# Patient Record
Sex: FEMALE | Race: WHITE | NOT HISPANIC OR LATINO | Employment: OTHER | URBAN - METROPOLITAN AREA
[De-identification: names, ages, dates, MRNs, and addresses within clinical notes are randomized per-mention and may not be internally consistent; named-entity substitution may affect disease eponyms.]

---

## 2023-05-05 ENCOUNTER — TELEPHONE (OUTPATIENT)
Dept: FAMILY MEDICINE | Facility: CLINIC | Age: 63
End: 2023-05-05
Payer: COMMERCIAL

## 2023-05-05 NOTE — TELEPHONE ENCOUNTER
Received call from patient. Will be establishing care with Dr. Bella in August and be seen for Annual Exam. Pt on controlled medication, Lyrica and will need refill prior to appointment in August.     Pt scheduled for a virtual visit to discuss medications prior to Annual.    Appointments in Next Year    May 25, 2023 11:30 AM  (Arrive by 11:10 AM)  Provider Visit with Sandie Bella MD  [facility] ([facility] ) [phone]   Aug 23, 2023  2:00 PM  (Arrive by 1:40 PM)  New - Adult Preventative Visit with Sandie Bella MD  [facility] ([facility] ) [phone]          Anastasia Arteaga RN

## 2023-05-25 ENCOUNTER — VIRTUAL VISIT (OUTPATIENT)
Dept: FAMILY MEDICINE | Facility: CLINIC | Age: 63
End: 2023-05-25
Payer: COMMERCIAL

## 2023-05-25 DIAGNOSIS — G62.9 PERIPHERAL POLYNEUROPATHY: ICD-10-CM

## 2023-05-25 DIAGNOSIS — M62.89 PELVIC FLOOR DYSFUNCTION IN FEMALE: ICD-10-CM

## 2023-05-25 DIAGNOSIS — F41.9 ANXIETY: ICD-10-CM

## 2023-05-25 DIAGNOSIS — Z12.11 SCREEN FOR COLON CANCER: ICD-10-CM

## 2023-05-25 DIAGNOSIS — Z78.0 ASYMPTOMATIC POSTMENOPAUSAL STATUS: Primary | ICD-10-CM

## 2023-05-25 DIAGNOSIS — Z13.220 SCREENING FOR HYPERLIPIDEMIA: ICD-10-CM

## 2023-05-25 DIAGNOSIS — C50.912 MALIGNANT NEOPLASM OF LEFT FEMALE BREAST, UNSPECIFIED ESTROGEN RECEPTOR STATUS, UNSPECIFIED SITE OF BREAST (H): ICD-10-CM

## 2023-05-25 PROBLEM — Z12.31 VISIT FOR SCREENING MAMMOGRAM: Status: ACTIVE | Noted: 2023-05-25

## 2023-05-25 PROCEDURE — 99204 OFFICE O/P NEW MOD 45 MIN: CPT | Mod: VID | Performed by: INTERNAL MEDICINE

## 2023-05-25 RX ORDER — PREGABALIN 75 MG/1
75 CAPSULE ORAL 3 TIMES DAILY
Qty: 180 CAPSULE | Refills: 0 | Status: SHIPPED | OUTPATIENT
Start: 2023-05-25 | End: 2023-08-23

## 2023-05-25 RX ORDER — CITALOPRAM HYDROBROMIDE 20 MG/1
20 TABLET ORAL DAILY
Qty: 90 TABLET | Refills: 3 | Status: SHIPPED | OUTPATIENT
Start: 2023-05-25 | End: 2023-11-30

## 2023-05-25 RX ORDER — CITALOPRAM HYDROBROMIDE 10 MG/1
10 TABLET ORAL DAILY
Qty: 90 TABLET | Refills: 3 | Status: SHIPPED | OUTPATIENT
Start: 2023-05-25 | End: 2023-11-30

## 2023-05-25 RX ORDER — PREGABALIN 75 MG/1
75 CAPSULE ORAL 3 TIMES DAILY
COMMUNITY
End: 2023-05-25

## 2023-05-25 RX ORDER — CALCIUM CARBONATE 500(1250)
1 TABLET ORAL 2 TIMES DAILY
COMMUNITY

## 2023-05-25 RX ORDER — CHOLECALCIFEROL (VITAMIN D3) 50 MCG
1 TABLET ORAL DAILY
COMMUNITY
End: 2024-04-30

## 2023-05-25 RX ORDER — SENNOSIDES A AND B 8.6 MG/1
1 TABLET, FILM COATED ORAL DAILY
COMMUNITY
End: 2024-04-30

## 2023-05-25 RX ORDER — CITALOPRAM HYDROBROMIDE 20 MG/1
20 TABLET ORAL DAILY
COMMUNITY
End: 2023-05-25

## 2023-05-25 RX ORDER — CITALOPRAM HYDROBROMIDE 10 MG/1
10 TABLET ORAL DAILY
COMMUNITY
End: 2023-05-25

## 2023-05-25 NOTE — PATIENT INSTRUCTIONS
You are due for dexa  Please call the following number to make appointment :  [phone]  It is located in [address]

## 2023-05-25 NOTE — PROGRESS NOTES
Lorin is a 62 year old who is being evaluated via a billable video visit.      How would you like to obtain your AVS? Mail a copy  If the video visit is dropped, the invitation should be resent by: Text to cell phone: [phone]  Will anyone else be joining your video visit? No      Assessment & Plan     Asymptomatic postmenopausal status  - DX Hip/Pelvis/Spine; Future  - PRIMARY CARE FOLLOW-UP SCHEDULING; Future    Screen for colon cancer  - Colonoscopy Screening  Referral; Future    Anxiety  Stable.  Medication refilled  - citalopram (CELEXA) 20 MG tablet; Take 1 tablet (20 mg) by mouth daily  - citalopram (CELEXA) 10 MG tablet; Take 1 tablet (10 mg) by mouth daily    Pelvic floor dysfunction in female  Refer to physical therapy for pelvic floor therapy  - Physical Therapy Referral; Future    Peripheral polyneuropathy  Stable.  Medication refilled  - pregabalin (LYRICA) 75 MG capsule; Take 1 capsule (75 mg) by mouth 3 times daily    Malignant neoplasm of left female breast, unspecified estrogen receptor status, unspecified site of breast (H)  Stable.  - Basic metabolic panel; Future  - CBC with Platelets (Today); Future    Screening for hyperlipidemia  - Lipid Profile; Future       See Patient Instructions    MARKEL CARDOZA MD  [facility]    Subjective   Lorin is a 62 year old, presenting for the following health issues:  No chief complaint on file.    HPI     Lorin is a very pleasant 62-year-old lady who had a virtual visit today to establish care with me.  She was diagnosed with breast cancer in 2006, s/p b/l mastectomy, s/l breast implants s/p chemo and radiation.     She has anxiety and is currently taking celexa 30 mg daily.   She also has neuropathy from chemotherapy.   She also had a prophylactic oophorectomy.   She reports pelvic floor pain - she gets pelvic floor therapy, possibly thinking of botox.       Review of Systems       Objective       Vitals:  No vitals were obtained  today due to virtual visit.    Physical Exam   GEN: No acute distress  RESP: No audible increased work of breathing. Patient speaking in full sentences without distress.  PSYCH: pleasant  Exam otherwise limited due to virtual platform          Video-Visit Details    Type of service:  Video Visit     Originating Location (pt. Location): Home  Distant Location (provider location):  On-site  Platform used for Video Visit: Mike

## 2023-06-12 ENCOUNTER — TELEPHONE (OUTPATIENT)
Dept: GASTROENTEROLOGY | Facility: CLINIC | Age: 63
End: 2023-06-12
Payer: COMMERCIAL

## 2023-06-12 NOTE — TELEPHONE ENCOUNTER
"Endoscopy Scheduling Screen    Have you had a positive Covid test in the last 14 days?  No    Are you active on MyChart?   Yes    What insurance is in the chart?  Other:  [facility]    Ordering/Referring Provider: Sandie Bella MD     (If ordering provider performs procedure, schedule with ordering provider unless otherwise instructed. )    BMI: There is no height or weight on file to calculate BMI.     Sedation Ordered  moderate sedation.   If patient BMI > 50 do not schedule in ASC.    Are you taking any prescription medications for pain?   Yes (RN Review required for scheduling unless scheduling in Hospital.)    Are you taking methadone or Suboxone?  No    Do you have a history of malignant hyperthermia or adverse reaction to anesthesia?  No    (Females) Are you currently pregnant?   No     Have you been diagnosed or told you have pulmonary hypertension?   No    Do you have an LVAD?  No    Have you been told you have moderate to severe sleep apnea?  Yes (RN Review required for scheduling unless scheduling in Hospital.)    Have you been told you have COPD, asthma, or any other lung disease?  No    Do you have any heart conditions?  No     Have you ever had or are you awaiting a heart or lung transplant?   No    Have you had a stroke or transient ischemic attack (TIA aka \"mini stroke\" in the last 6 months?   No    Have you been diagnosed with or been told you have cirrhosis of the liver?   No    Are you currently on dialysis?   No    Do you need assistance transferring?   No    BMI: There is no height or weight on file to calculate BMI.     Is patients BMI > 40 and scheduling location [facility]?  No    Do you take the medication Phentermine, Ozempic or Wegovy?  No    Do you take the medication Naltrexone?  No    Do you take blood thinners?  No    Preferred Pharmacy:    [facility] [address] [place] [facility] [address] [address] [address] [facility] [address]  [address] [address]  Phone: [phone] " Fax: [phone]      Prep   Are you currently on dialysis or do you have chronic kidney disease?  No (standard prep)    Do you have a diagnosis of diabetes?  No    Do you have a diagnosis of cystic fibrosis (CF)?  No    On a regular basis do you go 3 -5 days between bowel movements?  Yes (Extended Prep)    BMI > 40?  No    Final Scheduling Details   Colonoscopy prep sent?  Golytely Extended Prep    Procedure scheduled  COLONOSCOPY    Surgeon:  MAYO     Date of procedure:  8/16     Schedule PAC:   No    Location  [facility]    Sedation   Moderate Sedation    Patient Reminders:    You will receive a call from a Nurse to review instructions and health history.  This assessment must be completed prior to your procedure.  Failure to complete the Nurse assessment may result in the procedure being cancelled.       On the day of your procedure, please designate an adult(s) who can drive you home stay with you for the next 24 hours. The medicines used in the exam will make you sleepy. You will not be able to drive.       You cannot take public transportation, ride share services, or non-medical taxi service without a responsible caregiver.  Medical transport services are allowed with the requirement that a responsible caregiver will receive you at your destination.  We require that drivers and caregivers are confirmed prior to your procedure.

## 2023-06-18 ENCOUNTER — HEALTH MAINTENANCE LETTER (OUTPATIENT)
Age: 63
End: 2023-06-18

## 2023-07-31 ENCOUNTER — TELEPHONE (OUTPATIENT)
Dept: FAMILY MEDICINE | Facility: CLINIC | Age: 63
End: 2023-07-31

## 2023-07-31 NOTE — TELEPHONE ENCOUNTER
Patient Quality Outreach    Patient is due for the following:   Colon Cancer Screening    Next Steps:   No follow up needed at this time.    Type of outreach:    Sent Tappx message.      Questions for provider review:    None           Meghana Latif CMA

## 2023-08-10 RX ORDER — BISACODYL 5 MG/1
TABLET, DELAYED RELEASE ORAL
Qty: 4 TABLET | Refills: 0 | Status: SHIPPED | OUTPATIENT
Start: 2023-08-10 | End: 2023-08-23

## 2023-08-14 ENCOUNTER — HOSPITAL ENCOUNTER (OUTPATIENT)
Dept: BONE DENSITY | Facility: CLINIC | Age: 63
Discharge: HOME | End: 2023-08-14
Attending: INTERNAL MEDICINE | Admitting: INTERNAL MEDICINE
Payer: COMMERCIAL

## 2023-08-14 ENCOUNTER — LAB (OUTPATIENT)
Dept: LAB | Facility: CLINIC | Age: 63
End: 2023-08-14
Payer: COMMERCIAL

## 2023-08-14 DIAGNOSIS — C50.912 MALIGNANT NEOPLASM OF LEFT FEMALE BREAST, UNSPECIFIED ESTROGEN RECEPTOR STATUS, UNSPECIFIED SITE OF BREAST (H): ICD-10-CM

## 2023-08-14 DIAGNOSIS — Z78.0 ASYMPTOMATIC POSTMENOPAUSAL STATUS: ICD-10-CM

## 2023-08-14 DIAGNOSIS — Z13.220 SCREENING FOR HYPERLIPIDEMIA: ICD-10-CM

## 2023-08-14 LAB
ANION GAP SERPL CALCULATED.3IONS-SCNC: 10 MMOL/L (ref 7–15)
BUN SERPL-MCNC: 12.7 MG/DL (ref 8–23)
CALCIUM SERPL-MCNC: 9.3 MG/DL (ref 8.8–10.2)
CHLORIDE SERPL-SCNC: 104 MMOL/L (ref 98–107)
CHOLEST SERPL-MCNC: 188 MG/DL
CREAT SERPL-MCNC: 0.61 MG/DL (ref 0.51–0.95)
DEPRECATED HCO3 PLAS-SCNC: 26 MMOL/L (ref 22–29)
ERYTHROCYTE [DISTWIDTH] IN BLOOD BY AUTOMATED COUNT: 12.2 % (ref 10–15)
GFR SERPL CREATININE-BSD FRML MDRD: >90 ML/MIN/1.73M2
GLUCOSE SERPL-MCNC: 92 MG/DL (ref 70–99)
HCT VFR BLD AUTO: 37.9 % (ref 35–47)
HDLC SERPL-MCNC: 60 MG/DL
HGB BLD-MCNC: 12.3 G/DL (ref 11.7–15.7)
LDLC SERPL CALC-MCNC: 106 MG/DL
MCH RBC QN AUTO: 30.9 PG (ref 26.5–33)
MCHC RBC AUTO-ENTMCNC: 32.5 G/DL (ref 31.5–36.5)
MCV RBC AUTO: 95 FL (ref 78–100)
NONHDLC SERPL-MCNC: 128 MG/DL
PLATELET # BLD AUTO: 187 10E3/UL (ref 150–450)
POTASSIUM SERPL-SCNC: 4.4 MMOL/L (ref 3.4–5.3)
RBC # BLD AUTO: 3.98 10E6/UL (ref 3.8–5.2)
SODIUM SERPL-SCNC: 140 MMOL/L (ref 136–145)
TRIGL SERPL-MCNC: 111 MG/DL
WBC # BLD AUTO: 3.7 10E3/UL (ref 4–11)

## 2023-08-14 PROCEDURE — 85027 COMPLETE CBC AUTOMATED: CPT

## 2023-08-14 PROCEDURE — 36415 COLL VENOUS BLD VENIPUNCTURE: CPT

## 2023-08-14 PROCEDURE — 77080 DXA BONE DENSITY AXIAL: CPT

## 2023-08-14 PROCEDURE — 80061 LIPID PANEL: CPT

## 2023-08-14 PROCEDURE — 80048 BASIC METABOLIC PNL TOTAL CA: CPT

## 2023-08-16 ENCOUNTER — HOSPITAL ENCOUNTER (OUTPATIENT)
Facility: CLINIC | Age: 63
Discharge: HOME | End: 2023-08-16
Attending: COLON & RECTAL SURGERY | Admitting: COLON & RECTAL SURGERY
Payer: COMMERCIAL

## 2023-08-16 VITALS
RESPIRATION RATE: 15 BRPM | DIASTOLIC BLOOD PRESSURE: 40 MMHG | OXYGEN SATURATION: 96 % | HEIGHT: 65 IN | BODY MASS INDEX: 22.49 KG/M2 | WEIGHT: 135 LBS | SYSTOLIC BLOOD PRESSURE: 90 MMHG | HEART RATE: 71 BPM

## 2023-08-16 DIAGNOSIS — Z12.11 ENCOUNTER FOR SCREENING COLONOSCOPY: Primary | ICD-10-CM

## 2023-08-16 LAB — COLONOSCOPY: NORMAL

## 2023-08-16 PROCEDURE — 45378 DIAGNOSTIC COLONOSCOPY: CPT | Performed by: COLON & RECTAL SURGERY

## 2023-08-16 PROCEDURE — G0500 MOD SEDAT ENDO SERVICE >5YRS: HCPCS | Performed by: COLON & RECTAL SURGERY

## 2023-08-16 PROCEDURE — 250N000011 HC RX IP 250 OP 636: Performed by: COLON & RECTAL SURGERY

## 2023-08-16 PROCEDURE — G0121 COLON CA SCRN NOT HI RSK IND: HCPCS | Performed by: COLON & RECTAL SURGERY

## 2023-08-16 RX ORDER — FENTANYL CITRATE 50 UG/ML
INJECTION, SOLUTION INTRAMUSCULAR; INTRAVENOUS PRN
Status: DISCONTINUED | OUTPATIENT
Start: 2023-08-16 | End: 2023-08-16 | Stop reason: HOSPADM

## 2023-08-16 RX ORDER — ONDANSETRON 2 MG/ML
4 INJECTION INTRAMUSCULAR; INTRAVENOUS
Status: CANCELLED | OUTPATIENT
Start: 2023-08-16

## 2023-08-16 RX ORDER — LIDOCAINE 40 MG/G
CREAM TOPICAL
Status: CANCELLED | OUTPATIENT
Start: 2023-08-16

## 2023-08-16 ASSESSMENT — ACTIVITIES OF DAILY LIVING (ADL): ADLS_ACUITY_SCORE: 35

## 2023-08-16 NOTE — H&P
Colon & Rectal Surgery History and Physical  Pre-Endoscopy Procedure Note    History of Present Illness   I have been asked by Dr. Bella to evaluate this 62 year old female for colorectal cancer screening. She currently denies any abdominal pain, weight loss, bleeding per rectum, or recent change in bowel habits.    Past Medical History  Diagnosis Date    Cancer      Sleep apnea        Past Surgical History  Procedure Laterality Date    BREAST SURGERY      Double mastectomy        Medications  Medications Prior to Admission   Medication Sig    calcium carbonate (OS-JULIETTE) 500 MG tablet Take 1 tablet by mouth 2 times daily    citalopram (CELEXA) 10 MG tablet Take 1 tablet (10 mg) by mouth daily    citalopram (CELEXA) 20 MG tablet Take 1 tablet (20 mg) by mouth daily    pregabalin (LYRICA) 75 MG capsule Take 1 capsule (75 mg) by mouth 3 times daily    senna (SENOKOT) 8.6 MG tablet Take 1 tablet by mouth daily    vitamin B-12 (CYANOCOBALAMIN) 100 MCG tablet Take 1,000 mcg by mouth daily    vitamin D3 (CHOLECALCIFEROL) 50 mcg (2000 units) tablet Take 1 tablet by mouth daily       Allergies   No Known Allergies     Family History   Family history includes Arrhythmia in her sister; Coronary Artery Disease in her mother.     Social History   She reports that she has never smoked. She has never used smokeless tobacco. She reports current alcohol use. She reports that she does not use drugs.    Review of Systems   Constitutional:  No fever, weight change or fatigue.    Eyes:     No dry eyes or vision changes.   Ears/Nose/Throat/Neck:  No oral ulcers, sore throat or voice change.    Cardiovascular:   No palpitations, syncope, angina or edema.   Respiratory:    No chest pain, excessive sleepiness, shortness of breath or hemoptysis.    Gastrointestinal:   No abdominal pain, nausea, vomiting, diarrhea or heartburn.    Genitourinary:   No dysuria, hematuria, urinary retention or urinary frequency.   Musculoskeletal:  No joint  "swelling or arthralgias.    Dermatologic:  No skin rash or other skin changes.   Neurologic:    No focal weakness or numbness. No neuropathy.   Psychiatric:    No depression, anxiety, suicidal ideation, or paranoid ideation.   Endocrine:   No cold or heat intolerance, polydipsia, hirsutism, change in libido, or flushing.   Hematology/Lymphatic:  No bleeding or lymphadenopathy.    Allergy/Immunology:  No rhinitis or hives.     Physical Exam   Vitals:  Blood pressure 109/54, resp. rate 14, height 1.651 m (5' 5\"), weight 61.2 kg (135 lb), SpO2 97 %.    General:  Alert and oriented to person, place and time   Airway: Normal oropharyngeal airway and neck mobility   Lungs:  Clear bilaterally   Heart:  Regular rate and rhythm   Abdomen: Soft, NT, ND, no masses   Extremities: Warm, good capillary refill    ASA Grade: II (mild systemic disease)    Impression: Cleared for use of conscious sedation for colorectal cancer screening    Plan: Proceed with colonoscopy     Ana Paula Hurst MD  [facility]  [phone]  "

## 2023-08-17 ENCOUNTER — TRANSFERRED RECORDS (OUTPATIENT)
Dept: HEALTH INFORMATION MANAGEMENT | Facility: CLINIC | Age: 63
End: 2023-08-17

## 2023-08-23 ENCOUNTER — OFFICE VISIT (OUTPATIENT)
Dept: FAMILY MEDICINE | Facility: CLINIC | Age: 63
End: 2023-08-23
Payer: COMMERCIAL

## 2023-08-23 VITALS
HEART RATE: 83 BPM | TEMPERATURE: 97.9 F | WEIGHT: 134.3 LBS | OXYGEN SATURATION: 97 % | SYSTOLIC BLOOD PRESSURE: 90 MMHG | DIASTOLIC BLOOD PRESSURE: 57 MMHG | BODY MASS INDEX: 22.38 KG/M2 | HEIGHT: 65 IN | RESPIRATION RATE: 16 BRPM

## 2023-08-23 DIAGNOSIS — G62.9 PERIPHERAL POLYNEUROPATHY: ICD-10-CM

## 2023-08-23 DIAGNOSIS — S32.040S CLOSED COMPRESSION FRACTURE OF L4 VERTEBRA, SEQUELA: ICD-10-CM

## 2023-08-23 DIAGNOSIS — Z00.00 ANNUAL PHYSICAL EXAM: Primary | ICD-10-CM

## 2023-08-23 DIAGNOSIS — Z23 NEED FOR VACCINATION: ICD-10-CM

## 2023-08-23 DIAGNOSIS — C50.912 MALIGNANT NEOPLASM OF LEFT FEMALE BREAST, UNSPECIFIED ESTROGEN RECEPTOR STATUS, UNSPECIFIED SITE OF BREAST (H): ICD-10-CM

## 2023-08-23 DIAGNOSIS — K59.00 CONSTIPATION, UNSPECIFIED CONSTIPATION TYPE: ICD-10-CM

## 2023-08-23 PROCEDURE — 99214 OFFICE O/P EST MOD 30 MIN: CPT | Mod: 25 | Performed by: INTERNAL MEDICINE

## 2023-08-23 PROCEDURE — 90471 IMMUNIZATION ADMIN: CPT | Performed by: INTERNAL MEDICINE

## 2023-08-23 PROCEDURE — 99396 PREV VISIT EST AGE 40-64: CPT | Mod: 25 | Performed by: INTERNAL MEDICINE

## 2023-08-23 PROCEDURE — 90715 TDAP VACCINE 7 YRS/> IM: CPT | Performed by: INTERNAL MEDICINE

## 2023-08-23 RX ORDER — POLYETHYLENE GLYCOL 1450
17 POWDER (GRAM) MISCELLANEOUS DAILY PRN
COMMUNITY

## 2023-08-23 RX ORDER — PREGABALIN 75 MG/1
75 CAPSULE ORAL 2 TIMES DAILY
Qty: 180 CAPSULE | Refills: 0 | Status: SHIPPED | OUTPATIENT
Start: 2023-08-23 | End: 2023-11-30

## 2023-08-23 NOTE — NURSING NOTE
Prior to immunization administration, verified patients identity using patient s name and date of birth. Please see Immunization Activity for additional information.     Screening Questionnaire for Adult Immunization    Are you sick today?   No   Do you have allergies to medications, food, a vaccine component or latex?   No   Have you ever had a serious reaction after receiving a vaccination?   No   Do you have a long-term health problem with heart, lung, kidney, or metabolic disease (e.g., diabetes), asthma, a blood disorder, no spleen, complement component deficiency, a cochlear implant, or a spinal fluid leak?  Are you on long-term aspirin therapy?   No   Do you have cancer, leukemia, HIV/AIDS, or any other immune system problem?   No   Do you have a parent, brother, or sister with an immune system problem?   No   In the past 3 months, have you taken medications that affect  your immune system, such as prednisone, other steroids, or anticancer drugs; drugs for the treatment of rheumatoid arthritis, Crohn s disease, or psoriasis; or have you had radiation treatments?   No   Have you had a seizure, or a brain or other nervous system problem?   No   During the past year, have you received a transfusion of blood or blood    products, or been given immune (gamma) globulin or antiviral drug?   No   For women: Are you pregnant or is there a chance you could become       pregnant during the next month?   No   Have you received any vaccinations in the past 4 weeks?   No     Immunization questionnaire answers were all negative.      Patient instructed to remain in clinic for 15 minutes afterwards, and to report any adverse reactions.     Screening performed by Jenny Rojas MA on 8/23/2023 at 3:43 PM.

## 2023-08-23 NOTE — PROGRESS NOTES
"   SUBJECTIVE:   CC: Lorin is an 62 year old who presents for preventive health visit.     Healthy Habits:     Getting at least 3 servings of Calcium per day:  Yes    Bi-annual eye exam:  NO    Dental care twice a year:  Yes    Sleep apnea or symptoms of sleep apnea:  Sleep apnea    Diet:  Regular (no restrictions)    Frequency of exercise:  6-7 days/week    Duration of exercise:  30-45 minutes    Taking medications regularly:  Yes    Barriers to taking medications:  None    Medication side effects:  None    Additional concerns today:  Yes      Breast cancer s/p bilateral mastectomy     Have you ever done Advance Care Planning? (For example, a Health Directive, POLST, or a discussion with a medical provider or your loved ones about your wishes): Yes, patient states has an Advance Care Planning document and will bring a copy to the clinic.    Social History     Tobacco Use    Smoking status: Never    Smokeless tobacco: Never   Substance Use Topics    Alcohol use: Yes     Comment: 1-2 times a month       Reviewed orders with patient.  Reviewed health maintenance and updated orders accordingly - Yes  Lab work is in process    Breast Cancer Screening:  Any new diagnosis of family breast, ovarian, or bowel cancer? Yes     FHS-7:   Mammogram Screening: Recommended mammography every 1-2 years with patient discussion and risk factor consideration  Pertinent mammograms are reviewed under the imaging tab.    History of abnormal Pap smear: NO - age 30-65 PAP every 5 years with negative HPV co-testing recommended     Reviewed and updated as needed this visit by clinical staff   Tobacco  Allergies  Meds              Reviewed and updated as needed this visit by Provider                 Review of Systems   All other systems reviewed and are negative.    OBJECTIVE:   BP 90/57 (BP Location: Right arm, Patient Position: Sitting, Cuff Size: Adult Regular)   Pulse 83   Temp 97.9  F (36.6  C) (Oral)   Resp 16   Ht 1.651 m (5' 5\")  "  Wt 60.9 kg (134 lb 4.8 oz)   SpO2 97%   BMI 22.35 kg/m    Physical Exam  Vitals reviewed.   Constitutional:       Appearance: Normal appearance.   HENT:      Right Ear: Tympanic membrane normal. There is no impacted cerumen.      Left Ear: Tympanic membrane normal. There is no impacted cerumen.      Mouth/Throat:      Mouth: Mucous membranes are moist.      Pharynx: Oropharynx is clear. No oropharyngeal exudate or posterior oropharyngeal erythema.   Cardiovascular:      Rate and Rhythm: Normal rate and regular rhythm.      Heart sounds: Normal heart sounds. No murmur heard.     No gallop.   Pulmonary:      Effort: Pulmonary effort is normal. No respiratory distress.      Breath sounds: Normal breath sounds. No stridor. No wheezing, rhonchi or rales.   Chest:   Breasts:     Right: No swelling, bleeding, inverted nipple, mass, nipple discharge, skin change or tenderness.      Left: No swelling, bleeding, inverted nipple, mass, nipple discharge, skin change or tenderness.      Comments: Bilateral mastectomy.  Bilateral breast implants present.  Abdominal:      General: Abdomen is flat. There is no distension.      Palpations: Abdomen is soft. There is no mass.      Tenderness: There is no abdominal tenderness. There is no guarding.      Hernia: No hernia is present.   Musculoskeletal:         General: Normal range of motion.      Cervical back: Normal range of motion and neck supple. No rigidity or tenderness.      Right lower leg: No edema.      Left lower leg: No edema.   Lymphadenopathy:      Cervical: No cervical adenopathy.   Skin:     General: Skin is warm and dry.   Neurological:      General: No focal deficit present.      Mental Status: She is alert.   Psychiatric:         Mood and Affect: Mood normal.         ASSESSMENT/PLAN:   Lorin was seen today for physical.    Diagnoses and all orders for this visit:    Annual physical exam  Lab work reviewed.  Within normal limits.    Peripheral  polyneuropathy  Stable.  Medication refilled  -     pregabalin (LYRICA) 75 MG capsule; Take 1 capsule (75 mg) by mouth 2 times daily    Constipation, unspecified constipation type  She has constipation and bloating.  Discussed to start taking a probiotic daily and use MiraLAX as needed    Closed compression fracture of L4 vertebra, sequela  She is requesting physical therapy for her previous compression fracture of the L4 vertebra  -     Physical Therapy Referral; Future    Malignant neoplasm of left female breast, unspecified estrogen receptor status, unspecified site of breast (H)  Stable.  Exam within normal limits status post bilateral mastectomy    Need for vaccination  Tdap    Other orders  -     PRIMARY CARE FOLLOW-UP SCHEDULING; Future  -     TDAP VACCINE (Adacel, Boostrix)        Patient has been advised of split billing requirements and indicates understanding: Yes      COUNSELING:  Reviewed preventive health counseling, as reflected in patient instructions       Healthy diet/nutrition        She reports that she has never smoked. She has never used smokeless tobacco.          Sandie Bella MD  [facility]

## 2023-11-30 DIAGNOSIS — F41.9 ANXIETY: ICD-10-CM

## 2023-11-30 DIAGNOSIS — G62.9 PERIPHERAL POLYNEUROPATHY: ICD-10-CM

## 2023-11-30 RX ORDER — CITALOPRAM HYDROBROMIDE 10 MG/1
10 TABLET ORAL DAILY
Qty: 90 TABLET | Refills: 0 | Status: SHIPPED | OUTPATIENT
Start: 2023-11-30 | End: 2024-02-26

## 2023-11-30 RX ORDER — PREGABALIN 75 MG/1
75 CAPSULE ORAL 2 TIMES DAILY
Qty: 180 CAPSULE | Refills: 0 | Status: SHIPPED | OUTPATIENT
Start: 2023-11-30 | End: 2024-02-27

## 2023-11-30 RX ORDER — CITALOPRAM HYDROBROMIDE 20 MG/1
20 TABLET ORAL DAILY
Qty: 90 TABLET | Refills: 0 | Status: SHIPPED | OUTPATIENT
Start: 2023-11-30 | End: 2024-02-26

## 2023-11-30 NOTE — TELEPHONE ENCOUNTER
Pt called needs 90 day on Rxs sent to her [place] pharmacy     Celexa Rx approved     Pended Rx for Cody JONAS Triage RN  [facility]

## 2024-02-16 ENCOUNTER — TRANSFERRED RECORDS (OUTPATIENT)
Dept: HEALTH INFORMATION MANAGEMENT | Facility: CLINIC | Age: 64
End: 2024-02-16

## 2024-02-22 ENCOUNTER — TRANSFERRED RECORDS (OUTPATIENT)
Dept: HEALTH INFORMATION MANAGEMENT | Facility: CLINIC | Age: 64
End: 2024-02-22
Payer: COMMERCIAL

## 2024-02-23 ENCOUNTER — TRANSCRIBE ORDERS (OUTPATIENT)
Dept: OTHER | Age: 64
End: 2024-02-23

## 2024-02-23 ENCOUNTER — TELEPHONE (OUTPATIENT)
Dept: UROLOGY | Facility: CLINIC | Age: 64
End: 2024-02-23
Payer: COMMERCIAL

## 2024-02-23 DIAGNOSIS — N28.89 PELVICALIECTASIS: Primary | ICD-10-CM

## 2024-02-23 NOTE — TELEPHONE ENCOUNTER
[facility] Call Center    Phone Message    May a detailed message be left on voicemail: yes     Reason for Call: Other: Patient being referred for Urgent Appointment (3-5 days) for Pelvicaliectasis. This Dx is not listed in our guidelines for scheduling. Sending encounter message for clinic review and follow-up with Pt for scheduling. Thank you!     Action Taken: Message routed to:  [facility] URO    Travel Screening: Not Applicable

## 2024-02-26 DIAGNOSIS — G62.9 PERIPHERAL POLYNEUROPATHY: ICD-10-CM

## 2024-02-26 DIAGNOSIS — F41.9 ANXIETY: ICD-10-CM

## 2024-02-26 RX ORDER — CITALOPRAM HYDROBROMIDE 20 MG/1
20 TABLET ORAL DAILY
Qty: 90 TABLET | Refills: 0 | Status: SHIPPED | OUTPATIENT
Start: 2024-02-26 | End: 2024-05-30

## 2024-02-26 RX ORDER — CITALOPRAM HYDROBROMIDE 10 MG/1
10 TABLET ORAL DAILY
Qty: 90 TABLET | Refills: 0 | Status: SHIPPED | OUTPATIENT
Start: 2024-02-26 | End: 2024-05-30

## 2024-02-26 NOTE — TELEPHONE ENCOUNTER
Prescription approved per [facility] Refill Protocol.  Carlota Holt RN  [facility]

## 2024-02-27 RX ORDER — PREGABALIN 75 MG/1
75 CAPSULE ORAL 2 TIMES DAILY
Qty: 180 CAPSULE | Refills: 0 | Status: SHIPPED | OUTPATIENT
Start: 2024-02-27 | End: 2024-06-03

## 2024-02-29 NOTE — TELEPHONE ENCOUNTER
Action 2024 JTV 8:39 AM    Action Taken CSS called patient. Patient did not . [facility] LVM for patient to return call.      CSS called [facility] in [place], [phone].  Dr Smith's  did not . [facility] LVM for a return call. Message was sent to provider with update.    MEDICAL RECORDS REQUEST   [facility]  Urology Clinic  [address] [place] [address]  PHONE: [phone]  Fax: [phone]        FUTURE VISIT INFORMATION                                                   Lorin MATT Raymond, : 1960 scheduled for future visit at [facility]    APPOINTMENT INFORMATION:  Date: 2024  Provider:  Diana Lovell CNP  Reason for Visit/Diagnosis: Pelvicaliectasis    REFERRAL INFORMATION:  Referring provider:   Dr. Mikie Smith @ [facility]      RECORDS REQUESTED FOR VISIT                                                     NOTES  STATUS/DETAILS   OFFICE NOTE from referring provider Media tab Yes, Dr. Mikie Smith @ [facility]   OFFICE NOTE from other specialist  YES, 2022 -- Teresa Holland MD @ [facility]   MEDICATION LIST  yes   IMAGES  YES, 2023 -- DX HIP PELVIS SPINE     PRE-VISIT CHECKLIST      Joint diagnostic appointment coordinated correctly          (ensure right order & amount of time) Yes   RECORD COLLECTION COMPLETE YES

## 2024-03-12 ENCOUNTER — PRE VISIT (OUTPATIENT)
Dept: UROLOGY | Facility: CLINIC | Age: 64
End: 2024-03-12

## 2024-03-12 ENCOUNTER — TELEPHONE (OUTPATIENT)
Dept: UROLOGY | Facility: CLINIC | Age: 64
End: 2024-03-12

## 2024-03-12 NOTE — TELEPHONE ENCOUNTER
Pt lives in [place] and [place] seasonally, pt is currently still living in [place]. Pt was unaware about needing to be in [place] for visit. Pt states that they will be back in [place] on 3/26. Pt is interested in possible having an inperson appt if possible    Patient needs to be rescheduled for their virtual visit due to Reason for Reschedule: Out-of-State    Appointment mode: Video  Provider: Diana Lovell CNP

## 2024-04-04 ENCOUNTER — TELEPHONE (OUTPATIENT)
Dept: UROLOGY | Facility: CLINIC | Age: 64
End: 2024-04-04
Payer: COMMERCIAL

## 2024-04-04 ENCOUNTER — PATIENT OUTREACH (OUTPATIENT)
Dept: UROLOGY | Facility: CLINIC | Age: 64
End: 2024-04-04
Payer: COMMERCIAL

## 2024-04-04 NOTE — TELEPHONE ENCOUNTER
Writer spoke with pt and asked that pt bring in disk before appt. Pt confirmed that she can bring in disk tomorrow morning (4/5).       Mey DREW  April 4, 2024   3:48 PM

## 2024-04-04 NOTE — TELEPHONE ENCOUNTER
[facility] Call Center    Phone Message    May a detailed message be left on voicemail: no     Reason for Call: Patient has an MRI disk, and is wondering if she can provide it to us so it is available for her appointment with Diana Lovell tomorrow. Thanks!    Action Taken: Message routed to:  [facility] Urology    Travel Screening: Not Applicable

## 2024-04-04 NOTE — PROGRESS NOTES
Call placed to patient re: her MRI disc. She will bring it in the morning in order for us to get it downloaded before her 3 pm appointment.    Thank you,  Jia Holland RN, BSN Urology Triage

## 2024-04-05 ENCOUNTER — VIRTUAL VISIT (OUTPATIENT)
Dept: UROLOGY | Facility: CLINIC | Age: 64
End: 2024-04-05
Payer: COMMERCIAL

## 2024-04-05 DIAGNOSIS — N20.0 KIDNEY STONE: Primary | ICD-10-CM

## 2024-04-05 DIAGNOSIS — M54.9 BACK PAIN, UNSPECIFIED BACK LOCATION, UNSPECIFIED BACK PAIN LATERALITY, UNSPECIFIED CHRONICITY: ICD-10-CM

## 2024-04-05 PROCEDURE — 99203 OFFICE O/P NEW LOW 30 MIN: CPT | Mod: 95 | Performed by: NURSE PRACTITIONER

## 2024-04-05 ASSESSMENT — PAIN SCALES - GENERAL: PAINLEVEL: MODERATE PAIN (4)

## 2024-04-05 NOTE — NURSING NOTE
Is the patient currently in the state of [place]? YES    Visit mode:VIDEO    If the visit is dropped, the patient can be reconnected by: VIDEO VISIT: Send to e-mail at: [email or web address]    Will anyone else be joining the visit? NO  (If patient encounters technical issues they should call [phone]    How would you like to obtain your AVS? MyChart    Are changes needed to the allergy or medication list? No    Are refills needed on medications prescribed by this physician?     Reason for visit: Consult    Lala BRAVO

## 2024-04-05 NOTE — PATIENT INSTRUCTIONS
UROLOGY CLINIC VISIT PATIENT INSTRUCTIONS    Will obtain the CT scan to check on your kidney stones. I will be in touch with you regarding results, once complete.     See the below information regarding the ureteroscopy procedure that could be used to treat kidney stones.     If you have any issues, questions or concerns in the meantime, do not hesitate to contact us at [phone] or via [employer].     Diana Lovell, CNP  Department of Urology        Ureteroscopy     Ureteroscopy is a procedure which is done for clearance of stones from the ureter, kidney or both. There are no incisions involved. The procedure involves your surgeon placing a small scope into your urethra. This is the opening where urine leaves your body.  The surgeon watches as they carefully guide the scope to the stone(s).  Modern flexible ureteroscopes can be used to reach virtually any location within the urinary tract.      The size, shape and location of the stone determines how best to treat the stone(s).  Whenever possible, stones are removed in one piece.  Larger stones need to be broken using a laser before removing in smaller pieces.  The goal is to remove all stones and stone fragments from that side of the body in a single treatment.  Complete stone clearance is an important step to prevent future kidney stone episodes.     Surgery:  Same day outpatient procedure  30-60 minutes  Procedure done in hospital surgical suite  General anesthesia (you will be asleep during the procedure)   Antibiotic prior to surgery to prevent infection  Physician will visit with you and respond to any questions or concerns and consent will be signed prior to going to the operating room     Risks:  Infection - Preoperative antibiotics should prevent new infections but it is possible that unanticipated bacteria may be introduced at time of surgery or that the stones were actually chronically infected before surgery     Injury - The ureter may be injured  during this procedure.  This is most likely to happen if the ureter was very inflamed before surgery or if a stone is very tightly impacted.  The surgeon will not aggressively treat a stone if this creates a risk of injury.       Inaccessible Stones -A single procedure is effective in 95% of cases, but if your ureter is very narrow or your kidney stone is very impacted, a stent will be placed and the procedure stopped.  In 1-2 weeks after the ureter has relaxed, the patient will be brought back to surgery and the procedure can be safely performed.     Incomplete stone clearance -Occasionally stone or stone fragments may not be completely cleared.  These may pass on their own, which may cause discomfort.  Our goal is to remove all possible stones and fragments.     Stent:     An internal soft tube will be placed between the kidney and the bladder while in surgery (after the stone is cleared). The stent will keep the kidney draining.     What should I expect?     It is common for a stent to cause some irritation and discomfort.   You may have:     The need to urinate suddenly   The need to urinate often   Pain during urination   A dull backache, which may get worse during urination   Blood stained urine (like fruit punch) and occasional small clots     It s important to remember the stent is necessary and only temporary. To feel more comfortable:     Drink more than you normally would but you do not have to constantly  flush your kidneys   Limiting your activity may decrease irritation or bleeding  Ibuprofen - 2 tablets every 6-8 hours   Use pain medications as directed.     When is the stent removed?     Most stents are removed within 5 days to 2 weeks after a procedure.      How is the stent removed?      Your stent will be removed in the [facility] with a small telescope and a grasping tool.  It usually takes less than 1 minute to remove the stent.     What should I expect after the stent is removed?       You should feel normal by the next day     Some patients find:  An increase in back pain about an hour after the stent is removed as the kidney fills up with urine before it starts to empty.  It can be as uncomfortable as your initial stone episode.  Taking pain medications before stent removal may be helpful, but you would need someone else to drive you to and from your appointment.  Bladder symptoms usually disappear by the next morning.  Small amounts of blood in the urine may be seen occasionally for up to a week.     Diet:     After surgery, there are no dietary restrictions - Drink to thirst, there is no need to increase intake of fluids, as this may increase nausea symptoms. Try to eat smaller, more frequent snacks, instead of large meals.     Activity:  Many people return to work within 1-2 days. Fatigue is normal for a couple of weeks following surgery. With increased activity you may experience more discomfort and you may notice more blood in your urine.        One of our kidney stone surgeons, Dr. Bynum, also has a helpful website that includes information about ureteroscopy. There are videos there of him explaining this procedure and also a video of the actual procedure itself.     [email or web address]

## 2024-04-05 NOTE — LETTER
4/5/2024       RE: Lorin Andrade  [address]  [facility] [address]     Dear Colleague,    Thank you for referring your patient, Lorin Andrade, to the [facility] [place] at [facility]. Please see a copy of my visit note below.    Virtual Visit Details    Type of service:  Video Visit   Originating Location (pt. Location): Home  Distant Location (provider location):  Off-site  Platform used for Video Visit: [employer]    Video start time: 2:51 PM  Video end time: 3:14 PM    CC: Kidney stones    Assessment/Plan:  63 year old female with significant pelvic floor and back pain. Recent MRI by her orthopedic provider reportedly showed kidney stones, though these results have not yet made it into her chart for personal review today. We discussed that MRI does not give the best view of urinary tract stones and therefore recommend a CT to evaluate this further, which she agrees with. We briefly discussed treatment of stones with ureteroscopy if she is found to have large, obstructing stones that could be contributing to her pain.   -CT A/P now. Will be in touch with her regarding results.     Diana Lovell, CNP  Department of Urology      Subjective:   63 year old female with PMH of breast cancer who presents for virtual consult regarding kidney stones, referred by her orthopedic provider. These were identified on MRI that was obtained last month through [facility]. While these were results are on a disk that she dropped off to the clinic this morning, these have unfortunately not made it into her chart yet for my review.     She states that five years ago, she noticed some difficulty with voiding and was having frequency. She tried to train herself not to go as often. Sometimes needs to hum to void. A lot of pelvic floor pain to the point where she could barely walk by the end of the day. She saw pelvic floor PT, who did interval  massage and she has been doing this now ever since, for the past 3-4 years. This continues to be difficult for her if she walks uphill or stands for a prolonged period of time.    She otherwise denies any blood or obvious debris in her urine.     History of kidney stones in her brother.    Objective:     Exam:   Patient is a 63 year old female   No vital signs obtained due to virtual visit.  General Appearance: Well groomed, hygenic  Respiratory: No cough, no respiratory distress or labored breathing  Musculoskeletal: Grossly normal with no gross deficits  Skin: No discoloration or apparent rashes  Neurologic: No tremors  Psychiatric: Alert and oriented  Further examination is deferred due to the nature of our visit.

## 2024-04-05 NOTE — PROGRESS NOTES
Virtual Visit Details    Type of service:  Video Visit   Originating Location (pt. Location): Home  Distant Location (provider location):  Off-site  Platform used for Video Visit: [employer]    Video start time: 2:51 PM  Video end time: 3:14 PM    CC: Kidney stones    Assessment/Plan:  63 year old female with significant pelvic floor and back pain. Recent MRI by her orthopedic provider reportedly showed kidney stones, though these results have not yet made it into her chart for personal review today. We discussed that MRI does not give the best view of urinary tract stones and therefore recommend a CT to evaluate this further, which she agrees with. We briefly discussed treatment of stones with ureteroscopy if she is found to have large, obstructing stones that could be contributing to her pain.   -CT A/P now. Will be in touch with her regarding results.     Diana Lovell, CNP  Department of Urology      Subjective:   63 year old female with PMH of breast cancer who presents for virtual consult regarding kidney stones, referred by her orthopedic provider. These were identified on MRI that was obtained last month through [facility]. While these were results are on a disk that she dropped off to the clinic this morning, these have unfortunately not made it into her chart yet for my review.     She states that five years ago, she noticed some difficulty with voiding and was having frequency. She tried to train herself not to go as often. Sometimes needs to hum to void. A lot of pelvic floor pain to the point where she could barely walk by the end of the day. She saw pelvic floor PT, who did interval massage and she has been doing this now ever since, for the past 3-4 years. This continues to be difficult for her if she walks uphill or stands for a prolonged period of time.    She otherwise denies any blood or obvious debris in her urine.     History of kidney stones in her brother.    Objective:     Exam:    Patient is a 63 year old female   No vital signs obtained due to virtual visit.  General Appearance: Well groomed, hygenic  Respiratory: No cough, no respiratory distress or labored breathing  Musculoskeletal: Grossly normal with no gross deficits  Skin: No discoloration or apparent rashes  Neurologic: No tremors  Psychiatric: Alert and oriented  Further examination is deferred due to the nature of our visit.

## 2024-04-08 NOTE — TELEPHONE ENCOUNTER
Imaging disc was uploaded 4/5. Disc send back to pt via [employer] #[id number].       Mey DREW  April 8, 2024   12:38 PM

## 2024-04-10 ENCOUNTER — HOSPITAL ENCOUNTER (OUTPATIENT)
Dept: CT IMAGING | Facility: CLINIC | Age: 64
Discharge: HOME | End: 2024-04-10
Attending: NURSE PRACTITIONER | Admitting: NURSE PRACTITIONER
Payer: COMMERCIAL

## 2024-04-10 DIAGNOSIS — N20.0 KIDNEY STONE: Primary | ICD-10-CM

## 2024-04-10 DIAGNOSIS — N20.0 KIDNEY STONE: ICD-10-CM

## 2024-04-10 PROCEDURE — 74176 CT ABD & PELVIS W/O CONTRAST: CPT

## 2024-04-17 ENCOUNTER — HOSPITAL ENCOUNTER (OUTPATIENT)
Dept: NUCLEAR MEDICINE | Facility: CLINIC | Age: 64
Setting detail: NUCLEAR MEDICINE
Discharge: HOME | End: 2024-04-17
Attending: NURSE PRACTITIONER | Admitting: NURSE PRACTITIONER
Payer: COMMERCIAL

## 2024-04-17 ENCOUNTER — HOSPITAL ENCOUNTER (OUTPATIENT)
Dept: CT IMAGING | Facility: CLINIC | Age: 64
Discharge: HOME | End: 2024-04-17
Attending: NURSE PRACTITIONER | Admitting: NURSE PRACTITIONER
Payer: COMMERCIAL

## 2024-04-17 DIAGNOSIS — N20.0 KIDNEY STONE: ICD-10-CM

## 2024-04-17 PROCEDURE — A9562 TC99M MERTIATIDE: HCPCS | Performed by: NURSE PRACTITIONER

## 2024-04-17 PROCEDURE — 250N000011 HC RX IP 250 OP 636: Performed by: NURSE PRACTITIONER

## 2024-04-17 PROCEDURE — 78708 K FLOW/FUNCT IMAGE W/DRUG: CPT

## 2024-04-17 PROCEDURE — 250N000009 HC RX 250: Performed by: NURSE PRACTITIONER

## 2024-04-17 PROCEDURE — 74178 CT ABD&PLV WO CNTR FLWD CNTR: CPT | Mod: 26 | Performed by: RADIOLOGY

## 2024-04-17 PROCEDURE — 74178 CT ABD&PLV WO CNTR FLWD CNTR: CPT

## 2024-04-17 PROCEDURE — 78708 K FLOW/FUNCT IMAGE W/DRUG: CPT | Mod: 26 | Performed by: RADIOLOGY

## 2024-04-17 PROCEDURE — 343N000001 HC RX 343: Performed by: NURSE PRACTITIONER

## 2024-04-17 RX ORDER — FUROSEMIDE 10 MG/ML
20 INJECTION INTRAMUSCULAR; INTRAVENOUS ONCE
Status: COMPLETED | OUTPATIENT
Start: 2024-04-17 | End: 2024-04-17

## 2024-04-17 RX ORDER — IOPAMIDOL 755 MG/ML
82 INJECTION, SOLUTION INTRAVASCULAR ONCE
Status: COMPLETED | OUTPATIENT
Start: 2024-04-17 | End: 2024-04-17

## 2024-04-17 RX ADMIN — TECHNESCAN TC 99M MERTIATIDE 10 MILLICURIE: 1 INJECTION, POWDER, LYOPHILIZED, FOR SOLUTION INTRAVENOUS at 13:23

## 2024-04-17 RX ADMIN — SODIUM CHLORIDE, PRESERVATIVE FREE 150 ML: 5 INJECTION INTRAVENOUS at 12:39

## 2024-04-17 RX ADMIN — FUROSEMIDE 20 MG: 10 INJECTION, SOLUTION INTRAMUSCULAR; INTRAVENOUS at 13:32

## 2024-04-17 RX ADMIN — IOPAMIDOL 82 ML: 755 INJECTION, SOLUTION INTRAVENOUS at 12:39

## 2024-04-18 ENCOUNTER — TELEPHONE (OUTPATIENT)
Dept: UROLOGY | Facility: CLINIC | Age: 64
End: 2024-04-18
Payer: COMMERCIAL

## 2024-04-18 NOTE — TELEPHONE ENCOUNTER
[facility]    Phone Message    May a detailed message be left on voicemail: yes     Reason for Call: Requesting Results     Name/type of test: Imaging  Date of test: 4/17  Was test done at a location other than [facility] (Please fill in the location if not [facility])?: No    Action Taken: Message routed to:  [facility] ([facility] uro    Travel Screening: Not Applicable

## 2024-04-19 ENCOUNTER — LAB (OUTPATIENT)
Dept: LAB | Facility: CLINIC | Age: 64
End: 2024-04-19
Payer: COMMERCIAL

## 2024-04-19 DIAGNOSIS — N28.89: Primary | ICD-10-CM

## 2024-04-19 DIAGNOSIS — N28.9 LESION OF LEFT NATIVE URETER: Primary | ICD-10-CM

## 2024-04-19 DIAGNOSIS — N28.89: ICD-10-CM

## 2024-04-19 DIAGNOSIS — N28.9 LESION OF LEFT NATIVE URETER: ICD-10-CM

## 2024-04-19 LAB
ALBUMIN UR-MCNC: NEGATIVE MG/DL
APPEARANCE UR: CLEAR
BACTERIA #/AREA URNS HPF: ABNORMAL /HPF
BILIRUB UR QL STRIP: NEGATIVE
COLOR UR AUTO: YELLOW
GLUCOSE UR STRIP-MCNC: NEGATIVE MG/DL
HGB UR QL STRIP: ABNORMAL
KETONES UR STRIP-MCNC: NEGATIVE MG/DL
LEUKOCYTE ESTERASE UR QL STRIP: ABNORMAL
NITRATE UR QL: NEGATIVE
PH UR STRIP: 7 [PH] (ref 5–7)
RBC #/AREA URNS AUTO: ABNORMAL /HPF
SP GR UR STRIP: 1.01 (ref 1–1.03)
SQUAMOUS #/AREA URNS AUTO: ABNORMAL /LPF
UROBILINOGEN UR STRIP-ACNC: 0.2 E.U./DL
WBC #/AREA URNS AUTO: ABNORMAL /HPF

## 2024-04-19 PROCEDURE — 80053 COMPREHEN METABOLIC PANEL: CPT

## 2024-04-19 PROCEDURE — 81001 URINALYSIS AUTO W/SCOPE: CPT

## 2024-04-19 PROCEDURE — 88112 CYTOPATH CELL ENHANCE TECH: CPT | Performed by: PATHOLOGY

## 2024-04-19 PROCEDURE — 36415 COLL VENOUS BLD VENIPUNCTURE: CPT

## 2024-04-19 PROCEDURE — 87086 URINE CULTURE/COLONY COUNT: CPT

## 2024-04-19 NOTE — TELEPHONE ENCOUNTER
Called patient to discuss results of her CT urogram and renogram scan in detail. There appears to be an obstruction at her distal left ureter, concerning for malignancy. Will pursue a UA/UC, cytology and metabolic panel now. Message sent to urologic oncologist to determine if patient can be referred directly for URS to visualize/biopsy the area in question or if follow up visit is needed first.     Diana Lovell, CNP  Department of Urology

## 2024-04-19 NOTE — TELEPHONE ENCOUNTER
Left Voicemail (1st Attempt) for the patient to call back and schedule the following:    Appointment type: Lab   Provider: Ordered by Diana Lovell   Return date: Next available (Today preferred)  Specialty phone number: [phone]  Additional appointment(s) needed: N/A  Additonal Notes: Per message received - call this patient to schedule a lab appt- ideally today or early next week

## 2024-04-20 LAB — BACTERIA UR CULT: NORMAL

## 2024-04-21 LAB
ALBUMIN SERPL BCG-MCNC: 4.5 G/DL (ref 3.5–5.2)
ALP SERPL-CCNC: 76 U/L (ref 40–150)
ALT SERPL W P-5'-P-CCNC: 19 U/L (ref 0–50)
ANION GAP SERPL CALCULATED.3IONS-SCNC: 8 MMOL/L (ref 7–15)
AST SERPL W P-5'-P-CCNC: 23 U/L (ref 0–45)
BILIRUB SERPL-MCNC: 0.2 MG/DL
BUN SERPL-MCNC: 13.3 MG/DL (ref 8–23)
CALCIUM SERPL-MCNC: 9.6 MG/DL (ref 8.8–10.2)
CHLORIDE SERPL-SCNC: 103 MMOL/L (ref 98–107)
CREAT SERPL-MCNC: 0.84 MG/DL (ref 0.51–0.95)
DEPRECATED HCO3 PLAS-SCNC: 31 MMOL/L (ref 22–29)
EGFRCR SERPLBLD CKD-EPI 2021: 79 ML/MIN/1.73M2
GLUCOSE SERPL-MCNC: 96 MG/DL (ref 70–99)
POTASSIUM SERPL-SCNC: 4.3 MMOL/L (ref 3.4–5.3)
PROT SERPL-MCNC: 6.8 G/DL (ref 6.4–8.3)
SODIUM SERPL-SCNC: 142 MMOL/L (ref 135–145)

## 2024-04-22 ENCOUNTER — PREP FOR PROCEDURE (OUTPATIENT)
Dept: UROLOGY | Facility: CLINIC | Age: 64
End: 2024-04-22
Payer: COMMERCIAL

## 2024-04-22 DIAGNOSIS — N13.30 HYDRONEPHROSIS OF LEFT KIDNEY: Primary | ICD-10-CM

## 2024-04-22 DIAGNOSIS — N13.5 OBSTRUCTION OF LEFT URETER: Primary | ICD-10-CM

## 2024-04-22 DIAGNOSIS — N28.9 LESION OF LEFT NATIVE URETER: ICD-10-CM

## 2024-04-22 LAB
PATH REPORT.COMMENTS IMP SPEC: ABNORMAL
PATH REPORT.COMMENTS IMP SPEC: YES
PATH REPORT.FINAL DX SPEC: ABNORMAL
PATH REPORT.GROSS SPEC: ABNORMAL
PATH REPORT.MICROSCOPIC SPEC OTHER STN: ABNORMAL
PATH REPORT.RELEVANT HX SPEC: ABNORMAL

## 2024-04-22 RX ORDER — CEFAZOLIN SODIUM 2 G/50ML
2 SOLUTION INTRAVENOUS SEE ADMIN INSTRUCTIONS
Status: CANCELLED | OUTPATIENT
Start: 2024-04-22

## 2024-04-22 RX ORDER — CEFAZOLIN SODIUM 2 G/50ML
2 SOLUTION INTRAVENOUS
Status: CANCELLED | OUTPATIENT
Start: 2024-04-22

## 2024-04-24 ENCOUNTER — HOSPITAL ENCOUNTER (OUTPATIENT)
Facility: AMBULATORY SURGERY CENTER | Age: 64
End: 2024-04-24
Attending: UROLOGY
Payer: COMMERCIAL

## 2024-04-24 PROBLEM — N13.30 HYDRONEPHROSIS OF LEFT KIDNEY: Status: ACTIVE | Noted: 2024-04-22

## 2024-04-24 PROBLEM — N28.9 LESION OF LEFT NATIVE URETER: Status: ACTIVE | Noted: 2024-04-22

## 2024-04-25 ENCOUNTER — HOSPITAL ENCOUNTER (OUTPATIENT)
Facility: CLINIC | Age: 64
End: 2024-04-25
Attending: UROLOGY | Admitting: UROLOGY
Payer: COMMERCIAL

## 2024-04-25 ENCOUNTER — TELEPHONE (OUTPATIENT)
Dept: ONCOLOGY | Facility: CLINIC | Age: 64
End: 2024-04-25
Payer: COMMERCIAL

## 2024-04-25 NOTE — TELEPHONE ENCOUNTER
FUTURE VISIT INFORMATION      SURGERY INFORMATION:  Date: 5/15/24  [facility]  Surgeon:  Jc Nixon MD `  Anesthesia Type:  GENERAL  Procedure: CYSTOSCOPY, LEFT URETEROSCOPY WITH URETERAL BIOPSY, LEFT RETROGRADE PYELOGRAM, POSSIBLE LEFT URETERAL STENT PLACEMENT     RECORDS REQUESTED FROM:       Primary Care Provider: [facility]

## 2024-04-25 NOTE — TELEPHONE ENCOUNTER
Patient is scheduled for surgery with Dr. verma    Spoke with: Lorin    Date of Surgery: 0515    Location: [facility]    Informed patient they will need an adult  y    Pre op with Provider n    H&P: Scheduled with PAC on 0502    Additional imaging/appointments: n    Surgery packet: to be given at PAC appt     Additional comments[place] [facility]        Christine Umanzor on 4/25/2024 at 8:28 AM

## 2024-04-30 ENCOUNTER — HOSPITAL ENCOUNTER (OUTPATIENT)
Facility: CLINIC | Age: 64
Setting detail: OBSERVATION
Discharge: HOME | End: 2024-05-02
Attending: EMERGENCY MEDICINE | Admitting: UROLOGY
Payer: COMMERCIAL

## 2024-04-30 ENCOUNTER — NURSE TRIAGE (OUTPATIENT)
Dept: NURSING | Facility: CLINIC | Age: 64
End: 2024-04-30
Payer: COMMERCIAL

## 2024-04-30 ENCOUNTER — APPOINTMENT (OUTPATIENT)
Dept: CT IMAGING | Facility: CLINIC | Age: 64
End: 2024-04-30
Attending: EMERGENCY MEDICINE
Payer: COMMERCIAL

## 2024-04-30 DIAGNOSIS — N13.4 HYDROURETER: ICD-10-CM

## 2024-04-30 DIAGNOSIS — R11.2 NAUSEA AND VOMITING, UNSPECIFIED VOMITING TYPE: ICD-10-CM

## 2024-04-30 DIAGNOSIS — N28.9 LESION OF LEFT NATIVE URETER: Primary | ICD-10-CM

## 2024-04-30 DIAGNOSIS — R52 PAIN: ICD-10-CM

## 2024-04-30 DIAGNOSIS — R10.9 FLANK PAIN: ICD-10-CM

## 2024-04-30 DIAGNOSIS — N13.30 HYDRONEPHROSIS OF LEFT KIDNEY: ICD-10-CM

## 2024-04-30 LAB
ALBUMIN SERPL BCG-MCNC: 4.3 G/DL (ref 3.5–5.2)
ALBUMIN UR-MCNC: NEGATIVE MG/DL
ALP SERPL-CCNC: 65 U/L (ref 40–150)
ALT SERPL W P-5'-P-CCNC: 13 U/L (ref 0–50)
ANION GAP SERPL CALCULATED.3IONS-SCNC: 10 MMOL/L (ref 7–15)
APPEARANCE UR: CLEAR
AST SERPL W P-5'-P-CCNC: 18 U/L (ref 0–45)
BASOPHILS # BLD AUTO: 0 10E3/UL (ref 0–0.2)
BASOPHILS NFR BLD AUTO: 1 %
BILIRUB SERPL-MCNC: 0.3 MG/DL
BILIRUB UR QL STRIP: NEGATIVE
BUN SERPL-MCNC: 13.8 MG/DL (ref 8–23)
CALCIUM SERPL-MCNC: 9.3 MG/DL (ref 8.8–10.2)
CHLORIDE SERPL-SCNC: 105 MMOL/L (ref 98–107)
COLOR UR AUTO: ABNORMAL
CREAT SERPL-MCNC: 0.89 MG/DL (ref 0.51–0.95)
DEPRECATED HCO3 PLAS-SCNC: 27 MMOL/L (ref 22–29)
EGFRCR SERPLBLD CKD-EPI 2021: 73 ML/MIN/1.73M2
EOSINOPHIL # BLD AUTO: 0.1 10E3/UL (ref 0–0.7)
EOSINOPHIL NFR BLD AUTO: 1 %
ERYTHROCYTE [DISTWIDTH] IN BLOOD BY AUTOMATED COUNT: 12 % (ref 10–15)
GLUCOSE SERPL-MCNC: 94 MG/DL (ref 70–99)
GLUCOSE UR STRIP-MCNC: NEGATIVE MG/DL
HCT VFR BLD AUTO: 36.9 % (ref 35–47)
HGB BLD-MCNC: 12.1 G/DL (ref 11.7–15.7)
HGB UR QL STRIP: ABNORMAL
IMM GRANULOCYTES # BLD: 0 10E3/UL
IMM GRANULOCYTES NFR BLD: 0 %
KETONES UR STRIP-MCNC: NEGATIVE MG/DL
LEUKOCYTE ESTERASE UR QL STRIP: ABNORMAL
LYMPHOCYTES # BLD AUTO: 1.4 10E3/UL (ref 0.8–5.3)
LYMPHOCYTES NFR BLD AUTO: 25 %
MCH RBC QN AUTO: 31.3 PG (ref 26.5–33)
MCHC RBC AUTO-ENTMCNC: 32.8 G/DL (ref 31.5–36.5)
MCV RBC AUTO: 95 FL (ref 78–100)
MONOCYTES # BLD AUTO: 0.4 10E3/UL (ref 0–1.3)
MONOCYTES NFR BLD AUTO: 7 %
MUCOUS THREADS #/AREA URNS LPF: PRESENT /LPF
NEUTROPHILS # BLD AUTO: 3.7 10E3/UL (ref 1.6–8.3)
NEUTROPHILS NFR BLD AUTO: 66 %
NITRATE UR QL: NEGATIVE
NRBC # BLD AUTO: 0 10E3/UL
NRBC BLD AUTO-RTO: 0 /100
PH UR STRIP: 5 [PH] (ref 5–7)
PLATELET # BLD AUTO: 207 10E3/UL (ref 150–450)
POTASSIUM SERPL-SCNC: 4 MMOL/L (ref 3.4–5.3)
PROT SERPL-MCNC: 6.6 G/DL (ref 6.4–8.3)
RBC # BLD AUTO: 3.87 10E6/UL (ref 3.8–5.2)
RBC URINE: 14 /HPF
SODIUM SERPL-SCNC: 142 MMOL/L (ref 135–145)
SP GR UR STRIP: 1.01 (ref 1–1.03)
SQUAMOUS EPITHELIAL: 3 /HPF
UROBILINOGEN UR STRIP-MCNC: NORMAL MG/DL
WBC # BLD AUTO: 5.6 10E3/UL (ref 4–11)
WBC URINE: 8 /HPF

## 2024-04-30 PROCEDURE — 85041 AUTOMATED RBC COUNT: CPT | Performed by: EMERGENCY MEDICINE

## 2024-04-30 PROCEDURE — 81003 URINALYSIS AUTO W/O SCOPE: CPT | Performed by: EMERGENCY MEDICINE

## 2024-04-30 PROCEDURE — 82040 ASSAY OF SERUM ALBUMIN: CPT | Performed by: EMERGENCY MEDICINE

## 2024-04-30 PROCEDURE — 250N000009 HC RX 250: Performed by: EMERGENCY MEDICINE

## 2024-04-30 PROCEDURE — 99285 EMERGENCY DEPT VISIT HI MDM: CPT | Mod: 25

## 2024-04-30 PROCEDURE — 74178 CT ABD&PLV WO CNTR FLWD CNTR: CPT

## 2024-04-30 PROCEDURE — 120N000001 HC R&B MED SURG/OB

## 2024-04-30 PROCEDURE — 52354 CYSTOURETERO W/BIOPSY: CPT | Mod: LT | Performed by: UROLOGY

## 2024-04-30 PROCEDURE — 250N000011 HC RX IP 250 OP 636: Performed by: EMERGENCY MEDICINE

## 2024-04-30 PROCEDURE — G0378 HOSPITAL OBSERVATION PER HR: HCPCS

## 2024-04-30 PROCEDURE — 36415 COLL VENOUS BLD VENIPUNCTURE: CPT | Performed by: EMERGENCY MEDICINE

## 2024-04-30 PROCEDURE — 87086 URINE CULTURE/COLONY COUNT: CPT | Performed by: INTERNAL MEDICINE

## 2024-04-30 PROCEDURE — 250N000013 HC RX MED GY IP 250 OP 250 PS 637: Performed by: UROLOGY

## 2024-04-30 PROCEDURE — 99222 1ST HOSP IP/OBS MODERATE 55: CPT | Performed by: INTERNAL MEDICINE

## 2024-04-30 PROCEDURE — 250N000013 HC RX MED GY IP 250 OP 250 PS 637: Performed by: INTERNAL MEDICINE

## 2024-04-30 RX ORDER — IOPAMIDOL 755 MG/ML
80 INJECTION, SOLUTION INTRAVASCULAR ONCE
Status: COMPLETED | OUTPATIENT
Start: 2024-04-30 | End: 2024-04-30

## 2024-04-30 RX ORDER — PREGABALIN 75 MG/1
75 CAPSULE ORAL 2 TIMES DAILY
Status: DISCONTINUED | OUTPATIENT
Start: 2024-04-30 | End: 2024-05-02 | Stop reason: HOSPADM

## 2024-04-30 RX ORDER — IOPAMIDOL 755 MG/ML
65 INJECTION, SOLUTION INTRAVASCULAR ONCE
Status: DISCONTINUED | OUTPATIENT
Start: 2024-04-30 | End: 2024-04-30

## 2024-04-30 RX ORDER — CITALOPRAM HYDROBROMIDE 20 MG/1
20 TABLET ORAL EVERY EVENING
Status: DISCONTINUED | OUTPATIENT
Start: 2024-04-30 | End: 2024-05-02 | Stop reason: HOSPADM

## 2024-04-30 RX ORDER — CITALOPRAM HYDROBROMIDE 10 MG/1
10 TABLET ORAL EVERY EVENING
Status: DISCONTINUED | OUTPATIENT
Start: 2024-04-30 | End: 2024-05-02 | Stop reason: HOSPADM

## 2024-04-30 RX ORDER — CITALOPRAM HYDROBROMIDE 20 MG/1
20 TABLET ORAL DAILY
Status: DISCONTINUED | OUTPATIENT
Start: 2024-05-01 | End: 2024-04-30

## 2024-04-30 RX ADMIN — CITALOPRAM HYDROBROMIDE 10 MG: 10 TABLET ORAL at 22:49

## 2024-04-30 RX ADMIN — SODIUM CHLORIDE 55 ML: 9 INJECTION, SOLUTION INTRAVENOUS at 17:25

## 2024-04-30 RX ADMIN — PREGABALIN 75 MG: 75 CAPSULE ORAL at 22:49

## 2024-04-30 RX ADMIN — CITALOPRAM HYDROBROMIDE 20 MG: 20 TABLET ORAL at 22:49

## 2024-04-30 RX ADMIN — IOPAMIDOL 80 ML: 755 INJECTION, SOLUTION INTRAVENOUS at 17:25

## 2024-04-30 ASSESSMENT — ACTIVITIES OF DAILY LIVING (ADL)
ADLS_ACUITY_SCORE: 35

## 2024-04-30 ASSESSMENT — COLUMBIA-SUICIDE SEVERITY RATING SCALE - C-SSRS
1. IN THE PAST MONTH, HAVE YOU WISHED YOU WERE DEAD OR WISHED YOU COULD GO TO SLEEP AND NOT WAKE UP?: NO
2. HAVE YOU ACTUALLY HAD ANY THOUGHTS OF KILLING YOURSELF IN THE PAST MONTH?: NO
6. HAVE YOU EVER DONE ANYTHING, STARTED TO DO ANYTHING, OR PREPARED TO DO ANYTHING TO END YOUR LIFE?: NO

## 2024-04-30 NOTE — TELEPHONE ENCOUNTER
Noted, given degree of pain, agree with ER eval.  We cannot order a study to be done in the ER.  ER will eval and consult Urology if indicated.   Fritz Ruiz MD on 4/30/2024 at 12:29 PM

## 2024-04-30 NOTE — PHARMACY-ADMISSION MEDICATION HISTORY
Pharmacist Admission Medication History    Admission medication history is complete. The information provided in this note is only as accurate as the sources available at the time of the update.    Information Source(s): Patient and CareEverywhere/SureScripts via in-person    Pertinent Information: None    Changes made to PTA medication list:  Added: None  Deleted:   Senna  Vit D  Changed: None    Allergies reviewed with patient and updates made in EHR: yes    Medication History Completed By: Tg Singh [facility] 4/30/2024 5:48 PM    PTA Med List   Medication Sig Last Dose    calcium carbonate (OS-JULIETTE) 500 MG tablet Take 1 tablet by mouth 2 times daily 4/30/2024 at x1    citalopram (CELEXA) 10 MG tablet TAKE 1 TABLET (10 MG) BY MOUTH DAILY TAKES 20MG TAB ALSO 4/29/2024 at pm    citalopram (CELEXA) 20 MG tablet TAKE 1 TABLET (20 MG) BY MOUTH DAILY TAKES 10MG TABLET ALSO 4/29/2024 at pm    Polyethylene Glycol POWD 17 g daily as needed prn at prn    pregabalin (LYRICA) 75 MG capsule Take 1 capsule (75 mg) by mouth 2 times daily 4/30/2024 at x1    vitamin B-12 (CYANOCOBALAMIN) 100 MCG tablet Take 1,000 mcg by mouth daily 4/30/2024 at am

## 2024-04-30 NOTE — ED TRIAGE NOTES
Pt presents for evaluation of bilateral flank pain, with concern for possible blockage of L ureter. Has cystoscopy scheduled for 5/10 but is having severe pain.      Triage Assessment (Adult)       Row Name 04/30/24 0765          Triage Assessment    Airway WDL WDL        Respiratory WDL    Respiratory WDL WDL        Skin Circulation/Temperature WDL    Skin Circulation/Temperature WDL WDL        Cardiac WDL    Cardiac WDL WDL        Peripheral/Neurovascular WDL    Peripheral Neurovascular WDL WDL        Cognitive/Neuro/Behavioral WDL    Cognitive/Neuro/Behavioral WDL WDL

## 2024-04-30 NOTE — TELEPHONE ENCOUNTER
Pt calling to ask PCP for;    Ureteroscopy order to be done at ED today (?)    Pt reports that bilateral Lower back pain getting worse quickly  Has been seeing urologists  Has blockage with suspicion of bladder cancer (?)  10 days will be having   Pt states, I just don't think I can wait that long - the pain gets to 10/10 if I'm out of bed for more than 20 minutes at a time - can't even sit for longer than 20 minutes  Nauseated     Denies;  Vomiting  Fever    According to the protocol, patient should go to ED now (no availability for in office visit today per PCP care team.  Care advice given. Patient verbalizes understanding and states urology also advised to go to ED.  Pt states,  'Will go to [facility] now.  I really would like to have the ureteroscopy there today.'    Arleen Greene RN, Nurse Advisor 11:58 AM 4/30/2024  Reason for Disposition   Pain radiates into groin, scrotum    Additional Information   Negative: Passed out (i.e., fainted, collapsed and was not responding)   Negative: Shock suspected (e.g., cold/pale/clammy skin, too weak to stand, low BP, rapid pulse)   Negative: Sounds like a life-threatening emergency to the triager   Negative: Major injury to the back (e.g., MVA, fall > 10 feet or 3 meters, penetrating injury, etc.)   Negative: Pain in the upper back over the ribs (rib cage) that radiates (travels) into the chest   Negative: Pain in the upper back over the ribs (rib cage) and worsened by coughing (or clearly increases with breathing)   Negative: Back pain during pregnancy   Negative: SEVERE back pain of sudden onset and age > 60 years   Negative: SEVERE abdominal pain (e.g., excruciating)   Negative: Abdominal pain and age > 60 years   Negative: Unable to urinate (or only a few drops) and bladder feels very full   Negative: Loss of bladder or bowel control (urine or bowel incontinence; wetting self, leaking stool) of new-onset   Negative: Numbness (loss of sensation) in groin or rectal  area    Protocols used: Back Pain-A-[place]

## 2024-04-30 NOTE — TELEPHONE ENCOUNTER
Called patient regarding PCP message below. She verbalized understanding and is in agreement with plan of care.    [0] : 2) Feeling down, depressed, or hopeless: Not at all (0) [[id number]] : 0

## 2024-04-30 NOTE — H&P
INTERNAL MEDICINE HISTORY AND PHYSICAL  [facility]      Lorin Andrade MR#: [id number]  Date of Admission:  4/30/2024  Primary Care Provider:  Sandie Bella      Chief Complaint:   Back/flank and abdominal pain.    History of Present Illness:   Ms. Lorin Andrade is a 62 yo female with history including recent recent left hydroureter/hydronephrosis with left ureteral lesion; ODETTE on CPAP; depression/anxiety; neuropathy; OA; osteopenia; and h/p breast cancer s/p surgery and chemoradiation (2006); who presents with back/flank and abdominal pain.    Earlier this month 4/2024, patient developed lower back pain and subsequent CT imaging revealed left hydroureter/hydronephrosis with left ureteral lesion.  She subsequently seen by urology who would plan to perform cystoscopy 5/10/2024.      In the above context, she has had significant pain that has been fairly debilitating but has not been prescribed any pain medications.  At home she has been taking an old prescription of oxycodone and cutting the tablet into fourths and taking it for severe intractable pain.  Basically patient has not been very active immobile due to her pain.  Overall given her worsening pain today, she presented to [facility] for further evaluation.    On initial evaluation, pt was afebrile VSS. Labs notable for CBC normal; BMP normal; LFT's normal; UA with 8 WBC, 14 RBC, 3 sq epi cells, negative nitrate, trace LE, small blood.    Overall, her pain is in her mid to lower back and central.  Has abdominal pain on the left side lower at times.  She has been nauseated at times. Patient denies any fevers.  Does note chills.  No dysuria.  No change in urine appearance.  No chest pain or dyspnea.     Past Medical History:   1. Recent left hydroureter/hydronephrosis with left ureteral lesion. CT abdomen/pelvis 4/10/2024 showed no urinary calculi; duplicated left renal collecting system and proximal ureters which join just beyond  the UPJ, diffuse dilation of he left ureter transitioning to normal caliber distally in the pelvis. NM renogram 4/17/2024 showed left kidney with decreased function, decreased excretion, concerning for partial obstruction. CT urogram 4/17/2024 showed circumferential irregular/nodular urothelial enhancement at the distal left ureter with moderate upstream hydroureter/hydronephrosis, raising concern for urothelial malignancy, ureteral stricture related to prior infection/inflammation could have a similar appearance.   2. ODETTE. On CPAP.  3. Depression/anxiety.  4. OA.  5. Osteopenia.  6. Peripheral neuropathy, possibly due to chemotherapy.  7. H/o recent facial rash treated with 2 months antibiotics (completed earlier 4/2024).  8. H/o fall with L4 compression fracture in 2022.  9. H/o breast cancer, left. Stage IIB. S/p bilateral mastectomy and chemoradiation in 2006.     From Care Everywhere:  Medical History Date Comments   Anxiety state, unspecified       Backache, unspecified   neck right shoulder   Malignant neoplasm of breast (female), unspecified site 3/06     Other diseases of lung, not elsewhere classified   MARY lung nodule.   Sleep apnea       Osteopenia       LBP (low back pain) 5/20/2013     Depression       From Care Everywhere:  Dyslipidemia 05/02/2022     Overview:    Formatting of this note might be different from the original.  The 10-year ASCVD risk score ([employer] SAJI Jr[employer] et al., 2013) is: 1.5%  Values used to calculate the score:  Age: 61 years  Sex: Female  Is Non- : No  Diabetic: No  Tobacco smoker: No  Systolic Blood Pressure: 102 mmHg  Is BP treated: No  HDL Cholesterol: 69 mg/dL  Total Cholesterol: 182 mg/dL    2022 coronary calcium score was 0, done in [place]   Pelvic floor tension 05/02/2022     Overview:    Formatting of this note might be different from the original.  During examination she needs to lie down throughout the visit   Dizziness 05/02/2022     Overview:     Formatting of this note might be different from the original.  NDBC has been going well and already doing physical therapy that is helping and ?damage to inner ear from her accident   ODETTE on CPAP 10/07/2021     History of left breast cancer 10/07/2021     Overview:    Formatting of this note might be different from the original.  was diagnosed in 2006. stage IIB. status bilateral mastectomy, chemoradiation therapy.   Anxiety 10/07/2021     Peripheral sensory neuropathy 03/21/2019     Overview:    Formatting of this note might be different from the original.  on Lyrica  uses iMRS magnetic device at home   Osteopenia 05/20/2013     Overview:    Formatting of this note might be different from the original.  Dexa 4/2013 Spine -0.7, R hip -1.1, L hip -1.2 Vitamin D= 47 On reclast for several years.     Past Medical History:   Diagnosis Date    Cancer (H)     Sleep apnea      Above past medical history reviewed and edited and/or added to as necessary.    Past Surgical History:   From Care Everywhere:  Surgery Date Site/Laterality Comments   TUBAL LIGATION         BIOPSY BREAST age 20       TONSIL AND ADENOIDECTOMY         (IA) SC REMOVE ARMPITS LYMPH NODES COMPLT     L axillary lymphectomy (17 nodes)    MASTECTOMY 3/06   mesha.    Knee surgery 1/08   Miniscus Tear    Oophrectomy        Past Surgical History:   Procedure Laterality Date    COLONOSCOPY N/A 08/16/2023    Procedure: Colonoscopy;  Surgeon: Ana Paula Hurst MD;  Location:  GI    MASTECTOMY, BILATERAL      Double mastectomy        Allergies:     Allergies   Allergen Reactions    Animal Dander Shortness Of Breath    Gabapentin Other (See Comments)     Tolerating pregabalin well        Medications:     Prior to Admission Medications   Prescriptions Last Dose Informant Patient Reported? Taking?   Polyethylene Glycol POWD   Yes No   Sig: as needed   calcium carbonate (OS-JULIETTE) 500 MG tablet   Yes No   Sig: Take 1 tablet by mouth 2 times daily   citalopram  (CELEXA) 10 MG tablet   No No   Sig: TAKE 1 TABLET (10 MG) BY MOUTH DAILY TAKES 20MG TAB ALSO   citalopram (CELEXA) 20 MG tablet   No No   Sig: TAKE 1 TABLET (20 MG) BY MOUTH DAILY TAKES 10MG TABLET ALSO   pregabalin (LYRICA) 75 MG capsule   No No   Sig: Take 1 capsule (75 mg) by mouth 2 times daily   senna (SENOKOT) 8.6 MG tablet   Yes No   Sig: Take 1 tablet by mouth daily   vitamin B-12 (CYANOCOBALAMIN) 100 MCG tablet   Yes No   Sig: Take 1,000 mcg by mouth daily   vitamin D3 (CHOLECALCIFEROL) 50 mcg (2000 units) tablet   Yes No   Sig: Take 1 tablet by mouth daily      Facility-Administered Medications: None       Social History:     Social History     Socioeconomic History    Marital status:      Spouse name: Not on file    Number of children: Not on file    Years of education: Not on file    Highest education level: Not on file   Occupational History    Occupation: reading and    Tobacco Use    Smoking status: Never    Smokeless tobacco: Never   Vaping Use    Vaping status: Never Used   Substance and Sexual Activity    Alcohol use: Yes     Comment: 1-2 times a month    Drug use: Never    Sexual activity: Not on file   Other Topics Concern    Not on file   Social History Narrative    Not on file     Social Determinants of Health     Financial Resource Strain: High Risk (1/1/2022)    [facility]    Financial Resource Strain     Difficulty of Paying Living Expenses: Not on file     Difficulty of Paying Living Expenses: Not on file   Food Insecurity: Not on file   Transportation Needs: Not on file   Physical Activity: Not on file   Stress: Not on file   Social Connections: Unknown (1/1/2022)    [facility]    Social Connections     Frequency of Communication with Friends and Family: Not on file   Interpersonal  "Safety: Not on file   Housing Stability: Not on file       Family History:   Reviewed.  Family History   Problem Relation Age of Onset    Coronary Artery Disease Mother         CABG    Arrhythmia Sister        Review of Systems:   As noted in the HPI; otherwise 10 point review of systems was negative.     Physical Exam:   VITALS:  Blood pressure 118/45, pulse 82, temperature 97.1  F (36.2  C), temperature source Temporal, resp. rate 22, height 1.651 m (5' 5\"), weight 59 kg (130 lb), SpO2 96%.  General:  Awake, alert, oriented, pleasant, conversant.  HEENT:  PERRL, no scleral icterus/conjunctival injection. Oropharynx no erythema or exudate.  Neck:  No bruits, JVD, adenopathy.  Heart/Chest:  RRR, no m/r/g.  Lungs:  Diminished in bases. No crackles or wheezes.  Abdomen:  Soft, tender lower abdominal, no r/g, nd, +BS.  Extremities/Musculoskeletal:  No bilateral lower extremity edema  Skin:    Neurologic:  No gross focal motor/sensory deficits.     Labs, Imaging & Other Data:     Results for orders placed or performed during the hospital encounter of 04/30/24   UA with Microscopic reflex to Culture     Status: Abnormal    Specimen: Urine, Midstream   Result Value Ref Range    Color Urine Light Yellow Colorless, Straw, Light Yellow, Yellow    Appearance Urine Clear Clear    Glucose Urine Negative Negative mg/dL    Bilirubin Urine Negative Negative    Ketones Urine Negative Negative mg/dL    Specific Gravity Urine 1.015 1.003 - 1.035    Blood Urine Small (A) Negative    pH Urine 5.0 5.0 - 7.0    Protein Albumin Urine Negative Negative mg/dL    Urobilinogen Urine Normal Normal, 2.0 mg/dL    Nitrite Urine Negative Negative    Leukocyte Esterase Urine Trace (A) Negative    Mucus Urine Present (A) None Seen /LPF    RBC Urine 14 (H) <=2 /HPF    WBC Urine 8 (H) <=5 /HPF    Squamous Epithelials Urine 3 (H) <=1 /HPF    Narrative    Urine Culture not indicated   Comprehensive metabolic panel     Status: Normal   Result Value Ref " Range    Sodium 142 135 - 145 mmol/L    Potassium 4.0 3.4 - 5.3 mmol/L    Carbon Dioxide (CO2) 27 22 - 29 mmol/L    Anion Gap 10 7 - 15 mmol/L    Urea Nitrogen 13.8 8.0 - 23.0 mg/dL    Creatinine 0.89 0.51 - 0.95 mg/dL    GFR Estimate 73 >60 mL/min/1.73m2    Calcium 9.3 8.8 - 10.2 mg/dL    Chloride 105 98 - 107 mmol/L    Glucose 94 70 - 99 mg/dL    Alkaline Phosphatase 65 40 - 150 U/L    AST 18 0 - 45 U/L    ALT 13 0 - 50 U/L    Protein Total 6.6 6.4 - 8.3 g/dL    Albumin 4.3 3.5 - 5.2 g/dL    Bilirubin Total 0.3 <=1.2 mg/dL   CBC with platelets and differential     Status: None   Result Value Ref Range    WBC Count 5.6 4.0 - 11.0 10e3/uL    RBC Count 3.87 3.80 - 5.20 10e6/uL    Hemoglobin 12.1 11.7 - 15.7 g/dL    Hematocrit 36.9 35.0 - 47.0 %    MCV 95 78 - 100 fL    MCH 31.3 26.5 - 33.0 pg    MCHC 32.8 31.5 - 36.5 g/dL    RDW 12.0 10.0 - 15.0 %    Platelet Count 207 150 - 450 10e3/uL    % Neutrophils 66 %    % Lymphocytes 25 %    % Monocytes 7 %    % Eosinophils 1 %    % Basophils 1 %    % Immature Granulocytes 0 %    NRBCs per 100 WBC 0 <1 /100    Absolute Neutrophils 3.7 1.6 - 8.3 10e3/uL    Absolute Lymphocytes 1.4 0.8 - 5.3 10e3/uL    Absolute Monocytes 0.4 0.0 - 1.3 10e3/uL    Absolute Eosinophils 0.1 0.0 - 0.7 10e3/uL    Absolute Basophils 0.0 0.0 - 0.2 10e3/uL    Absolute Immature Granulocytes 0.0 <=0.4 10e3/uL    Absolute NRBCs 0.0 10e3/uL   CBC with platelets + differential     Status: None    Narrative    The following orders were created for panel order CBC with platelets + differential.  Procedure                               Abnormality         Status                     ---------                               -----------         ------                     CBC with platelets and d...[[id number]]                      Final result                 Please view results for these tests on the individual orders.         ASSESSMENT & PLAN:   Ms. Lorin Andrade is a 64 yo female with history including recent  recent left hydroureter/hydronephrosis with left ureteral lesion scheduled for cystoscopy (5/10/2024); ODETTE on CPAP; depression/anxiety; neuropathy; OA; osteopenia; and h/p breast cancer s/p surgery and chemoradiation (2006); who presents with back/flank and abdominal pain. Admitted under observation due to severe pain requiring expedited urologic workup.    On initial evaluation, pt was afebrile VSS. Labs notable for CBC normal; BMP normal; LFT's normal; UA with 8 WBC, 14 RBC, 3 sq epi cells, negative nitrate, trace LE, small blood.      Recent left hydroureter/hydronephrosis with left ureteral lesion.  Back/flank/abdominal, suspect due to above.  * CT abdomen/pelvis 4/10/2024 showed no urinary calculi; duplicated left renal collecting system and proximal ureters which join just beyond the UPJ, diffuse dilation of he left ureter transitioning to normal caliber distally in the pelvis. NM renogram 4/17/2024 showed left kidney with decreased function, decreased excretion, concerning for partial obstruction. CT urogram 4/17/2024 showed circumferential irregular/nodular urothelial enhancement at the distal left ureter with moderate upstream hydroureter/hydronephrosis, raising concern for urothelial malignancy, ureteral stricture related to prior infection/inflammation could have a similar appearance. Urology planned for cysto 5/10/2024.  * Initial presentation as above. On admit, pt notes that pain been fairly debilitating but has not been prescribed any pain medications; basically patient has not been very active immobile due to her pain. At home she has been taking an old prescription of oxycodone and cutting the tablet into fourths and taking it for severe intractable pain.    - Urology plans cysto 5/1/2024.  - Order PRN acetaminophen, PRN oxycodone, PRN IV hydromorphone; minimize opioids as able (pt reluctant to take opioids overall).    Abnormal UA, suspect due to above.  * Initial presentation as above.  - Add on  urine culture.  - No antibiotics for now.    Recent facial rash, treated with antibiotics.  * Treated for 2 months oral antibiotics and completed earlier 4/2024.  - Continue to monitor clinically.    ODETTE.  - Continue CPAP.    Depression/anxiety.  - Continue citalopram.    Neuropathy, possibly due to prior chemotherapy.  - Continue pregabalin.    H/o breast cancer, left.   * Stage IIB. S/p bilateral mastectomy and chemoradiation in 2006.   - Noted.    Prophylaxis.  - PCD's, ambulation.    CODE STATUS: FULL      Dennis Bermeo Jr., MD  [phone] (p)  Text Page  [place]

## 2024-04-30 NOTE — CONSULTS
[facility] Consultation by [facility]    Lorin Andrade MRN# [id number]   Age: 63 year old YOB: 1960     Date of Admission:  4/30/2024    Reason for consult: Left flank pain       Requesting PA/MD: Dr. Ross       Level of consult: Consult and follow           Assessment and Plan:   Assessment:   Renal colic  Urothelial enhancement of the left dist ureter with moderate hydroureteronephrosis      Plan:   -Ate lunch today and delays OR to tomorrow. Ok for diet. NPO after midnight.   -Reviewed cysto, left diagnostic URS, possible biopsy, possible stent placement.   -Reviewed with Dr. Gallagher.    Sharlene Finnegan PA-C  [facility]  Office: [phone]  After business hours: [phone]                   Chief Complaint:        History is obtained from the patient and EMR.         History of Present Illness:   This patient is a 63 year old female admitted with worsening renal colic secondary to known urothelial enhancement of the left dist ureter with moderate hydroureteronephrosis. Has planned diagnostic ureteroscopy scheduled 5/10/24, although her pain was so significant and unrelenting, she presented to the ED for further evaluation.      UA nitrite neg, Cr 0.89, WBC 5.6. Ate lunch at 12pm.            Past Medical History:     Past Medical History:   Diagnosis Date    Cancer (H)     Sleep apnea              Past Surgical History:     Past Surgical History:   Procedure Laterality Date    COLONOSCOPY N/A 08/16/2023    Procedure: Colonoscopy;  Surgeon: Ana Paula Hurst MD;  Location: [facility]    MASTECTOMY, BILATERAL      Double mastectomy             Social History:     Social History     Tobacco Use    Smoking status: Never    Smokeless tobacco: Never   Substance Use Topics    Alcohol use: Yes     Comment: 1-2 times a month             Family History:     Family History   Problem Relation Age of Onset    Coronary Artery Disease Mother         CABG    Arrhythmia Sister      Family  "history reviewed.             Allergies:     Allergies   Allergen Reactions    Animal Dander Shortness Of Breath    Gabapentin Other (See Comments)     Tolerating pregabalin well             Medications:     No current facility-administered medications for this encounter.     Current Outpatient Medications   Medication Sig Dispense Refill    calcium carbonate (OS-JULIETTE) 500 MG tablet Take 1 tablet by mouth 2 times daily      citalopram (CELEXA) 10 MG tablet TAKE 1 TABLET (10 MG) BY MOUTH DAILY TAKES 20MG TAB ALSO 90 tablet 0    citalopram (CELEXA) 20 MG tablet TAKE 1 TABLET (20 MG) BY MOUTH DAILY TAKES 10MG TABLET ALSO 90 tablet 0    Polyethylene Glycol POWD as needed      pregabalin (LYRICA) 75 MG capsule Take 1 capsule (75 mg) by mouth 2 times daily 180 capsule 0    senna (SENOKOT) 8.6 MG tablet Take 1 tablet by mouth daily      vitamin B-12 (CYANOCOBALAMIN) 100 MCG tablet Take 1,000 mcg by mouth daily      vitamin D3 (CHOLECALCIFEROL) 50 mcg (2000 units) tablet Take 1 tablet by mouth daily               Review of Systems:   A comprehensive 10-point review of systems was performed and found to be negative except as described in the HPI.     /45   Pulse 82   Temp 97.1  F (36.2  C) (Temporal)   Resp 22   Ht 1.651 m (5' 5\")   Wt 59 kg (130 lb)   SpO2 96%   BMI 21.63 kg/m    PSYCH: NAD  EYES: EOMI  MOUTH: MMM  NEURO: AAO x3            Data:     Lab Results   Component Value Date    WBC 5.6 04/30/2024    HGB 12.1 04/30/2024    HCT 36.9 04/30/2024    MCV 95 04/30/2024     04/30/2024     Lab Results   Component Value Date    CR 0.89 04/30/2024       EXAMINATION: CT UROGRAM WO & W CONTRAST, 4/17/2024 1:11 PM     TECHNIQUE:  Helical CT images from the lung bases through the  symphysis pubis were obtained  without and with contrast using CT  urogram protocol. Contrast dose: iopamidol (ISOVUE-370) solution 82 mL     COMPARISON: 4/10/2024 CT abdomen/pelvis, 2/16/2024 lumbar spine MRI     HISTORY: duplicated " left real collecting system; Kidney stone     FINDINGS:     Urinary tract: The kidneys are normally positioned. Subcentimeter  nonenhancing cortical cysts in the upper and lower poles of the right  kidney and mid to upper pole of the left kidney. No urinary tract  stone. Single right ureter without hydronephrosis. Duplex left kidney  with a duplicated pelvicalyceal system and proximal left ureter  uniting just below the ureteropelvic junction. Moderate left  hydronephrosis and hydroureter with fairly abrupt transition to normal  caliber ureter along the left pelvic sidewall where the urothelium  demonstrates circumferential irregular/nodular enhancement (series 13  images [id number]). Mild left periureteral fat stranding at this level.  Impaired excretion of iodinated contrast into the left upper urinary  tract with only a small amount of contrast layering within the renal  calyces. Normal excretion of contrast into the right upper urinary  tract without suspicious filling defect. Normal bladder.     Remainder of the abdomen and pelvis: Well-circumscribed fluid  attenuation cyst in hepatic segment 4A. No focal suspicious hepatic  lesion. Normal gallbladder. No intra or extrahepatic biliary  dilatation. Normal pancreas, spleen, and adrenal glands. Normal uterus  and ovaries. Trace free fluid in the pelvis. No free air. No bowel  obstruction or inflammation. Moderate colonic stool burden. Normal  appendix. The major abdominal vasculature is patent. Mild aortoiliac  atherosclerotic calcification without aneurysmal dilation. No  abdominal or pelvic lymphadenopathy. Descent of the anorectal junction  below the pubococcygeal line.     Lung bases:  No pleural or pericardial effusion. Subsegmental  atelectasis in the lung bases.     Bones and soft tissues: Partially visualized bilateral breast  implants. No acute or aggressive osseous abnormality. Chronic L4  superior end plate compression deformity.                                                                       IMPRESSION:   1. Circumferential irregular/nodular urothelial enhancement at the  distal left ureter with moderate upstream hydroureter/hydronephrosis,  raising concern for urothelial malignancy. Ureteral stricture related  to prior infection/inflammation could have a similar appearance.  Recommend ureteroscopy.  2. No suspicious lymphadenopathy.  3. Other chronic and incidental findings as detailed in the body of  the report.

## 2024-04-30 NOTE — ED PROVIDER NOTES
History     Chief Complaint:  Flank Pain     HPI   Lorin Andrade is a 63 year old female with history of hydronephrosis and lesion of left ureter who presents with her spouse for evaluation of flank pain. She reports that she had recent imaging and has a left ureteroscopy with retrograde pyelogram and possible ureteral stent schedule next week but is having severe bilateral flank pain. Adds that she has hematuria, nausea, and trouble urinating but she is still able to urinate and does not use a catheter. She denies fever.     NM Renogram w Lasix Impression 4/17/24:  1. Right kidney: Normal function. Normal excretion. No obstruction.  2. Left kidney: Decreased function. Decreased excretion, concerning for partial obstruction.    CT Urogram wo & w Contrast Impression 4/17/24:   1. Circumferential irregular/nodular urothelial enhancement at the distal left ureter with moderate upstream hydroureter/hydronephrosis,raising concern for urothelial malignancy. Ureteral stricture related to prior infection/inflammation could have a similar appearance. Recommend ureteroscopy.  2. No suspicious lymphadenopathy.  3. Other chronic and incidental findings as detailed in the body of the report.    CT Abdomen Pelvis wo Contrast Impression 4/10/24:   1.  No urinary calculi.  2.  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 Diffuse dilation of he left ureter transitioning to normal caliber distally in the pelvis. Recommend further evaluation with CT urogram or pyeloscopy to assess for possible underlying ureteral lesion or stricture.    Independent Historian:   None - Patient Only    Review of External Notes:   Reviewed recent urology notes as well as recent CT scan results as seen above.  Plan for uretoscopy on May 10 per [facility].    Medications:    Senna  Citalopram   Pregabalin     Past Medical History:    Sleep apnea   Breast cancer  Anxiety  Hyperlipidemia  Hydronephrosis,  "left  Lesion of left ureter  L4 compression fracture  Pelvic floor dysfunction  Peripheral polyneuropathy   ODETTE on CPAP  Osteopenia   Lymphedema     Past Surgical History:    Bilateral mastectomy    Tubal ligation  Tonsillectomy & adenoidectomy   Oophorectomy   Meniscus repair   Axillary lymphectomy, left     Physical Exam     Patient Vitals for the past 24 hrs:   BP Temp Temp src Pulse Resp SpO2 Height Weight   04/30/24 1344 118/45 97.1  F (36.2  C) Temporal 82 22 96 % 1.651 m (5' 5\") 59 kg (130 lb)        Physical Exam  General: Well-nourished, appears to be in pain  Eyes: PERRL, conjunctivae pink no scleral icterus or conjunctival injection  ENT:  Moist mucus membranes, posterior oropharynx clear without erythema or exudates  Respiratory:  Lungs clear to auscultation bilaterally, no crackles/rubs/wheezes.  Good air movement  CV: Normal rate and rhythm, no murmurs/rubs/gallops  GI:  Abdomen soft and non-distended.  Normoactive BS.  No tenderness, guarding or rebound  Skin: Warm, dry.  No petechiae or other rash  Musculoskeletal: No peripheral edema or calf tenderness  Neuro: Alert and oriented to person/place/time. Normal saddle sensation.  Normal and symmetric reflexes at patella tendon bilaterally.  Normal and symmetric strength at BLE.  Psychiatric: Normal affect    Emergency Department Course   Imaging:  CT Urogram wo & w Contrast   Final Result   IMPRESSION:   1.  Unchanged moderate left hydroureteronephrosis to the level of the distal ureter where there is an approximately 5 cm segment of urothelial thickening and enhancement. As before, etiology may be inflammatory or neoplastic.   2.  Additional noncritical details in the findings.           Laboratory:  Labs Ordered and Resulted from Time of ED Arrival to Time of ED Departure   ROUTINE UA WITH MICROSCOPIC REFLEX TO CULTURE - Abnormal       Result Value    Color Urine Light Yellow      Appearance Urine Clear      Glucose Urine Negative      Bilirubin Urine " Negative      Ketones Urine Negative      Specific Gravity Urine 1.015      Blood Urine Small (*)     pH Urine 5.0      Protein Albumin Urine Negative      Urobilinogen Urine Normal      Nitrite Urine Negative      Leukocyte Esterase Urine Trace (*)     Mucus Urine Present (*)     RBC Urine 14 (*)     WBC Urine 8 (*)     Squamous Epithelials Urine 3 (*)    COMPREHENSIVE METABOLIC PANEL - Normal    Sodium 142      Potassium 4.0      Carbon Dioxide (CO2) 27      Anion Gap 10      Urea Nitrogen 13.8      Creatinine 0.89      GFR Estimate 73      Calcium 9.3      Chloride 105      Glucose 94      Alkaline Phosphatase 65      AST 18      ALT 13      Protein Total 6.6      Albumin 4.3      Bilirubin Total 0.3     CBC WITH PLATELETS AND DIFFERENTIAL    WBC Count 5.6      RBC Count 3.87      Hemoglobin 12.1      Hematocrit 36.9      MCV 95      MCH 31.3      MCHC 32.8      RDW 12.0      Platelet Count 207      % Neutrophils 66      % Lymphocytes 25      % Monocytes 7      % Eosinophils 1      % Basophils 1      % Immature Granulocytes 0      NRBCs per 100 WBC 0      Absolute Neutrophils 3.7      Absolute Lymphocytes 1.4      Absolute Monocytes 0.4      Absolute Eosinophils 0.1      Absolute Basophils 0.0      Absolute Immature Granulocytes 0.0      Absolute NRBCs 0.0     URINE CULTURE      Emergency Department Course & Assessments:    Interventions:  Medications   pregabalin (LYRICA) capsule 75 mg (75 mg Oral $Given 4/30/24 2249)   citalopram (celeXA) tablet 10 mg (10 mg Oral $Given 4/30/24 2249)   citalopram (celeXA) tablet 20 mg (20 mg Oral $Given 4/30/24 2249)   Saline Flush - CT (55 mLs Intravenous $Given 4/30/24 1725)   iopamidol (ISOVUE-370) solution 80 mL (80 mLs Intravenous $Given 4/30/24 1725)        Assessments:  1350 I obtained history and examined the patient as noted above.     Independent Interpretation (X-rays, CTs, rhythm strip):  None    Consultations/Discussion of Management or Tests:  1504 I spoke with  Sharlene Finnegan from urology who requested a CT urogram.  1641 I spoke with Sharlene GIORDANO from urology after she evaluated the patient. Patient declined CT and she does not feel it is necessary at this point.    1735 I spoke with Dr. Bermeo of the hospitalist service regarding admission.  1750 I spoke with Dr. Schulte from urology.      Social Determinants of Health affecting care:   None    Disposition:  The patient was admitted to the hospital under the care of Dr. Bermeo.     Impression & Plan    Medical Decision Making:  Lorin Andrade is a 63 year old female who comes with worsening flank pain in the setting of a known ureteral obstruction.  Fortunately, kidney function does not appear to be have been significantly impacted.  Does not appear to have a urinary tract infection.  I consulted with urology as she is already under the care and the pain is likely secondary to this ongoing ureteral obstruction.  They recommended admission with the plan to take her to the OR for cystourethroscopy, ureteral biopsy and possible stent insertion as soon as possible.  Unfortunately, she cannot go to the OR tonight given her last p.o. intake but the plan is to go to the OR tomorrow morning.  She will be kept n.p.o. after midnight.  She is admitted to the hospital to the care of the hospitalist service.    Diagnosis:    ICD-10-CM    1. Lesion of left native ureter  N28.9 Case Request: CYSTOURETEROSCOPY, LEFT URETERAL BIOPSY WITH POSS STENT INSERTION     Case Request: CYSTOURETEROSCOPY, LEFT URETERAL BIOPSY WITH POSS STENT INSERTION      2. Hydroureter  N13.4 Case Request: CYSTOURETEROSCOPY, LEFT URETERAL BIOPSY WITH POSS STENT INSERTION     Case Request: CYSTOURETEROSCOPY, LEFT URETERAL BIOPSY WITH POSS STENT INSERTION      3. Flank pain  R10.9             Scribe Disclosure:  Rachael ZAMORA, am serving as a scribe at 2:22 PM on 4/30/2024 to document services personally performed by Shala Ross MD based on my observations and  the provider's statements to me.     4/30/2024   Shala Ross MD Cho, Amy C, MD  04/30/24 0896

## 2024-05-01 ENCOUNTER — ANESTHESIA (OUTPATIENT)
Dept: SURGERY | Facility: CLINIC | Age: 64
End: 2024-05-01
Payer: COMMERCIAL

## 2024-05-01 ENCOUNTER — ANESTHESIA EVENT (OUTPATIENT)
Dept: SURGERY | Facility: CLINIC | Age: 64
End: 2024-05-01
Payer: COMMERCIAL

## 2024-05-01 ENCOUNTER — APPOINTMENT (OUTPATIENT)
Dept: GENERAL RADIOLOGY | Facility: CLINIC | Age: 64
End: 2024-05-01
Attending: UROLOGY
Payer: COMMERCIAL

## 2024-05-01 LAB
ANION GAP SERPL CALCULATED.3IONS-SCNC: 11 MMOL/L (ref 7–15)
BASOPHILS # BLD AUTO: 0 10E3/UL (ref 0–0.2)
BASOPHILS NFR BLD AUTO: 1 %
BUN SERPL-MCNC: 15.5 MG/DL (ref 8–23)
CALCIUM SERPL-MCNC: 9.3 MG/DL (ref 8.8–10.2)
CHLORIDE SERPL-SCNC: 106 MMOL/L (ref 98–107)
CREAT SERPL-MCNC: 0.73 MG/DL (ref 0.51–0.95)
DEPRECATED HCO3 PLAS-SCNC: 25 MMOL/L (ref 22–29)
EGFRCR SERPLBLD CKD-EPI 2021: >90 ML/MIN/1.73M2
EOSINOPHIL # BLD AUTO: 0.1 10E3/UL (ref 0–0.7)
EOSINOPHIL NFR BLD AUTO: 2 %
ERYTHROCYTE [DISTWIDTH] IN BLOOD BY AUTOMATED COUNT: 12.1 % (ref 10–15)
GLUCOSE SERPL-MCNC: 88 MG/DL (ref 70–99)
HCT VFR BLD AUTO: 36.6 % (ref 35–47)
HGB BLD-MCNC: 12.3 G/DL (ref 11.7–15.7)
IMM GRANULOCYTES # BLD: 0 10E3/UL
IMM GRANULOCYTES NFR BLD: 0 %
LYMPHOCYTES # BLD AUTO: 0.8 10E3/UL (ref 0.8–5.3)
LYMPHOCYTES NFR BLD AUTO: 15 %
MCH RBC QN AUTO: 31.4 PG (ref 26.5–33)
MCHC RBC AUTO-ENTMCNC: 33.6 G/DL (ref 31.5–36.5)
MCV RBC AUTO: 93 FL (ref 78–100)
MONOCYTES # BLD AUTO: 0.4 10E3/UL (ref 0–1.3)
MONOCYTES NFR BLD AUTO: 7 %
NEUTROPHILS # BLD AUTO: 4.1 10E3/UL (ref 1.6–8.3)
NEUTROPHILS NFR BLD AUTO: 75 %
NRBC # BLD AUTO: 0 10E3/UL
NRBC BLD AUTO-RTO: 0 /100
PLATELET # BLD AUTO: 185 10E3/UL (ref 150–450)
POTASSIUM SERPL-SCNC: 4.1 MMOL/L (ref 3.4–5.3)
RBC # BLD AUTO: 3.92 10E6/UL (ref 3.8–5.2)
SODIUM SERPL-SCNC: 142 MMOL/L (ref 135–145)
WBC # BLD AUTO: 5.4 10E3/UL (ref 4–11)

## 2024-05-01 PROCEDURE — 250N000011 HC RX IP 250 OP 636: Performed by: UROLOGY

## 2024-05-01 PROCEDURE — 96374 THER/PROPH/DIAG INJ IV PUSH: CPT

## 2024-05-01 PROCEDURE — 52332 CYSTOSCOPY AND TREATMENT: CPT | Performed by: NURSE ANESTHETIST, CERTIFIED REGISTERED

## 2024-05-01 PROCEDURE — 36415 COLL VENOUS BLD VENIPUNCTURE: CPT | Performed by: INTERNAL MEDICINE

## 2024-05-01 PROCEDURE — 250N000013 HC RX MED GY IP 250 OP 250 PS 637: Performed by: UROLOGY

## 2024-05-01 PROCEDURE — 258N000003 HC RX IP 258 OP 636: Performed by: ANESTHESIOLOGY

## 2024-05-01 PROCEDURE — 250N000011 HC RX IP 250 OP 636: Performed by: ANESTHESIOLOGY

## 2024-05-01 PROCEDURE — G0378 HOSPITAL OBSERVATION PER HR: HCPCS

## 2024-05-01 PROCEDURE — 710N000009 HC RECOVERY PHASE 1, LEVEL 1, PER MIN: Performed by: UROLOGY

## 2024-05-01 PROCEDURE — 88305 TISSUE EXAM BY PATHOLOGIST: CPT | Mod: TC | Performed by: UROLOGY

## 2024-05-01 PROCEDURE — 85025 COMPLETE CBC W/AUTO DIFF WBC: CPT | Performed by: INTERNAL MEDICINE

## 2024-05-01 PROCEDURE — 255N000002 HC RX 255 OP 636: Performed by: UROLOGY

## 2024-05-01 PROCEDURE — 258N000001 HC RX 258: Performed by: UROLOGY

## 2024-05-01 PROCEDURE — 250N000013 HC RX MED GY IP 250 OP 250 PS 637: Performed by: HOSPITALIST

## 2024-05-01 PROCEDURE — 250N000009 HC RX 250: Performed by: UROLOGY

## 2024-05-01 PROCEDURE — C2617 STENT, NON-COR, TEM W/O DEL: HCPCS | Performed by: UROLOGY

## 2024-05-01 PROCEDURE — C1758 CATHETER, URETERAL: HCPCS | Performed by: UROLOGY

## 2024-05-01 PROCEDURE — 360N000075 HC SURGERY LEVEL 2, PER MIN: Performed by: UROLOGY

## 2024-05-01 PROCEDURE — 272N000001 HC OR GENERAL SUPPLY STERILE: Performed by: UROLOGY

## 2024-05-01 PROCEDURE — 80048 BASIC METABOLIC PNL TOTAL CA: CPT | Performed by: INTERNAL MEDICINE

## 2024-05-01 PROCEDURE — 99214 OFFICE O/P EST MOD 30 MIN: CPT | Mod: 25 | Performed by: UROLOGY

## 2024-05-01 PROCEDURE — 52332 CYSTOSCOPY AND TREATMENT: CPT | Performed by: ANESTHESIOLOGY

## 2024-05-01 PROCEDURE — 99233 SBSQ HOSP IP/OBS HIGH 50: CPT | Performed by: HOSPITALIST

## 2024-05-01 PROCEDURE — C1769 GUIDE WIRE: HCPCS | Performed by: UROLOGY

## 2024-05-01 PROCEDURE — 999N000141 HC STATISTIC PRE-PROCEDURE NURSING ASSESSMENT: Performed by: UROLOGY

## 2024-05-01 PROCEDURE — 250N000025 HC SEVOFLURANE, PER MIN: Performed by: UROLOGY

## 2024-05-01 PROCEDURE — 250N000009 HC RX 250: Performed by: ANESTHESIOLOGY

## 2024-05-01 PROCEDURE — 52332 CYSTOSCOPY AND TREATMENT: CPT | Mod: LT | Performed by: UROLOGY

## 2024-05-01 PROCEDURE — 370N000017 HC ANESTHESIA TECHNICAL FEE, PER MIN: Performed by: UROLOGY

## 2024-05-01 PROCEDURE — 74420 UROGRAPHY RTRGR +-KUB: CPT | Mod: 26 | Performed by: UROLOGY

## 2024-05-01 PROCEDURE — 999N000179 XR SURGERY CARM FLUORO LESS THAN 5 MIN W STILLS: Mod: TC

## 2024-05-01 DEVICE — URETERAL STENT
Type: IMPLANTABLE DEVICE | Site: URETER | Status: NON-FUNCTIONAL
Brand: POLARIS™ ULTRA
Removed: 2024-05-15

## 2024-05-01 RX ORDER — POLYETHYLENE GLYCOL 3350 17 G/17G
17 POWDER, FOR SOLUTION ORAL DAILY
Status: DISCONTINUED | OUTPATIENT
Start: 2024-05-01 | End: 2024-05-02 | Stop reason: HOSPADM

## 2024-05-01 RX ORDER — DEXAMETHASONE SODIUM PHOSPHATE 4 MG/ML
INJECTION, SOLUTION INTRA-ARTICULAR; INTRALESIONAL; INTRAMUSCULAR; INTRAVENOUS; SOFT TISSUE PRN
Status: DISCONTINUED | OUTPATIENT
Start: 2024-05-01 | End: 2024-05-01

## 2024-05-01 RX ORDER — SODIUM CHLORIDE, SODIUM LACTATE, POTASSIUM CHLORIDE, CALCIUM CHLORIDE 600; 310; 30; 20 MG/100ML; MG/100ML; MG/100ML; MG/100ML
INJECTION, SOLUTION INTRAVENOUS CONTINUOUS
Status: DISCONTINUED | OUTPATIENT
Start: 2024-05-01 | End: 2024-05-01 | Stop reason: HOSPADM

## 2024-05-01 RX ORDER — ONDANSETRON 4 MG/1
4 TABLET, ORALLY DISINTEGRATING ORAL EVERY 6 HOURS PRN
Status: DISCONTINUED | OUTPATIENT
Start: 2024-05-01 | End: 2024-05-02 | Stop reason: HOSPADM

## 2024-05-01 RX ORDER — HYDROMORPHONE HCL IN WATER/PF 6 MG/30 ML
0.4 PATIENT CONTROLLED ANALGESIA SYRINGE INTRAVENOUS EVERY 5 MIN PRN
Status: DISCONTINUED | OUTPATIENT
Start: 2024-05-01 | End: 2024-05-01 | Stop reason: HOSPADM

## 2024-05-01 RX ORDER — CEFAZOLIN SODIUM/WATER 2 G/20 ML
2 SYRINGE (ML) INTRAVENOUS
Status: DISCONTINUED | OUTPATIENT
Start: 2024-05-01 | End: 2024-05-01 | Stop reason: HOSPADM

## 2024-05-01 RX ORDER — AMOXICILLIN 250 MG
2 CAPSULE ORAL 2 TIMES DAILY PRN
Status: DISCONTINUED | OUTPATIENT
Start: 2024-05-01 | End: 2024-05-02 | Stop reason: HOSPADM

## 2024-05-01 RX ORDER — NALOXONE HYDROCHLORIDE 0.4 MG/ML
0.1 INJECTION, SOLUTION INTRAMUSCULAR; INTRAVENOUS; SUBCUTANEOUS
Status: DISCONTINUED | OUTPATIENT
Start: 2024-05-01 | End: 2024-05-01 | Stop reason: HOSPADM

## 2024-05-01 RX ORDER — HYDROMORPHONE HYDROCHLORIDE 1 MG/ML
.3-.5 INJECTION, SOLUTION INTRAMUSCULAR; INTRAVENOUS; SUBCUTANEOUS EVERY 4 HOURS PRN
Status: DISCONTINUED | OUTPATIENT
Start: 2024-05-01 | End: 2024-05-02 | Stop reason: HOSPADM

## 2024-05-01 RX ORDER — FENTANYL CITRATE 50 UG/ML
INJECTION, SOLUTION INTRAMUSCULAR; INTRAVENOUS PRN
Status: DISCONTINUED | OUTPATIENT
Start: 2024-05-01 | End: 2024-05-01

## 2024-05-01 RX ORDER — IOPAMIDOL 612 MG/ML
INJECTION, SOLUTION INTRAVASCULAR PRN
Status: DISCONTINUED | OUTPATIENT
Start: 2024-05-01 | End: 2024-05-01 | Stop reason: HOSPADM

## 2024-05-01 RX ORDER — ACETAMINOPHEN 650 MG/1
650 SUPPOSITORY RECTAL EVERY 4 HOURS PRN
Status: DISCONTINUED | OUTPATIENT
Start: 2024-05-01 | End: 2024-05-02 | Stop reason: HOSPADM

## 2024-05-01 RX ORDER — ACETAMINOPHEN 325 MG/1
650 TABLET ORAL EVERY 4 HOURS PRN
Status: DISCONTINUED | OUTPATIENT
Start: 2024-05-01 | End: 2024-05-02 | Stop reason: HOSPADM

## 2024-05-01 RX ORDER — AMOXICILLIN 250 MG
1 CAPSULE ORAL 2 TIMES DAILY PRN
Status: DISCONTINUED | OUTPATIENT
Start: 2024-05-01 | End: 2024-05-02 | Stop reason: HOSPADM

## 2024-05-01 RX ORDER — LIDOCAINE HYDROCHLORIDE 20 MG/ML
INJECTION, SOLUTION INFILTRATION; PERINEURAL PRN
Status: DISCONTINUED | OUTPATIENT
Start: 2024-05-01 | End: 2024-05-01

## 2024-05-01 RX ORDER — FENTANYL CITRATE 50 UG/ML
25 INJECTION, SOLUTION INTRAMUSCULAR; INTRAVENOUS EVERY 5 MIN PRN
Status: DISCONTINUED | OUTPATIENT
Start: 2024-05-01 | End: 2024-05-01 | Stop reason: HOSPADM

## 2024-05-01 RX ORDER — PROPOFOL 10 MG/ML
INJECTION, EMULSION INTRAVENOUS PRN
Status: DISCONTINUED | OUTPATIENT
Start: 2024-05-01 | End: 2024-05-01

## 2024-05-01 RX ORDER — PROCHLORPERAZINE 25 MG
25 SUPPOSITORY, RECTAL RECTAL EVERY 12 HOURS PRN
Status: DISCONTINUED | OUTPATIENT
Start: 2024-05-01 | End: 2024-05-02 | Stop reason: HOSPADM

## 2024-05-01 RX ORDER — AMOXICILLIN 250 MG
1 CAPSULE ORAL 2 TIMES DAILY
Status: DISCONTINUED | OUTPATIENT
Start: 2024-05-01 | End: 2024-05-02 | Stop reason: HOSPADM

## 2024-05-01 RX ORDER — ONDANSETRON 2 MG/ML
4 INJECTION INTRAMUSCULAR; INTRAVENOUS EVERY 6 HOURS PRN
Status: DISCONTINUED | OUTPATIENT
Start: 2024-05-01 | End: 2024-05-02 | Stop reason: HOSPADM

## 2024-05-01 RX ORDER — PROCHLORPERAZINE MALEATE 10 MG
10 TABLET ORAL EVERY 6 HOURS PRN
Status: DISCONTINUED | OUTPATIENT
Start: 2024-05-01 | End: 2024-05-02 | Stop reason: HOSPADM

## 2024-05-01 RX ORDER — NALOXONE HYDROCHLORIDE 0.4 MG/ML
0.4 INJECTION, SOLUTION INTRAMUSCULAR; INTRAVENOUS; SUBCUTANEOUS
Status: DISCONTINUED | OUTPATIENT
Start: 2024-05-01 | End: 2024-05-02 | Stop reason: HOSPADM

## 2024-05-01 RX ORDER — OXYCODONE HYDROCHLORIDE 5 MG/1
5 TABLET ORAL EVERY 4 HOURS PRN
Status: DISCONTINUED | OUTPATIENT
Start: 2024-05-01 | End: 2024-05-02 | Stop reason: HOSPADM

## 2024-05-01 RX ORDER — NALOXONE HYDROCHLORIDE 0.4 MG/ML
0.2 INJECTION, SOLUTION INTRAMUSCULAR; INTRAVENOUS; SUBCUTANEOUS
Status: DISCONTINUED | OUTPATIENT
Start: 2024-05-01 | End: 2024-05-02 | Stop reason: HOSPADM

## 2024-05-01 RX ORDER — CEFAZOLIN SODIUM/WATER 2 G/20 ML
2 SYRINGE (ML) INTRAVENOUS
Status: COMPLETED | OUTPATIENT
Start: 2024-05-01 | End: 2024-05-01

## 2024-05-01 RX ORDER — ONDANSETRON 2 MG/ML
INJECTION INTRAMUSCULAR; INTRAVENOUS PRN
Status: DISCONTINUED | OUTPATIENT
Start: 2024-05-01 | End: 2024-05-01

## 2024-05-01 RX ORDER — CEFAZOLIN SODIUM/WATER 2 G/20 ML
2 SYRINGE (ML) INTRAVENOUS SEE ADMIN INSTRUCTIONS
Status: DISCONTINUED | OUTPATIENT
Start: 2024-05-01 | End: 2024-05-01 | Stop reason: HOSPADM

## 2024-05-01 RX ORDER — HYDROMORPHONE HCL IN WATER/PF 6 MG/30 ML
0.2 PATIENT CONTROLLED ANALGESIA SYRINGE INTRAVENOUS EVERY 5 MIN PRN
Status: DISCONTINUED | OUTPATIENT
Start: 2024-05-01 | End: 2024-05-01 | Stop reason: HOSPADM

## 2024-05-01 RX ORDER — MAGNESIUM HYDROXIDE 1200 MG/15ML
LIQUID ORAL PRN
Status: DISCONTINUED | OUTPATIENT
Start: 2024-05-01 | End: 2024-05-01 | Stop reason: HOSPADM

## 2024-05-01 RX ORDER — DEXAMETHASONE SODIUM PHOSPHATE 4 MG/ML
4 INJECTION, SOLUTION INTRA-ARTICULAR; INTRALESIONAL; INTRAMUSCULAR; INTRAVENOUS; SOFT TISSUE
Status: DISCONTINUED | OUTPATIENT
Start: 2024-05-01 | End: 2024-05-01 | Stop reason: HOSPADM

## 2024-05-01 RX ORDER — MEPERIDINE HYDROCHLORIDE 25 MG/ML
12.5 INJECTION INTRAMUSCULAR; INTRAVENOUS; SUBCUTANEOUS EVERY 5 MIN PRN
Status: DISCONTINUED | OUTPATIENT
Start: 2024-05-01 | End: 2024-05-01 | Stop reason: HOSPADM

## 2024-05-01 RX ORDER — ONDANSETRON 4 MG/1
4 TABLET, ORALLY DISINTEGRATING ORAL EVERY 30 MIN PRN
Status: DISCONTINUED | OUTPATIENT
Start: 2024-05-01 | End: 2024-05-01 | Stop reason: HOSPADM

## 2024-05-01 RX ORDER — FENTANYL CITRATE 50 UG/ML
50 INJECTION, SOLUTION INTRAMUSCULAR; INTRAVENOUS EVERY 5 MIN PRN
Status: DISCONTINUED | OUTPATIENT
Start: 2024-05-01 | End: 2024-05-01 | Stop reason: HOSPADM

## 2024-05-01 RX ORDER — ONDANSETRON 2 MG/ML
4 INJECTION INTRAMUSCULAR; INTRAVENOUS EVERY 30 MIN PRN
Status: DISCONTINUED | OUTPATIENT
Start: 2024-05-01 | End: 2024-05-01 | Stop reason: HOSPADM

## 2024-05-01 RX ORDER — PROPOFOL 10 MG/ML
INJECTION, EMULSION INTRAVENOUS CONTINUOUS PRN
Status: DISCONTINUED | OUTPATIENT
Start: 2024-05-01 | End: 2024-05-01

## 2024-05-01 RX ORDER — POLYETHYLENE GLYCOL 3350 17 G/17G
17 POWDER, FOR SOLUTION ORAL 2 TIMES DAILY PRN
Status: DISCONTINUED | OUTPATIENT
Start: 2024-05-01 | End: 2024-05-02 | Stop reason: HOSPADM

## 2024-05-01 RX ADMIN — ONDANSETRON 4 MG: 2 INJECTION INTRAMUSCULAR; INTRAVENOUS at 13:52

## 2024-05-01 RX ADMIN — ONDANSETRON 4 MG: 2 INJECTION INTRAMUSCULAR; INTRAVENOUS at 13:02

## 2024-05-01 RX ADMIN — HYDROMORPHONE HYDROCHLORIDE 0.4 MG: 0.2 INJECTION, SOLUTION INTRAMUSCULAR; INTRAVENOUS; SUBCUTANEOUS at 14:00

## 2024-05-01 RX ADMIN — FENTANYL CITRATE 50 MCG: 50 INJECTION, SOLUTION INTRAMUSCULAR; INTRAVENOUS at 13:32

## 2024-05-01 RX ADMIN — SODIUM CHLORIDE, POTASSIUM CHLORIDE, SODIUM LACTATE AND CALCIUM CHLORIDE: 600; 310; 30; 20 INJECTION, SOLUTION INTRAVENOUS at 11:46

## 2024-05-01 RX ADMIN — PROPOFOL 50 MCG/KG/MIN: 10 INJECTION, EMULSION INTRAVENOUS at 12:17

## 2024-05-01 RX ADMIN — Medication 2 G: at 12:13

## 2024-05-01 RX ADMIN — PREGABALIN 75 MG: 75 CAPSULE ORAL at 15:04

## 2024-05-01 RX ADMIN — POLYETHYLENE GLYCOL 3350 17 G: 17 POWDER, FOR SOLUTION ORAL at 15:05

## 2024-05-01 RX ADMIN — PHENYLEPHRINE HYDROCHLORIDE 100 MCG: 10 INJECTION INTRAVENOUS at 12:30

## 2024-05-01 RX ADMIN — DOCUSATE SODIUM 50 MG AND SENNOSIDES 8.6 MG 1 TABLET: 8.6; 5 TABLET, FILM COATED ORAL at 20:14

## 2024-05-01 RX ADMIN — PHENYLEPHRINE HYDROCHLORIDE 100 MCG: 10 INJECTION INTRAVENOUS at 12:24

## 2024-05-01 RX ADMIN — OXYCODONE HYDROCHLORIDE 2.5 MG: 5 TABLET ORAL at 20:59

## 2024-05-01 RX ADMIN — CITALOPRAM HYDROBROMIDE 20 MG: 20 TABLET ORAL at 20:17

## 2024-05-01 RX ADMIN — FENTANYL CITRATE 50 MCG: 50 INJECTION INTRAMUSCULAR; INTRAVENOUS at 12:17

## 2024-05-01 RX ADMIN — PHENYLEPHRINE HYDROCHLORIDE 100 MCG: 10 INJECTION INTRAVENOUS at 12:48

## 2024-05-01 RX ADMIN — PHENYLEPHRINE HYDROCHLORIDE 100 MCG: 10 INJECTION INTRAVENOUS at 12:36

## 2024-05-01 RX ADMIN — CITALOPRAM HYDROBROMIDE 10 MG: 10 TABLET ORAL at 20:17

## 2024-05-01 RX ADMIN — HYDROMORPHONE HYDROCHLORIDE 0.5 MG: 1 INJECTION, SOLUTION INTRAMUSCULAR; INTRAVENOUS; SUBCUTANEOUS at 16:53

## 2024-05-01 RX ADMIN — LIDOCAINE HYDROCHLORIDE 60 MG: 20 INJECTION, SOLUTION INFILTRATION; PERINEURAL at 12:17

## 2024-05-01 RX ADMIN — DEXAMETHASONE SODIUM PHOSPHATE 4 MG: 4 INJECTION, SOLUTION INTRA-ARTICULAR; INTRALESIONAL; INTRAMUSCULAR; INTRAVENOUS; SOFT TISSUE at 12:20

## 2024-05-01 RX ADMIN — PROPOFOL 200 MG: 10 INJECTION, EMULSION INTRAVENOUS at 12:17

## 2024-05-01 RX ADMIN — PHENYLEPHRINE HYDROCHLORIDE 100 MCG: 10 INJECTION INTRAVENOUS at 12:18

## 2024-05-01 ASSESSMENT — ACTIVITIES OF DAILY LIVING (ADL)
ADLS_ACUITY_SCORE: 18
ADLS_ACUITY_SCORE: 21
ADLS_ACUITY_SCORE: 18
ADLS_ACUITY_SCORE: 18
ADLS_ACUITY_SCORE: 35
ADLS_ACUITY_SCORE: 18
ADLS_ACUITY_SCORE: 35
ADLS_ACUITY_SCORE: 18
ADLS_ACUITY_SCORE: 35
ADLS_ACUITY_SCORE: 35
ADLS_ACUITY_SCORE: 21
ADLS_ACUITY_SCORE: 35

## 2024-05-01 ASSESSMENT — LIFESTYLE VARIABLES: TOBACCO_USE: 0

## 2024-05-01 ASSESSMENT — ENCOUNTER SYMPTOMS
DYSRHYTHMIAS: 0
SEIZURES: 0

## 2024-05-01 NOTE — ED NOTES
"[facility]  ED Nurse Handoff Report    ED Chief complaint: Flank Pain      ED Diagnosis:   Final diagnoses:   Hydroureter   Flank pain       Code Status: Full Code    Allergies:   Allergies   Allergen Reactions    Animal Dander Shortness Of Breath    Gabapentin Other (See Comments)     Tolerating pregabalin well       Patient Story: Lorin Andrade is a 63 year old female with history of hydronephrosis and lesion of left ureter who presents with her spouse for evaluation of flank pain. She reports that she had recent imaging and has a left ureteroscopy with retrograde pyelogram and possible ureteral stent schedule next week but is having severe bilateral flank pain. Adds that she has hematuria, nausea, and trouble urinating but she is still able to urinate and does not use a catheter. She denies fever.     Focused Assessment:  Intermittent pain    Treatments and/or interventions provided: Imaging, Labs  Patient's response to treatments and/or interventions: N/A    To be done/followed up on inpatient unit:  Inpatient orders, pain management    Does this patient have any cognitive concerns?:  None    Activity level - Baseline/Home:  Independent  Activity Level - Current:   Independent    Patient's Preferred language: English   Needed?: No    Isolation: None  Infection: Not Applicable  Patient tested for COVID 19 prior to admission: NO  Bariatric?: No    Vital Signs:   Vitals:    04/30/24 1344   BP: 118/45   Pulse: 82   Resp: 22   Temp: 97.1  F (36.2  C)   TempSrc: Temporal   SpO2: 96%   Weight: 59 kg (130 lb)   Height: 1.651 m (5' 5\")       For the majority of the shift this patient's behavior was Green.   Behavioral interventions performed were N/A.    ED NURSE PHONE NUMBER: *[id number]         "

## 2024-05-01 NOTE — PROGRESS NOTES
[facility]    Hospitalist Progress Note      Assessment & Plan   Ms. Lorin Andrade is a 64 yo female with history including recent recent left hydroureter/hydronephrosis with left ureteral lesion; ODETTE on CPAP; depression/anxiety; neuropathy; OA; osteopenia; and h/p breast cancer s/p surgery and chemoradiation (2006); who presents with back/flank and abdominal pain     Recent left hydroureter/hydronephrosis with left ureteral lesion.  Back/flank/abdominal, suspect due to above.  * CT abdomen/pelvis 4/10/2024 concerning for partial obstruction. CT urogram 4/17/2024 showed circumferential irregular/nodular urothelial enhancement at the distal left ureter with moderate upstream hydroureter/hydronephrosis, raising concern for urothelial malignancy, Urology planned for cysto 5/10/2024.  * Initial presentation as above. On admit, pt notes that pain been fairly debilitating with severe intractable pain.    - Urology cysto and ureteroscopy with ureteral biopsy, 5/1/2024.  Stent placement  - Order PRN acetaminophen, PRN oxycodone, PRN IV hydromorphone; minimize opioids as able (pt reluctant to take opioids overall).     Abnormal UA, suspect due to above.  * Initial presentation as above.  -UCx NGTD       Recent facial rash, treated with antibiotics.  * Treated for 2 months oral antibiotics and completed earlier 4/2024.  - Continue to monitor clinically.     ODETTE.  - Continue CPAP.     Depression/anxiety.  - Continue citalopram.     Neuropathy, possibly due to prior chemotherapy.  - Continue pregabalin.     H/o breast cancer, left.   * Stage IIB. S/p bilateral mastectomy and chemoradiation in 2006.   - Noted.    Constipation  No associated nausea/emesis.  Eating.  Abdominal/flank pain as above.  -Scheduled MiraLAX, senna S, mobilize as able, monitor     DVT Prophylaxis: Pneumatic Compression Devices  Code Status: Full Code     Expected Discharge Date: 05/03/2024               Lakshmi Lorenzana MD  Text  Page (7am - 6pm, M-F)    Total time 50 minutes for today 5/1/2024 :   time consisted of the following, assuming Patient care with review of records including labs, imaging results, medications, interdisciplinary notes; examination of Patient;  and completing documentation and orders.  Care Management included  Coordination of Care time with Nursing  and Specialists, Urology regarding care plan, management and surveillance; as above. .     Interval History   Assumed care.  Continue flank pain, improved with pain medications.  Chronic constipation, denies abdominal pain, nausea, emesis    -Data reviewed today: I reviewed all new labs and imaging results over the last 24 hours.    Physical Exam   Temp: 96.9  F (36.1  C) Temp src: Oral BP: 121/61 Pulse: 61   Resp: 14 SpO2: 97 % O2 Device: None (Room air) Oxygen Delivery: 2 LPM  Vitals:    04/30/24 1344   Weight: 59 kg (130 lb)     General/Constitutional:   NAD, alert, calm, cooperative    Chest/Respiratory: Respirations nonlabored room air  Cardiovascular:  regular, no murmur appreciated.  LE edema none  Gastrointestinal/Abdomen:  soft, nontender, no rebound, guarding or other peritoneal signs.  Neuro.  Gross motor tested, nonfocal,  Psych oriented, affect calm      Medications   Current Facility-Administered Medications   Medication Dose Route Frequency Provider Last Rate Last Admin     Current Facility-Administered Medications   Medication Dose Route Frequency Provider Last Rate Last Admin    citalopram (celeXA) tablet 10 mg  10 mg Oral QPM Dennis Bermeo MD   10 mg at 04/30/24 2249    citalopram (celeXA) tablet 20 mg  20 mg Oral QPM Dennis Bermeo MD   20 mg at 04/30/24 2249    polyethylene glycol (MIRALAX) Packet 17 g  17 g Oral Daily Lakshmi Lorenzana MD        pregabalin (LYRICA) capsule 75 mg  75 mg Oral BID Dennis Bermeo MD   75 mg at 04/30/24 2249    senna-docusate (SENOKOT-S/PERICOLACE) 8.6-50 MG per tablet 1 tablet  1 tablet Oral BID  Lakshmi Lorenzana MD           Data   Recent Labs   Lab 05/01/24  0946 04/30/24  1415   WBC 5.4 5.6   HGB 12.3 12.1   MCV 93 95    207    142   POTASSIUM 4.1 4.0   CHLORIDE 106 105   CO2 25 27   BUN 15.5 13.8   CR 0.73 0.89   ANIONGAP 11 10   JULIETTE 9.3 9.3   GLC 88 94   ALBUMIN  --  4.3   PROTTOTAL  --  6.6   BILITOTAL  --  0.3   ALKPHOS  --  65   ALT  --  13   AST  --  18       Recent Results (from the past 24 hour(s))   CT Urogram wo & w Contrast    Narrative    EXAM: CT UROGRAM WO and W CONTRAST  LOCATION: [facility]  DATE: 4/30/2024    INDICATION: flank pain  COMPARISON: CT abdomen pelvis 4/17/2024  TECHNIQUE: CT scan of the abdomen and pelvis using urogram technique with pre contrast, post contrast, and delayed images. Multiplanar reformats were obtained. Dose reduction techniques were used.   CONTRAST: 80 mL Isovuie 370    FINDINGS:   LOWER CHEST: No worrisome consolidation in the visualized lung bases. No pleural effusion. Partially imaged breast implants.    HEPATOBILIARY: Unchanged liver contours with benign cyst in the left hepatic lobe. No worrisome liver lesions. Normal gallbladder. No biliary ductal dilation.    PANCREAS: Normal.    SPLEEN: Normal.    ADRENAL GLANDS: Normal.    RIGHT KIDNEY/URETER: Normal morphology and enhancement of the right kidney. A few cortically based subcentimeter low-attenuation lesions are incompletely characterized though likely cysts. No urolithiasis or hydronephrosis. Normal excretion of contrast   on delayed images without urothelial thickening or filling defects.    LEFT KIDNEY/URETER: Unchanged delayed left nephrogram. 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 No urolithiasis. There is unchanged moderate to severe hydroureteronephrosis with dependent excreted contrast   layering within the calyces. As before, there is a transition in the distal ureter where there is an approximately 5  cm segment of urothelial thickening and enhancement (series 12 image #42, #48).     BLADDER: Unremarkable.    BOWEL: Normal caliber small and large bowel. Normal appendix. No free fluid or free air.    LYMPH NODES: No lymphadenopathy.    VASCULATURE: The abdominal aorta is nonaneurysmal with mild atheromatous disease.    PELVIC ORGANS: Uterus is present. No adnexal mass.    MUSCULOSKELETAL: Unchanged mild L4 superior endplate deformity. No destructive osseous lesions.      Impression    IMPRESSION:  1.  Unchanged moderate left hydroureteronephrosis to the level of the distal ureter where there is an approximately 5 cm segment of urothelial thickening and enhancement. As before, etiology may be inflammatory or neoplastic.  2.  Additional noncritical details in the findings.   XR Surgery ARNULFO Fluoro Less Than 5 Min w Stills    Narrative    This exam was marked as non-reportable because it will not be read by a   radiologist or a [place] non-radiologist provider.

## 2024-05-01 NOTE — ANESTHESIA CARE TRANSFER NOTE
Patient: Lorin Andrade    Procedure: Procedure(s):  CYSTOURETEROSCOPY, LEFT URETERAL BIOPSY WITH STENT INSERTION, LEFT RETROGRADE PYELOGRAM       Diagnosis: Hydroureter [N13.4]  Lesion of left native ureter [N28.9]  Diagnosis Additional Information: No value filed.    Anesthesia Type:   General     Note:    Oropharynx: oropharynx clear of all foreign objects and spontaneously breathing  Level of Consciousness: awake  Oxygen Supplementation: face mask  Level of Supplemental Oxygen (L/min / FiO2): 6  Independent Airway: airway patency satisfactory and stable  Dentition: dentition unchanged  Vital Signs Stable: post-procedure vital signs reviewed and stable  Report to RN Given: handoff report given  Patient transferred to: PACU    Handoff Report: Identifed the Patient, Identified the Reponsible Provider, Reviewed the pertinent medical history, Discussed the surgical course, Reviewed Intra-OP anesthesia mangement and issues during anesthesia, Set expectations for post-procedure period and Allowed opportunity for questions and acknowledgement of understanding      Vitals:  Vitals Value Taken Time   BP     Temp     Pulse 68 05/01/24 1313   Resp 12 05/01/24 1313   SpO2 100 % 05/01/24 1313   Vitals shown include unfiled device data.    Electronically Signed By: Sumaya Monahan  May 1, 2024  1:14 PM

## 2024-05-01 NOTE — ANESTHESIA PROCEDURE NOTES
Airway       Patient location during procedure: OR  Staff -        Anesthesiologist:  Donavon Alvares MD       CRNA: Sumaya Monahan       Performed By: CRNA  Consent for Airway        Urgency: elective  Indications and Patient Condition       Indications for airway management: ilir-procedural       Induction type:intravenous       Mask difficulty assessment: 0 - not attempted    Final Airway Details       Final airway type: supraglottic airway    Supraglottic Airway Details        Type: LMA       Brand: I-Gel       LMA size: 4    Post intubation assessment        Placement verified by: capnometry, equal breath sounds and chest rise        Number of attempts at approach: 1       Number of other approaches attempted: 0       Secured with: tape       Ease of procedure: easy       Dentition: Intact and Unchanged

## 2024-05-01 NOTE — PLAN OF CARE
5/1  0700 - 1930  Orientation: A&Ox4  Activity: SBA after procedure, independent in room in morning  Diet/BS Checks: Clear liquids, ADAT  Tele: n/a  IV Access/Drains: PIV SL  Pain Management: PRN 0.3mg dilaudid x1 for post-procedure pain  Abnormal VS/Results: VSS on RA  Bowel/Bladder: Ayala in place, pink-tinged output. Orders to removed POD1. Miralax given for constipation, no BM.  Skin/Wounds: WDL  Consults: urology  D/C Disposition: Discharge home 5/2 pending progress  Other Info:   - Pt had L ureteral stent this morning  - Biopsy pending

## 2024-05-01 NOTE — PROGRESS NOTES
RECEIVING UNIT ED HANDOFF REVIEW    ED Nurse Handoff Report was reviewed by: Madison Lucio RN on May 1, 2024 at 4:45 AM

## 2024-05-01 NOTE — ANESTHESIA POSTPROCEDURE EVALUATION
Patient: Lorin Andrade    Procedure: Procedure(s):  CYSTOURETEROSCOPY, LEFT URETERAL BIOPSY WITH STENT INSERTION, LEFT RETROGRADE PYELOGRAM       Anesthesia Type:  General    Note:  Disposition: Inpatient   Postop Pain Control: Uneventful            Sign Out: Well controlled pain   PONV: No   Neuro/Psych: Uneventful            Sign Out: Acceptable/Baseline neuro status   Airway/Respiratory: Uneventful            Sign Out: Acceptable/Baseline resp. status   CV/Hemodynamics: Uneventful            Sign Out: Acceptable CV status   Other NRE: NONE   DID A NON-ROUTINE EVENT OCCUR? No           Last vitals:  Vitals Value Taken Time   /66 05/01/24 1400   Temp 36.1  C (96.9  F) 05/01/24 1313   Pulse 71 05/01/24 1406   Resp 14 05/01/24 1407   SpO2 96 % 05/01/24 1407   Vitals shown include unfiled device data.    Electronically Signed By: Donavon Alvares MD  May 1, 2024  2:58 PM

## 2024-05-01 NOTE — PLAN OF CARE
[id number]  Orientation: a&ox4  Activity: ind  Diet/BS Checks: NPO  Tele:  n/a  IV Access/Drains: PIV RA SL  Pain Management: pain in flank denies pain medications  Abnormal VS/Results: VSS on RA  Bowel/Bladder: cont B/B  Skin/Wounds: WNL  Consults: urology  D/C Disposition: pending  Other Info: poss. Biopsy/stent placement

## 2024-05-01 NOTE — UTILIZATION REVIEW
"  Admission Status; Secondary Review Determination     Under the authority of the Utilization Management Committee, the utilization review process indicated a secondary review on the above patient. The review outcome is based on review of the medical records, discussions with staff, and applying clinical experience noted on the date of the review.     (x) Observation Status Appropriate - This patient does not meet hospital inpatient criteria and is placed in observation status. If this patient's primary payer is Medicare and was admitted as an inpatient, Condition Code 44 should be used and patient status changed to \"observation\".     RATIONALE FOR DETERMINATION:  Per provider note:  \"62 yo female with history including recent recent left hydroureter/hydronephrosis with left ureteral lesion; ODETTE on CPAP; depression/anxiety; neuropathy; OA; osteopenia; and h/p breast cancer s/p surgery and chemoradiation (2006); who presents with back/flank and abdominal pain\"    VS reviewed.  No fever    Labs reviewed.  Abnormal UA with 8 WBC and 14 RBC.  CBC normal, CMP normal    CT urogram shows \"Unchanged moderate left hydroureteronephrosis to the level of the distal ureter where there is an approximately 5 cm segment of urothelial thickening and enhancement. As before, etiology may be inflammatory or neoplastic.\"    The patient is not requiring any IV medications currently.  No pain medications administered today beyond scheduled Lyrica.      Urology consulted and cysto, left diagnostic URS, possible biopsy, possible stent placement being planned tomorrow.  Unable to do procedure today as patient ate lunch today    The severity of illness, intensity of service provided, expected LOS and risk for adverse outcome make the care appropriate for further observation; however, doesn't meet criteria for hospital inpatient admission. Dr Lorenzana notified of this determination.   This document was produced using voice recognition software. "   The information on this document is developed by the utilization review team in order for the business office to ensure compliance. This only denotes the appropriateness of proper admission status and does not reflect the quality of care rendered.   The definitions of Inpatient Status and Observation Status used in making the determination above are those provided in the CMS Coverage Manual, Chapter 1 and Chapter 6, section 70.4.     Sincerely,     Bladimir Carlos MD  Utilization Review   Physician Advisor   [facility]

## 2024-05-01 NOTE — ANESTHESIA PREPROCEDURE EVALUATION
Anesthesia Pre-Procedure Evaluation    Patient: Lorin Andrade   MRN: [id number] : 1960        Procedure : Procedure(s):  CYSTOURETEROSCOPY, LEFT URETERAL BIOPSY WITH POSS STENT INSERTION          Past Medical History:   Diagnosis Date    Cancer (H)     Sleep apnea       Past Surgical History:   Procedure Laterality Date    COLONOSCOPY N/A 2023    Procedure: Colonoscopy;  Surgeon: Ana Paula Hurst MD;  Location: [facility]    MASTECTOMY, BILATERAL      Double mastectomy      Allergies   Allergen Reactions    Animal Dander Shortness Of Breath    Gabapentin Other (See Comments)     Tolerating pregabalin well      Social History     Tobacco Use    Smoking status: Never    Smokeless tobacco: Never   Substance Use Topics    Alcohol use: Yes     Comment: 1-2 times a month      Wt Readings from Last 1 Encounters:   24 59 kg (130 lb)        Anesthesia Evaluation   Pt has had prior anesthetic. Type: General.    History of anesthetic complications  - PONV.      ROS/MED HX  ENT/Pulmonary:     (+) sleep apnea, uses CPAP,                                   (-) tobacco use and asthma   Neurologic:     (+)    peripheral neuropathy, - feet.                        (-) no seizures and no CVA   Cardiovascular:    (-) hypertension, CAD and arrhythmias   METS/Exercise Tolerance:     Hematologic:       Musculoskeletal:       GI/Hepatic:    (-) GERD   Renal/Genitourinary: Comment: Hydroureter [N13.4];Flank pain [R10.9];Lesion of left native ureter [N28.9]      (+) renal disease,             Endo:    (-) Type II DM and thyroid disease   Psychiatric/Substance Use:     (+) psychiatric history anxiety and depression       Infectious Disease:    (-) Recent Fever   Malignancy:   (+) Malignancy, History of Breast.Breast CA Remission status post Surgery and Chemo.      Other:            Physical Exam    Airway  airway exam normal      Mallampati: II   TM distance: > 3 FB   Neck ROM: full   Mouth opening: > 3 cm    Respiratory  "Devices and Support         Dental       (+) Minor Abnormalities - some fillings, tiny chips      Cardiovascular   cardiovascular exam normal          Pulmonary   pulmonary exam normal                OUTSIDE LABS:  CBC:   Lab Results   Component Value Date    WBC 5.4 05/01/2024    WBC 5.6 04/30/2024    HGB 12.3 05/01/2024    HGB 12.1 04/30/2024    HCT 36.6 05/01/2024    HCT 36.9 04/30/2024     05/01/2024     04/30/2024     BMP:   Lab Results   Component Value Date     05/01/2024     04/30/2024    POTASSIUM 4.1 05/01/2024    POTASSIUM 4.0 04/30/2024    CHLORIDE 106 05/01/2024    CHLORIDE 105 04/30/2024    CO2 25 05/01/2024    CO2 27 04/30/2024    BUN 15.5 05/01/2024    BUN 13.8 04/30/2024    CR 0.73 05/01/2024    CR 0.89 04/30/2024    GLC 88 05/01/2024    GLC 94 04/30/2024     COAGS: No results found for: \"PTT\", \"INR\", \"FIBR\"  POC: No results found for: \"BGM\", \"HCG\", \"HCGS\"  HEPATIC:   Lab Results   Component Value Date    ALBUMIN 4.3 04/30/2024    PROTTOTAL 6.6 04/30/2024    ALT 13 04/30/2024    AST 18 04/30/2024    ALKPHOS 65 04/30/2024    BILITOTAL 0.3 04/30/2024     OTHER:   Lab Results   Component Value Date    JULIETTE 9.3 05/01/2024       Anesthesia Plan    ASA Status:  2    NPO Status:  NPO Appropriate    Anesthesia Type: General.     - Airway: LMA   Induction: Intravenous.   Maintenance: TIVA.        Consents    Anesthesia Plan(s) and associated risks, benefits, and realistic alternatives discussed. Questions answered and patient/representative(s) expressed understanding.     - Discussed:     - Discussed with:  Patient            Postoperative Care    Pain management: IV analgesics, Oral pain medications.   PONV prophylaxis: Ondansetron (or other 5HT-3), Dexamethasone or Solumedrol, Background Propofol Infusion     Comments:               Donavon Alvares MD    I have reviewed the pertinent notes and labs in the chart from the past 30 days and (re)examined the patient.  Any updates or " changes from those notes are reflected in this note.

## 2024-05-01 NOTE — OP NOTE
OPERATIVE REPORT  DATE OF SURGERY: 05/01/24  LOCATION OF SURGERY: [facility]  PREOPERATIVE DIAGNOSIS:  (N28.9) Lesion of left native ureter  (primary encounter diagnosis)  (N13.4) Hydroureter  (R10.9) Flank pain  (N13.30) Hydronephrosis of left kidney  POSTOPERATIVE DIAGNOSIS: (N28.9) Lesion of left native ureter  (primary encounter diagnosis)  (N13.4) Hydroureter  (R10.9) Flank pain  (N13.30) Hydronephrosis of left kidney     START TIME: 12:27 PM  END TIME: 1:04 PM    PROCEDURE PERFORMED:   1. Cystoscopy  2. LEFT retrograde pyelogram  3. LEFT ureteroscopy with ureteral biopsy   4. LEFT JJ stent placement  5. <1hr physician fluoroscopy time      STAFF SURGEON: Rogelio Gallagher MD  ANESTHESIA: General.   ESTIMATED BLOOD LOSS: 2 mL.   DRAINS AND TUBES: LEFT 6fr x 24cm ureteral stent, 16fr dominguez catheter  COMPLICATIONS: None.   DISPOSITION: PACU.   SPECIMENS OBTAINED:   ID Type Source Tests Collected by Time Destination   1 : LEFT DISTAL URETERAL BIOPSY Biopsy Ureter, Left SURGICAL PATHOLOGY EXAM Rogelio Gallagher MD 5/1/2024 12:48 PM       SIGNIFICANT FINDINGS: Cystoscopy with no evidence of intravesical abnormalities, lesions, or concern for malignancy.  Left retrograde pyelogram with evidence of very narrow distal 3 to 4 cm of ureter.  Ureteroscopy with no clear urothelial malignancy within the ureter, however this appeared more consistent with a dense stricture.  Biopsy samples taken from the stricture area.  Left ureteral stent placed.     HISTORY OF PRESENT ILLNESS: Lorin Andrade is a 63 year old female admitted with worsening left-sided renal colic which has been known and being worked up for possible enhancement of the left distal ureter and moderate hydroureteronephrosis.  Urine cytology with suspicion for high-grade urothelial carcinoma.  She presented yesterday with worsening pain and plans were made for operative evaluation today.    OPERATION PERFORMED:   Informed consent was obtained and the patient was  brought to the operating room where general anesthesia was induced. The patient was given appropriate preoperative antibiotics and positioned supine. The patient was then repositioned in dorsal lithotomy with all pressure points padded. We then performed a timeout, verifying the correct patient's site and procedure to be performed.    A 22 Persian cystoscope was inserted atraumatically into the bladder.  Complete cystoscopy was performed with both the 30 degree and 70 degree lenses with no evidence of intravesical abnormalities, masses, diverticula, stones, or of any other areas of concern.  The left ureteral orifice was identified and cannulated with a 0.035 sensor wire which was advanced up to the renal pelvis under fluoroscopic guidance and the cystoscope was removed.  A semirigid ureteroscope was assembled and inserted atraumatically into the bladder.  The Amplatz Super Stiff wire was used to cannulate the ureteral orifice and the semirigid scope was advanced using a railroad technique.  The distal 3 to 4 cm of the ureter were very narrow and constrictive.  A gentle retrograde pyelogram was performed outlining this segment of ureter with hydroureteronephrosis more proximally.  Ureteroscope was able to gently advanced through the area of stricture to the normal caliber ureter.  Repeat retrograde pyelogram was performed outlining the rest of the collecting system.  Ureteroscope was then withdrawn slowly visualizing the entire ureteral mucosa.  There is no clear papillary tumor.  3 biopsy samples were taken from the sidewall of the ureter with the Victor Manuelha flexible forceps.  The ureteroscope was removed and a 6 Persian by 24 cm JJ ureteral stent was advanced over the wire with good curl noted in the renal pelvis and the bladder fluoroscopically.  A 16 Persian Ayala catheter was placed with 10 cc in the balloon.  She was emerged from anesthesia and taken to the recovery room in stable condition.    Rogelio Gallagher MD    Urology  [facility]  Clinic Phone [phone]

## 2024-05-02 ENCOUNTER — PRE VISIT (OUTPATIENT)
Dept: SURGERY | Facility: CLINIC | Age: 64
End: 2024-05-02

## 2024-05-02 VITALS
TEMPERATURE: 97.9 F | HEIGHT: 65 IN | RESPIRATION RATE: 16 BRPM | SYSTOLIC BLOOD PRESSURE: 103 MMHG | WEIGHT: 130 LBS | HEART RATE: 60 BPM | OXYGEN SATURATION: 96 % | DIASTOLIC BLOOD PRESSURE: 50 MMHG | BODY MASS INDEX: 21.66 KG/M2

## 2024-05-02 LAB
BACTERIA UR CULT: NO GROWTH
HGB BLD-MCNC: 11.4 G/DL (ref 11.7–15.7)
PATH REPORT.COMMENTS IMP SPEC: NORMAL
PATH REPORT.COMMENTS IMP SPEC: NORMAL
PATH REPORT.FINAL DX SPEC: NORMAL
PATH REPORT.GROSS SPEC: NORMAL
PATH REPORT.MICROSCOPIC SPEC OTHER STN: NORMAL
PATH REPORT.RELEVANT HX SPEC: NORMAL
PHOTO IMAGE: NORMAL

## 2024-05-02 PROCEDURE — 250N000011 HC RX IP 250 OP 636: Performed by: UROLOGY

## 2024-05-02 PROCEDURE — 96376 TX/PRO/DX INJ SAME DRUG ADON: CPT

## 2024-05-02 PROCEDURE — 99239 HOSP IP/OBS DSCHRG MGMT >30: CPT | Performed by: HOSPITALIST

## 2024-05-02 PROCEDURE — 250N000013 HC RX MED GY IP 250 OP 250 PS 637: Performed by: HOSPITALIST

## 2024-05-02 PROCEDURE — 250N000013 HC RX MED GY IP 250 OP 250 PS 637: Performed by: UROLOGY

## 2024-05-02 PROCEDURE — 88305 TISSUE EXAM BY PATHOLOGIST: CPT | Mod: 26 | Performed by: PATHOLOGY

## 2024-05-02 PROCEDURE — 85018 HEMOGLOBIN: CPT | Performed by: HOSPITALIST

## 2024-05-02 PROCEDURE — G0378 HOSPITAL OBSERVATION PER HR: HCPCS

## 2024-05-02 PROCEDURE — 36415 COLL VENOUS BLD VENIPUNCTURE: CPT | Performed by: HOSPITALIST

## 2024-05-02 RX ORDER — ONDANSETRON 4 MG/1
4 TABLET, ORALLY DISINTEGRATING ORAL EVERY 8 HOURS PRN
Qty: 10 TABLET | Refills: 0 | Status: SHIPPED | OUTPATIENT
Start: 2024-05-02 | End: 2024-06-11

## 2024-05-02 RX ORDER — ACETAMINOPHEN 325 MG/1
650 TABLET ORAL EVERY 8 HOURS PRN
COMMUNITY
Start: 2024-05-02 | End: 2024-06-11

## 2024-05-02 RX ORDER — OXYCODONE HYDROCHLORIDE 5 MG/1
2.5 TABLET ORAL EVERY 6 HOURS PRN
Qty: 8 TABLET | Refills: 0 | Status: SHIPPED | OUTPATIENT
Start: 2024-05-02 | End: 2024-06-11

## 2024-05-02 RX ADMIN — POLYETHYLENE GLYCOL 3350 17 G: 17 POWDER, FOR SOLUTION ORAL at 07:30

## 2024-05-02 RX ADMIN — OXYCODONE HYDROCHLORIDE 2.5 MG: 5 TABLET ORAL at 02:37

## 2024-05-02 RX ADMIN — ONDANSETRON 4 MG: 4 TABLET, ORALLY DISINTEGRATING ORAL at 07:35

## 2024-05-02 RX ADMIN — HYDROMORPHONE HYDROCHLORIDE 0.5 MG: 1 INJECTION, SOLUTION INTRAMUSCULAR; INTRAVENOUS; SUBCUTANEOUS at 00:07

## 2024-05-02 RX ADMIN — PREGABALIN 75 MG: 75 CAPSULE ORAL at 07:28

## 2024-05-02 RX ADMIN — OXYCODONE HYDROCHLORIDE 2.5 MG: 5 TABLET ORAL at 07:35

## 2024-05-02 RX ADMIN — DOCUSATE SODIUM 50 MG AND SENNOSIDES 8.6 MG 1 TABLET: 8.6; 5 TABLET, FILM COATED ORAL at 07:30

## 2024-05-02 RX ADMIN — PREGABALIN 75 MG: 75 CAPSULE ORAL at 00:02

## 2024-05-02 ASSESSMENT — ACTIVITIES OF DAILY LIVING (ADL)
ADLS_ACUITY_SCORE: 21

## 2024-05-02 NOTE — PROGRESS NOTES
UROLOGY BRIEF PROGRESS NOTE    Seen and examined on PM rounds    : Dominguez in place draining clear, light pink urine    Plan:  - I again reviewed her prior CT images and shared them with the patient  - We discussed the plan for dominguez removal and possible discharge to home tomorrow  - We discussed that we will arrange for out-patient follow up next week to review pathology and discuss next steps in management  - We discussed that this will likely involve a robotic distal ureterectomy and ureteral reimplant, but that this would be based in part on the pathology from her biopsy today    Rogelio Gallagher M.D.

## 2024-05-02 NOTE — PLAN OF CARE
[id number]  Orientation: a&ox4. Forgetful and lethargic after surgery  Activity: SBA  Diet/BS Checks: clear liquids. Adv as tolerated  Tele:  n/a  IV Access/Drains: PIV RA SL  Pain Management: c/o of l flank pain. IV dilaudid 0.3 mg given x1. PO oxy given x1 Ice packs  Abnormal VS/Results: VSS on RA  Bowel/Bladder: dominguez removed this AM. Pt reported not passing gas throughou  Skin/Wounds: WNL  Consults: urology  D/C Disposition: 5/2?  Other Info:

## 2024-05-02 NOTE — DISCHARGE SUMMARY
[facility]    Discharge Summary  Hospitalist    Date of Admission:  4/30/2024  Date of Discharge:  5/2/2024  Discharging Provider: Lakshmi Lorenzana MD  Date of Service (when I saw the patient): 05/02/24      History of Present Illness   Ms. Lorin Andrade is a 64 yo female with history including recent recent left hydroureter/hydronephrosis with left ureteral lesion; ODETTE on CPAP; depression/anxiety; neuropathy; OA; osteopenia; and h/p breast cancer s/p surgery and chemoradiation (2006); who presents with back/flank and abdominal pain     Hospital Course   Lorin Andrade was admitted on 4/30/2024.  The following problems were addressed during her hospitalization:    Recent left hydroureter/hydronephrosis with left ureteral lesion.  Back/flank/abdominal, suspect due to above, improved after stent placement..  S/P cystoX, L URS w ureteral biopsy, ureteral stent placement 5/1/2024, biopsy results pending.   * CT abdomen/pelvis 4/10/2024 concerning for partial obstruction. CT urogram 4/17/2024 showed circumferential irregular/nodular urothelial enhancement at the distal left ureter with moderate upstream hydroureter/hydronephrosis, raising concern for urothelial malignancy, Urology planned for cysto 5/10/2024.  * Initial presentation as above. On admit, pt notes that pain been fairly debilitating with severe intractable pain.    - Urology cysto and ureteroscopy with ureteral biopsy, 5/1/2024.  Stent placement  -5/2/2024 pain significantly improved after stent placement.  Ayala removed, voiding trial negative however decreased overall urine output secondary to n.p.o. status.  She is aware to maintain fluid intake on discharge.  Follow-up with urology, their office will call in 1 week with biopsy results and recommendations.  Discharged with PRN oxycodone and Zofran OTD.  She is aware to monitor bowels and no driving with use of as needed oxycodone.     Abnormal UA, suspect due to above.  * Initial  presentation as above.  -Urine culture negative        Recent facial rash, treated with antibiotics.  * Treated for 2 months oral antibiotics and completed earlier 4/2024.     ODETTE.  - Continue CPAP.     Depression/anxiety.  - Continue citalopram.     Neuropathy, possibly due to prior chemotherapy.  - Continue pregabalin.     H/o breast cancer, left.   * Stage IIB. S/p bilateral mastectomy and chemoradiation in 2006.   -Follow-up with oncology as previously     Constipation  No associated nausea/emesis.  Eating.  Abdominal/flank pain as above.  -Scheduled MiraLAX, senna S, mobilize as able, bowels mobilized on bowel program.  She is aware to monitor bowels on discharge.      Code Status   Full Code       Primary Care Physician   Sandie Bella    Physical Exam   Temp: 97.9  F (36.6  C) Temp src: Oral BP: 103/50 Pulse: 60   Resp: 16 SpO2: 96 % O2 Device: None (Room air)    Vitals:    04/30/24 1344   Weight: 59 kg (130 lb)     General/Constitutional:   NAD, alert, calm, cooperative    Chest/Respiratory: Respirations nonlabored room air  Cardiovascular:  regular, no murmur appreciated.  LE edema none  Gastrointestinal/Abdomen:  soft, nontender, no rebound, guarding or other peritoneal signs. Ayala out.   Neuro.  Gross motor tested, nonfocal,  Psych oriented, affect calm    Discharge Disposition   Discharged to home  Condition at discharge: Fair    Consultations This Hospital Stay   UROLOGY IP CONSULT    Time Spent on this Encounter   ILakshmi MD, personally saw the patient today and spent greater than 30 minutes discharging this patient discussing discharge plans with Patient, , and nursing, coordinating with Specialties, Urology regarding discharge orders and follow-up.  Completing discharge orders, medications and follow-up    Discharge Orders      Reason for your hospital stay    Presented with severe flank/abdominal pain with known ureteral lesion     Follow-up and recommended labs and tests      Follow-up with Urology in 1 wk; they will call you for appointment date and time.      Follow-up with Primary Provider as needed.     Activity    Your activity upon discharge: activity as tolerated; No driving with use of narcotics.     Diet    Follow this diet upon discharge:       Advance Diet as Tolerated: Regular Diet Adult     Discharge Medications   Current Discharge Medication List        START taking these medications    Details   acetaminophen (TYLENOL) 325 MG tablet Take 2 tablets (650 mg) by mouth every 8 hours as needed for mild pain or other (and adjunct with moderate or severe pain or per patient request)    Associated Diagnoses: Pain      ondansetron (ZOFRAN ODT) 4 MG ODT tab Take 1 tablet (4 mg) by mouth every 8 hours as needed for nausea or vomiting Future refills by PCP Dr. Sandie Bella with phone number [phone].  Qty: 10 tablet, Refills: 0    Associated Diagnoses: Nausea and vomiting, unspecified vomiting type      oxyCODONE (ROXICODONE) 5 MG tablet Take 0.5 tablets (2.5 mg) by mouth every 6 hours as needed for severe pain Future refills by PCP Dr. Sandie Bella with phone number [phone].  Qty: 8 tablet, Refills: 0    Associated Diagnoses: Pain           CONTINUE these medications which have NOT CHANGED    Details   calcium carbonate (OS-JULIETTE) 500 MG tablet Take 1 tablet by mouth 2 times daily      !! citalopram (CELEXA) 10 MG tablet TAKE 1 TABLET (10 MG) BY MOUTH DAILY TAKES 20MG TAB ALSO  Qty: 90 tablet, Refills: 0    Associated Diagnoses: Anxiety      !! citalopram (CELEXA) 20 MG tablet TAKE 1 TABLET (20 MG) BY MOUTH DAILY TAKES 10MG TABLET ALSO  Qty: 90 tablet, Refills: 0    Associated Diagnoses: Anxiety      Polyethylene Glycol POWD 17 g daily as needed      pregabalin (LYRICA) 75 MG capsule Take 1 capsule (75 mg) by mouth 2 times daily  Qty: 180 capsule, Refills: 0    Associated Diagnoses: Peripheral polyneuropathy      vitamin B-12 (CYANOCOBALAMIN) 100 MCG tablet Take 1,000 mcg by  mouth daily       !! - Potential duplicate medications found. Please discuss with provider.        Allergies   Allergies   Allergen Reactions    Animal Dander Shortness Of Breath    Gabapentin Other (See Comments)     Tolerating pregabalin well     Data   Most Recent 3 CBC's:  Recent Labs   Lab Test 05/02/24  0825 05/01/24  0946 04/30/24  1415 08/14/23  0851   WBC  --  5.4 5.6 3.7*   HGB 11.4* 12.3 12.1 12.3   MCV  --  93 95 95   PLT  --  185 207 187      Most Recent 3 BMP's:  Recent Labs   Lab Test 05/01/24  0946 04/30/24  1415 04/19/24  1309    142 142   POTASSIUM 4.1 4.0 4.3   CHLORIDE 106 105 103   CO2 25 27 31*   BUN 15.5 13.8 13.3   CR 0.73 0.89 0.84   ANIONGAP 11 10 8   JULIETTE 9.3 9.3 9.6   GLC 88 94 96     Most Recent 2 LFT's:  Recent Labs   Lab Test 04/30/24  1415 04/19/24  1309   AST 18 23   ALT 13 19   ALKPHOS 65 76   BILITOTAL 0.3 0.2

## 2024-05-02 NOTE — PROGRESS NOTES
Urology Progress Note    Name: Lorin Andrade    MRN: [id number]   YOB: 1960  Date of Admission: 4/30/2024              Impression and Plan:   Impression / Plan:   Lorin Andrade is a 63 year old female with admitted 4/30 with worsening left-sided renal colic. Hx known enhancement of the left distal ureter and moderate hydroureteronephrosis.  POD1  cysto, L URS w ureteral biopsy, ureteral stent placement.       -Able to discharge from urologic perspective.  -We will arrange for out-patient follow up next week with Dr. Gallagher to review pathology and discuss next steps in management. Msg sent to scheduling pool to arrange.   -Anticipate robotic distal ureterectomy and ureteral reimplant, but this would in part be based on the pathology from her biopsy today.  -Urology will sign off. Call if questions    Discussed with Dr. Schulte, Dr. Gallagher    Thank you for the opportunity to participate in the care of Lorin Andrade.     TAL Hooks, CNP  [facility] - Department of Urology  Office: [phone]  After business hours: [phone]  Securely message with [employer] History:    Lorin Andrade is a 63 year old female admitted 4/30 with worsening left-sided renal colic which has been known and being worked up for possible enhancement of the left distal ureter and moderate hydroureteronephrosis.  Urine cytology with suspicion for high-grade urothelial carcinoma. S/p cystoscopy, L retrograde pyelogram, L URS w ureteral biopsy (path results pending), placement of JJ stent.     Has been having some stent related pain controllable with PO medications. Denies bladder spasms.   Ayala removed this AM. Voiding spontaneously. Notes some hematuria as expected.   Ambulating without assistance.   Passing flatulence. Tolerating discharge diet.   Having some nausea controllable with PO medications. No emesis.   AVSS.    History is obtained from patient & EMR.          Past Medical History:     Past Medical  History:   Diagnosis Date    Cancer (H)     Sleep apnea           Past Surgical History:     Past Surgical History:   Procedure Laterality Date    COLONOSCOPY N/A 08/16/2023    Procedure: Colonoscopy;  Surgeon: Ana Paula Hurst MD;  Location:  GI    CYSTOSCOPY, URETEROSCOPY, INSERT STENT Left 5/1/2024    Procedure: CYSTOURETEROSCOPY, LEFT URETERAL BIOPSY WITH STENT INSERTION, LEFT RETROGRADE PYELOGRAM;  Surgeon: Rogelio Gallagher MD;  Location:  OR    MASTECTOMY, BILATERAL      Double mastectomy          Social History:     Social History     Tobacco Use    Smoking status: Never    Smokeless tobacco: Never   Substance Use Topics    Alcohol use: Yes     Comment: 1-2 times a month          Family History:     Family History   Problem Relation Age of Onset    Coronary Artery Disease Mother         CABG    Arrhythmia Sister           Allergies:     Allergies   Allergen Reactions    Animal Dander Shortness Of Breath    Gabapentin Other (See Comments)     Tolerating pregabalin well          Medications:     Current Facility-Administered Medications   Medication Dose Route Frequency Provider Last Rate Last Admin    acetaminophen (TYLENOL) tablet 650 mg  650 mg Oral Q4H PRN Rogelio Gallagher MD        Or    acetaminophen (TYLENOL) Suppository 650 mg  650 mg Rectal Q4H PRN Rogelio Gallagher MD        citalopram (celeXA) tablet 10 mg  10 mg Oral QPM Rogelio Gallagher MD   10 mg at 05/01/24 2017    citalopram (celeXA) tablet 20 mg  20 mg Oral QPM Rogelio Gallagher MD   20 mg at 05/01/24 2017    HYDROmorphone (PF) (DILAUDID) injection 0.3-0.5 mg  0.3-0.5 mg Intravenous Q4H PRN Rogelio Gallagher MD   0.5 mg at 05/02/24 0007    naloxone (NARCAN) injection 0.2 mg  0.2 mg Intravenous Q2 Min PRN Rogelio Gallagher MD        Or    naloxone (NARCAN) injection 0.4 mg  0.4 mg Intravenous Q2 Min PRN Rogelio Gallagher MD        Or    naloxone (NARCAN) injection 0.2 mg  0.2 mg Intramuscular Q2 Min PRN Rogelio Gallagher MD        Or    naloxone  (NARCAN) injection 0.4 mg  0.4 mg Intramuscular Q2 Min PRN Rogelio Gallagher MD        ondansetron (ZOFRAN ODT) ODT tab 4 mg  4 mg Oral Q6H PRN Rogelio Gallagher MD   4 mg at 05/02/24 0735    Or    ondansetron (ZOFRAN) injection 4 mg  4 mg Intravenous Q6H PRN Rogelio Gallagher MD        oxyCODONE (ROXICODONE) tablet 5 mg  5 mg Oral Q4H PRN Rogelio Gallagher MD        oxyCODONE IR (ROXICODONE) half-tab 2.5 mg  2.5 mg Oral Q4H PRN Rogelio Gallagher MD   2.5 mg at 05/02/24 0735    polyethylene glycol (MIRALAX) Packet 17 g  17 g Oral BID PRN Rogelio Gallagher MD        polyethylene glycol (MIRALAX) Packet 17 g  17 g Oral Daily Lakshmi Lorenzana MD   17 g at 05/02/24 0730    pregabalin (LYRICA) capsule 75 mg  75 mg Oral BID Rogelio Gallagher MD   75 mg at 05/02/24 0728    prochlorperazine (COMPAZINE) injection 10 mg  10 mg Intravenous Q6H PRN Rogelio Gallagher MD        Or    prochlorperazine (COMPAZINE) tablet 10 mg  10 mg Oral Q6H PRN Rogelio Gallagher MD        Or    prochlorperazine (COMPAZINE) suppository 25 mg  25 mg Rectal Q12H PRN Rogelio Gallagher MD senna-docusate (SENOKOT-S/PERICOLACE) 8.6-50 MG per tablet 1 tablet  1 tablet Oral BID PRN Rogelio Gallagher MD        Or    senna-docusate (SENOKOT-S/PERICOLACE) 8.6-50 MG per tablet 2 tablet  2 tablet Oral BID PRN Rogelio Gallagher MD        sennamrata-docusate (SENOKOT-S/PERICOLACE) 8.6-50 MG per tablet 1 tablet  1 tablet Oral BID Lakshmi Lorenzana MD   1 tablet at 05/02/24 0730          Review of Systems:    ROS: See HPI for pertinent details.  Remainder of 10-point ROS negative.         Physical Exam:   VS:  T: 97.9    HR: 60    BP: 103/50    RR: 16     GEN:  Alert.  NAD. Pt is not diaphoretic.   HEENT:  Sclerae anicteric.    CV:  No obvious jugular venous distension  LUNGS: No respiratory distress, breathing comfortably wo accessory muscle use.  ABD:  ND.   EXT:  Warm, well perfused.  SKIN:  Warm.  Dry.   NEURO:  CN grossly intact.          Data:   All laboratory data  "reviewed:    No results found for: \"PSA\"  Recent Labs   Lab 05/02/24  0825 05/01/24  0946 04/30/24  1415   WBC  --  5.4 5.6   HGB 11.4* 12.3 12.1   PLT  --  185 207       Recent Labs   Lab 05/01/24  0946 04/30/24  1415    142   POTASSIUM 4.1 4.0   CHLORIDE 106 105   CO2 25 27   BUN 15.5 13.8   CR 0.73 0.89   GLC 88 94   JULIETTE 9.3 9.3       Recent Labs   Lab 04/30/24  1411   COLOR Light Yellow   APPEARANCE Clear   URINEGLC Negative   URINEBILI Negative   URINEKETONE Negative   SG 1.015   URINEPH 5.0   PROTEIN Negative   NITRITE Negative   LEUKEST Trace*   RBCU 14*   WBCU 8*     Results for orders placed or performed during the hospital encounter of 04/30/24   Urine Culture    Specimen: Urine, Midstream   Result Value Ref Range    Culture No Growth    Results for orders placed or performed in visit on 04/19/24   Urine Culture Aerobic Bacterial    Specimen: Urine, Midstream   Result Value Ref Range    Culture <10,000 CFU/mL Urogenital apoorva             "

## 2024-05-02 NOTE — PLAN OF CARE
[id number]  Orientation: a&ox4. Forgetful and lethargic after surgery  Activity: SBA  Diet/BS Checks: clear liquids. Adv as tolerated  Tele:  n/a  IV Access/Drains: PIV RA SL  Pain Management: c/o of l flank pain. IV dilaudid 0.3 mg given x1. Ice packs  Abnormal VS/Results: VSS on RA  Bowel/Bladder: pt reported not having passed gas throughout night. Removed dominguez this AM  Skin/Wounds: WNL  Consults: urology  D/C Disposition: 5/2?

## 2024-05-02 NOTE — PLAN OF CARE
Patient discharged at 1:40 PM to home  IV was discontinued. Pain at time of discharge was 2/10. Belongings returned to patient.  Discharge instructions and medications reviewed with patient.  Patient verbalized understanding and all questions were answered.  Prescriptions given to patient.  At time of discharge, patient condition was stable and left the unit escorted by .

## 2024-05-08 ENCOUNTER — OFFICE VISIT (OUTPATIENT)
Dept: UROLOGY | Facility: CLINIC | Age: 64
End: 2024-05-08
Payer: COMMERCIAL

## 2024-05-08 VITALS
BODY MASS INDEX: 20.89 KG/M2 | SYSTOLIC BLOOD PRESSURE: 85 MMHG | OXYGEN SATURATION: 96 % | DIASTOLIC BLOOD PRESSURE: 52 MMHG | WEIGHT: 130 LBS | HEART RATE: 92 BPM | HEIGHT: 66 IN

## 2024-05-08 DIAGNOSIS — N13.5 OBSTRUCTION OF LEFT URETER: ICD-10-CM

## 2024-05-08 DIAGNOSIS — N13.30 HYDRONEPHROSIS OF LEFT KIDNEY: Primary | ICD-10-CM

## 2024-05-08 DIAGNOSIS — N28.9 LESION OF LEFT NATIVE URETER: ICD-10-CM

## 2024-05-08 PROCEDURE — 99215 OFFICE O/P EST HI 40 MIN: CPT | Performed by: UROLOGY

## 2024-05-08 ASSESSMENT — PAIN SCALES - GENERAL: PAINLEVEL: NO PAIN (0)

## 2024-05-08 NOTE — LETTER
5/8/2024       RE: Lorin Andrade  [address] [facility] [address]     Dear Colleague,    Thank you for referring your patient, Lorin Andrade, to the [facility] at [facility]. Please see a copy of my visit note below.    [place]  CHIEF COMPLAINT   It was my pleasure to see Lorin Andrade who is a 63 year old female for follow-up of LEFT hydroureteronephrosis.      HPI   Lorin Andrade is a very pleasant 63 year old female with a history of left renal colic with workup showing moderate hydroureteronephrosis and enhancement of the last 5 cm of the left distal ureter.  Urine cytology with suspicion for high-grade urothelial carcinoma.  She underwent ureteroscopy with ureteral biopsy and stent placement on 5/1/2024 and returns today to discuss the results.    She has had resolution of her flank pain following stent placement  Her  joins her for this visit    PHYSICAL EXAM  Patient is a 63 year old  female   Vitals: There were no vitals taken for this visit.  There is no height or weight on file to calculate BMI.  General Appearance Adult:   Alert, no acute distress, oriented  HENT: throat/mouth:normal, good dentition  Lungs: no respiratory distress, or pursed lip breathing  Heart: No obvious jugular venous distension present  Musculoskeltal: extremities normal, no peripheral edema  Skin: no suspicious lesions or rashes  Neuro: Alert, oriented, speech and mentation normal  Psych: affect and mood normal  Gait: Normal    Creatinine   Date Value Ref Range Status   05/01/2024 0.73 0.51 - 0.95 mg/dL Final   04/30/2024 0.89 0.51 - 0.95 mg/dL Final   04/19/2024 0.84 0.51 - 0.95 mg/dL Final   08/14/2023 0.61 0.51 - 0.95 mg/dL Final        UA RESULTS:  Recent Labs   Lab Test 04/30/24  1411 04/19/24  1315   COLOR Light Yellow Yellow   APPEARANCE Clear Clear   URINEGLC Negative Negative   URINEBILI Negative Negative   URINEKETONE Negative  Negative   SG 1.015 1.015   UBLD Small* Moderate*   URINEPH 5.0 7.0   PROTEIN Negative Negative   UROBILINOGEN  --  0.2   NITRITE Negative Negative   LEUKEST Trace* Trace*   RBCU 14* 10-25*   WBCU 8* 0-5      Final Diagnosis   Specimen A                 Interpretation:                  -  Suspicious for High-grade Urothelial Carcinoma      PATHOLOGY:  5/1/2024:  Final Diagnosis   Left distal ureter, biopsy:  -Normal urothelial lining without evidence of atypia or malignancy, please see description     IMAGING:  All pertinent imaging reviewed:    All imaging studies reviewed by me.  I personally reviewed these imaging films.  A formal report from radiology will follow.    CT UROGRAM 4/30/2024:  FINDINGS:   LOWER CHEST: No worrisome consolidation in the visualized lung bases. No pleural effusion. Partially imaged breast implants.     HEPATOBILIARY: Unchanged liver contours with benign cyst in the left hepatic lobe. No worrisome liver lesions. Normal gallbladder. No biliary ductal dilation.     PANCREAS: Normal.     SPLEEN: Normal.     ADRENAL GLANDS: Normal.     RIGHT KIDNEY/URETER: Normal morphology and enhancement of the right kidney. A few cortically based subcentimeter low-attenuation lesions are incompletely characterized though likely cysts. No urolithiasis or hydronephrosis. Normal excretion of contrast   on delayed images without urothelial thickening or filling defects.     LEFT KIDNEY/URETER: Unchanged delayed left nephrogram. 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 No urolithiasis. There is unchanged moderate to severe hydroureteronephrosis with dependent excreted contrast   layering within the calyces. As before, there is a transition in the distal ureter where there is an approximately 5 cm segment of urothelial thickening and enhancement (series 12 image #42, #48).      BLADDER: Unremarkable.     BOWEL: Normal caliber small and large bowel. Normal  appendix. No free fluid or free air.     LYMPH NODES: No lymphadenopathy.     VASCULATURE: The abdominal aorta is nonaneurysmal with mild atheromatous disease.     PELVIC ORGANS: Uterus is present. No adnexal mass.     MUSCULOSKELETAL: Unchanged mild L4 superior endplate deformity. No destructive osseous lesions.                                                                      IMPRESSION:  1.  Unchanged moderate left hydroureteronephrosis to the level of the distal ureter where there is an approximately 5 cm segment of urothelial thickening and enhancement. As before, etiology may be inflammatory or neoplastic.  2.  Additional noncritical details in the findings.    NM RENAL SCAN 4/17/2024:  FINDINGS:      Renal angiogram demonstrates: Flow to right kidney within normal  limits. There is asymmetrically slower and less flow to the left  kidney.   Renogram images demonstrate:     Right kidney: There is normal cortical uptake. Cortical transit is  noted at 3 minutes. Ureter is visualized at 4 minutes. There is normal  wash out. Following Lasix administration, there is prompt wash out.      Left kidney: There is delayed cortical uptake. Cortical transit is  noted at 6 minutes which is slightly delayed. Ureter is visualized at  20 minutes. There is no wash ou prior to Lasix, minimal wash out after  Lasix.   Renogram curves demonstrate:     Right kidney: The radiotracer reaches the peak activity at 5 minutes  (time to peak). There is normal wash out and complete wash out  following Lasix. T1/2 is: 14 minutes.     Left kidney: The radiotracer reaches the peak activity at 22 minutes  (time to peak). There is delayed wash out and  incomplete wash out  following Lasix. T1/2 is: Not applicable.     Split function: Left: 38.7%, Right: 61.3%.                                                                      IMPRESSION:  1. Right kidney: Normal function. Normal excretion. No obstruction.     2. Left kidney: Decreased  function. Decreased excretion, concerning  for partial obstruction.      ASSESSMENT and PLAN  63-year-old female with left-sided hydroureteronephrosis and concern for urothelial malignancy of the left distal ureter    Possible urothelial malignancy of the left distal ureter  - I reviewed her labs with a normal and stable serum creatinine  - I reviewed her urine cytology which was suspicious for high-grade urothelial carcinoma  - I reviewed her imaging and reviewed the images with the patient and her .  Specifically we reviewed her most recent CT urogram, her retrograde pyelogram from 5/1/2024 and discussed her nuclear medicine renal scan  - Reviewed her pathology results from her ureteral biopsy which were negative for malignancy.  We discussed the difficulty in challenging obtaining adequate tissue for analysis from the ureteral biopsy  - We discussed the need to proceed with a repeat ureteroscopy with complete pyeloscopy and evaluation of both the upper and lower pole moiety's.  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  Will plan for ureteral brushing at this time as well  - We then discussed that we will need to plan to proceed with a robotic distal ureterectomy with left pelvic lymph node dissection and ureteral reimplant.  We discussed that on the low chance that a cancerous process is found up in her kidney we would need to alter the surgical plan.  - Orders for both surgeries placed      Time spent: 40 minutes spent on the date of the encounter doing chart review, history and exam, documentation and further activities as noted above.    Rogelio Gallagher MD   Urology  [facility]  [facility] Phone: [phone]  [facility] Phone: [phone]

## 2024-05-08 NOTE — PROGRESS NOTES
[facility]  CHIEF COMPLAINT   It was my pleasure to see Lorin Andrade who is a 63 year old female for follow-up of LEFT hydroureteronephrosis.      HPI   Lorin Andrade is a very pleasant 63 year old female with a history of left renal colic with workup showing moderate hydroureteronephrosis and enhancement of the last 5 cm of the left distal ureter.  Urine cytology with suspicion for high-grade urothelial carcinoma.  She underwent ureteroscopy with ureteral biopsy and stent placement on 5/1/2024 and returns today to discuss the results.    She has had resolution of her flank pain following stent placement  Her  joins her for this visit    PHYSICAL EXAM  Patient is a 63 year old  female   Vitals: There were no vitals taken for this visit.  There is no height or weight on file to calculate BMI.  General Appearance Adult:   Alert, no acute distress, oriented  HENT: throat/mouth:normal, good dentition  Lungs: no respiratory distress, or pursed lip breathing  Heart: No obvious jugular venous distension present  Musculoskeltal: extremities normal, no peripheral edema  Skin: no suspicious lesions or rashes  Neuro: Alert, oriented, speech and mentation normal  Psych: affect and mood normal  Gait: Normal    Creatinine   Date Value Ref Range Status   05/01/2024 0.73 0.51 - 0.95 mg/dL Final   04/30/2024 0.89 0.51 - 0.95 mg/dL Final   04/19/2024 0.84 0.51 - 0.95 mg/dL Final   08/14/2023 0.61 0.51 - 0.95 mg/dL Final        UA RESULTS:  Recent Labs   Lab Test 04/30/24  1411 04/19/24  1315   COLOR Light Yellow Yellow   APPEARANCE Clear Clear   URINEGLC Negative Negative   URINEBILI Negative Negative   URINEKETONE Negative Negative   SG 1.015 1.015   UBLD Small* Moderate*   URINEPH 5.0 7.0   PROTEIN Negative Negative   UROBILINOGEN  --  0.2   NITRITE Negative Negative   LEUKEST Trace* Trace*   RBCU 14* 10-25*   WBCU 8* 0-5      Final Diagnosis   Specimen A                 Interpretation:                  -  Suspicious for  High-grade Urothelial Carcinoma      PATHOLOGY:  5/1/2024:  Final Diagnosis   Left distal ureter, biopsy:  -Normal urothelial lining without evidence of atypia or malignancy, please see description     IMAGING:  All pertinent imaging reviewed:    All imaging studies reviewed by me.  I personally reviewed these imaging films.  A formal report from radiology will follow.    CT UROGRAM 4/30/2024:  FINDINGS:   LOWER CHEST: No worrisome consolidation in the visualized lung bases. No pleural effusion. Partially imaged breast implants.     HEPATOBILIARY: Unchanged liver contours with benign cyst in the left hepatic lobe. No worrisome liver lesions. Normal gallbladder. No biliary ductal dilation.     PANCREAS: Normal.     SPLEEN: Normal.     ADRENAL GLANDS: Normal.     RIGHT KIDNEY/URETER: Normal morphology and enhancement of the right kidney. A few cortically based subcentimeter low-attenuation lesions are incompletely characterized though likely cysts. No urolithiasis or hydronephrosis. Normal excretion of contrast   on delayed images without urothelial thickening or filling defects.     LEFT KIDNEY/URETER: Unchanged delayed left nephrogram. 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 No urolithiasis. There is unchanged moderate to severe hydroureteronephrosis with dependent excreted contrast   layering within the calyces. As before, there is a transition in the distal ureter where there is an approximately 5 cm segment of urothelial thickening and enhancement (series 12 image #42, #48).      BLADDER: Unremarkable.     BOWEL: Normal caliber small and large bowel. Normal appendix. No free fluid or free air.     LYMPH NODES: No lymphadenopathy.     VASCULATURE: The abdominal aorta is nonaneurysmal with mild atheromatous disease.     PELVIC ORGANS: Uterus is present. No adnexal mass.     MUSCULOSKELETAL: Unchanged mild L4 superior endplate deformity. No destructive osseous  lesions.                                                                      IMPRESSION:  1.  Unchanged moderate left hydroureteronephrosis to the level of the distal ureter where there is an approximately 5 cm segment of urothelial thickening and enhancement. As before, etiology may be inflammatory or neoplastic.  2.  Additional noncritical details in the findings.    NM RENAL SCAN 4/17/2024:  FINDINGS:      Renal angiogram demonstrates: Flow to right kidney within normal  limits. There is asymmetrically slower and less flow to the left  kidney.   Renogram images demonstrate:     Right kidney: There is normal cortical uptake. Cortical transit is  noted at 3 minutes. Ureter is visualized at 4 minutes. There is normal  wash out. Following Lasix administration, there is prompt wash out.      Left kidney: There is delayed cortical uptake. Cortical transit is  noted at 6 minutes which is slightly delayed. Ureter is visualized at  20 minutes. There is no wash ou prior to Lasix, minimal wash out after  Lasix.   Renogram curves demonstrate:     Right kidney: The radiotracer reaches the peak activity at 5 minutes  (time to peak). There is normal wash out and complete wash out  following Lasix. T1/2 is: 14 minutes.     Left kidney: The radiotracer reaches the peak activity at 22 minutes  (time to peak). There is delayed wash out and  incomplete wash out  following Lasix. T1/2 is: Not applicable.     Split function: Left: 38.7%, Right: 61.3%.                                                                      IMPRESSION:  1. Right kidney: Normal function. Normal excretion. No obstruction.     2. Left kidney: Decreased function. Decreased excretion, concerning  for partial obstruction.      ASSESSMENT and PLAN  63-year-old female with left-sided hydroureteronephrosis and concern for urothelial malignancy of the left distal ureter    Possible urothelial malignancy of the left distal ureter  - I reviewed her labs with a normal  and stable serum creatinine  - I reviewed her urine cytology which was suspicious for high-grade urothelial carcinoma  - I reviewed her imaging and reviewed the images with the patient and her .  Specifically we reviewed her most recent CT urogram, her retrograde pyelogram from 5/1/2024 and discussed her nuclear medicine renal scan  - Reviewed her pathology results from her ureteral biopsy which were negative for malignancy.  We discussed the difficulty in challenging obtaining adequate tissue for analysis from the ureteral biopsy  - We discussed the need to proceed with a repeat ureteroscopy with complete pyeloscopy and evaluation of both the upper and lower pole moiety's.  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  Will plan for ureteral brushing at this time as well  - We then discussed that we will need to plan to proceed with a robotic distal ureterectomy with left pelvic lymph node dissection and ureteral reimplant.  We discussed that on the low chance that a cancerous process is found up in her kidney we would need to alter the surgical plan.  - Orders for both surgeries placed      Time spent: 40 minutes spent on the date of the encounter doing chart review, history and exam, documentation and further activities as noted above.    Rogelio Gallagher MD   Urology  [facility]  [facility] Phone: [phone]  [facility] Phone: [phone]

## 2024-05-14 ENCOUNTER — TELEPHONE (OUTPATIENT)
Dept: UROLOGY | Facility: CLINIC | Age: 64
End: 2024-05-14
Payer: COMMERCIAL

## 2024-05-14 ENCOUNTER — ANESTHESIA EVENT (OUTPATIENT)
Dept: SURGERY | Facility: CLINIC | Age: 64
End: 2024-05-14
Payer: COMMERCIAL

## 2024-05-14 ENCOUNTER — TELEPHONE (OUTPATIENT)
Dept: FAMILY MEDICINE | Facility: CLINIC | Age: 64
End: 2024-05-14
Payer: COMMERCIAL

## 2024-05-14 ASSESSMENT — ENCOUNTER SYMPTOMS
SEIZURES: 0
DYSRHYTHMIAS: 0

## 2024-05-14 ASSESSMENT — LIFESTYLE VARIABLES: TOBACCO_USE: 0

## 2024-05-14 NOTE — ANESTHESIA PREPROCEDURE EVALUATION
Anesthesia Pre-Procedure Evaluation    Patient: Lorin Andrade   MRN: [id number] : 1960        Procedure : Procedure(s):  CYSTOSCOPY, LEFT URETERAL STENT REMOVAL,  LEFT RETROGRADE PYELOGRAM, LEFT URETEROSCOPY WITH POSSIBLE BIOPSY AND URETERAL BRUSHING, LEFT URETERAL STENT PLACEMENT          Past Medical History:   Diagnosis Date     Cancer (H)      Sleep apnea       Past Surgical History:   Procedure Laterality Date     COLONOSCOPY N/A 2023    Procedure: Colonoscopy;  Surgeon: Ana Paula Hurst MD;  Location:  GI     CYSTOSCOPY, URETEROSCOPY, INSERT STENT Left 2024    Procedure: CYSTOURETEROSCOPY, LEFT URETERAL BIOPSY WITH STENT INSERTION, LEFT RETROGRADE PYELOGRAM;  Surgeon: Rogelio Gallagher MD;  Location:  OR     MASTECTOMY, BILATERAL      Double mastectomy      Allergies   Allergen Reactions     Animal Dander Shortness Of Breath     Gabapentin Other (See Comments)     Tolerating pregabalin well      Social History     Tobacco Use     Smoking status: Never     Smokeless tobacco: Never   Substance Use Topics     Alcohol use: Yes     Comment: 1-2 times a month      Wt Readings from Last 1 Encounters:   24 59 kg (130 lb)        Anesthesia Evaluation   Pt has had prior anesthetic. Type: General.    History of anesthetic complications  - PONV.      ROS/MED HX  ENT/Pulmonary:     (+) sleep apnea, uses CPAP,                                   (-) tobacco use and asthma   Neurologic:     (+)    peripheral neuropathy, - feet.                        (-) no seizures and no CVA   Cardiovascular:    (-) hypertension, CAD, arrhythmias and murmur   METS/Exercise Tolerance:     Hematologic:       Musculoskeletal:       GI/Hepatic:    (-) GERD   Renal/Genitourinary: Comment: Hydroureter [N13.4];Flank pain [R10.9];Lesion of left native ureter [N28.9]      (+) renal disease,             Endo:    (-) Type II DM and thyroid disease   Psychiatric/Substance Use:     (+) psychiatric history anxiety and  "depression       Infectious Disease:    (-) Recent Fever   Malignancy:   (+) Malignancy, History of Breast.Breast CA Remission status post Surgery and Chemo.      Other:            Physical Exam    Airway        Mallampati: II   TM distance: > 3 FB   Neck ROM: full   Mouth opening: > 3 cm    Respiratory Devices and Support         Dental       (+) Completely normal teeth      Cardiovascular          Rhythm and rate: regular and normal (-) no murmur    Pulmonary           breath sounds clear to auscultation       OUTSIDE LABS:  CBC:   Lab Results   Component Value Date    WBC 5.4 05/01/2024    WBC 5.6 04/30/2024    HGB 11.4 (L) 05/02/2024    HGB 12.3 05/01/2024    HCT 36.6 05/01/2024    HCT 36.9 04/30/2024     05/01/2024     04/30/2024     BMP:   Lab Results   Component Value Date     05/01/2024     04/30/2024    POTASSIUM 4.1 05/01/2024    POTASSIUM 4.0 04/30/2024    CHLORIDE 106 05/01/2024    CHLORIDE 105 04/30/2024    CO2 25 05/01/2024    CO2 27 04/30/2024    BUN 15.5 05/01/2024    BUN 13.8 04/30/2024    CR 0.73 05/01/2024    CR 0.89 04/30/2024    GLC 88 05/01/2024    GLC 94 04/30/2024     COAGS: No results found for: \"PTT\", \"INR\", \"FIBR\"  POC: No results found for: \"BGM\", \"HCG\", \"HCGS\"  HEPATIC:   Lab Results   Component Value Date    ALBUMIN 4.3 04/30/2024    PROTTOTAL 6.6 04/30/2024    ALT 13 04/30/2024    AST 18 04/30/2024    ALKPHOS 65 04/30/2024    BILITOTAL 0.3 04/30/2024     OTHER:   Lab Results   Component Value Date    JULIETTE 9.3 05/01/2024       Anesthesia Plan    ASA Status:  2    NPO Status:  NPO Appropriate    Anesthesia Type: General.     - Airway: LMA   Induction: Intravenous.   Maintenance: TIVA.        Consents    Anesthesia Plan(s) and associated risks, benefits, and realistic alternatives discussed. Questions answered and patient/representative(s) expressed understanding.     - Discussed:     - Discussed with:  Patient            Postoperative Care    Pain management: IV " analgesics.     - Plan for long acting post-op opioid use   PONV prophylaxis: Ondansetron (or other 5HT-3), Dexamethasone or Solumedrol, Background Propofol Infusion     Comments:               Manoj Hidalgo MD    I have reviewed the pertinent notes and labs in the chart from the past 30 days and (re)examined the patient.  Any updates or changes from those notes are reflected in this note.

## 2024-05-14 NOTE — TELEPHONE ENCOUNTER
Writer contacted patient and relayed the message below:        Patient expressed understanding and agrees to contact Urology.

## 2024-05-14 NOTE — TELEPHONE ENCOUNTER
[facility] Call Center    Phone Message    May a detailed message be left on voicemail: yes     Reason for Call: Other: Pt states that she called the provided number and was told that they could not do this and that it had to be urology that calls her insurance at [phone]. Pt would like confirmation when this is completed. Thanks      Action Taken: Other: Uro    Travel Screening: Not Applicable

## 2024-05-14 NOTE — TELEPHONE ENCOUNTER
I am covering doctor for Dr. Bella today.    This is not done by us  We need to route to urology and prior surgery auth team  As far as I know the team does submit those prior to surgery  Patient needs to contact urology office     Dr.Nasima Dmitry MD

## 2024-05-14 NOTE — TELEPHONE ENCOUNTER
"Nurse Triage SBAR    Is this a 2nd Level Triage? YES, LICENSED PRACTITIONER REVIEW IS REQUIRED    Situation: My insurance says I need prior authorization for an cystoscopy left uteroscopy biopsy and stent exchange and urethral brushing.    Background: surgery tomorrow     Assessment: Patient needing provider to authorize surgery tomorrow. She states, \"I had the same procedure two weeks ago and had the pre-op then so they said I didn't need the pre-op, they just need to go in again because there were problems the first time. I have to have the doctor call this number today and authorize this. #[phone]. They said they need the primary care to do this. The codes are 27557 and 45709. They are looking for cancer and I am for sure having a surgery on 6/24/24 and I do also have a pre-op scheduled for that as well. But they have to complete this first to make sure my kidneys are working well enough for me to have the surgery. It could mean a substantial amount of money for me if this isn't done today.     Protocol Recommended Disposition:   No disposition on file.      Routed to provider    Does the patient meet one of the following criteria for ADS visit consideration? 16+ years old, with an [facility] PCP     TIP  Providers, please consider if this condition is appropriate for management at one of our Acute and Diagnostic Services sites.     If patient is a good candidate, please use dotphrase <dot>triageresponse and select Refer to ADS to document.   "

## 2024-05-14 NOTE — TELEPHONE ENCOUNTER
[facility] Call Center    Phone Message    May a detailed message be left on voicemail: yes     Reason for Call: Other: Pt states that because her procedure is in a hospital setting her insurance does need a prior auth called in. Pt is requesting team call [phone] to get this prior auth done. Pt states the codes needed for prior auth are 12122 and 03486. Please call pt to discuss further. Thanks       Action Taken: Other: Uro    Travel Screening: Not Applicable

## 2024-05-15 ENCOUNTER — APPOINTMENT (OUTPATIENT)
Dept: GENERAL RADIOLOGY | Facility: CLINIC | Age: 64
End: 2024-05-15
Attending: UROLOGY
Payer: COMMERCIAL

## 2024-05-15 ENCOUNTER — HOSPITAL ENCOUNTER (OUTPATIENT)
Facility: CLINIC | Age: 64
Discharge: HOME | End: 2024-05-15
Attending: UROLOGY | Admitting: UROLOGY
Payer: COMMERCIAL

## 2024-05-15 ENCOUNTER — ANESTHESIA (OUTPATIENT)
Dept: SURGERY | Facility: CLINIC | Age: 64
End: 2024-05-15
Payer: COMMERCIAL

## 2024-05-15 VITALS
BODY MASS INDEX: 22.06 KG/M2 | RESPIRATION RATE: 14 BRPM | DIASTOLIC BLOOD PRESSURE: 61 MMHG | WEIGHT: 132.4 LBS | HEIGHT: 65 IN | HEART RATE: 67 BPM | OXYGEN SATURATION: 93 % | TEMPERATURE: 98.1 F | SYSTOLIC BLOOD PRESSURE: 105 MMHG

## 2024-05-15 DIAGNOSIS — N13.30 HYDRONEPHROSIS OF LEFT KIDNEY: ICD-10-CM

## 2024-05-15 DIAGNOSIS — N28.9 LESION OF LEFT NATIVE URETER: Primary | ICD-10-CM

## 2024-05-15 PROCEDURE — 88305 TISSUE EXAM BY PATHOLOGIST: CPT | Mod: 26 | Performed by: PATHOLOGY

## 2024-05-15 PROCEDURE — 250N000011 HC RX IP 250 OP 636: Performed by: NURSE ANESTHETIST, CERTIFIED REGISTERED

## 2024-05-15 PROCEDURE — 258N000003 HC RX IP 258 OP 636: Performed by: ANESTHESIOLOGY

## 2024-05-15 PROCEDURE — 250N000025 HC SEVOFLURANE, PER MIN: Performed by: UROLOGY

## 2024-05-15 PROCEDURE — 88341 IMHCHEM/IMCYTCHM EA ADD ANTB: CPT | Mod: 26 | Performed by: PATHOLOGY

## 2024-05-15 PROCEDURE — 258N000003 HC RX IP 258 OP 636: Performed by: NURSE ANESTHETIST, CERTIFIED REGISTERED

## 2024-05-15 PROCEDURE — 52332 CYSTOSCOPY AND TREATMENT: CPT | Mod: LT | Performed by: UROLOGY

## 2024-05-15 PROCEDURE — 250N000011 HC RX IP 250 OP 636: Performed by: UROLOGY

## 2024-05-15 PROCEDURE — 250N000009 HC RX 250: Performed by: NURSE ANESTHETIST, CERTIFIED REGISTERED

## 2024-05-15 PROCEDURE — 52332 CYSTOSCOPY AND TREATMENT: CPT | Performed by: NURSE ANESTHETIST, CERTIFIED REGISTERED

## 2024-05-15 PROCEDURE — 360N000075 HC SURGERY LEVEL 2, PER MIN: Performed by: UROLOGY

## 2024-05-15 PROCEDURE — 370N000017 HC ANESTHESIA TECHNICAL FEE, PER MIN: Performed by: UROLOGY

## 2024-05-15 PROCEDURE — 710N000009 HC RECOVERY PHASE 1, LEVEL 1, PER MIN: Performed by: UROLOGY

## 2024-05-15 PROCEDURE — 272N000002 HC OR SUPPLY OTHER OPNP: Performed by: UROLOGY

## 2024-05-15 PROCEDURE — 88342 IMHCHEM/IMCYTCHM 1ST ANTB: CPT | Mod: 26 | Performed by: PATHOLOGY

## 2024-05-15 PROCEDURE — 88305 TISSUE EXAM BY PATHOLOGIST: CPT | Mod: TC | Performed by: UROLOGY

## 2024-05-15 PROCEDURE — 710N000012 HC RECOVERY PHASE 2, PER MINUTE: Performed by: UROLOGY

## 2024-05-15 PROCEDURE — C2617 STENT, NON-COR, TEM W/O DEL: HCPCS | Performed by: UROLOGY

## 2024-05-15 PROCEDURE — 88342 IMHCHEM/IMCYTCHM 1ST ANTB: CPT | Mod: TC,XU | Performed by: UROLOGY

## 2024-05-15 PROCEDURE — 999N000141 HC STATISTIC PRE-PROCEDURE NURSING ASSESSMENT: Performed by: UROLOGY

## 2024-05-15 PROCEDURE — 52354 CYSTOURETERO W/BIOPSY: CPT | Mod: 22 | Performed by: UROLOGY

## 2024-05-15 PROCEDURE — 258N000001 HC RX 258: Performed by: UROLOGY

## 2024-05-15 PROCEDURE — C1769 GUIDE WIRE: HCPCS | Performed by: UROLOGY

## 2024-05-15 PROCEDURE — 255N000002 HC RX 255 OP 636: Performed by: UROLOGY

## 2024-05-15 PROCEDURE — 88360 TUMOR IMMUNOHISTOCHEM/MANUAL: CPT | Mod: 26 | Performed by: PATHOLOGY

## 2024-05-15 PROCEDURE — 74420 UROGRAPHY RTRGR +-KUB: CPT | Mod: 26 | Performed by: UROLOGY

## 2024-05-15 PROCEDURE — 88112 CYTOPATH CELL ENHANCE TECH: CPT | Mod: 26 | Performed by: PATHOLOGY

## 2024-05-15 PROCEDURE — 250N000012 HC RX MED GY IP 250 OP 636 PS 637: Performed by: ANESTHESIOLOGY

## 2024-05-15 PROCEDURE — C1758 CATHETER, URETERAL: HCPCS | Performed by: UROLOGY

## 2024-05-15 PROCEDURE — 272N000001 HC OR GENERAL SUPPLY STERILE: Performed by: UROLOGY

## 2024-05-15 PROCEDURE — 999N000179 XR SURGERY CARM FLUORO LESS THAN 5 MIN W STILLS: Mod: TC

## 2024-05-15 PROCEDURE — 52332 CYSTOSCOPY AND TREATMENT: CPT | Performed by: ANESTHESIOLOGY

## 2024-05-15 DEVICE — URETERAL STENT
Type: IMPLANTABLE DEVICE | Site: URETER | Status: NON-FUNCTIONAL
Brand: POLARIS™ ULTRA
Removed: 2024-06-24

## 2024-05-15 RX ORDER — IOPAMIDOL 612 MG/ML
INJECTION, SOLUTION INTRAVASCULAR PRN
Status: DISCONTINUED | OUTPATIENT
Start: 2024-05-15 | End: 2024-05-15 | Stop reason: HOSPADM

## 2024-05-15 RX ORDER — LIDOCAINE HYDROCHLORIDE 20 MG/ML
INJECTION, SOLUTION INFILTRATION; PERINEURAL PRN
Status: DISCONTINUED | OUTPATIENT
Start: 2024-05-15 | End: 2024-05-15

## 2024-05-15 RX ORDER — APREPITANT 40 MG/1
40 CAPSULE ORAL ONCE
Status: COMPLETED | OUTPATIENT
Start: 2024-05-15 | End: 2024-05-15

## 2024-05-15 RX ORDER — CEFAZOLIN SODIUM/WATER 2 G/20 ML
2 SYRINGE (ML) INTRAVENOUS SEE ADMIN INSTRUCTIONS
Status: DISCONTINUED | OUTPATIENT
Start: 2024-05-15 | End: 2024-05-15 | Stop reason: HOSPADM

## 2024-05-15 RX ORDER — NALOXONE HYDROCHLORIDE 0.4 MG/ML
0.1 INJECTION, SOLUTION INTRAMUSCULAR; INTRAVENOUS; SUBCUTANEOUS
Status: DISCONTINUED | OUTPATIENT
Start: 2024-05-15 | End: 2024-05-15 | Stop reason: HOSPADM

## 2024-05-15 RX ORDER — HYDROMORPHONE HCL IN WATER/PF 6 MG/30 ML
0.4 PATIENT CONTROLLED ANALGESIA SYRINGE INTRAVENOUS EVERY 5 MIN PRN
Status: DISCONTINUED | OUTPATIENT
Start: 2024-05-15 | End: 2024-05-15 | Stop reason: HOSPADM

## 2024-05-15 RX ORDER — ONDANSETRON 2 MG/ML
INJECTION INTRAMUSCULAR; INTRAVENOUS PRN
Status: DISCONTINUED | OUTPATIENT
Start: 2024-05-15 | End: 2024-05-15

## 2024-05-15 RX ORDER — FENTANYL CITRATE 50 UG/ML
INJECTION, SOLUTION INTRAMUSCULAR; INTRAVENOUS PRN
Status: DISCONTINUED | OUTPATIENT
Start: 2024-05-15 | End: 2024-05-15

## 2024-05-15 RX ORDER — SODIUM CHLORIDE, SODIUM LACTATE, POTASSIUM CHLORIDE, CALCIUM CHLORIDE 600; 310; 30; 20 MG/100ML; MG/100ML; MG/100ML; MG/100ML
INJECTION, SOLUTION INTRAVENOUS CONTINUOUS
Status: DISCONTINUED | OUTPATIENT
Start: 2024-05-15 | End: 2024-05-15 | Stop reason: HOSPADM

## 2024-05-15 RX ORDER — FENTANYL CITRATE 0.05 MG/ML
50 INJECTION, SOLUTION INTRAMUSCULAR; INTRAVENOUS
Status: DISCONTINUED | OUTPATIENT
Start: 2024-05-15 | End: 2024-05-15 | Stop reason: HOSPADM

## 2024-05-15 RX ORDER — PROPOFOL 10 MG/ML
INJECTION, EMULSION INTRAVENOUS PRN
Status: DISCONTINUED | OUTPATIENT
Start: 2024-05-15 | End: 2024-05-15

## 2024-05-15 RX ORDER — FENTANYL CITRATE 0.05 MG/ML
25 INJECTION, SOLUTION INTRAMUSCULAR; INTRAVENOUS EVERY 5 MIN PRN
Status: DISCONTINUED | OUTPATIENT
Start: 2024-05-15 | End: 2024-05-15 | Stop reason: HOSPADM

## 2024-05-15 RX ORDER — FUROSEMIDE 10 MG/ML
INJECTION INTRAMUSCULAR; INTRAVENOUS PRN
Status: DISCONTINUED | OUTPATIENT
Start: 2024-05-15 | End: 2024-05-15

## 2024-05-15 RX ORDER — FENTANYL CITRATE 0.05 MG/ML
50 INJECTION, SOLUTION INTRAMUSCULAR; INTRAVENOUS EVERY 5 MIN PRN
Status: DISCONTINUED | OUTPATIENT
Start: 2024-05-15 | End: 2024-05-15 | Stop reason: HOSPADM

## 2024-05-15 RX ORDER — DEXAMETHASONE SODIUM PHOSPHATE 4 MG/ML
4 INJECTION, SOLUTION INTRA-ARTICULAR; INTRALESIONAL; INTRAMUSCULAR; INTRAVENOUS; SOFT TISSUE
Status: DISCONTINUED | OUTPATIENT
Start: 2024-05-15 | End: 2024-05-15 | Stop reason: HOSPADM

## 2024-05-15 RX ORDER — KETOROLAC TROMETHAMINE 30 MG/ML
INJECTION, SOLUTION INTRAMUSCULAR; INTRAVENOUS PRN
Status: DISCONTINUED | OUTPATIENT
Start: 2024-05-15 | End: 2024-05-15

## 2024-05-15 RX ORDER — ONDANSETRON 2 MG/ML
4 INJECTION INTRAMUSCULAR; INTRAVENOUS EVERY 30 MIN PRN
Status: DISCONTINUED | OUTPATIENT
Start: 2024-05-15 | End: 2024-05-15 | Stop reason: HOSPADM

## 2024-05-15 RX ORDER — ONDANSETRON 4 MG/1
4 TABLET, ORALLY DISINTEGRATING ORAL EVERY 30 MIN PRN
Status: DISCONTINUED | OUTPATIENT
Start: 2024-05-15 | End: 2024-05-15 | Stop reason: HOSPADM

## 2024-05-15 RX ORDER — PROPOFOL 10 MG/ML
INJECTION, EMULSION INTRAVENOUS CONTINUOUS PRN
Status: DISCONTINUED | OUTPATIENT
Start: 2024-05-15 | End: 2024-05-15

## 2024-05-15 RX ORDER — HYDROMORPHONE HCL IN WATER/PF 6 MG/30 ML
0.2 PATIENT CONTROLLED ANALGESIA SYRINGE INTRAVENOUS EVERY 5 MIN PRN
Status: DISCONTINUED | OUTPATIENT
Start: 2024-05-15 | End: 2024-05-15 | Stop reason: HOSPADM

## 2024-05-15 RX ORDER — DEXAMETHASONE SODIUM PHOSPHATE 4 MG/ML
INJECTION, SOLUTION INTRA-ARTICULAR; INTRALESIONAL; INTRAMUSCULAR; INTRAVENOUS; SOFT TISSUE PRN
Status: DISCONTINUED | OUTPATIENT
Start: 2024-05-15 | End: 2024-05-15

## 2024-05-15 RX ORDER — CEFAZOLIN SODIUM/WATER 2 G/20 ML
2 SYRINGE (ML) INTRAVENOUS
Status: COMPLETED | OUTPATIENT
Start: 2024-05-15 | End: 2024-05-15

## 2024-05-15 RX ADMIN — PHENYLEPHRINE HYDROCHLORIDE 100 MCG: 10 INJECTION INTRAVENOUS at 11:09

## 2024-05-15 RX ADMIN — PHENYLEPHRINE HYDROCHLORIDE 100 MCG: 10 INJECTION INTRAVENOUS at 11:41

## 2024-05-15 RX ADMIN — PROPOFOL 150 MG: 10 INJECTION, EMULSION INTRAVENOUS at 11:01

## 2024-05-15 RX ADMIN — FENTANYL CITRATE 50 MCG: 50 INJECTION INTRAMUSCULAR; INTRAVENOUS at 11:16

## 2024-05-15 RX ADMIN — FUROSEMIDE 20 MG: 10 INJECTION, SOLUTION INTRAVENOUS at 12:17

## 2024-05-15 RX ADMIN — PHENYLEPHRINE HYDROCHLORIDE 100 MCG: 10 INJECTION INTRAVENOUS at 11:26

## 2024-05-15 RX ADMIN — PHENYLEPHRINE HYDROCHLORIDE 100 MCG: 10 INJECTION INTRAVENOUS at 11:14

## 2024-05-15 RX ADMIN — PHENYLEPHRINE HYDROCHLORIDE 100 MCG: 10 INJECTION INTRAVENOUS at 12:09

## 2024-05-15 RX ADMIN — PHENYLEPHRINE HYDROCHLORIDE 100 MCG: 10 INJECTION INTRAVENOUS at 11:33

## 2024-05-15 RX ADMIN — MIDAZOLAM 2 MG: 1 INJECTION INTRAMUSCULAR; INTRAVENOUS at 10:59

## 2024-05-15 RX ADMIN — SODIUM CHLORIDE, POTASSIUM CHLORIDE, SODIUM LACTATE AND CALCIUM CHLORIDE: 600; 310; 30; 20 INJECTION, SOLUTION INTRAVENOUS at 09:46

## 2024-05-15 RX ADMIN — APREPITANT 40 MG: 40 CAPSULE ORAL at 10:03

## 2024-05-15 RX ADMIN — LIDOCAINE HYDROCHLORIDE 100 MG: 20 INJECTION, SOLUTION INFILTRATION; PERINEURAL at 11:01

## 2024-05-15 RX ADMIN — Medication 2 G: at 10:56

## 2024-05-15 RX ADMIN — KETOROLAC TROMETHAMINE 15 MG: 30 INJECTION, SOLUTION INTRAMUSCULAR at 12:17

## 2024-05-15 RX ADMIN — PHENYLEPHRINE HYDROCHLORIDE 100 MCG: 10 INJECTION INTRAVENOUS at 11:21

## 2024-05-15 RX ADMIN — PHENYLEPHRINE HYDROCHLORIDE 100 MCG: 10 INJECTION INTRAVENOUS at 12:15

## 2024-05-15 RX ADMIN — ONDANSETRON 4 MG: 2 INJECTION INTRAMUSCULAR; INTRAVENOUS at 11:09

## 2024-05-15 RX ADMIN — PHENYLEPHRINE HYDROCHLORIDE 100 MCG: 10 INJECTION INTRAVENOUS at 12:03

## 2024-05-15 RX ADMIN — DEXAMETHASONE SODIUM PHOSPHATE 4 MG: 4 INJECTION, SOLUTION INTRA-ARTICULAR; INTRALESIONAL; INTRAMUSCULAR; INTRAVENOUS; SOFT TISSUE at 11:09

## 2024-05-15 RX ADMIN — PROPOFOL 50 MCG/KG/MIN: 10 INJECTION, EMULSION INTRAVENOUS at 11:01

## 2024-05-15 RX ADMIN — FENTANYL CITRATE 50 MCG: 50 INJECTION INTRAMUSCULAR; INTRAVENOUS at 11:01

## 2024-05-15 RX ADMIN — PHENYLEPHRINE HYDROCHLORIDE 100 MCG: 10 INJECTION INTRAVENOUS at 11:50

## 2024-05-15 ASSESSMENT — ACTIVITIES OF DAILY LIVING (ADL)
ADLS_ACUITY_SCORE: 35

## 2024-05-15 NOTE — ANESTHESIA POSTPROCEDURE EVALUATION
Patient: Lorin Andrade    Procedure: Procedure(s):  CYSTOSCOPY, LEFT URETERAL STENT REMOVAL,  LEFT RETROGRADE PYELOGRAM, LEFT URETEROSCOPY WITH BIOPSY AND URETERAL BRUSHING, LEFT URETERAL STENT PLACEMENT       Anesthesia Type:  General    Note:  Disposition: Outpatient   Postop Pain Control: Uneventful            Sign Out: Well controlled pain   PONV: No   Neuro/Psych: Uneventful            Sign Out: Acceptable/Baseline neuro status   Airway/Respiratory: Uneventful            Sign Out: Acceptable/Baseline resp. status   CV/Hemodynamics: Uneventful            Sign Out: Acceptable CV status; No obvious hypovolemia; No obvious fluid overload   Other NRE: NONE   DID A NON-ROUTINE EVENT OCCUR? No       Last vitals:  Vitals Value Taken Time   BP 94/48 05/15/24 1345   Temp 36.7  C (98.1  F) 05/15/24 1345   Pulse 68 05/15/24 1345   Resp 11 05/15/24 1345   SpO2 94 % 05/15/24 1345   Vitals shown include unfiled device data.    Electronically Signed By: Manoj Hidalgo MD  May 15, 2024  3:05 PM

## 2024-05-15 NOTE — ANESTHESIA CARE TRANSFER NOTE
Patient: Lorin Andrade    Procedure: Procedure(s):  CYSTOSCOPY, LEFT URETERAL STENT REMOVAL,  LEFT RETROGRADE PYELOGRAM, LEFT URETEROSCOPY WITH BIOPSY AND URETERAL BRUSHING, LEFT URETERAL STENT PLACEMENT       Diagnosis: Hydronephrosis of left kidney [N13.30]  Lesion of left native ureter [N28.9]  Obstruction of left ureter [N13.5]  Diagnosis Additional Information: No value filed.    Anesthesia Type:   General     Note:    Oropharynx: oropharynx clear of all foreign objects and spontaneously breathing  Level of Consciousness: drowsy  Oxygen Supplementation: face mask  Level of Supplemental Oxygen (L/min / FiO2): 6  Independent Airway: airway patency satisfactory and stable  Dentition: dentition unchanged  Vital Signs Stable: post-procedure vital signs reviewed and stable  Report to RN Given: handoff report given  Patient transferred to: PACU    Handoff Report: Identifed the Patient, Identified the Reponsible Provider, Reviewed the pertinent medical history, Discussed the surgical course, Reviewed Intra-OP anesthesia mangement and issues during anesthesia, Set expectations for post-procedure period and Allowed opportunity for questions and acknowledgement of understanding      Vitals:  Vitals Value Taken Time   /68 05/15/24 1230   Temp     Pulse 72 05/15/24 1233   Resp 8 05/15/24 1233   SpO2 100 % 05/15/24 1233       Electronically Signed By: TAL Jain CRNA  May 15, 2024  12:34 PM

## 2024-05-15 NOTE — TELEPHONE ENCOUNTER
Called patient and gave her financial counseling number at the hospital and again explained that this is not something that Urology does.     Sonia Franco LPN

## 2024-05-15 NOTE — OP NOTE
OPERATIVE REPORT  DATE OF SURGERY: 05/15/24  LOCATION OF SURGERY: [facility]  PREOPERATIVE DIAGNOSIS:  (N28.9) Lesion of left native ureter  (primary encounter diagnosis)  (N13.30) Hydronephrosis of left kidney  POSTOPERATIVE DIAGNOSIS: (N28.9) Lesion of left native ureter  (primary encounter diagnosis)  (N13.30) Hydronephrosis of left kidney     START TIME: 11:16 AM  END TIME: 12:20 PM    PROCEDURE PERFORMED:   1. Cystoscopy and ureteral stent removal  2. LEFT retrograde pyelogram  3. LEFT ureteroscopy with ureteral biopsy - MODIFIER 22  4. LEFT ureteral brushing  5. LEFT JJ stent placement  6. >1hr physician fluoroscopy time      STAFF SURGEON: Rogelio Gallagher MD  ANESTHESIA: General.   ESTIMATED BLOOD LOSS: 5 mL.   DRAINS AND TUBES: LEFT 6fr x 24cm ureteral stent, 16fr dominguez catheter  COMPLICATIONS: None.   DISPOSITION: PACU.   SPECIMENS OBTAINED:   ID Type Source Tests Collected by Time Destination   1 : PROXIMAL URETERAL BIOPSY Tissue Ureter, Left SURGICAL PATHOLOGY EXAM Rogelio Gallagher MD 5/15/2024 11:33 AM    2 : LEFT DISTAL URETERAL BRUSHING Brushing Ureter, Left NON-GYNECOLOGIC CYTOLOGY Rogelio Gallagher MD 5/15/2024 12:10 PM       SIGNIFICANT FINDINGS: Cystoscopy with no evidence of intravesical abnormalities.  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  Left ureteroscopy of the entire collecting system with no significant concern for malignancy in either of the renal moieties or proximal ureter.  Mild edema of the UPJ which was biopsied.  Evidence of a sessile ureteral lesion in the distal ureter which would not allow for biopsy despite extensive effort.  Ureteral brushing obtained.    MODIFIER 22 JUSTIFICATION: The distal ureteral lesion was extremely narrow and would not allow for advancement of a biopsy forceps.  This required significant additional surgeon time and effort  and equipment was done out of the standard case.     HISTORY OF PRESENT ILLNESS: Lorin Andrade is a 63 year old female who was found to have left-sided hydroureteronephrosis and a possible urothelial malignancy of the left distal ureter.  She underwent retrograde pyelogram on 5/1/2024 limited biopsies able to be taken.  She returns today for more thorough analysis.    OPERATION PERFORMED:   Informed consent was obtained and the patient was brought to the operating room where general anesthesia was induced. The patient was given appropriate preoperative antibiotics and positioned supine. The patient was then repositioned in dorsal lithotomy with all pressure points padded. We then performed a timeout, verifying the correct patient's site and procedure to be performed.    A 22 Khmer cystoscope was inserted atraumatically into the bladder.  Cystoscopy was performed with no evidence of intravesical abnormalities or lesions.  The previously placed left ureteral stent was identified and grasped and withdrawn to the urethral meatus.  This was cannulated with a 0.035 sensor wire which was advanced up to the renal pelvis under fluoroscopic guidance and the stent was removed.  A 10 Khmer dual-lumen catheter was advanced and was able to advance to the mid to proximal ureter.  A gentle retrograde pyelogram was performed outlining the partially duplicated collecting system with a common ureter joining just below the UPJ.  A stiff Glidewire was advanced into the upper pole moiety and the dual-lumen catheter was removed.  Flexible ureteroscope was then backloaded over the Glidewire into the renal pelvis of the upper pole moiety and the wire was removed.  Complete pyeloscopy was performed of both the upper and lower pole moiety's with no evidence concerning for urothelial malignancy.  At the UPJ there was some mild edema likely secondary to the prior stent and a few biopsy samples were obtained, though this was only able to be  scanned tissue.  The ureteroscope was withdrawn down the ureter which was normal until the distal ureter at which point there was fairly clear sessile lesion.  The flexible scope was removed and the semirigid ureteroscope was removed with attempt for advancement of the Big-opsy biopsy forceps, however this would not advance through the distal ureter.  Amplatz Super Stiff wire was advanced up the ureter and the ureteroscope was able to advance over the wire and noticed this was extremely challenging.  This process required significant additional surgeon time and effort beyond that of a standard case.  Given the difficulty with the ureteral biopsy, decision made for ureteral brushing and the ureteroscope was removed.  A 5 Senegalese open-ended catheter was advanced past the point of concern and then under fluoroscopic guidance ureteral brushing was performed of the distal ureter.  This was sent for separate specimen.  The wire was then repositioned into the lower pole moiety and a new 6 Senegalese by 24 cm JJ ureteral stent was advanced over the wire with good curl noted in the renal pelvis and in the bladder fluoroscopically.  A 16 Senegalese Ayala catheter was placed balloon.  She was given 20 mg IV Lasix and 15 mg IV Toradol.  She was emerged anesthesia and taken to the recovery room in stable condition.      Rogelio Gallagher MD   Urology  [facility]  Clinic Phone [phone]

## 2024-05-15 NOTE — DISCHARGE INSTRUCTIONS
POSTOPERATIVE INSTRUCTIONS    Diagnosis-------------------------------   Left distal ureteral tumor    Procedure-------------------------------  Procedure(s) (LRB):  CYSTOSCOPY, LEFT URETERAL STENT REMOVAL,  LEFT RETROGRADE PYELOGRAM, LEFT URETEROSCOPY WITH BIOPSY AND URETERAL BRUSHING, LEFT URETERAL STENT PLACEMENT (Left)      Findings--------------------------------  Cystoscopy with no evidence of intravesical abnormalities. 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 Left ureteroscopy of the entire collecting system with no significant concern for malignancy in either of the renal moieties or proximal ureter. Mild edema of the UPJ which was biopsied. Evidence of a sessile ureteral lesion in the distal ureter which would not allow for biopsy despite extensive effort. Ureteral brushing obtained.     Home-going instructions-----------------         Activity Limitation:     - No driving or operating heavy machinery while on narcotic pain medication.     FOLLOW THESE INSTRUCTIONS AS INDICATED BELOW:  - Observe operative area for signs of excessive bleeding.  - You may shower.  - Increase fluid intake to promote clear urine.  - Resume usual diet as tolerated    What to expect while recovering-----------  - You may experience some intermittent bleeding that makes your urine pink or cherry colored. This is normal.  - However, if you are unable to urinate, passing large amount of clots, have ruddy blood in your urine, or have a temperature >101 degrees, call the urology nurse on call, or present to your nearest emergency department.  - You are encouraged to walk daily, and have no activity restrictions.   - A URETERAL STENT has been placed that allows urine to flow unobstructed from your kidney into your bladder.  The stent has a curl in the kidney and a curl in the bladder.  The curl in the  bladder can cause some urgency and frequency of urination as well as some mild blood in the urine.  The curl in the kidney can cause some mild flank discomfort.  This may be more noticeable when you urinate.  A URETERAL STENT is meant to be left in temporarily.  It must be removed or changed no later than 3 months after it's insertion.  If it's not removed it can result in stone overgrowth on the stent that can cause pain, infection, and can be very difficult to remove.      Discharge Medications/instructions:   - Take Tylenol 1000mg every 6 hours for pain  - Take Ibuprofen 600mg every 6 hours as needed for additional pain control  - Take Oxycodone 5mg every 4-6 hours only for break through pain  - Take Colace while taking Oxycodone to prevent constipation      Questions/concerns------------------------  [facility]: [phone]    Future appointments  You are scheduled for surgery with Dr. Malik on 6/24/2024    Rogelio Gallagher MD           **Because you had anesthesia today and your history of sleep apnea, it is extremely important that you use your CPAP machine for the next 24 hours while napping or sleeping.**      Same Day Surgery Discharge Instructions for  Sedation and General Anesthesia     It's not unusual to feel dizzy, light-headed or faint for up to 24 hours after surgery or while taking pain medication.  If you have these symptoms: sit for a few minutes before standing and have someone assist you when you get up to walk or use the bathroom.    You should rest and relax for the next 24 hours. We recommend you make arrangements to have an adult stay with you for at least 24 hours after your discharge.  Avoid hazardous and strenuous activity.    DO NOT DRIVE any vehicle or operate mechanical equipment for 24 hours following the end of your surgery.  Even though you may feel normal, your reactions may be affected by the medication you have received.    Do not drink alcoholic beverages for 24  hours following surgery.     Slowly progress to your regular diet as you feel able. It's not unusual to feel nauseated and/or vomit after receiving anesthesia.  If you develop these symptoms, drink clear liquids (apple juice, ginger ale, broth, 7-up, etc. ) until you feel better.  If your nausea and vomiting persists for 24 hours, please notify your surgeon.      All narcotic pain medications, along with inactivity and anesthesia, can cause constipation. Drinking plenty of liquids and increasing fiber intake will help.    For any questions of a medical nature, call your surgeon.    Do not make important decisions for 24 hours.    If you had general anesthesia, you may have a sore throat for a couple of days related to the breathing tube used during surgery.  You may use Cepacol lozenges to help with this discomfort.  If it worsens or if you develop a fever, contact your surgeon.     If you feel your pain is not well managed with the pain medications prescribed by your surgeon, please contact your surgeon's office to let them know so they can address your concerns.     Today you received Toradol, an antiinflammatory medication similar to Ibuprofen.  You should not take other antiinflammatory medication, such as Ibuprofen, Motrin, Advil, Aleve, Naprosyn, etc until 6:15pm.     **If you have questions or concerns about your procedure,   call Dr. Gallagher at [phone]**

## 2024-05-17 LAB
PATH REPORT.COMMENTS IMP SPEC: NO
PATH REPORT.COMMENTS IMP SPEC: NORMAL
PATH REPORT.COMMENTS IMP SPEC: NORMAL
PATH REPORT.FINAL DX SPEC: NORMAL
PATH REPORT.GROSS SPEC: NORMAL
PATH REPORT.MICROSCOPIC SPEC OTHER STN: NORMAL
PATH REPORT.RELEVANT HX SPEC: NORMAL

## 2024-05-20 LAB
PATH REPORT.COMMENTS IMP SPEC: NORMAL
PATH REPORT.COMMENTS IMP SPEC: NORMAL
PATH REPORT.FINAL DX SPEC: NORMAL
PATH REPORT.GROSS SPEC: NORMAL
PATH REPORT.MICROSCOPIC SPEC OTHER STN: NORMAL
PATH REPORT.RELEVANT HX SPEC: NORMAL
PHOTO IMAGE: NORMAL

## 2024-05-22 ENCOUNTER — VIRTUAL VISIT (OUTPATIENT)
Dept: UROLOGY | Facility: CLINIC | Age: 64
End: 2024-05-22
Payer: COMMERCIAL

## 2024-05-22 VITALS — BODY MASS INDEX: 20.57 KG/M2 | HEIGHT: 66 IN | WEIGHT: 128 LBS

## 2024-05-22 DIAGNOSIS — N13.5 OBSTRUCTION OF LEFT URETER: ICD-10-CM

## 2024-05-22 DIAGNOSIS — N28.9 LESION OF LEFT NATIVE URETER: Primary | ICD-10-CM

## 2024-05-22 PROCEDURE — 99214 OFFICE O/P EST MOD 30 MIN: CPT | Mod: 95 | Performed by: UROLOGY

## 2024-05-22 ASSESSMENT — PAIN SCALES - GENERAL: PAINLEVEL: MILD PAIN (2)

## 2024-05-22 NOTE — LETTER
"5/22/2024       RE: Lorin Andrade  [address] [address]     Dear Colleague,    Thank you for referring your patient, Lorin Andrade, to the [facility] at [facility]. Please see a copy of my visit note below.    [facility]  CHIEF COMPLAINT   It was my pleasure to see Lorin Andrade who is a 63 year old female for follow-up of LEFT hydroureteronephrosis.      HPI  5/8/2024   Lorin Andrade is a very pleasant 63 year old female with a history of left renal colic with workup showing moderate hydroureteronephrosis and enhancement of the last 5 cm of the left distal ureter.  Urine cytology with suspicion for high-grade urothelial carcinoma.  She underwent ureteroscopy with ureteral biopsy and stent placement on 5/1/2024 and returns today to discuss the results.    She has had resolution of her flank pain following stent placement  Her  joins her for this visit    5/15/2024:  Ureteroscopy     TODAY 5/22/2024:  Follow-up today to review the ureteroscopy findings and pathology results    PHYSICAL EXAM  Patient is a 63 year old  female   Vitals: Height 1.664 m (5' 5.5\"), weight 58.1 kg (128 lb).  Body mass index is 20.98 kg/m .  General Appearance Adult:   Alert, no acute distress, oriented  Neuro: Alert, oriented, speech and mentation normal  Psych: affect and mood normal       Creatinine   Date Value Ref Range Status   05/01/2024 0.73 0.51 - 0.95 mg/dL Final   04/30/2024 0.89 0.51 - 0.95 mg/dL Final   04/19/2024 0.84 0.51 - 0.95 mg/dL Final   08/14/2023 0.61 0.51 - 0.95 mg/dL Final        UA RESULTS:  Recent Labs   Lab Test 04/30/24  1411 04/19/24  1315   COLOR Light Yellow Yellow   APPEARANCE Clear Clear   URINEGLC Negative Negative   URINEBILI Negative Negative   URINEKETONE Negative Negative   SG 1.015 1.015   UBLD Small* Moderate*   URINEPH 5.0 7.0   PROTEIN Negative Negative   UROBILINOGEN  --  0.2   NITRITE Negative Negative "   LEUKEST Trace* Trace*   RBCU 14* 10-25*   WBCU 8* 0-5      Final Diagnosis   Specimen A                 Interpretation:                  -  Suspicious for High-grade Urothelial Carcinoma      PATHOLOGY:  5/1/2024:  Final Diagnosis   Left distal ureter, biopsy:  -Normal urothelial lining without evidence of atypia or malignancy, please see description     5/15/2024:  Final Diagnosis   Ureter, left proximal, biopsy:  -- Urothelium overlying edematous stroma.  Negative for dysplasia or carcinoma.     5/15/2024:  Distal ureteral brushing  Final Diagnosis   Specimen A              Interpretation:               Negative for High Grade Urothelial Carcinoma       IMAGING:  All pertinent imaging reviewed:    All imaging studies reviewed by me.  I personally reviewed these imaging films.  A formal report from radiology will follow.    CT UROGRAM 4/30/2024:  FINDINGS:   LOWER CHEST: No worrisome consolidation in the visualized lung bases. No pleural effusion. Partially imaged breast implants.     HEPATOBILIARY: Unchanged liver contours with benign cyst in the left hepatic lobe. No worrisome liver lesions. Normal gallbladder. No biliary ductal dilation.     PANCREAS: Normal.     SPLEEN: Normal.     ADRENAL GLANDS: Normal.     RIGHT KIDNEY/URETER: Normal morphology and enhancement of the right kidney. A few cortically based subcentimeter low-attenuation lesions are incompletely characterized though likely cysts. No urolithiasis or hydronephrosis. Normal excretion of contrast   on delayed images without urothelial thickening or filling defects.     LEFT KIDNEY/URETER: Unchanged delayed left nephrogram. 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 No urolithiasis. There is unchanged moderate to severe hydroureteronephrosis with dependent excreted contrast   layering within the calyces. As before, there is a transition in the distal ureter where there is an approximately 5 cm segment  of urothelial thickening and enhancement (series 12 image #42, #48).      BLADDER: Unremarkable.     BOWEL: Normal caliber small and large bowel. Normal appendix. No free fluid or free air.     LYMPH NODES: No lymphadenopathy.     VASCULATURE: The abdominal aorta is nonaneurysmal with mild atheromatous disease.     PELVIC ORGANS: Uterus is present. No adnexal mass.     MUSCULOSKELETAL: Unchanged mild L4 superior endplate deformity. No destructive osseous lesions.                                                                      IMPRESSION:  1.  Unchanged moderate left hydroureteronephrosis to the level of the distal ureter where there is an approximately 5 cm segment of urothelial thickening and enhancement. As before, etiology may be inflammatory or neoplastic.  2.  Additional noncritical details in the findings.    NM RENAL SCAN 4/17/2024:  FINDINGS:      Renal angiogram demonstrates: Flow to right kidney within normal  limits. There is asymmetrically slower and less flow to the left  kidney.   Renogram images demonstrate:     Right kidney: There is normal cortical uptake. Cortical transit is  noted at 3 minutes. Ureter is visualized at 4 minutes. There is normal  wash out. Following Lasix administration, there is prompt wash out.      Left kidney: There is delayed cortical uptake. Cortical transit is  noted at 6 minutes which is slightly delayed. Ureter is visualized at  20 minutes. There is no wash ou prior to Lasix, minimal wash out after  Lasix.   Renogram curves demonstrate:     Right kidney: The radiotracer reaches the peak activity at 5 minutes  (time to peak). There is normal wash out and complete wash out  following Lasix. T1/2 is: 14 minutes.     Left kidney: The radiotracer reaches the peak activity at 22 minutes  (time to peak). There is delayed wash out and  incomplete wash out  following Lasix. T1/2 is: Not applicable.     Split function: Left: 38.7%, Right: 61.3%.                                                                       IMPRESSION:  1. Right kidney: Normal function. Normal excretion. No obstruction.     2. Left kidney: Decreased function. Decreased excretion, concerning  for partial obstruction.      ASSESSMENT and PLAN  63-year-old female with left-sided hydroureteronephrosis and concern for urothelial malignancy of the left distal ureter    Possible urothelial malignancy of the left distal ureter  -Again reviewed her serum creatinine which is normal  - Reviewed her initial cytology with high-grade urothelial carcinoma  - We discussed that her repeat ureteroscopy with proximal ureteral biopsy was negative for malignancy in the kidney and surprisingly her distal ureteral brushing was negative on cytology  - We discussed that her ureteroscopy visually was consistent with a sessile tumor of the distal ureter and I recommend we plan to proceed as scheduled with a distal ureterectomy and ureteroneocystostomy scheduled for 6/24/2024  - We discussed that the remainder of any treatment regimen will depend on pathology from her distal ureterectomy and we will also plan for a left pelvic lymph node dissection      Time spent: 20 minutes spent on the date of the encounter doing chart review, history and exam, documentation and further activities as noted above.    .[email or web address]     Rogelio Gallagher MD   Urology  [facility]  [facility] Phone: [phone]  [facility] Phone: [phone]          Virtual Visit Details    Type of service:  Video Visit     Originating Location (pt. Location): Home    Distant Location (provider location):  On-site  Platform used for Video Visit: [employer]

## 2024-05-22 NOTE — NURSING NOTE
Is the patient currently in the state of [place]? YES    Visit mode:VIDEO    If the visit is dropped, the patient can be reconnected by: VIDEO VISIT: Send to e-mail at: [email or web address]    Will anyone else be joining the visit? NO  (If patient encounters technical issues they should call [phone]    How would you like to obtain your AVS? MyChart    Are changes needed to the allergy or medication list? No    Are refills needed on medications prescribed by this physician? NO    Reason for visit: RECHECK    Lala BRAVO

## 2024-05-22 NOTE — PROGRESS NOTES
"[facility]  CHIEF COMPLAINT   It was my pleasure to see Lorin Andrade who is a 63 year old female for follow-up of LEFT hydroureteronephrosis.      HPI  5/8/2024   Lorin Andrade is a very pleasant 63 year old female with a history of left renal colic with workup showing moderate hydroureteronephrosis and enhancement of the last 5 cm of the left distal ureter.  Urine cytology with suspicion for high-grade urothelial carcinoma.  She underwent ureteroscopy with ureteral biopsy and stent placement on 5/1/2024 and returns today to discuss the results.    She has had resolution of her flank pain following stent placement  Her  joins her for this visit    5/15/2024:  Ureteroscopy     TODAY 5/22/2024:  Follow-up today to review the ureteroscopy findings and pathology results    PHYSICAL EXAM  Patient is a 63 year old  female   Vitals: Height 1.664 m (5' 5.5\"), weight 58.1 kg (128 lb).  Body mass index is 20.98 kg/m .  General Appearance Adult:   Alert, no acute distress, oriented  Neuro: Alert, oriented, speech and mentation normal  Psych: affect and mood normal       Creatinine   Date Value Ref Range Status   05/01/2024 0.73 0.51 - 0.95 mg/dL Final   04/30/2024 0.89 0.51 - 0.95 mg/dL Final   04/19/2024 0.84 0.51 - 0.95 mg/dL Final   08/14/2023 0.61 0.51 - 0.95 mg/dL Final        UA RESULTS:  Recent Labs   Lab Test 04/30/24  1411 04/19/24  1315   COLOR Light Yellow Yellow   APPEARANCE Clear Clear   URINEGLC Negative Negative   URINEBILI Negative Negative   URINEKETONE Negative Negative   SG 1.015 1.015   UBLD Small* Moderate*   URINEPH 5.0 7.0   PROTEIN Negative Negative   UROBILINOGEN  --  0.2   NITRITE Negative Negative   LEUKEST Trace* Trace*   RBCU 14* 10-25*   WBCU 8* 0-5      Final Diagnosis   Specimen A                 Interpretation:                  -  Suspicious for High-grade Urothelial Carcinoma      PATHOLOGY:  5/1/2024:  Final Diagnosis   Left distal ureter, biopsy:  -Normal urothelial lining without " evidence of atypia or malignancy, please see description     5/15/2024:  Final Diagnosis   Ureter, left proximal, biopsy:  -- Urothelium overlying edematous stroma.  Negative for dysplasia or carcinoma.     5/15/2024:  Distal ureteral brushing  Final Diagnosis   Specimen A              Interpretation:               Negative for High Grade Urothelial Carcinoma       IMAGING:  All pertinent imaging reviewed:    All imaging studies reviewed by me.  I personally reviewed these imaging films.  A formal report from radiology will follow.    CT UROGRAM 4/30/2024:  FINDINGS:   LOWER CHEST: No worrisome consolidation in the visualized lung bases. No pleural effusion. Partially imaged breast implants.     HEPATOBILIARY: Unchanged liver contours with benign cyst in the left hepatic lobe. No worrisome liver lesions. Normal gallbladder. No biliary ductal dilation.     PANCREAS: Normal.     SPLEEN: Normal.     ADRENAL GLANDS: Normal.     RIGHT KIDNEY/URETER: Normal morphology and enhancement of the right kidney. A few cortically based subcentimeter low-attenuation lesions are incompletely characterized though likely cysts. No urolithiasis or hydronephrosis. Normal excretion of contrast   on delayed images without urothelial thickening or filling defects.     LEFT KIDNEY/URETER: Unchanged delayed left nephrogram. 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 No urolithiasis. There is unchanged moderate to severe hydroureteronephrosis with dependent excreted contrast   layering within the calyces. As before, there is a transition in the distal ureter where there is an approximately 5 cm segment of urothelial thickening and enhancement (series 12 image #42, #48).      BLADDER: Unremarkable.     BOWEL: Normal caliber small and large bowel. Normal appendix. No free fluid or free air.     LYMPH NODES: No lymphadenopathy.     VASCULATURE: The abdominal aorta is nonaneurysmal with mild  atheromatous disease.     PELVIC ORGANS: Uterus is present. No adnexal mass.     MUSCULOSKELETAL: Unchanged mild L4 superior endplate deformity. No destructive osseous lesions.                                                                      IMPRESSION:  1.  Unchanged moderate left hydroureteronephrosis to the level of the distal ureter where there is an approximately 5 cm segment of urothelial thickening and enhancement. As before, etiology may be inflammatory or neoplastic.  2.  Additional noncritical details in the findings.    NM RENAL SCAN 4/17/2024:  FINDINGS:      Renal angiogram demonstrates: Flow to right kidney within normal  limits. There is asymmetrically slower and less flow to the left  kidney.   Renogram images demonstrate:     Right kidney: There is normal cortical uptake. Cortical transit is  noted at 3 minutes. Ureter is visualized at 4 minutes. There is normal  wash out. Following Lasix administration, there is prompt wash out.      Left kidney: There is delayed cortical uptake. Cortical transit is  noted at 6 minutes which is slightly delayed. Ureter is visualized at  20 minutes. There is no wash ou prior to Lasix, minimal wash out after  Lasix.   Renogram curves demonstrate:     Right kidney: The radiotracer reaches the peak activity at 5 minutes  (time to peak). There is normal wash out and complete wash out  following Lasix. T1/2 is: 14 minutes.     Left kidney: The radiotracer reaches the peak activity at 22 minutes  (time to peak). There is delayed wash out and  incomplete wash out  following Lasix. T1/2 is: Not applicable.     Split function: Left: 38.7%, Right: 61.3%.                                                                      IMPRESSION:  1. Right kidney: Normal function. Normal excretion. No obstruction.     2. Left kidney: Decreased function. Decreased excretion, concerning  for partial obstruction.      ASSESSMENT and PLAN  63-year-old female with left-sided  hydroureteronephrosis and concern for urothelial malignancy of the left distal ureter    Possible urothelial malignancy of the left distal ureter  -Again reviewed her serum creatinine which is normal  - Reviewed her initial cytology with high-grade urothelial carcinoma  - We discussed that her repeat ureteroscopy with proximal ureteral biopsy was negative for malignancy in the kidney and surprisingly her distal ureteral brushing was negative on cytology  - We discussed that her ureteroscopy visually was consistent with a sessile tumor of the distal ureter and I recommend we plan to proceed as scheduled with a distal ureterectomy and ureteroneocystostomy scheduled for 6/24/2024  - We discussed that the remainder of any treatment regimen will depend on pathology from her distal ureterectomy and we will also plan for a left pelvic lymph node dissection      Time spent: 20 minutes spent on the date of the encounter doing chart review, history and exam, documentation and further activities as noted above.    .[email or web address]     Rogelio Gallagher MD   Urology  [facility]  [facility] Phone: [phone]  [facility] Phone: [phone]          Virtual Visit Details    Type of service:  Video Visit     Originating Location (pt. Location): Home    Distant Location (provider location):  On-site  Platform used for Video Visit: Atul

## 2024-05-30 DIAGNOSIS — F41.9 ANXIETY: ICD-10-CM

## 2024-05-30 RX ORDER — CITALOPRAM HYDROBROMIDE 20 MG/1
20 TABLET ORAL DAILY
Qty: 90 TABLET | Refills: 0 | Status: SHIPPED | OUTPATIENT
Start: 2024-05-30 | End: 2024-06-03

## 2024-05-30 RX ORDER — CITALOPRAM HYDROBROMIDE 10 MG/1
10 TABLET ORAL DAILY
Qty: 90 TABLET | Refills: 0 | Status: SHIPPED | OUTPATIENT
Start: 2024-05-30 | End: 2024-06-03

## 2024-05-30 NOTE — TELEPHONE ENCOUNTER
Prescription approved per [facility] Refill Protocol.  Carlota Holt RN  [facility]

## 2024-06-03 DIAGNOSIS — F41.9 ANXIETY: ICD-10-CM

## 2024-06-03 DIAGNOSIS — G62.9 PERIPHERAL POLYNEUROPATHY: ICD-10-CM

## 2024-06-03 RX ORDER — CITALOPRAM HYDROBROMIDE 10 MG/1
10 TABLET ORAL DAILY
Qty: 90 TABLET | Refills: 0 | Status: SHIPPED | OUTPATIENT
Start: 2024-06-03 | End: 2024-08-27

## 2024-06-03 RX ORDER — CITALOPRAM HYDROBROMIDE 20 MG/1
20 TABLET ORAL DAILY
Qty: 90 TABLET | Refills: 0 | Status: SHIPPED | OUTPATIENT
Start: 2024-06-03 | End: 2024-08-27

## 2024-06-03 RX ORDER — PREGABALIN 75 MG/1
75 CAPSULE ORAL 2 TIMES DAILY
Qty: 180 CAPSULE | Refills: 0 | Status: SHIPPED | OUTPATIENT
Start: 2024-06-03 | End: 2024-08-27

## 2024-06-03 NOTE — TELEPHONE ENCOUNTER
Patient called the clinic and stated that she is needing a refill of her medication. Citalopram was send to the wrong pharmacy so it need to be resent. Patient will be out of the medication on Wednesday. Routing high priority to refill pool.    Isabel MATT RN  [facility] Triage Team

## 2024-06-11 ENCOUNTER — OFFICE VISIT (OUTPATIENT)
Dept: FAMILY MEDICINE | Facility: CLINIC | Age: 64
End: 2024-06-11
Payer: COMMERCIAL

## 2024-06-11 VITALS
OXYGEN SATURATION: 97 % | TEMPERATURE: 97.7 F | HEART RATE: 78 BPM | DIASTOLIC BLOOD PRESSURE: 64 MMHG | RESPIRATION RATE: 16 BRPM | BODY MASS INDEX: 21.21 KG/M2 | WEIGHT: 132 LBS | SYSTOLIC BLOOD PRESSURE: 106 MMHG | HEIGHT: 66 IN

## 2024-06-11 DIAGNOSIS — F41.9 ANXIETY: ICD-10-CM

## 2024-06-11 DIAGNOSIS — G62.9 PERIPHERAL POLYNEUROPATHY: ICD-10-CM

## 2024-06-11 DIAGNOSIS — N28.9 LESION OF LEFT NATIVE URETER: ICD-10-CM

## 2024-06-11 DIAGNOSIS — C50.912 MALIGNANT NEOPLASM OF LEFT FEMALE BREAST, UNSPECIFIED ESTROGEN RECEPTOR STATUS, UNSPECIFIED SITE OF BREAST (H): ICD-10-CM

## 2024-06-11 DIAGNOSIS — Z01.818 PRE-OP EXAM: Primary | ICD-10-CM

## 2024-06-11 LAB
ANION GAP SERPL CALCULATED.3IONS-SCNC: 8 MMOL/L (ref 7–15)
BASOPHILS # BLD AUTO: 0 10E3/UL (ref 0–0.2)
BASOPHILS NFR BLD AUTO: 1 %
BUN SERPL-MCNC: 15.7 MG/DL (ref 8–23)
CALCIUM SERPL-MCNC: 9.8 MG/DL (ref 8.8–10.2)
CHLORIDE SERPL-SCNC: 102 MMOL/L (ref 98–107)
CREAT SERPL-MCNC: 0.74 MG/DL (ref 0.51–0.95)
DEPRECATED HCO3 PLAS-SCNC: 30 MMOL/L (ref 22–29)
EGFRCR SERPLBLD CKD-EPI 2021: >90 ML/MIN/1.73M2
EOSINOPHIL # BLD AUTO: 0.2 10E3/UL (ref 0–0.7)
EOSINOPHIL NFR BLD AUTO: 4 %
ERYTHROCYTE [DISTWIDTH] IN BLOOD BY AUTOMATED COUNT: 12.1 % (ref 10–15)
GLUCOSE SERPL-MCNC: 81 MG/DL (ref 70–99)
HCT VFR BLD AUTO: 36.2 % (ref 35–47)
HGB BLD-MCNC: 11.5 G/DL (ref 11.7–15.7)
IMM GRANULOCYTES # BLD: 0 10E3/UL
IMM GRANULOCYTES NFR BLD: 0 %
LYMPHOCYTES # BLD AUTO: 1.3 10E3/UL (ref 0.8–5.3)
LYMPHOCYTES NFR BLD AUTO: 32 %
MCH RBC QN AUTO: 30.5 PG (ref 26.5–33)
MCHC RBC AUTO-ENTMCNC: 31.8 G/DL (ref 31.5–36.5)
MCV RBC AUTO: 96 FL (ref 78–100)
MONOCYTES # BLD AUTO: 0.5 10E3/UL (ref 0–1.3)
MONOCYTES NFR BLD AUTO: 11 %
NEUTROPHILS # BLD AUTO: 2.2 10E3/UL (ref 1.6–8.3)
NEUTROPHILS NFR BLD AUTO: 52 %
PLATELET # BLD AUTO: 189 10E3/UL (ref 150–450)
POTASSIUM SERPL-SCNC: 4.7 MMOL/L (ref 3.4–5.3)
RBC # BLD AUTO: 3.77 10E6/UL (ref 3.8–5.2)
SODIUM SERPL-SCNC: 140 MMOL/L (ref 135–145)
WBC # BLD AUTO: 4.2 10E3/UL (ref 4–11)

## 2024-06-11 PROCEDURE — 99214 OFFICE O/P EST MOD 30 MIN: CPT | Performed by: PHYSICIAN ASSISTANT

## 2024-06-11 PROCEDURE — 36415 COLL VENOUS BLD VENIPUNCTURE: CPT | Performed by: PHYSICIAN ASSISTANT

## 2024-06-11 PROCEDURE — 85025 COMPLETE CBC W/AUTO DIFF WBC: CPT | Performed by: PHYSICIAN ASSISTANT

## 2024-06-11 PROCEDURE — 80048 BASIC METABOLIC PNL TOTAL CA: CPT | Performed by: PHYSICIAN ASSISTANT

## 2024-06-11 ASSESSMENT — PAIN SCALES - GENERAL: PAINLEVEL: NO PAIN (0)

## 2024-06-11 NOTE — PATIENT INSTRUCTIONS
1. One week surgery do not take NSAIDS (Alleve, ibuprofen, aspirin, etc) or any over-the-counter pain medications other than Tylenol.  TYLENOL is the safest pain pill to use before surgery because it does not affect your bleeding time. If tylenol is not sufficient for pain control talk to me or the surgeon and we will decide what is safe to use.     2. Do not eat anything after midnight  (yuri of the surgery) and nothing the morning of the surgery.     3. Follow all instructions given by the surgery team. They usually give out a packet. Read it and please follow it precisely. This helps surgical experience and outcomes.     4. If you have any questions do not hesitate to call me or the surgeon/surgical team.

## 2024-06-11 NOTE — PROGRESS NOTES
Preoperative Evaluation  [facility]  [address] [facility], [address]  [facility] [address]  Phone: [phone]  Primary Provider: Sandie Bella MD  Pre-op Performing Provider: Mukesh Payne PA-C  Jun 11, 2024 6/11/2024   Surgical Information   What procedure is being done? uterer surgery   Facility or Hospital where procedure/surgery will be performed: [facility]   Who is doing the procedure / surgery? Dr Gallagher   Date of surgery / procedure: June 24, 2024   Time of surgery / procedure: 1:45   Where do you plan to recover after surgery? at home with family     Fax number for surgical facility: Note does not need to be faxed, will be available electronically in Epic.    Assessment & Plan     The proposed surgical procedure is considered INTERMEDIATE risk.    Pre-op exam  Lesion of left native ureter  Peripheral polyneuropathy  Anxiety  Malignant neoplasm of left female breast, unspecified estrogen receptor status, unspecified site of breast (H)  Cleared for surgery pending labs.  Check CBC and BMP today.  Hold NSAIDs/vitamins 1 week prior to procedure.  Continue all other medications without modification at this time.  Will send H&P and labs to surgical team when labs result.  - CBC with platelets and differential  - Basic metabolic panel  (Ca, Cl, CO2, Creat, Gluc, K, Na, BUN)     - No identified additional risk factors other than previously addressed    Recommendation  Approval given to proceed with proposed procedure pending review of diagnostic evaluation.    20 minutes spent by me on the date of the encounter doing chart review, review of test results, interpretation of tests, patient visit, and documentation     Subjective   Lorin is a 63 year old, presenting for the following:  Pre-Op Exam      HPI related to upcoming procedure:     Here for preoperative clearance for upcoming left distal ureterectomy with ureteral reimplant + left inguinal node dissection.  Is  scheduled for 6/24/2024.  She has had no issues in the past with anesthesia.  History of heart attack, stroke, or blood clot.  Denies chest pain, shortness of breath, heart racing or new leg swelling.        6/11/2024   Pre-Op Questionnaire   Have you ever had a heart attack or stroke? No   Have you ever had surgery on your heart or blood vessels, such as a stent placement, a coronary artery bypass, or surgery on an artery in your head, neck, heart, or legs? No   Do you have chest pain with activity? No   Do you have a history of heart failure? No   Do you currently have a cold, bronchitis or symptoms of other infection? No   Do you have a cough, shortness of breath, or wheezing? No   Do you or anyone in your family have previous history of blood clots? No   Do you or does anyone in your family have a serious bleeding problem such as prolonged bleeding following surgeries or cuts? No   Have you ever had problems with anemia or been told to take iron pills? (!) YES   Have you had any abnormal blood loss such as black, tarry or bloody stools, or abnormal vaginal bleeding? No   Have you ever had a blood transfusion? No   Are you willing to have a blood transfusion if it is medically needed before, during, or after your surgery? Yes   Have you or any of your relatives ever had problems with anesthesia? No   Do you have sleep apnea, excessive snoring or daytime drowsiness? (!) YES   Do you have a CPAP machine? Yes   Do you have any artifical heart valves or other implanted medical devices like a pacemaker, defibrillator, or continuous glucose monitor? No   Do you have artificial joints? No   Are you allergic to latex? No     Health Care Directive  Patient does not have a Health Care Directive or Living Will:     Preoperative Review of    reviewed - controlled substances reflected in medication list.        Patient Active Problem List    Diagnosis Date Noted    Hydroureter 04/30/2024     Priority: Medium    Flank  pain 04/30/2024     Priority: Medium    Hydronephrosis of left kidney 04/22/2024     Priority: Medium    Lesion of left native ureter 04/22/2024     Priority: Medium    Annual physical exam 08/23/2023     Priority: Medium    Constipation, unspecified constipation type 08/23/2023     Priority: Medium    Closed compression fracture of L4 vertebra, sequela 08/23/2023     Priority: Medium    Need for vaccination 08/23/2023     Priority: Medium    Asymptomatic postmenopausal status 05/25/2023     Priority: Medium    Visit for screening mammogram 05/25/2023     Priority: Medium    Screen for colon cancer 05/25/2023     Priority: Medium    Anxiety 05/25/2023     Priority: Medium    Pelvic floor dysfunction in female 05/25/2023     Priority: Medium    Peripheral polyneuropathy 05/25/2023     Priority: Medium    Malignant neoplasm of left female breast, unspecified estrogen receptor status, unspecified site of breast (H) 05/25/2023     Priority: Medium    Screening for hyperlipidemia 05/25/2023     Priority: Medium      Past Medical History:   Diagnosis Date    Cancer (H)     Sleep apnea      Past Surgical History:   Procedure Laterality Date    COLONOSCOPY N/A 08/16/2023    Procedure: Colonoscopy;  Surgeon: Ana Paula Hurst MD;  Location:  GI    COMBINED CYSTOSCOPY, RETROGRADES, URETEROSCOPY, INSERT STENT Left 5/15/2024    Procedure: CYSTOSCOPY, LEFT URETERAL STENT REMOVAL,  LEFT RETROGRADE PYELOGRAM, LEFT URETEROSCOPY WITH BIOPSY AND URETERAL BRUSHING, LEFT URETERAL STENT PLACEMENT;  Surgeon: Rogelio Gallagher MD;  Location:  OR    CYSTOSCOPY, URETEROSCOPY, INSERT STENT Left 5/1/2024    Procedure: CYSTOURETEROSCOPY, LEFT URETERAL BIOPSY WITH STENT INSERTION, LEFT RETROGRADE PYELOGRAM;  Surgeon: Rogelio Gallagher MD;  Location:  OR    MASTECTOMY, BILATERAL      Double mastectomy     Current Outpatient Medications   Medication Sig Dispense Refill    calcium carbonate (OS-JULIETTE) 500 MG tablet Take 1 tablet by mouth 2  "times daily      citalopram (CELEXA) 10 MG tablet Take 1 tablet (10 mg) by mouth daily Takes 20mg tab also 90 tablet 0    citalopram (CELEXA) 20 MG tablet Take 1 tablet (20 mg) by mouth daily Takes 10mg tablet also 90 tablet 0    Polyethylene Glycol POWD 17 g daily as needed      pregabalin (LYRICA) 75 MG capsule Take 1 capsule (75 mg) by mouth 2 times daily 180 capsule 0    vitamin B-12 (CYANOCOBALAMIN) 100 MCG tablet Take 1,000 mcg by mouth daily         Allergies   Allergen Reactions    Animal Dander Shortness Of Breath    Gabapentin Other (See Comments)     Tolerating pregabalin well        Social History     Tobacco Use    Smoking status: Never    Smokeless tobacco: Never   Substance Use Topics    Alcohol use: Yes     Comment: 1-2 times a month     Family History   Problem Relation Age of Onset    Coronary Artery Disease Mother         CABG    Arrhythmia Sister      History   Drug Use Unknown     Comment: medical marijuana             Review of Systems  Constitutional, neuro, ENT, endocrine, pulmonary, cardiac, gastrointestinal, genitourinary, musculoskeletal, integument and psychiatric systems are negative, except as otherwise noted.    Objective    /64 (BP Location: Right arm, Patient Position: Sitting, Cuff Size: Adult Regular)   Pulse 78   Temp 97.7  F (36.5  C) (Temporal)   Resp 16   Ht 1.664 m (5' 5.5\")   Wt 59.9 kg (132 lb)   SpO2 97%   BMI 21.63 kg/m     Estimated body mass index is 21.63 kg/m  as calculated from the following:    Height as of this encounter: 1.664 m (5' 5.5\").    Weight as of this encounter: 59.9 kg (132 lb).  Physical Exam  GENERAL: alert and no distress  EYES: Eyes grossly normal to inspection, PERRL and conjunctivae and sclerae normal  NECK: no adenopathy, no asymmetry, masses, or scars  RESP: lungs clear to auscultation - no rales, rhonchi or wheezes  CV: regular rate and rhythm, normal S1 S2, no S3 or S4, no murmur, click or rub, no peripheral edema  ABDOMEN: soft, " nontender, no hepatosplenomegaly, no masses  MS: no gross musculoskeletal defects noted, no edema  SKIN: no suspicious lesions or rashes  NEURO: Normal strength and tone, mentation intact and speech normal  PSYCH: mentation appears normal, affect normal/bright    Recent Labs   Lab Test 05/02/24  0825 05/01/24  0946 04/30/24  1415   HGB 11.4* 12.3 12.1   PLT  --  185 207   NA  --  142 142   POTASSIUM  --  4.1 4.0   CR  --  0.73 0.89        Diagnostics  Labs pending at this time.  Results will be reviewed when available.   No EKG required, no history of coronary heart disease, significant arrhythmia, peripheral arterial disease or other structural heart disease.    Revised Cardiac Risk Index (RCRI)  The patient has the following serious cardiovascular risks for perioperative complications:   - No serious cardiac risks = 0 points     RCRI Interpretation: 0 points: Class I (very low risk - 0.4% complication rate)    The likelihood of other entities in the differential is insufficient to justify any further testing for them at this time. This was explained to the patient. The patient was advised that persistent or worsening symptoms would require further evaluation. Patient advised to call the office and if unable to reach to go to the emergency room if they develop any new or worsening symptoms. Expressed understanding and agreement with above stated plan.       Signed Electronically by: Mukesh Payne PA-C  Copy of this evaluation report is provided to requesting physician.

## 2024-06-12 NOTE — RESULT ENCOUNTER NOTE
Rivera Padgett,     -Stable blood counts. No signs of anemia.     -Your basic metabolic panel which includes tests of kidney function (creatinine, BUN), electrolytes (sodium, potassium, calcium), and blood sugar (glucose) returned stable for you.    Good to go for surgery!    Mkuesh Payne PA-C  [facility]

## 2024-06-20 NOTE — PROGRESS NOTES
PTA medications updated by Medication Scribe prior to surgery via phone call with patient (last doses completed by Nurse)     Medication history sources: Patient, Surescripts, and H&P  In the past week, patient estimated taking medication this percent of the time: Greater than 90%      Significant changes made to the medication list:  None      Additional medication history information:   None    Medication reconciliation completed by provider prior to medication history? No    Time spent in this activity: 25 minutes    The information provided in this note is only as accurate as the sources available at the time of update(s)      Prior to Admission medications    Medication Sig Last Dose Taking? Auth Provider Long Term End Date   calcium carbonate (OS-JULIETTE) 500 MG tablet Take 1 tablet by mouth 2 times daily  at pm Yes Reported, Patient     Cholecalciferol (VITAMIN D-3 PO) Take 1 tablet by mouth daily  at pm Yes Reported, Patient     citalopram (CELEXA) 10 MG tablet Take 1 tablet (10 mg) by mouth daily Takes 20mg tab also  at pm Yes Sandie Bella MD Yes    citalopram (CELEXA) 20 MG tablet Take 1 tablet (20 mg) by mouth daily Takes 10mg tablet also  at pm Yes Sandie Bella MD Yes    Polyethylene Glycol POWD 17 g daily as needed  at PRN Yes Reported, Patient     pregabalin (LYRICA) 75 MG capsule Take 1 capsule (75 mg) by mouth 2 times daily  at am Yes Fritz Ruiz MD Yes    vitamin B-12 (CYANOCOBALAMIN) 100 MCG tablet Take 1,000 mcg by mouth daily  at pm Yes Reported, Patient         Medication history completed by: Krishna Castro

## 2024-06-24 ENCOUNTER — HOSPITAL ENCOUNTER (OUTPATIENT)
Facility: CLINIC | Age: 64
Discharge: HOME | End: 2024-06-26
Attending: UROLOGY | Admitting: UROLOGY
Payer: COMMERCIAL

## 2024-06-24 ENCOUNTER — ANESTHESIA (OUTPATIENT)
Dept: SURGERY | Facility: CLINIC | Age: 64
End: 2024-06-24
Payer: COMMERCIAL

## 2024-06-24 ENCOUNTER — ANESTHESIA EVENT (OUTPATIENT)
Dept: SURGERY | Facility: CLINIC | Age: 64
End: 2024-06-24
Payer: COMMERCIAL

## 2024-06-24 ENCOUNTER — APPOINTMENT (OUTPATIENT)
Dept: GENERAL RADIOLOGY | Facility: CLINIC | Age: 64
End: 2024-06-24
Attending: STUDENT IN AN ORGANIZED HEALTH CARE EDUCATION/TRAINING PROGRAM
Payer: COMMERCIAL

## 2024-06-24 DIAGNOSIS — N28.9 LESION OF LEFT NATIVE URETER: Primary | ICD-10-CM

## 2024-06-24 DIAGNOSIS — N13.30 HYDRONEPHROSIS OF LEFT KIDNEY: ICD-10-CM

## 2024-06-24 DIAGNOSIS — R89.6 ABNORMAL BLADDER CYTOLOGY: ICD-10-CM

## 2024-06-24 PROBLEM — N28.89 URETERAL MASS: Status: ACTIVE | Noted: 2024-06-24

## 2024-06-24 PROCEDURE — 250N000011 HC RX IP 250 OP 636: Mod: JZ | Performed by: ANESTHESIOLOGY

## 2024-06-24 PROCEDURE — 250N000009 HC RX 250: Performed by: ANESTHESIOLOGY

## 2024-06-24 PROCEDURE — 250N000013 HC RX MED GY IP 250 OP 250 PS 637: Performed by: STUDENT IN AN ORGANIZED HEALTH CARE EDUCATION/TRAINING PROGRAM

## 2024-06-24 PROCEDURE — 88331 PATH CONSLTJ SURG 1 BLK 1SPC: CPT | Mod: TC | Performed by: UROLOGY

## 2024-06-24 PROCEDURE — 38770 REMOVE PELVIS LYMPH NODES: CPT | Mod: 52 | Performed by: UROLOGY

## 2024-06-24 PROCEDURE — C2617 STENT, NON-COR, TEM W/O DEL: HCPCS | Performed by: UROLOGY

## 2024-06-24 PROCEDURE — 272N000001 HC OR GENERAL SUPPLY STERILE: Performed by: UROLOGY

## 2024-06-24 PROCEDURE — 258N000003 HC RX IP 258 OP 636: Mod: JZ | Performed by: ANESTHESIOLOGY

## 2024-06-24 PROCEDURE — 258N000003 HC RX IP 258 OP 636: Mod: JZ | Performed by: STUDENT IN AN ORGANIZED HEALTH CARE EDUCATION/TRAINING PROGRAM

## 2024-06-24 PROCEDURE — 999N000141 HC STATISTIC PRE-PROCEDURE NURSING ASSESSMENT: Performed by: UROLOGY

## 2024-06-24 PROCEDURE — 258N000001 HC RX 258: Performed by: UROLOGY

## 2024-06-24 PROCEDURE — 999N000065 XR ABDOMEN PORT 1 VIEW

## 2024-06-24 PROCEDURE — 710N000009 HC RECOVERY PHASE 1, LEVEL 1, PER MIN: Performed by: UROLOGY

## 2024-06-24 PROCEDURE — 44180 LAP ENTEROLYSIS: CPT | Performed by: ANESTHESIOLOGY

## 2024-06-24 PROCEDURE — 44180 LAP ENTEROLYSIS: CPT | Performed by: NURSE ANESTHETIST, CERTIFIED REGISTERED

## 2024-06-24 PROCEDURE — 50949 UNLISTED LAPS PX URETER: CPT | Performed by: UROLOGY

## 2024-06-24 PROCEDURE — 370N000017 HC ANESTHESIA TECHNICAL FEE, PER MIN: Performed by: UROLOGY

## 2024-06-24 PROCEDURE — C1769 GUIDE WIRE: HCPCS | Performed by: UROLOGY

## 2024-06-24 PROCEDURE — 250N000025 HC SEVOFLURANE, PER MIN: Performed by: UROLOGY

## 2024-06-24 PROCEDURE — 250N000011 HC RX IP 250 OP 636: Mod: JZ | Performed by: STUDENT IN AN ORGANIZED HEALTH CARE EDUCATION/TRAINING PROGRAM

## 2024-06-24 PROCEDURE — 360N000080 HC SURGERY LEVEL 7, PER MIN: Performed by: UROLOGY

## 2024-06-24 PROCEDURE — 250N000009 HC RX 250: Performed by: UROLOGY

## 2024-06-24 PROCEDURE — 250N000011 HC RX IP 250 OP 636: Performed by: SURGERY

## 2024-06-24 PROCEDURE — 250N000011 HC RX IP 250 OP 636: Mod: JZ | Performed by: UROLOGY

## 2024-06-24 PROCEDURE — 250N000011 HC RX IP 250 OP 636: Performed by: UROLOGY

## 2024-06-24 DEVICE — URETERAL STENT
Type: IMPLANTABLE DEVICE | Site: PELVIS | Status: FUNCTIONAL
Brand: POLARIS™ ULTRA

## 2024-06-24 RX ORDER — CITALOPRAM HYDROBROMIDE 10 MG/1
10 TABLET ORAL DAILY
Status: DISCONTINUED | OUTPATIENT
Start: 2024-06-25 | End: 2024-06-26 | Stop reason: HOSPADM

## 2024-06-24 RX ORDER — PREGABALIN 75 MG/1
75 CAPSULE ORAL 2 TIMES DAILY
Status: DISCONTINUED | OUTPATIENT
Start: 2024-06-24 | End: 2024-06-26 | Stop reason: HOSPADM

## 2024-06-24 RX ORDER — FENTANYL CITRATE 50 UG/ML
INJECTION, SOLUTION INTRAMUSCULAR; INTRAVENOUS PRN
Status: DISCONTINUED | OUTPATIENT
Start: 2024-06-24 | End: 2024-06-24

## 2024-06-24 RX ORDER — BUPIVACAINE HYDROCHLORIDE 2.5 MG/ML
INJECTION, SOLUTION INFILTRATION; PERINEURAL PRN
Status: DISCONTINUED | OUTPATIENT
Start: 2024-06-24 | End: 2024-06-24 | Stop reason: HOSPADM

## 2024-06-24 RX ORDER — HYDROMORPHONE HCL IN WATER/PF 6 MG/30 ML
0.2 PATIENT CONTROLLED ANALGESIA SYRINGE INTRAVENOUS EVERY 5 MIN PRN
Status: DISCONTINUED | OUTPATIENT
Start: 2024-06-24 | End: 2024-06-24 | Stop reason: HOSPADM

## 2024-06-24 RX ORDER — NALOXONE HYDROCHLORIDE 0.4 MG/ML
0.4 INJECTION, SOLUTION INTRAMUSCULAR; INTRAVENOUS; SUBCUTANEOUS
Status: DISCONTINUED | OUTPATIENT
Start: 2024-06-24 | End: 2024-06-26 | Stop reason: HOSPADM

## 2024-06-24 RX ORDER — NALOXONE HYDROCHLORIDE 0.4 MG/ML
0.2 INJECTION, SOLUTION INTRAMUSCULAR; INTRAVENOUS; SUBCUTANEOUS
Status: DISCONTINUED | OUTPATIENT
Start: 2024-06-24 | End: 2024-06-26 | Stop reason: HOSPADM

## 2024-06-24 RX ORDER — ONDANSETRON 2 MG/ML
4 INJECTION INTRAMUSCULAR; INTRAVENOUS EVERY 30 MIN PRN
Status: DISCONTINUED | OUTPATIENT
Start: 2024-06-24 | End: 2024-06-24 | Stop reason: HOSPADM

## 2024-06-24 RX ORDER — HYDROMORPHONE HCL IN WATER/PF 6 MG/30 ML
0.4 PATIENT CONTROLLED ANALGESIA SYRINGE INTRAVENOUS EVERY 5 MIN PRN
Status: DISCONTINUED | OUTPATIENT
Start: 2024-06-24 | End: 2024-06-24 | Stop reason: HOSPADM

## 2024-06-24 RX ORDER — SODIUM CHLORIDE, SODIUM LACTATE, POTASSIUM CHLORIDE, CALCIUM CHLORIDE 600; 310; 30; 20 MG/100ML; MG/100ML; MG/100ML; MG/100ML
INJECTION, SOLUTION INTRAVENOUS CONTINUOUS
Status: ACTIVE | OUTPATIENT
Start: 2024-06-24 | End: 2024-06-25

## 2024-06-24 RX ORDER — HEPARIN SODIUM 5000 [USP'U]/.5ML
5000 INJECTION, SOLUTION INTRAVENOUS; SUBCUTANEOUS EVERY 8 HOURS
Status: DISCONTINUED | OUTPATIENT
Start: 2024-06-25 | End: 2024-06-26 | Stop reason: HOSPADM

## 2024-06-24 RX ORDER — PROPOFOL 10 MG/ML
INJECTION, EMULSION INTRAVENOUS PRN
Status: DISCONTINUED | OUTPATIENT
Start: 2024-06-24 | End: 2024-06-24

## 2024-06-24 RX ORDER — ONDANSETRON 2 MG/ML
INJECTION INTRAMUSCULAR; INTRAVENOUS PRN
Status: DISCONTINUED | OUTPATIENT
Start: 2024-06-24 | End: 2024-06-24

## 2024-06-24 RX ORDER — FENTANYL CITRATE 50 UG/ML
50 INJECTION, SOLUTION INTRAMUSCULAR; INTRAVENOUS EVERY 5 MIN PRN
Status: DISCONTINUED | OUTPATIENT
Start: 2024-06-24 | End: 2024-06-24 | Stop reason: HOSPADM

## 2024-06-24 RX ORDER — ACETAMINOPHEN 325 MG/1
975 TABLET ORAL EVERY 8 HOURS
Status: DISCONTINUED | OUTPATIENT
Start: 2024-06-24 | End: 2024-06-26 | Stop reason: HOSPADM

## 2024-06-24 RX ORDER — CITALOPRAM HYDROBROMIDE 20 MG/1
20 TABLET ORAL DAILY
Status: DISCONTINUED | OUTPATIENT
Start: 2024-06-25 | End: 2024-06-26 | Stop reason: HOSPADM

## 2024-06-24 RX ORDER — LIDOCAINE HYDROCHLORIDE 20 MG/ML
INJECTION, SOLUTION INFILTRATION; PERINEURAL PRN
Status: DISCONTINUED | OUTPATIENT
Start: 2024-06-24 | End: 2024-06-24

## 2024-06-24 RX ORDER — ONDANSETRON 4 MG/1
4 TABLET, ORALLY DISINTEGRATING ORAL EVERY 30 MIN PRN
Status: DISCONTINUED | OUTPATIENT
Start: 2024-06-24 | End: 2024-06-24 | Stop reason: HOSPADM

## 2024-06-24 RX ORDER — SODIUM CHLORIDE, SODIUM LACTATE, POTASSIUM CHLORIDE, CALCIUM CHLORIDE 600; 310; 30; 20 MG/100ML; MG/100ML; MG/100ML; MG/100ML
INJECTION, SOLUTION INTRAVENOUS CONTINUOUS
Status: DISCONTINUED | OUTPATIENT
Start: 2024-06-24 | End: 2024-06-24 | Stop reason: HOSPADM

## 2024-06-24 RX ORDER — ONDANSETRON 2 MG/ML
4 INJECTION INTRAMUSCULAR; INTRAVENOUS EVERY 6 HOURS PRN
Status: DISCONTINUED | OUTPATIENT
Start: 2024-06-24 | End: 2024-06-26 | Stop reason: HOSPADM

## 2024-06-24 RX ORDER — HEPARIN SODIUM 5000 [USP'U]/.5ML
5000 INJECTION, SOLUTION INTRAVENOUS; SUBCUTANEOUS
Status: COMPLETED | OUTPATIENT
Start: 2024-06-24 | End: 2024-06-24

## 2024-06-24 RX ORDER — MAGNESIUM HYDROXIDE 1200 MG/15ML
LIQUID ORAL PRN
Status: DISCONTINUED | OUTPATIENT
Start: 2024-06-24 | End: 2024-06-24 | Stop reason: HOSPADM

## 2024-06-24 RX ORDER — OXYCODONE HYDROCHLORIDE 5 MG/1
10 TABLET ORAL EVERY 4 HOURS PRN
Status: DISCONTINUED | OUTPATIENT
Start: 2024-06-24 | End: 2024-06-26 | Stop reason: HOSPADM

## 2024-06-24 RX ORDER — HYDROMORPHONE HCL IN WATER/PF 6 MG/30 ML
0.4 PATIENT CONTROLLED ANALGESIA SYRINGE INTRAVENOUS
Status: DISCONTINUED | OUTPATIENT
Start: 2024-06-24 | End: 2024-06-26 | Stop reason: HOSPADM

## 2024-06-24 RX ORDER — OXYCODONE HYDROCHLORIDE 5 MG/1
5 TABLET ORAL EVERY 4 HOURS PRN
Status: DISCONTINUED | OUTPATIENT
Start: 2024-06-24 | End: 2024-06-26 | Stop reason: HOSPADM

## 2024-06-24 RX ORDER — PROPOFOL 10 MG/ML
INJECTION, EMULSION INTRAVENOUS CONTINUOUS PRN
Status: DISCONTINUED | OUTPATIENT
Start: 2024-06-24 | End: 2024-06-24

## 2024-06-24 RX ORDER — HYDRALAZINE HYDROCHLORIDE 20 MG/ML
2.5-5 INJECTION INTRAMUSCULAR; INTRAVENOUS EVERY 10 MIN PRN
Status: DISCONTINUED | OUTPATIENT
Start: 2024-06-24 | End: 2024-06-24 | Stop reason: HOSPADM

## 2024-06-24 RX ORDER — FENTANYL CITRATE 50 UG/ML
25 INJECTION, SOLUTION INTRAMUSCULAR; INTRAVENOUS EVERY 5 MIN PRN
Status: DISCONTINUED | OUTPATIENT
Start: 2024-06-24 | End: 2024-06-24 | Stop reason: HOSPADM

## 2024-06-24 RX ORDER — HYDROMORPHONE HCL IN WATER/PF 6 MG/30 ML
0.2 PATIENT CONTROLLED ANALGESIA SYRINGE INTRAVENOUS
Status: DISCONTINUED | OUTPATIENT
Start: 2024-06-24 | End: 2024-06-26 | Stop reason: HOSPADM

## 2024-06-24 RX ORDER — CEFAZOLIN SODIUM/WATER 2 G/20 ML
2 SYRINGE (ML) INTRAVENOUS
Status: COMPLETED | OUTPATIENT
Start: 2024-06-24 | End: 2024-06-24

## 2024-06-24 RX ORDER — LIDOCAINE 40 MG/G
CREAM TOPICAL
Status: DISCONTINUED | OUTPATIENT
Start: 2024-06-24 | End: 2024-06-26 | Stop reason: HOSPADM

## 2024-06-24 RX ORDER — DEXAMETHASONE SODIUM PHOSPHATE 4 MG/ML
4 INJECTION, SOLUTION INTRA-ARTICULAR; INTRALESIONAL; INTRAMUSCULAR; INTRAVENOUS; SOFT TISSUE
Status: DISCONTINUED | OUTPATIENT
Start: 2024-06-24 | End: 2024-06-24 | Stop reason: HOSPADM

## 2024-06-24 RX ORDER — ONDANSETRON 4 MG/1
4 TABLET, ORALLY DISINTEGRATING ORAL EVERY 6 HOURS PRN
Status: DISCONTINUED | OUTPATIENT
Start: 2024-06-24 | End: 2024-06-26 | Stop reason: HOSPADM

## 2024-06-24 RX ORDER — CEFAZOLIN SODIUM/WATER 2 G/20 ML
2 SYRINGE (ML) INTRAVENOUS SEE ADMIN INSTRUCTIONS
Status: DISCONTINUED | OUTPATIENT
Start: 2024-06-24 | End: 2024-06-24 | Stop reason: HOSPADM

## 2024-06-24 RX ORDER — NALOXONE HYDROCHLORIDE 0.4 MG/ML
0.1 INJECTION, SOLUTION INTRAMUSCULAR; INTRAVENOUS; SUBCUTANEOUS
Status: DISCONTINUED | OUTPATIENT
Start: 2024-06-24 | End: 2024-06-24 | Stop reason: HOSPADM

## 2024-06-24 RX ORDER — PROCHLORPERAZINE MALEATE 10 MG
10 TABLET ORAL EVERY 6 HOURS PRN
Status: DISCONTINUED | OUTPATIENT
Start: 2024-06-24 | End: 2024-06-26 | Stop reason: HOSPADM

## 2024-06-24 RX ADMIN — ROCURONIUM BROMIDE 20 MG: 50 INJECTION, SOLUTION INTRAVENOUS at 16:45

## 2024-06-24 RX ADMIN — Medication 2 G: at 15:26

## 2024-06-24 RX ADMIN — SODIUM CHLORIDE, POTASSIUM CHLORIDE, SODIUM LACTATE AND CALCIUM CHLORIDE: 600; 310; 30; 20 INJECTION, SOLUTION INTRAVENOUS at 21:57

## 2024-06-24 RX ADMIN — HYDROMORPHONE HYDROCHLORIDE 0.4 MG: 0.2 INJECTION, SOLUTION INTRAMUSCULAR; INTRAVENOUS; SUBCUTANEOUS at 20:05

## 2024-06-24 RX ADMIN — LIDOCAINE HYDROCHLORIDE 100 MG: 20 INJECTION, SOLUTION INFILTRATION; PERINEURAL at 15:32

## 2024-06-24 RX ADMIN — HYDROMORPHONE HYDROCHLORIDE 0.5 MG: 1 INJECTION, SOLUTION INTRAMUSCULAR; INTRAVENOUS; SUBCUTANEOUS at 16:19

## 2024-06-24 RX ADMIN — OXYCODONE HYDROCHLORIDE 10 MG: 5 TABLET ORAL at 23:37

## 2024-06-24 RX ADMIN — FENTANYL CITRATE 25 MCG: 50 INJECTION INTRAMUSCULAR; INTRAVENOUS at 15:32

## 2024-06-24 RX ADMIN — SODIUM CHLORIDE, POTASSIUM CHLORIDE, SODIUM LACTATE AND CALCIUM CHLORIDE: 600; 310; 30; 20 INJECTION, SOLUTION INTRAVENOUS at 13:37

## 2024-06-24 RX ADMIN — ROCURONIUM BROMIDE 20 MG: 50 INJECTION, SOLUTION INTRAVENOUS at 17:30

## 2024-06-24 RX ADMIN — ROCURONIUM BROMIDE 10 MG: 50 INJECTION, SOLUTION INTRAVENOUS at 18:45

## 2024-06-24 RX ADMIN — PREGABALIN 75 MG: 75 CAPSULE ORAL at 22:48

## 2024-06-24 RX ADMIN — PROPOFOL 50 MCG/KG/MIN: 10 INJECTION, EMULSION INTRAVENOUS at 15:50

## 2024-06-24 RX ADMIN — HEPARIN SODIUM 5000 UNITS: 5000 INJECTION, SOLUTION INTRAVENOUS; SUBCUTANEOUS at 13:59

## 2024-06-24 RX ADMIN — ROCURONIUM BROMIDE 50 MG: 50 INJECTION, SOLUTION INTRAVENOUS at 15:32

## 2024-06-24 RX ADMIN — PROPOFOL 200 MG: 10 INJECTION, EMULSION INTRAVENOUS at 15:32

## 2024-06-24 RX ADMIN — HYDROMORPHONE HYDROCHLORIDE 0.4 MG: 0.2 INJECTION, SOLUTION INTRAMUSCULAR; INTRAVENOUS; SUBCUTANEOUS at 20:17

## 2024-06-24 RX ADMIN — MIDAZOLAM 2 MG: 1 INJECTION INTRAMUSCULAR; INTRAVENOUS at 15:24

## 2024-06-24 RX ADMIN — SUGAMMADEX 200 MG: 100 INJECTION, SOLUTION INTRAVENOUS at 19:12

## 2024-06-24 RX ADMIN — ONDANSETRON 4 MG: 2 INJECTION INTRAMUSCULAR; INTRAVENOUS at 18:54

## 2024-06-24 RX ADMIN — SODIUM CHLORIDE, POTASSIUM CHLORIDE, SODIUM LACTATE AND CALCIUM CHLORIDE: 600; 310; 30; 20 INJECTION, SOLUTION INTRAVENOUS at 18:39

## 2024-06-24 RX ADMIN — FENTANYL CITRATE 75 MCG: 50 INJECTION INTRAMUSCULAR; INTRAVENOUS at 15:50

## 2024-06-24 RX ADMIN — HYDROMORPHONE HYDROCHLORIDE 0.2 MG: 0.2 INJECTION, SOLUTION INTRAMUSCULAR; INTRAVENOUS; SUBCUTANEOUS at 21:55

## 2024-06-24 RX ADMIN — PHENYLEPHRINE HYDROCHLORIDE 0.2 MCG/KG/MIN: 10 INJECTION INTRAVENOUS at 15:57

## 2024-06-24 RX ADMIN — ROCURONIUM BROMIDE 30 MG: 50 INJECTION, SOLUTION INTRAVENOUS at 17:01

## 2024-06-24 RX ADMIN — ROCURONIUM BROMIDE 30 MG: 50 INJECTION, SOLUTION INTRAVENOUS at 16:16

## 2024-06-24 RX ADMIN — PHENYLEPHRINE HYDROCHLORIDE 100 MCG: 10 INJECTION INTRAVENOUS at 15:42

## 2024-06-24 ASSESSMENT — ACTIVITIES OF DAILY LIVING (ADL)
ADLS_ACUITY_SCORE: 18

## 2024-06-24 NOTE — ANESTHESIA PROCEDURE NOTES
Airway       Patient location during procedure: OR       Procedure Start/Stop Times: 6/24/2024 3:34 PM  Staff -        Anesthesiologist:  Chad Chi MD       CRNA: Sumaya Monahan       Performed By: CRNA  Consent for Airway        Urgency: elective  Indications and Patient Condition       Indications for airway management: ilir-procedural       Induction type:intravenous       Mask difficulty assessment: 1 - vent by mask    Final Airway Details       Final airway type: endotracheal airway       Successful airway: ETT - single and Oral  Endotracheal Airway Details        ETT size (mm): 7.0       Cuffed: yes       Successful intubation technique: direct laryngoscopy       DL Blade Type: MAC 3       Grade View of Cords: 2       Adjucts: stylet       Position: Right       Measured from: gums/teeth       Secured at (cm): 19       Bite block used: None    Post intubation assessment        Placement verified by: capnometry, equal breath sounds and chest rise        Number of attempts at approach: 1       Number of other approaches attempted: 0       Secured with: tape       Ease of procedure: easy       Dentition: Intact and Unchanged    Medication(s) Administered   Medication Administration Time: 6/24/2024 3:34 PM

## 2024-06-24 NOTE — ANESTHESIA PREPROCEDURE EVALUATION
Anesthesia Pre-Procedure Evaluation    Patient: Lorin Andrade   MRN: [id number] : 1960        Procedure : Procedure(s):  ROBOT ASSISTED LAPAROSCOPIC LEFT DISTAL URETERECTOMY WITH URETERAL RE-IMPLANT, LEFT INGUINAL LYMPH NODE DISSECTION          Past Medical History:   Diagnosis Date    Cancer (H)     Sleep apnea       Past Surgical History:   Procedure Laterality Date    COLONOSCOPY N/A 2023    Procedure: Colonoscopy;  Surgeon: Ana Paula Hurst MD;  Location:  GI    COMBINED CYSTOSCOPY, RETROGRADES, URETEROSCOPY, INSERT STENT Left 5/15/2024    Procedure: CYSTOSCOPY, LEFT URETERAL STENT REMOVAL,  LEFT RETROGRADE PYELOGRAM, LEFT URETEROSCOPY WITH BIOPSY AND URETERAL BRUSHING, LEFT URETERAL STENT PLACEMENT;  Surgeon: Rogelio Gallagher MD;  Location: [facility]    CYSTOSCOPY, URETEROSCOPY, INSERT STENT Left 2024    Procedure: CYSTOURETEROSCOPY, LEFT URETERAL BIOPSY WITH STENT INSERTION, LEFT RETROGRADE PYELOGRAM;  Surgeon: Rogelio Gallagher MD;  Location:  OR    MASTECTOMY, BILATERAL      Double mastectomy      Allergies   Allergen Reactions    Animal Dander Shortness Of Breath    Gabapentin Other (See Comments)     Tolerating pregabalin well      Social History     Tobacco Use    Smoking status: Never    Smokeless tobacco: Never   Substance Use Topics    Alcohol use: Yes     Comment: 1-2 times a month      Wt Readings from Last 1 Encounters:   24 60.1 kg (132 lb 8 oz)        Anesthesia Evaluation            ROS/MED HX  ENT/Pulmonary:     (+) sleep apnea,                                       Neurologic:     (+)    peripheral neuropathy,                            Cardiovascular:       METS/Exercise Tolerance:     Hematologic:       Musculoskeletal:       GI/Hepatic:       Renal/Genitourinary:     (+) renal disease,             Endo:       Psychiatric/Substance Use:     (+) psychiatric history anxiety       Infectious Disease:       Malignancy:   (+) Malignancy, History of Breast.    Other:       "         OUTSIDE LABS:  CBC:   Lab Results   Component Value Date    WBC 4.2 06/11/2024    WBC 5.4 05/01/2024    HGB 11.5 (L) 06/11/2024    HGB 11.4 (L) 05/02/2024    HCT 36.2 06/11/2024    HCT 36.6 05/01/2024     06/11/2024     05/01/2024     BMP:   Lab Results   Component Value Date     06/11/2024     05/01/2024    POTASSIUM 4.7 06/11/2024    POTASSIUM 4.1 05/01/2024    CHLORIDE 102 06/11/2024    CHLORIDE 106 05/01/2024    CO2 30 (H) 06/11/2024    CO2 25 05/01/2024    BUN 15.7 06/11/2024    BUN 15.5 05/01/2024    CR 0.74 06/11/2024    CR 0.73 05/01/2024    GLC 81 06/11/2024    GLC 88 05/01/2024     COAGS: No results found for: \"PTT\", \"INR\", \"FIBR\"  POC: No results found for: \"BGM\", \"HCG\", \"HCGS\"  HEPATIC:   Lab Results   Component Value Date    ALBUMIN 4.3 04/30/2024    PROTTOTAL 6.6 04/30/2024    ALT 13 04/30/2024    AST 18 04/30/2024    ALKPHOS 65 04/30/2024    BILITOTAL 0.3 04/30/2024     OTHER:   Lab Results   Component Value Date    JULIETTE 9.8 06/11/2024       Anesthesia Plan    ASA Status:  2                     Consents            Postoperative Care            Comments:               Samuel Vyas MD    I have reviewed the pertinent notes and labs in the chart from the past 30 days and (re)examined the patient.  Any updates or changes from those notes are reflected in this note.                  "

## 2024-06-25 LAB
ANION GAP SERPL CALCULATED.3IONS-SCNC: 7 MMOL/L (ref 7–15)
BASOPHILS # BLD AUTO: 0 10E3/UL (ref 0–0.2)
BASOPHILS NFR BLD AUTO: 0 %
BUN SERPL-MCNC: 10.7 MG/DL (ref 8–23)
CALCIUM SERPL-MCNC: 8.4 MG/DL (ref 8.8–10.2)
CHLORIDE SERPL-SCNC: 103 MMOL/L (ref 98–107)
CREAT FLD-MCNC: 27.2 MG/DL
CREAT SERPL-MCNC: 0.73 MG/DL (ref 0.51–0.95)
CREATININE BODY FLUID SOURCE: NORMAL
DEPRECATED HCO3 PLAS-SCNC: 28 MMOL/L (ref 22–29)
EGFRCR SERPLBLD CKD-EPI 2021: >90 ML/MIN/1.73M2
EOSINOPHIL # BLD AUTO: 0 10E3/UL (ref 0–0.7)
EOSINOPHIL NFR BLD AUTO: 0 %
ERYTHROCYTE [DISTWIDTH] IN BLOOD BY AUTOMATED COUNT: 12.4 % (ref 10–15)
GLUCOSE SERPL-MCNC: 112 MG/DL (ref 70–99)
HCT VFR BLD AUTO: 29.5 % (ref 35–47)
HGB BLD-MCNC: 9.5 G/DL (ref 11.7–15.7)
IMM GRANULOCYTES # BLD: 0 10E3/UL
IMM GRANULOCYTES NFR BLD: 0 %
LYMPHOCYTES # BLD AUTO: 0.6 10E3/UL (ref 0.8–5.3)
LYMPHOCYTES NFR BLD AUTO: 6 %
MCH RBC QN AUTO: 31.1 PG (ref 26.5–33)
MCHC RBC AUTO-ENTMCNC: 32.2 G/DL (ref 31.5–36.5)
MCV RBC AUTO: 97 FL (ref 78–100)
MONOCYTES # BLD AUTO: 0.6 10E3/UL (ref 0–1.3)
MONOCYTES NFR BLD AUTO: 6 %
NEUTROPHILS # BLD AUTO: 8.7 10E3/UL (ref 1.6–8.3)
NEUTROPHILS NFR BLD AUTO: 88 %
NRBC # BLD AUTO: 0 10E3/UL
NRBC BLD AUTO-RTO: 0 /100
PLATELET # BLD AUTO: 163 10E3/UL (ref 150–450)
POTASSIUM SERPL-SCNC: 4.4 MMOL/L (ref 3.4–5.3)
RBC # BLD AUTO: 3.05 10E6/UL (ref 3.8–5.2)
SODIUM SERPL-SCNC: 138 MMOL/L (ref 135–145)
WBC # BLD AUTO: 9.9 10E3/UL (ref 4–11)

## 2024-06-25 PROCEDURE — 85025 COMPLETE CBC W/AUTO DIFF WBC: CPT | Performed by: STUDENT IN AN ORGANIZED HEALTH CARE EDUCATION/TRAINING PROGRAM

## 2024-06-25 PROCEDURE — 258N000003 HC RX IP 258 OP 636: Performed by: STUDENT IN AN ORGANIZED HEALTH CARE EDUCATION/TRAINING PROGRAM

## 2024-06-25 PROCEDURE — 96376 TX/PRO/DX INJ SAME DRUG ADON: CPT

## 2024-06-25 PROCEDURE — 250N000011 HC RX IP 250 OP 636: Mod: JZ | Performed by: STUDENT IN AN ORGANIZED HEALTH CARE EDUCATION/TRAINING PROGRAM

## 2024-06-25 PROCEDURE — 96361 HYDRATE IV INFUSION ADD-ON: CPT

## 2024-06-25 PROCEDURE — 82570 ASSAY OF URINE CREATININE: CPT | Performed by: STUDENT IN AN ORGANIZED HEALTH CARE EDUCATION/TRAINING PROGRAM

## 2024-06-25 PROCEDURE — 36415 COLL VENOUS BLD VENIPUNCTURE: CPT | Performed by: STUDENT IN AN ORGANIZED HEALTH CARE EDUCATION/TRAINING PROGRAM

## 2024-06-25 PROCEDURE — 96374 THER/PROPH/DIAG INJ IV PUSH: CPT

## 2024-06-25 PROCEDURE — 80048 BASIC METABOLIC PNL TOTAL CA: CPT | Performed by: STUDENT IN AN ORGANIZED HEALTH CARE EDUCATION/TRAINING PROGRAM

## 2024-06-25 PROCEDURE — 250N000013 HC RX MED GY IP 250 OP 250 PS 637: Performed by: STUDENT IN AN ORGANIZED HEALTH CARE EDUCATION/TRAINING PROGRAM

## 2024-06-25 PROCEDURE — 96372 THER/PROPH/DIAG INJ SC/IM: CPT | Mod: 59 | Performed by: STUDENT IN AN ORGANIZED HEALTH CARE EDUCATION/TRAINING PROGRAM

## 2024-06-25 RX ORDER — KETOROLAC TROMETHAMINE 15 MG/ML
15 INJECTION, SOLUTION INTRAMUSCULAR; INTRAVENOUS EVERY 6 HOURS PRN
Status: DISCONTINUED | OUTPATIENT
Start: 2024-06-25 | End: 2024-06-26 | Stop reason: HOSPADM

## 2024-06-25 RX ORDER — SODIUM CHLORIDE, SODIUM LACTATE, POTASSIUM CHLORIDE, CALCIUM CHLORIDE 600; 310; 30; 20 MG/100ML; MG/100ML; MG/100ML; MG/100ML
INJECTION, SOLUTION INTRAVENOUS CONTINUOUS
Status: ACTIVE | OUTPATIENT
Start: 2024-06-25 | End: 2024-06-25

## 2024-06-25 RX ORDER — BISACODYL 10 MG
10 SUPPOSITORY, RECTAL RECTAL DAILY PRN
Status: DISCONTINUED | OUTPATIENT
Start: 2024-06-25 | End: 2024-06-26 | Stop reason: HOSPADM

## 2024-06-25 RX ORDER — POLYETHYLENE GLYCOL 3350 17 G/17G
17 POWDER, FOR SOLUTION ORAL 2 TIMES DAILY
Status: DISCONTINUED | OUTPATIENT
Start: 2024-06-25 | End: 2024-06-26 | Stop reason: HOSPADM

## 2024-06-25 RX ORDER — CIPROFLOXACIN 500 MG/1
500 TABLET, FILM COATED ORAL 2 TIMES DAILY
Qty: 6 TABLET | Refills: 0 | Status: SHIPPED | OUTPATIENT
Start: 2024-06-25 | End: 2024-06-28

## 2024-06-25 RX ORDER — TAMSULOSIN HYDROCHLORIDE 0.4 MG/1
0.4 CAPSULE ORAL DAILY
Qty: 30 CAPSULE | Refills: 1 | Status: SHIPPED | OUTPATIENT
Start: 2024-06-25

## 2024-06-25 RX ORDER — ASPIRIN 81 MG
100 TABLET, DELAYED RELEASE (ENTERIC COATED) ORAL DAILY
Qty: 60 TABLET | Refills: 1 | Status: SHIPPED | OUTPATIENT
Start: 2024-06-25

## 2024-06-25 RX ORDER — OXYBUTYNIN CHLORIDE 5 MG/1
5 TABLET, EXTENDED RELEASE ORAL AT BEDTIME
Status: DISCONTINUED | OUTPATIENT
Start: 2024-06-25 | End: 2024-06-26 | Stop reason: HOSPADM

## 2024-06-25 RX ORDER — OXYCODONE HYDROCHLORIDE 5 MG/1
5 TABLET ORAL EVERY 6 HOURS PRN
Qty: 9 TABLET | Refills: 0 | Status: SHIPPED | OUTPATIENT
Start: 2024-06-25 | End: 2024-06-28

## 2024-06-25 RX ORDER — SODIUM CHLORIDE 9 MG/ML
INJECTION, SOLUTION INTRAVENOUS CONTINUOUS
Status: ACTIVE | OUTPATIENT
Start: 2024-06-25 | End: 2024-06-25

## 2024-06-25 RX ORDER — OXYBUTYNIN CHLORIDE 5 MG/1
5 TABLET, EXTENDED RELEASE ORAL DAILY
Qty: 30 TABLET | Refills: 1 | Status: SHIPPED | OUTPATIENT
Start: 2024-06-25

## 2024-06-25 RX ADMIN — SODIUM CHLORIDE: 9 INJECTION, SOLUTION INTRAVENOUS at 09:38

## 2024-06-25 RX ADMIN — SODIUM CHLORIDE, POTASSIUM CHLORIDE, SODIUM LACTATE AND CALCIUM CHLORIDE: 600; 310; 30; 20 INJECTION, SOLUTION INTRAVENOUS at 15:00

## 2024-06-25 RX ADMIN — OXYBUTYNIN CHLORIDE 5 MG: 5 TABLET, EXTENDED RELEASE ORAL at 22:22

## 2024-06-25 RX ADMIN — MAGNESIUM HYDROXIDE 30 ML: 400 SUSPENSION ORAL at 22:22

## 2024-06-25 RX ADMIN — CITALOPRAM HYDROBROMIDE 20 MG: 20 TABLET ORAL at 20:34

## 2024-06-25 RX ADMIN — HYDROMORPHONE HYDROCHLORIDE 0.2 MG: 0.2 INJECTION, SOLUTION INTRAMUSCULAR; INTRAVENOUS; SUBCUTANEOUS at 03:12

## 2024-06-25 RX ADMIN — HEPARIN SODIUM 5000 UNITS: 5000 INJECTION, SOLUTION INTRAVENOUS; SUBCUTANEOUS at 09:19

## 2024-06-25 RX ADMIN — OXYCODONE HYDROCHLORIDE 5 MG: 5 TABLET ORAL at 06:14

## 2024-06-25 RX ADMIN — SODIUM CHLORIDE, POTASSIUM CHLORIDE, SODIUM LACTATE AND CALCIUM CHLORIDE: 600; 310; 30; 20 INJECTION, SOLUTION INTRAVENOUS at 04:14

## 2024-06-25 RX ADMIN — PREGABALIN 75 MG: 75 CAPSULE ORAL at 22:23

## 2024-06-25 RX ADMIN — OXYCODONE HYDROCHLORIDE 5 MG: 5 TABLET ORAL at 20:33

## 2024-06-25 RX ADMIN — OXYCODONE HYDROCHLORIDE 5 MG: 5 TABLET ORAL at 16:35

## 2024-06-25 RX ADMIN — OXYBUTYNIN CHLORIDE 5 MG: 5 TABLET, EXTENDED RELEASE ORAL at 09:15

## 2024-06-25 RX ADMIN — KETOROLAC TROMETHAMINE 15 MG: 15 INJECTION, SOLUTION INTRAMUSCULAR; INTRAVENOUS at 19:00

## 2024-06-25 RX ADMIN — CITALOPRAM HYDROBROMIDE 10 MG: 10 TABLET ORAL at 20:35

## 2024-06-25 RX ADMIN — ACETAMINOPHEN 975 MG: 325 TABLET, FILM COATED ORAL at 22:22

## 2024-06-25 RX ADMIN — KETOROLAC TROMETHAMINE 15 MG: 15 INJECTION, SOLUTION INTRAMUSCULAR; INTRAVENOUS at 09:10

## 2024-06-25 RX ADMIN — OXYCODONE HYDROCHLORIDE 10 MG: 5 TABLET ORAL at 10:40

## 2024-06-25 RX ADMIN — POLYETHYLENE GLYCOL 3350 17 G: 17 POWDER, FOR SOLUTION ORAL at 10:41

## 2024-06-25 RX ADMIN — HEPARIN SODIUM 5000 UNITS: 5000 INJECTION, SOLUTION INTRAVENOUS; SUBCUTANEOUS at 16:36

## 2024-06-25 RX ADMIN — PREGABALIN 75 MG: 75 CAPSULE ORAL at 09:16

## 2024-06-25 RX ADMIN — POLYETHYLENE GLYCOL 3350 17 G: 17 POWDER, FOR SOLUTION ORAL at 22:22

## 2024-06-25 ASSESSMENT — ACTIVITIES OF DAILY LIVING (ADL)
ADLS_ACUITY_SCORE: 20
ADLS_ACUITY_SCORE: 18
ADLS_ACUITY_SCORE: 20
ADLS_ACUITY_SCORE: 18
ADLS_ACUITY_SCORE: 28
ADLS_ACUITY_SCORE: 20
ADLS_ACUITY_SCORE: 20
ADLS_ACUITY_SCORE: 30
ADLS_ACUITY_SCORE: 30
ADLS_ACUITY_SCORE: 18
ADLS_ACUITY_SCORE: 30
ADLS_ACUITY_SCORE: 30
ADLS_ACUITY_SCORE: 28
ADLS_ACUITY_SCORE: 18
ADLS_ACUITY_SCORE: 30
ADLS_ACUITY_SCORE: 20
ADLS_ACUITY_SCORE: 20
ADLS_ACUITY_SCORE: 30
ADLS_ACUITY_SCORE: 20

## 2024-06-25 NOTE — DISCHARGE INSTRUCTIONS
POSTOPERATIVE INSTRUCTIONS    Diagnosis-------------------------------   LEFT ureteral lesion    Procedure-------------------------------  Procedure(s) (LRB):  ROBOT ASSISTED LAPAROSCOPIC LEFT DISTAL URETERECTOMY WITH URETERAL RE-IMPLANT, LEFT PELVIC LYMPH NODE DISSECTION, LYSIS OF ADHESIONS (Left)      Findings--------------------------------  LEFT distal ureter removed with negative margins and re-attached to the bladder    Home-going instructions-----------------         Activity Limitation:     - No driving or operating heavy machinery while on narcotic pain medication.   - No strenuous exercise for 6 weeks.   - No lifting, pushing, pulling more than 10 pounds for 6 weeks. Take care when pushing with your arms to stand up.   - Do not strain your belly area. When you bend, sit up or twice, you could strain the area around your incision.   - Do not strain with bowel movements.   - Do not drive until you can press the brake pedal quickly and fully without pain.   - Do not operate a motor vehicle while taking narcotic pain medications    FOLLOW THESE INSTRUCTIONS AS INDICATED BELOW:  - Observe operative area for signs of excessive bleeding.  - You may shower.  - Increase fluid intake to promote clear urine.  - Resume usual diet as tolerated    What to expect while recovering----------- TUBES AND DRAINS:  1) Ayala Catheter: You are going home with a Ayala catheter which will remain in place until your follow-up appointment. Your nurse or  will provide written catheter care instructions for you to take home.   - Protect the catheter and treat it like an extension of your body - keep it secured to your leg and do not let it get caught, snagged, or tugged.   - Should the catheter somehow come out of position, do not let anyone but a urologist who is aware of your recent surgery try to replace a catheter. Best yet, contact our urology office right away with any concerns.       What to expect while  "recovering----------- WOUND CARE:  - You may shower and get incisions wet starting 48 hrs after surgery.  - Do not scrub incisions or submerge wounds (aka, bath, pool, hot tub, etc.) for 2 weeks or until wounds have healed and catheter is removed.   - Remove wound dressing 48 hours after surgery if already not removed.   - If purple dermabond glue was used, avoid applying any lotions or ointments.   - Leave incision open to air. Cover with gauze only if needed for comfort or to protect clothing from drainage.    Discharge Medications/instructions:   1) PAIN: Oxycodone is a narcotic medication that has been prescribed for pain. Narcotics will cause sleepiness and constipation, therefore it is best to stop or reduce them as soon as you can and switch to using acetaminophen (Tylenol) and/or ibuprofen (Advil/Motrin), taken as directed on the packaging. Keep in mind that certain narcotics can contain acetaminophen, also called \"APAP\" on prescription bottles. Do not take more than 4,000mg of Tylenol (acetaminophen/ APAP) from all sources in any 24 hour period since this can cause liver damage. Never drive, operate machinery or drink alcoholic beverages while you are taking narcotic pain medications.     2) CONSTIPATION: Pericolace (senna/docusate sodium) can be taken twice daily for prevention of constipation since surgery, pain medications and bladder spasm medications can all make you constipated. Please reduce or stop pericolace if you develop loose stools. Other over the counter solutions such as prune juice, miralax, fiber products, senna, and dulcolax can also be used. If you are taking the pericolace but still have not had a bowel movement in 3 days, start over-the-counter Milk of Magnesia taken twice daily until you have a nice bowel movement. Call the office with any concerns.     3) ANTIBIOTICS - Ciprofloxacin 500mg should be taken started the day prior to your your followup appointment to remove your Ayala " catheter and continued for 3 days.    4) Ditropan: Take daily to decrease catheter and ureteral stent discomfort. Take until ureteral stent is removed    5) Tamsulosin 0.4mg (Flomax): take daily to decrease ureteral stent discomfort until ureteral stent is removed      Questions/concerns------------------------  [facility]: [phone]    Future appointments  You are scheduled for follow up for dominguez removal in 2 weeks on 7/10/2024.    Rogelio Gallagher MD

## 2024-06-25 NOTE — ANESTHESIA CARE TRANSFER NOTE
Patient: Lorin Andrade    Procedure: Procedure(s):  ROBOT ASSISTED LAPAROSCOPIC LEFT DISTAL URETERECTOMY WITH URETERAL RE-IMPLANT, LEFT PELVIC LYMPH NODE DISSECTION, LYSIS OF ADHESIONS       Diagnosis: Hydronephrosis of left kidney [N13.30]  Lesion of left native ureter [N28.9]  Obstruction of left ureter [N13.5]  Diagnosis Additional Information: No value filed.    Anesthesia Type:   No value filed.     Note:    Oropharynx: oropharynx clear of all foreign objects  Level of Consciousness: drowsy  Oxygen Supplementation: face mask    Independent Airway: airway patency satisfactory and stable  Dentition: dentition unchanged  Vital Signs Stable: post-procedure vital signs reviewed and stable  Report to RN Given: handoff report given  Patient transferred to: PACU    Handoff Report: Identifed the Patient, Identified the Reponsible Provider, Reviewed the pertinent medical history, Discussed the surgical course, Reviewed Intra-OP anesthesia mangement and issues during anesthesia, Set expectations for post-procedure period and Allowed opportunity for questions and acknowledgement of understanding      Vitals:  Vitals Value Taken Time   BP     Temp     Pulse     Resp 42 06/24/24 1927   SpO2     Vitals shown include unfiled device data.    Electronically Signed By: Sumaya Monahan  June 24, 2024  7:28 PM

## 2024-06-25 NOTE — ANESTHESIA CARE TRANSFER NOTE
Patient: Lorin Andrade    Procedure: Procedure(s):  ROBOT ASSISTED LAPAROSCOPIC LEFT DISTAL URETERECTOMY WITH URETERAL RE-IMPLANT, LEFT PELVIC LYMPH NODE DISSECTION, LYSIS OF ADHESIONS       Diagnosis: Hydronephrosis of left kidney [N13.30]  Lesion of left native ureter [N28.9]  Obstruction of left ureter [N13.5]  Diagnosis Additional Information: No value filed.    Anesthesia Type:   No value filed.     Note:    Oropharynx: oropharynx clear of all foreign objects and spontaneously breathing  Level of Consciousness: awake  Oxygen Supplementation: room air    Independent Airway: airway patency satisfactory and stable  Dentition: dentition unchanged  Vital Signs Stable: post-procedure vital signs reviewed and stable  Report to RN Given: handoff report given  Patient transferred to: PACU    Handoff Report: Identifed the Patient, Identified the Reponsible Provider, Reviewed the pertinent medical history, Discussed the surgical course, Reviewed Intra-OP anesthesia mangement and issues during anesthesia, Set expectations for post-procedure period and Allowed opportunity for questions and acknowledgement of understanding      Vitals:  Vitals Value Taken Time   /57 06/24/24 1926   Temp     Pulse 80 06/24/24 1929   Resp 31 06/24/24 1929   SpO2 98 % 06/24/24 1929   Vitals shown include unfiled device data.    Electronically Signed By: Sumaya Monahan  June 24, 2024  7:30 PM

## 2024-06-25 NOTE — PLAN OF CARE
Goal Outcome Evaluation:       Patient VSS are at baseline: Yes    Patient able to ambulate as they were prior to admission or with assist devices provided by therapies during their stay: Yes    Patient able to tolerate oral intake and maintain hydration status: Yes    Patient has adequate pain control using oral analgesics: No    Patient must be voiding adequate amounts: NA - dominguez     Hypercapnia, Hypoventilation or hypoxia resolved for at least 2 hours without supplemental oxygen: Yes    Deficits in sensation, mobility or coordination have resolved if spinal or regional anesthesia used: Yes    Patient has returned to baseline mental status: Yes

## 2024-06-25 NOTE — PROGRESS NOTES
"Urology  Progress Note    No acute events overnight  Endorsing pain    Exam  BP 94/43 (BP Location: Right arm)   Pulse 81   Temp 98.6  F (37  C) (Oral)   Resp 16   Ht 1.651 m (5' 5\")   Wt 60.1 kg (132 lb 8 oz)   SpO2 98%   BMI 22.05 kg/m    No acute distress  Unlabored breathing  Abdomen soft, nontender, nondistended. Incisions c/d/i  VISH serosanguinous  Dominguez with clear urine in tubing    UOP 1150  VISH 135    Labs  WBC 10  Hgb 9.5  Cr 0.73    KUB reviewed - stent in good position on left    Assessment/Plan  63 year old y/o female POD#1 s/p left distal ureterectomy and reimplant (robotic) for distal ureteral mass.     Neuro: scheduled tylenol, PRN oxy/dilaudid, add PRN toradol and scheduled ditropan for pain control  CV: no acute concerns  Pulm: incentive spirometry while awake  FEN/GI: advance diet as tolerated, MIVF @ 75/hr  Endo:no acute concerns  :  dominguez to remain on discharge x 1-2 weeks, please provide teaching/supplies. Left stent to remain x 4 weeks. VISH will be removed prior to discharge  Heme/ID: s/p perioperative abx; SQH while in house  Activity: as tolerated  PPx: SCDs    Will discuss with Dr. Gallagher.    Yoli Calles MD, PGY-5  Urology Resident    "

## 2024-06-25 NOTE — ANESTHESIA POSTPROCEDURE EVALUATION
Patient: Lorin Andrade    Procedure: Procedure(s):  ROBOT ASSISTED LAPAROSCOPIC LEFT DISTAL URETERECTOMY WITH URETERAL RE-IMPLANT, LEFT PELVIC LYMPH NODE DISSECTION, LYSIS OF ADHESIONS       Anesthesia Type:  No value filed.    Note:     Postop Pain Control: Uneventful            Sign Out: Well controlled pain   PONV: No   Neuro/Psych: Uneventful            Sign Out: Acceptable/Baseline neuro status   Airway/Respiratory: Uneventful            Sign Out: Acceptable/Baseline resp. status   CV/Hemodynamics: Uneventful            Sign Out: Acceptable CV status; No obvious hypovolemia; No obvious fluid overload   Other NRE: NONE   DID A NON-ROUTINE EVENT OCCUR? No           Last vitals:  Vitals Value Taken Time   BP 99/49 06/24/24 2045   Temp 36.4  C (97.6  F) 06/24/24 2030   Pulse 97 06/24/24 2054   Resp 32 06/24/24 2054   SpO2 99 % 06/24/24 2054   Vitals shown include unfiled device data.    Electronically Signed By: Tammi Maxwell MD  June 24, 2024  8:58 PM

## 2024-06-25 NOTE — OP NOTE
OPERATIVE REPORT  DATE OF SURGERY: 06/24/24  LOCATION OF SURGERY: [facility]  PREOPERATIVE DIAGNOSIS:  (N28.9) Lesion of left native ureter  (primary encounter diagnosis)  (N13.30) Hydronephrosis of left kidney  (R89.6) Abnormal bladder cytology  POSTOPERATIVE DIAGNOSIS: (N28.9) Lesion of left native ureter  (primary encounter diagnosis)  (N13.30) Hydronephrosis of left kidney  (R89.6) Abnormal bladder cytology     START TIME: 3:53 PM  END TIME: 7:07 PM    PROCEDURE PERFORMED:   ROBOTIC ASSISTED LAPAROSCOPIC DISTAL URETERECTOMY - LEFT  ROBOTIC ASSISTED LAPAROSCOPIC LEFT PELVIC LYMPH NODE DISSECTION  ROBOTIC ASSISTED LAPAROSCOPIC BOARI FLAP AND URETERAL REIMPLANT  ROBOTIC ASSISTED LAPAROSCOPIC LYSIS OF INTRA-ABDOMINAL ADHESIONS     STAFF SURGEON: Rogelio Gallagher MD  RESIDENT SURGEON: Yoli Calles MD  ANESTHESIA: General.   ESTIMATED BLOOD LOSS: 25 mL.   DRAINS AND TUBES: LEFT 6fr x 24cm ureteral stent, 16fr dominguez catheter  COMPLICATIONS: None.   DISPOSITION: PACU.   SPECIMENS OBTAINED:   ID Type Source Tests Collected by Time Destination   1 : left distal ureter, proximal margins for frozen Tissue Ureter, Left SURGICAL PATHOLOGY EXAM Rogelio Gallagher MD 6/24/2024  5:02 PM    2 : left pelvic lymph nodes Tissue Lymph Node(s), Pelvis, Left SURGICAL PATHOLOGY EXAM Rogelio Gallagher MD 6/24/2024  5:23 PM    3 : left distal ureter Tissue Ureter, Left SURGICAL PATHOLOGY EXAM Rogelio Gallagher MD 6/24/2024  6:58 PM       SIGNIFICANT FINDINGS: Significant adhesions of the redundant sigmoid colon secondary to diverticulosis requiring robotic lysis of adhesions.  Left distal ureterectomy with negative margin on a frozen section and watertight bladder closure.  Ureteral reimplant with a Boari flap.     HISTORY OF PRESENT ILLNESS: Lorin Andrade is a 63 year old female who presented with left-sided hydroureteronephrosis and concern for urothelial malignancy with positive cytology.  Ureteroscopic evaluation with concern for  urothelial malignancy of the left distal ureter visually, however no positive pathology sample was able to be obtained.  She was counseled on treatment options elects proceed with the above surgery.    OPERATION PERFORMED:   Informed consent was obtained and the patient was brought to the operating room where general anesthesia was induced. The patient was given appropriate preoperative antibiotics and positioned supine. We then performed a timeout, verifying the correct patient's site and procedure to be performed.    Port placement was performed in the usual fashion includin mm supraumbilical camera port via Veress needle; Two left lateral robotic ports 8mm under direct vision; One right lateral robotic port under direct vision; 12mm right lateral assist port under direct vision. The robot was then docked. Sigmoid adhesions were taken down from the left pelvic side wall and inguinal area and this was quite extensive given diverticulosis and required 15 minutes of surgical time.  The left ureter was then identified and noted to have fairly significant edema and inflammation secondary to her ureteral stent.  The distal ureter was traced down to the level of the bladder.  The bladder cuff was developed.  The ureter was mobilized cephalad and transected just below the iliac vessels.  The previously placed ureteral stent was removed in entirety.  A frozen sample was taken from the proximal end of our final sample for a frozen section which ultimately resulted as negative for malignancy.  Sample was placed in an Endo Catch bag.  The cystotomy was closed with 2 layers of 3-0 Vicryl with no evidence of urine leak.        The left pelvic lymph nodes were dissected in the usual fashion with electrocautery . The boundaries of the dissection included the bifurcation of the common iliac vein to the external iliac vein posteriorly, pelvic side wall laterally and anteriorly, and the obturator nerve inferiorly. The specimen  was removed with a laparoscopic spoon.    Attention was then turned to the ureteral reimplant and the bladder was mobilized off the anterior wall developing the space of Retzius and fully mobilizing the right aspect of the bladder.  He did not need to divide the right pedicle of the bladder.  To allow for a tension-free anastomosis and this was most optimally done with a small Boari flap.  The bladder was distended and the apex of the Femi flap was incised.  3-0 Vicryl suture was then placed in the Boari flap was fully developed.  The ureter was spatulated posteriorly and secured at the apex with the 3-0 Vicryl.  The lateral aspects of the anastomosis were then completed with 4-0 Vicryl.  Prior to completion a 6 Portuguese by 24 cm JJ ureteral stent was advanced over the wire to the 20 cm luigi and the distal end placed within the bladder.  The remaining Boari flap defect was closed with 3 oh V-Loc incorporating the distal aspect of the ureter.  Bladder was filled with no clear evidence of a bladder leak.      VISH was placed in the left lateral pelvic gutter and brought out through the lateral port site. The assistant port was closed with a Johny-Plata device and an 0 Vicryl tie. The remaining ports were removed under direct vision with no bleeding noted from the abdominal wall, and the abdomen was desufflated.  The specimen was removed through the assistant port site following port removal.  All port sites were irrigated with normal saline. 30 cc marcaine was injected for incisional analgesia. The skin was closed with 4-0 monocyl in a running subcuticular fashion and skin glue was applied.      At the completion of the procedure, all surgical counts were correct.       The patient tolerated the procedure well, and he was awakened from anesthesia, extubated and transferred to the PACU in stable condition.      Rogelio Gallagher MD   Urology  [facility]  Clinic Phone [phone]

## 2024-06-26 VITALS
WEIGHT: 132.5 LBS | RESPIRATION RATE: 18 BRPM | SYSTOLIC BLOOD PRESSURE: 91 MMHG | HEIGHT: 65 IN | DIASTOLIC BLOOD PRESSURE: 50 MMHG | HEART RATE: 81 BPM | OXYGEN SATURATION: 92 % | TEMPERATURE: 98.3 F | BODY MASS INDEX: 22.08 KG/M2

## 2024-06-26 LAB
ANION GAP SERPL CALCULATED.3IONS-SCNC: 2 MMOL/L (ref 7–15)
BASOPHILS # BLD AUTO: 0 10E3/UL (ref 0–0.2)
BASOPHILS NFR BLD AUTO: 0 %
BUN SERPL-MCNC: 12.4 MG/DL (ref 8–23)
CALCIUM SERPL-MCNC: 8.3 MG/DL (ref 8.8–10.2)
CHLORIDE SERPL-SCNC: 102 MMOL/L (ref 98–107)
CREAT FLD-MCNC: 0.8 MG/DL
CREAT SERPL-MCNC: 0.89 MG/DL (ref 0.51–0.95)
CREATININE BODY FLUID SOURCE: NORMAL
DEPRECATED HCO3 PLAS-SCNC: 32 MMOL/L (ref 22–29)
EGFRCR SERPLBLD CKD-EPI 2021: 73 ML/MIN/1.73M2
EOSINOPHIL # BLD AUTO: 0.1 10E3/UL (ref 0–0.7)
EOSINOPHIL NFR BLD AUTO: 1 %
ERYTHROCYTE [DISTWIDTH] IN BLOOD BY AUTOMATED COUNT: 12.5 % (ref 10–15)
GLUCOSE SERPL-MCNC: 95 MG/DL (ref 70–99)
HCT VFR BLD AUTO: 27.9 % (ref 35–47)
HGB BLD-MCNC: 8.8 G/DL (ref 11.7–15.7)
IMM GRANULOCYTES # BLD: 0 10E3/UL
IMM GRANULOCYTES NFR BLD: 0 %
LYMPHOCYTES # BLD AUTO: 1.5 10E3/UL (ref 0.8–5.3)
LYMPHOCYTES NFR BLD AUTO: 21 %
MCH RBC QN AUTO: 30.6 PG (ref 26.5–33)
MCHC RBC AUTO-ENTMCNC: 31.5 G/DL (ref 31.5–36.5)
MCV RBC AUTO: 97 FL (ref 78–100)
MONOCYTES # BLD AUTO: 0.6 10E3/UL (ref 0–1.3)
MONOCYTES NFR BLD AUTO: 9 %
NEUTROPHILS # BLD AUTO: 4.7 10E3/UL (ref 1.6–8.3)
NEUTROPHILS NFR BLD AUTO: 68 %
NRBC # BLD AUTO: 0 10E3/UL
NRBC BLD AUTO-RTO: 0 /100
PLATELET # BLD AUTO: 134 10E3/UL (ref 150–450)
POTASSIUM SERPL-SCNC: 4.5 MMOL/L (ref 3.4–5.3)
RBC # BLD AUTO: 2.88 10E6/UL (ref 3.8–5.2)
SODIUM SERPL-SCNC: 136 MMOL/L (ref 135–145)
WBC # BLD AUTO: 6.8 10E3/UL (ref 4–11)

## 2024-06-26 PROCEDURE — 82565 ASSAY OF CREATININE: CPT | Performed by: STUDENT IN AN ORGANIZED HEALTH CARE EDUCATION/TRAINING PROGRAM

## 2024-06-26 PROCEDURE — 36415 COLL VENOUS BLD VENIPUNCTURE: CPT | Performed by: STUDENT IN AN ORGANIZED HEALTH CARE EDUCATION/TRAINING PROGRAM

## 2024-06-26 PROCEDURE — 250N000013 HC RX MED GY IP 250 OP 250 PS 637: Performed by: STUDENT IN AN ORGANIZED HEALTH CARE EDUCATION/TRAINING PROGRAM

## 2024-06-26 PROCEDURE — 250N000011 HC RX IP 250 OP 636: Performed by: STUDENT IN AN ORGANIZED HEALTH CARE EDUCATION/TRAINING PROGRAM

## 2024-06-26 PROCEDURE — 96361 HYDRATE IV INFUSION ADD-ON: CPT

## 2024-06-26 PROCEDURE — 85025 COMPLETE CBC W/AUTO DIFF WBC: CPT | Performed by: STUDENT IN AN ORGANIZED HEALTH CARE EDUCATION/TRAINING PROGRAM

## 2024-06-26 PROCEDURE — 82374 ASSAY BLOOD CARBON DIOXIDE: CPT | Performed by: STUDENT IN AN ORGANIZED HEALTH CARE EDUCATION/TRAINING PROGRAM

## 2024-06-26 PROCEDURE — 96372 THER/PROPH/DIAG INJ SC/IM: CPT | Performed by: STUDENT IN AN ORGANIZED HEALTH CARE EDUCATION/TRAINING PROGRAM

## 2024-06-26 PROCEDURE — 82570 ASSAY OF URINE CREATININE: CPT | Performed by: STUDENT IN AN ORGANIZED HEALTH CARE EDUCATION/TRAINING PROGRAM

## 2024-06-26 RX ADMIN — POLYETHYLENE GLYCOL 3350 17 G: 17 POWDER, FOR SOLUTION ORAL at 08:35

## 2024-06-26 RX ADMIN — HEPARIN SODIUM 5000 UNITS: 5000 INJECTION, SOLUTION INTRAVENOUS; SUBCUTANEOUS at 08:35

## 2024-06-26 RX ADMIN — OXYCODONE HYDROCHLORIDE 10 MG: 5 TABLET ORAL at 00:42

## 2024-06-26 RX ADMIN — PREGABALIN 75 MG: 75 CAPSULE ORAL at 08:35

## 2024-06-26 RX ADMIN — MAGNESIUM HYDROXIDE 30 ML: 400 SUSPENSION ORAL at 08:35

## 2024-06-26 RX ADMIN — HEPARIN SODIUM 5000 UNITS: 5000 INJECTION, SOLUTION INTRAVENOUS; SUBCUTANEOUS at 00:42

## 2024-06-26 RX ADMIN — OXYCODONE HYDROCHLORIDE 5 MG: 5 TABLET ORAL at 08:39

## 2024-06-26 ASSESSMENT — ACTIVITIES OF DAILY LIVING (ADL)
ADLS_ACUITY_SCORE: 28

## 2024-06-26 NOTE — PLAN OF CARE
Goal Outcome Evaluation:      Plan of Care Reviewed With: patient      Date & Time: 6/25/2024 7am-7pm  Surgery/POD#: POD#1 s/p left distal ureterectomy and reimplant (robotic) for distal ureteral mass.   Behavior & Aggression: calm and cooperative  Fall Risk: yes  Orientation:A/O x4  ABNL VS/O2:VSS, pt is on RA  ABNL Labs: see computer for details  Pain Management:Pt c/o abdominal pain, pt taking po Oxycodone, IV Toradol per MD orders, relief noted  Bowel/Bladder: Pt denies passing gas and has not had a BM.  Pt's catheter had to be hand irrigated x 1 this shift (MD aware-see provider notification note) to remove a large clot, after removing clot catheter has been draining an adequate amount of urine.  Drains: Pt has one abdominal J.P. drain, leaking at the insertion site and draining serosanguinous drainage.  Dressing around the J.P. site changed twice this shift.  Wounds/incisions: Lap sites to the pt's abdomen are C/D/I with surgical glue.  Diet: Pt tolerating a regular diet without nausea  Activity Level: Pt is up with SBA x 1 in the hallways  Tests/Procedures: none  Anticipated  DC Date: possibly tomorrow.  Significant Information: none

## 2024-06-26 NOTE — PROGRESS NOTES
[facility] Urology Progress Note          Assessment and Plan:     Active Problems:    Ureteral mass    Assessment: POD #2 s/p robotic left distal ureterectomy and reimplant with boari flap    Plan:     - AM labs pending  - VISH output decreased following dominguez catheter irrigation yesterday, VISH Cr from 6/25 consistent with urine leak, repeat VISH Cr pending. If VISH Cr consistent with serum, then may remove VISH prior to discharge, otherwise home with VISH and removal as out-patient next week  - Plan for discharge today      Rogelio Gallagher MD   [facility]  Office: [phone]               Interval History:     doing well, feeling much better today, issue yesterday with an obstructing clot in the bladder leading to a urine leak and abd pain, resolved with dominguez irrigation              Review of Systems:     The 5 point Review of Systems is negative other than noted in the HPI             Medications:     Current Facility-Administered Medications   Medication Dose Route Frequency Provider Last Rate Last Admin    acetaminophen (TYLENOL) tablet 975 mg  975 mg Oral Q8H Yoli Calles MD   975 mg at 06/25/24 2222    bisacodyl (DULCOLAX) suppository 10 mg  10 mg Rectal Daily PRN Yoli Calles MD        citalopram (celeXA) tablet 10 mg  10 mg Oral Daily Yoli Calles MD   10 mg at 06/25/24 2035    citalopram (celeXA) tablet 20 mg  20 mg Oral Daily Yoli Calles MD   20 mg at 06/25/24 2034    heparin ANTICOAGULANT injection 5,000 Units  5,000 Units Subcutaneous Q8H Yoli Calles MD   5,000 Units at 06/26/24 0042    HYDROmorphone (DILAUDID) injection 0.2 mg  0.2 mg Intravenous Q2H PRN Yoli Calles MD   0.2 mg at 06/25/24 0312    Or    HYDROmorphone (DILAUDID) injection 0.4 mg  0.4 mg Intravenous Q2H PRN Yoli Calles MD        ketorolac (TORADOL) injection 15 mg  15 mg Intravenous Q6H PRN  Yoli Calles MD   15 mg at 06/25/24 1900    lidocaine (LMX4) cream   Topical Q1H PRN Yoli Calles MD        lidocaine 1 % 0.1-1 mL  0.1-1 mL Other Q1H PRN Yoli Calles MD        magnesium hydroxide (MILK OF MAGNESIA) suspension 30 mL  30 mL Oral BID Yoli Calles MD   30 mL at 06/25/24 2222    naloxone (NARCAN) injection 0.2 mg  0.2 mg Intravenous Q2 Min PRN Rogelio Gallagher MD        Or    naloxone (NARCAN) injection 0.4 mg  0.4 mg Intravenous Q2 Min PRN Rogelio Gallagher MD        Or    naloxone (NARCAN) injection 0.2 mg  0.2 mg Intramuscular Q2 Min PRN Rogelio Gallagher MD        Or    naloxone (NARCAN) injection 0.4 mg  0.4 mg Intramuscular Q2 Min PRN Rogelio Gallagher MD        ondansetron (ZOFRAN ODT) ODT tab 4 mg  4 mg Oral Q6H PRN Yoli Calles MD        Or    ondansetron (ZOFRAN) injection 4 mg  4 mg Intravenous Q6H PRN Yoli Calles MD        oxyBUTYnin ER (DITROPAN XL) 24 hr tablet 5 mg  5 mg Oral At Bedtime Yoli Calles MD   5 mg at 06/25/24 2222    oxyCODONE (ROXICODONE) tablet 5 mg  5 mg Oral Q4H PRN Yoli Calles MD   5 mg at 06/25/24 2033    Or    oxyCODONE (ROXICODONE) tablet 10 mg  10 mg Oral Q4H PRN Yoli Calles MD   10 mg at 06/26/24 0042    polyethylene glycol (MIRALAX) Packet 17 g  17 g Oral BID Yoli Calles MD   17 g at 06/25/24 2222    pregabalin (LYRICA) capsule 75 mg  75 mg Oral BID Yoli Calles MD   75 mg at 06/25/24 2223    prochlorperazine (COMPAZINE) injection 10 mg  10 mg Intravenous Q6H PRN Yoli Calles MD        Or    prochlorperazine (COMPAZINE) tablet 10 mg  10 mg Oral Q6H PRN Yoli Calles MD        sodium chloride (PF) 0.9% PF flush 3 mL  3 mL Intracatheter Q8H Yoli Calles MD        sodium chloride (PF) 0.9% PF flush 3 mL  3 mL Intracatheter  "q1 min Yoli Hollingsworth MD                      Physical Exam:   Vitals were reviewed  Patient Vitals for the past 8 hrs:   BP Temp Temp src Pulse Resp SpO2   06/26/24 0441 (!) 87/44 98  F (36.7  C) Oral 84 10 91 %   06/26/24 0050 94/43 99.5  F (37.5  C) Oral 86 12 93 %     GEN: NAD, lying in bed  HEENT: EOMI  NECK: Supple  ABD: soft, non-tender, non-distended, incision clean, dry and intact   EXT: No LE edema  : Ayala draining clear urine  VISH: minimal serosanguinous fluid           Data:     Lab Results   Component Value Date    CR 0.73 06/25/2024    CR 0.74 06/11/2024    CR 0.73 05/01/2024    CR 0.89 04/30/2024    CR 0.84 04/19/2024     Lab Results   Component Value Date    WBC 9.9 06/25/2024    WBC 4.2 06/11/2024    WBC 5.4 05/01/2024    HGB 9.5 (L) 06/25/2024    HGB 11.5 (L) 06/11/2024    HGB 11.4 (L) 05/02/2024    HCT 29.5 (L) 06/25/2024    HCT 36.2 06/11/2024    HCT 36.6 05/01/2024    MCV 97 06/25/2024    MCV 96 06/11/2024    MCV 93 05/01/2024     06/25/2024     06/11/2024     05/01/2024     No results found for: \"INR\"       "

## 2024-06-26 NOTE — PROGRESS NOTES
Date & Time: 6/25/24, 6935-6990  Surgery/POD#: POD1/2 L distal uterectomy and reimplant (robotic) for distal ureteral mass  Behavior & Aggression: Green  Fall Risk: Yes  Orientation: AO4  ABNL VS/O2: VSS ex soft BPs  ABNL Labs: Hgb 9.5  Pain Management: Scheduled Tylenol, Lyrica, PRN oxycodone x2  Bowel/Bladder: Ayala w/ adequate yellow UOP, flatus+  Drains: L abd VISH w/ 80mL serosang OP.  Wounds/incisions: 4 abd lap sites CDI  Diet: Tolerating reg  Activity Level: SBA  Anticipated DC Date: 6/26/24

## 2024-06-26 NOTE — PROGRESS NOTES
VSS. A/O. Ind. Abdo incisions CDI. VISH removed. Tolerating diet. Dominguez good UOP, no blood. Oxy given once. D'c home. Meds/paper/home dominguez care instructions given.

## 2024-06-27 ENCOUNTER — TELEPHONE (OUTPATIENT)
Dept: UROLOGY | Facility: CLINIC | Age: 64
End: 2024-06-27
Payer: COMMERCIAL

## 2024-06-27 LAB
PATH REPORT.COMMENTS IMP SPEC: NORMAL
PATH REPORT.COMMENTS IMP SPEC: NORMAL
PATH REPORT.FINAL DX SPEC: NORMAL
PATH REPORT.GROSS SPEC: NORMAL
PATH REPORT.INTRAOP OBS SPEC DOC: NORMAL
PATH REPORT.MICROSCOPIC SPEC OTHER STN: NORMAL
PATH REPORT.RELEVANT HX SPEC: NORMAL
PATHOLOGY SYNOPTIC REPORT: NORMAL
PHOTO IMAGE: NORMAL

## 2024-06-27 PROCEDURE — 88307 TISSUE EXAM BY PATHOLOGIST: CPT | Mod: 26 | Performed by: PATHOLOGY

## 2024-06-27 PROCEDURE — 88305 TISSUE EXAM BY PATHOLOGIST: CPT | Mod: 26 | Performed by: PATHOLOGY

## 2024-06-27 PROCEDURE — 88331 PATH CONSLTJ SURG 1 BLK 1SPC: CPT | Mod: 26 | Performed by: PATHOLOGY

## 2024-06-27 NOTE — TELEPHONE ENCOUNTER
Catheter instructions discussed with patients .  Offered our assistance if needed in the future.  Shayna Vences LPN

## 2024-06-27 NOTE — TELEPHONE ENCOUNTER
[facility] Call Center    Phone Message    May a detailed message be left on voicemail: yes     Reason for Call: Other: pt spouse calling about after care,  looking for 24 hour help number, has no clue how to get help after 24 hours, no instructions on cath care at all, they have no clue what to do, please call them     Action Taken: Other: uorlogy    Travel Screening: Not Applicable     Date of Service:

## 2024-07-08 DIAGNOSIS — N28.89 URETERAL MASS: ICD-10-CM

## 2024-07-08 DIAGNOSIS — N28.89 URETERAL MASS: Primary | ICD-10-CM

## 2024-07-08 DIAGNOSIS — N13.30 HYDRONEPHROSIS: Primary | ICD-10-CM

## 2024-07-09 ENCOUNTER — ALLIED HEALTH/NURSE VISIT (OUTPATIENT)
Dept: UROLOGY | Facility: CLINIC | Age: 64
End: 2024-07-09
Payer: COMMERCIAL

## 2024-07-09 ENCOUNTER — HOSPITAL ENCOUNTER (OUTPATIENT)
Dept: GENERAL RADIOLOGY | Facility: CLINIC | Age: 64
Discharge: HOME | End: 2024-07-09
Attending: UROLOGY | Admitting: UROLOGY
Payer: COMMERCIAL

## 2024-07-09 DIAGNOSIS — N28.89 URETERAL MASS: Primary | ICD-10-CM

## 2024-07-09 DIAGNOSIS — N28.89 URETERAL MASS: ICD-10-CM

## 2024-07-09 PROCEDURE — 99207 PR NO CHARGE NURSE ONLY: CPT

## 2024-07-09 PROCEDURE — 51600 INJECTION FOR BLADDER X-RAY: CPT

## 2024-07-09 PROCEDURE — 255N000002 HC RX 255 OP 636: Performed by: UROLOGY

## 2024-07-09 RX ADMIN — DIATRIZOATE MEGLUMINE 200 ML: 300 INJECTION, SOLUTION INTRAVENOUS at 10:27

## 2024-07-09 NOTE — PROGRESS NOTES
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  This service provided today was under the supervising provider of the day , who was available if needed.      Catheter removal documentation on 7/9/2024:    Lorin Andrade presents to the clinic for catheter removal.  Reason for removal: removal per verbal order from   Order has been verified. yes  Catheter successfully removed at 10:55 AM without immediate complication..  Urethral meatus is free of secretions and encrustation.  The patient is afebrile.  The patient tolerated the procedure and was instructed to return tomorrow as planned.    Shayna Vences LPN

## 2024-07-10 ENCOUNTER — OFFICE VISIT (OUTPATIENT)
Dept: UROLOGY | Facility: CLINIC | Age: 64
End: 2024-07-10
Payer: COMMERCIAL

## 2024-07-10 VITALS
BODY MASS INDEX: 20.89 KG/M2 | DIASTOLIC BLOOD PRESSURE: 62 MMHG | WEIGHT: 130 LBS | HEIGHT: 66 IN | SYSTOLIC BLOOD PRESSURE: 95 MMHG | HEART RATE: 72 BPM | OXYGEN SATURATION: 96 %

## 2024-07-10 DIAGNOSIS — C66.2 UROTHELIAL CARCINOMA OF LEFT DISTAL URETER (H): Primary | ICD-10-CM

## 2024-07-10 PROCEDURE — 99024 POSTOP FOLLOW-UP VISIT: CPT | Performed by: UROLOGY

## 2024-07-10 ASSESSMENT — PAIN SCALES - GENERAL: PAINLEVEL: NO PAIN (0)

## 2024-07-10 NOTE — NURSING NOTE
Chief Complaint   Patient presents with    Surgical Followup     Patient is here for post op follow up from 06/24/24 Ureteral mass removal.    Patient had dominguez catheter removed here at clinic on 07/09/2024.          Trudy Caballero MA on 7/10/2024 at 3:29 PM

## 2024-07-10 NOTE — Clinical Note
Rivera Bella,  I followed up with Lorin today after her robotic distal ureterectomy on 6/24/2024.  This did show urothelial carcinoma, high-grade carcinoma in situ with negative margins.  No additional chemotherapy or radiation is needed and we will plan for surveillance cystoscopy in about 3 months.  Thanks,  Rogelio Gallagher M.D. Cell: [phone]

## 2024-07-10 NOTE — PROGRESS NOTES
"[facility]  CHIEF COMPLAINT   It was my pleasure to see Lorin Andrade who is a 63 year old female for follow-up of LEFT hydroureteronephrosis.      HPI  5/8/2024   Lorin Andrade is a very pleasant 63 year old female with a history of left renal colic with workup showing moderate hydroureteronephrosis and enhancement of the last 5 cm of the left distal ureter.  Urine cytology with suspicion for high-grade urothelial carcinoma.  She underwent ureteroscopy with ureteral biopsy and stent placement on 5/1/2024 and returns today to discuss the results.    She has had resolution of her flank pain following stent placement  Her  joins her for this visit    5/15/2024:  Ureteroscopy     5/22/2024:  Follow-up today to review the ureteroscopy findings and pathology results    6/24/2024:  Robotic distal ureterectomy, ureteroneocystotomy and boari flap    7/9/2024:  Cystogram and dominguez removal    TODAY 7/10/2024:  Follow-up today for postop check  She is doing very well after Dominguez removal yesterday    PHYSICAL EXAM  Patient is a 63 year old  female   Vitals: Blood pressure 95/62, pulse 72, height 1.664 m (5' 5.5\"), weight 59 kg (130 lb), SpO2 96%.  Body mass index is 21.3 kg/m .  General Appearance Adult:   Alert, no acute distress, oriented  ABD: Incisions are clean, dry, and intact with peeling skin glue.  No evidence of hernia or infection  Neuro: Alert, oriented, speech and mentation normal  Psych: affect and mood normal       Creatinine   Date Value Ref Range Status   06/26/2024 0.89 0.51 - 0.95 mg/dL Final   06/25/2024 0.73 0.51 - 0.95 mg/dL Final   06/11/2024 0.74 0.51 - 0.95 mg/dL Final   05/01/2024 0.73 0.51 - 0.95 mg/dL Final   04/30/2024 0.89 0.51 - 0.95 mg/dL Final   04/19/2024 0.84 0.51 - 0.95 mg/dL Final   08/14/2023 0.61 0.51 - 0.95 mg/dL Final      Final Diagnosis   Specimen A                 Interpretation:                  -  Suspicious for High-grade Urothelial Carcinoma      PATHOLOGY:  5/1/2024:  Final " Diagnosis   Left distal ureter, biopsy:  -Normal urothelial lining without evidence of atypia or malignancy, please see description     5/15/2024:  Final Diagnosis   Ureter, left proximal, biopsy:  -- Urothelium overlying edematous stroma.  Negative for dysplasia or carcinoma.     5/15/2024:  Distal ureteral brushing  Final Diagnosis   Specimen A              Interpretation:               Negative for High Grade Urothelial Carcinoma     6/24/2024:  Distal ureterectomy and lymph node dissection  Final Diagnosis   A-C.  Left ureter with separately submitted proximal margin and left pelvic lymph nodes; left ureterectomy with left pelvic lymphadenectomy  -Proximal left ureter margin without evidence of in situ or invasive carcinoma  -2 benign lymph nodes, benign perinodal fibrovascular and fatty tissue, entire received tissue sample examined microscopically  -Urothelial carcinoma in situ present in association with background urothelial atypia/dysplasia; no evidence of carcinoma in situ at margins; please see microscopic description and synoptic template within report       IMAGING:  All pertinent imaging reviewed:    All imaging studies reviewed by me.  I personally reviewed these imaging films.  A formal report from radiology will follow.    CT UROGRAM 4/30/2024:  FINDINGS:   LOWER CHEST: No worrisome consolidation in the visualized lung bases. No pleural effusion. Partially imaged breast implants.     HEPATOBILIARY: Unchanged liver contours with benign cyst in the left hepatic lobe. No worrisome liver lesions. Normal gallbladder. No biliary ductal dilation.     PANCREAS: Normal.     SPLEEN: Normal.     ADRENAL GLANDS: Normal.     RIGHT KIDNEY/URETER: Normal morphology and enhancement of the right kidney. A few cortically based subcentimeter low-attenuation lesions are incompletely characterized though likely cysts. No urolithiasis or hydronephrosis. Normal excretion of contrast   on delayed images without urothelial  thickening or filling defects.     LEFT KIDNEY/URETER: Unchanged delayed left nephrogram. 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 No urolithiasis. There is unchanged moderate to severe hydroureteronephrosis with dependent excreted contrast   layering within the calyces. As before, there is a transition in the distal ureter where there is an approximately 5 cm segment of urothelial thickening and enhancement (series 12 image #42, #48).      BLADDER: Unremarkable.     BOWEL: Normal caliber small and large bowel. Normal appendix. No free fluid or free air.     LYMPH NODES: No lymphadenopathy.     VASCULATURE: The abdominal aorta is nonaneurysmal with mild atheromatous disease.     PELVIC ORGANS: Uterus is present. No adnexal mass.     MUSCULOSKELETAL: Unchanged mild L4 superior endplate deformity. No destructive osseous lesions.                                                                      IMPRESSION:  1.  Unchanged moderate left hydroureteronephrosis to the level of the distal ureter where there is an approximately 5 cm segment of urothelial thickening and enhancement. As before, etiology may be inflammatory or neoplastic.  2.  Additional noncritical details in the findings.    NM RENAL SCAN 4/17/2024:  FINDINGS:      Renal angiogram demonstrates: Flow to right kidney within normal  limits. There is asymmetrically slower and less flow to the left  kidney.   Renogram images demonstrate:     Right kidney: There is normal cortical uptake. Cortical transit is  noted at 3 minutes. Ureter is visualized at 4 minutes. There is normal  wash out. Following Lasix administration, there is prompt wash out.      Left kidney: There is delayed cortical uptake. Cortical transit is  noted at 6 minutes which is slightly delayed. Ureter is visualized at  20 minutes. There is no wash ou prior to Lasix, minimal wash out after  Lasix.   Renogram curves demonstrate:     Right kidney:  The radiotracer reaches the peak activity at 5 minutes  (time to peak). There is normal wash out and complete wash out  following Lasix. T1/2 is: 14 minutes.     Left kidney: The radiotracer reaches the peak activity at 22 minutes  (time to peak). There is delayed wash out and  incomplete wash out  following Lasix. T1/2 is: Not applicable.     Split function: Left: 38.7%, Right: 61.3%.                                                                      IMPRESSION:  1. Right kidney: Normal function. Normal excretion. No obstruction.     2. Left kidney: Decreased function. Decreased excretion, concerning  for partial obstruction.    CYSTOGRAM 7/9/2024:  FINDINGS: Left ureteral stent present on the  image. Cystografin  contrast was instilled into the bladder via existing Ayala catheter.  No visible leak.                                                                      IMPRESSION:  1.  No leak, status post ureteral reimplantation.    ASSESSMENT and PLAN  63-year-old female with left-sided hydroureteronephrosis and concern for urothelial malignancy of the left distal ureter.  Now status post a robotic distal ureterectomy, lymph node dissection, ureteroneocystotomy with a Boari flap on 6/24/2024 with evidence of CIS    High-grade CIS of the distal ureter  - Reviewed her pathology with high-grade CIS of the ureter with no evidence of positive margin  - We discussed her lymph nodes were negative  - Follow-up in 2 to 3 weeks for stent removal  - Will plan for surveillance cystoscopy in 3 months and then likely surveillance ureteroscopy in 6 months      Time spent: 15 minutes spent on the date of the encounter doing chart review, history and exam, documentation and further activities as noted above.    Rogelio Gallagher MD   Urology  [facility]  [facility] Phone: [phone]  [facility] Phone: [phone]

## 2024-07-10 NOTE — LETTER
"7/10/2024       RE: Lorin Andrade  [address] [address]     Dear Colleague,    Thank you for referring your patient, Lorin Andrade, to the [facility] at [facility]. Please see a copy of my visit note below.    [place]  CHIEF COMPLAINT   It was my pleasure to see Lorin Andrade who is a 63 year old female for follow-up of LEFT hydroureteronephrosis.      HPI  5/8/2024   Lorin Andrade is a very pleasant 63 year old female with a history of left renal colic with workup showing moderate hydroureteronephrosis and enhancement of the last 5 cm of the left distal ureter.  Urine cytology with suspicion for high-grade urothelial carcinoma.  She underwent ureteroscopy with ureteral biopsy and stent placement on 5/1/2024 and returns today to discuss the results.    She has had resolution of her flank pain following stent placement  Her  joins her for this visit    5/15/2024:  Ureteroscopy     5/22/2024:  Follow-up today to review the ureteroscopy findings and pathology results    6/24/2024:  Robotic distal ureterectomy, ureteroneocystotomy and boari flap    7/9/2024:  Cystogram and dominguez removal    TODAY 7/10/2024:  Follow-up today for postop check  She is doing very well after Dominguez removal yesterday    PHYSICAL EXAM  Patient is a 63 year old  female   Vitals: Blood pressure 95/62, pulse 72, height 1.664 m (5' 5.5\"), weight 59 kg (130 lb), SpO2 96%.  Body mass index is 21.3 kg/m .  General Appearance Adult:   Alert, no acute distress, oriented  ABD: Incisions are clean, dry, and intact with peeling skin glue.  No evidence of hernia or infection  Neuro: Alert, oriented, speech and mentation normal  Psych: affect and mood normal       Creatinine   Date Value Ref Range Status   06/26/2024 0.89 0.51 - 0.95 mg/dL Final   06/25/2024 0.73 0.51 - 0.95 mg/dL Final   06/11/2024 0.74 0.51 - 0.95 mg/dL Final   05/01/2024 0.73 0.51 - 0.95 " mg/dL Final   04/30/2024 0.89 0.51 - 0.95 mg/dL Final   04/19/2024 0.84 0.51 - 0.95 mg/dL Final   08/14/2023 0.61 0.51 - 0.95 mg/dL Final      Final Diagnosis   Specimen A                 Interpretation:                  -  Suspicious for High-grade Urothelial Carcinoma      PATHOLOGY:  5/1/2024:  Final Diagnosis   Left distal ureter, biopsy:  -Normal urothelial lining without evidence of atypia or malignancy, please see description     5/15/2024:  Final Diagnosis   Ureter, left proximal, biopsy:  -- Urothelium overlying edematous stroma.  Negative for dysplasia or carcinoma.     5/15/2024:  Distal ureteral brushing  Final Diagnosis   Specimen A              Interpretation:               Negative for High Grade Urothelial Carcinoma     6/24/2024:  Distal ureterectomy and lymph node dissection  Final Diagnosis   A-C.  Left ureter with separately submitted proximal margin and left pelvic lymph nodes; left ureterectomy with left pelvic lymphadenectomy  -Proximal left ureter margin without evidence of in situ or invasive carcinoma  -2 benign lymph nodes, benign perinodal fibrovascular and fatty tissue, entire received tissue sample examined microscopically  -Urothelial carcinoma in situ present in association with background urothelial atypia/dysplasia; no evidence of carcinoma in situ at margins; please see microscopic description and synoptic template within report       IMAGING:  All pertinent imaging reviewed:    All imaging studies reviewed by me.  I personally reviewed these imaging films.  A formal report from radiology will follow.    CT UROGRAM 4/30/2024:  FINDINGS:   LOWER CHEST: No worrisome consolidation in the visualized lung bases. No pleural effusion. Partially imaged breast implants.     HEPATOBILIARY: Unchanged liver contours with benign cyst in the left hepatic lobe. No worrisome liver lesions. Normal gallbladder. No biliary ductal dilation.     PANCREAS: Normal.     SPLEEN: Normal.     ADRENAL GLANDS:  Normal.     RIGHT KIDNEY/URETER: Normal morphology and enhancement of the right kidney. A few cortically based subcentimeter low-attenuation lesions are incompletely characterized though likely cysts. No urolithiasis or hydronephrosis. Normal excretion of contrast   on delayed images without urothelial thickening or filling defects.     LEFT KIDNEY/URETER: Unchanged delayed left nephrogram. 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 No urolithiasis. There is unchanged moderate to severe hydroureteronephrosis with dependent excreted contrast   layering within the calyces. As before, there is a transition in the distal ureter where there is an approximately 5 cm segment of urothelial thickening and enhancement (series 12 image #42, #48).      BLADDER: Unremarkable.     BOWEL: Normal caliber small and large bowel. Normal appendix. No free fluid or free air.     LYMPH NODES: No lymphadenopathy.     VASCULATURE: The abdominal aorta is nonaneurysmal with mild atheromatous disease.     PELVIC ORGANS: Uterus is present. No adnexal mass.     MUSCULOSKELETAL: Unchanged mild L4 superior endplate deformity. No destructive osseous lesions.                                                                      IMPRESSION:  1.  Unchanged moderate left hydroureteronephrosis to the level of the distal ureter where there is an approximately 5 cm segment of urothelial thickening and enhancement. As before, etiology may be inflammatory or neoplastic.  2.  Additional noncritical details in the findings.    NM RENAL SCAN 4/17/2024:  FINDINGS:      Renal angiogram demonstrates: Flow to right kidney within normal  limits. There is asymmetrically slower and less flow to the left  kidney.   Renogram images demonstrate:     Right kidney: There is normal cortical uptake. Cortical transit is  noted at 3 minutes. Ureter is visualized at 4 minutes. There is normal  wash out. Following Lasix  administration, there is prompt wash out.      Left kidney: There is delayed cortical uptake. Cortical transit is  noted at 6 minutes which is slightly delayed. Ureter is visualized at  20 minutes. There is no wash ou prior to Lasix, minimal wash out after  Lasix.   Renogram curves demonstrate:     Right kidney: The radiotracer reaches the peak activity at 5 minutes  (time to peak). There is normal wash out and complete wash out  following Lasix. T1/2 is: 14 minutes.     Left kidney: The radiotracer reaches the peak activity at 22 minutes  (time to peak). There is delayed wash out and  incomplete wash out  following Lasix. T1/2 is: Not applicable.     Split function: Left: 38.7%, Right: 61.3%.                                                                      IMPRESSION:  1. Right kidney: Normal function. Normal excretion. No obstruction.     2. Left kidney: Decreased function. Decreased excretion, concerning  for partial obstruction.    CYSTOGRAM 7/9/2024:  FINDINGS: Left ureteral stent present on the  image. Cystografin  contrast was instilled into the bladder via existing Ayala catheter.  No visible leak.                                                                      IMPRESSION:  1.  No leak, status post ureteral reimplantation.    ASSESSMENT and PLAN  63-year-old female with left-sided hydroureteronephrosis and concern for urothelial malignancy of the left distal ureter.  Now status post a robotic distal ureterectomy, lymph node dissection, ureteroneocystotomy with a Boari flap on 6/24/2024 with evidence of CIS    High-grade CIS of the distal ureter  - Reviewed her pathology with high-grade CIS of the ureter with no evidence of positive margin  - We discussed her lymph nodes were negative  - Follow-up in 2 to 3 weeks for stent removal  - Will plan for surveillance cystoscopy in 3 months and then likely surveillance ureteroscopy in 6 months      Time spent: 15 minutes spent on the date of the  encounter doing chart review, history and exam, documentation and further activities as noted above.    Rogelio Gallagher MD   Urology  [facility]  [facility] Phone: [phone]  [facility] Phone: [phone]

## 2024-07-24 ENCOUNTER — OFFICE VISIT (OUTPATIENT)
Dept: UROLOGY | Facility: CLINIC | Age: 64
End: 2024-07-24
Payer: COMMERCIAL

## 2024-07-24 VITALS
HEART RATE: 78 BPM | BODY MASS INDEX: 20.89 KG/M2 | OXYGEN SATURATION: 97 % | SYSTOLIC BLOOD PRESSURE: 132 MMHG | WEIGHT: 130 LBS | HEIGHT: 66 IN | DIASTOLIC BLOOD PRESSURE: 91 MMHG

## 2024-07-24 DIAGNOSIS — N28.9 LESION OF LEFT NATIVE URETER: ICD-10-CM

## 2024-07-24 DIAGNOSIS — C66.2 UROTHELIAL CARCINOMA OF LEFT DISTAL URETER (H): Primary | ICD-10-CM

## 2024-07-24 DIAGNOSIS — Z46.6 ENCOUNTER FOR REMOVAL OF URETERAL STENT: ICD-10-CM

## 2024-07-24 PROCEDURE — 52310 CYSTOSCOPY AND TREATMENT: CPT | Performed by: UROLOGY

## 2024-07-24 RX ORDER — LIDOCAINE HYDROCHLORIDE 20 MG/ML
JELLY TOPICAL ONCE
Status: COMPLETED | OUTPATIENT
Start: 2024-07-24 | End: 2024-07-24

## 2024-07-24 RX ORDER — CEPHALEXIN 500 MG/1
500 CAPSULE ORAL 2 TIMES DAILY
Qty: 6 CAPSULE | Refills: 0 | Status: SHIPPED | OUTPATIENT
Start: 2024-07-24 | End: 2024-07-27

## 2024-07-24 RX ADMIN — LIDOCAINE HYDROCHLORIDE 5 ML: 20 JELLY TOPICAL at 16:21

## 2024-07-24 ASSESSMENT — PAIN SCALES - GENERAL: PAINLEVEL: NO PAIN (0)

## 2024-07-24 NOTE — LETTER
7/24/2024       RE: Lorin Andrade  [address]  [place] [address]     Dear Colleague,    Thank you for referring your patient, Lorin Andrade, to the [facility] at [facility]. Please see a copy of my visit note below.    CYSTOSCOPY AND URETERAL STENT REMOVAL PROCEDURE NOTE:    Lorin Andrade is a 63 year old female who presents with ureteral stent  for a cystoscopy and ureteral stent removal.    Pt ID verified with patient: Yes     Procedure verified with patient: Yes     Procedure confirmed with physician and support staff: Yes     Consent confirmed with physician and support staff.    Sign In:  History and Physical Exam reviewed  Primary Diagnosis: ureteral stent   Informed Consent Discussed: Yes   Sign in Communication: Yes   Time Out:  Team Confirms the Correct Patient, Correct Procedure; Yes , Correct Site and Site Marking, Correct Position (if applicable).  Affirmation of Time Out: Yes   Sign Out:  Sign Out Discussion: Yes     Lorin Andrade is a 63 year old female with an indwelling ureteral stent in need of removal.    CYSTOSCOPY PROCEDURE:  After sterile preparation and draping of the patient,  a 17-[place] flexible cystoscope was introduced via the urethra.  It was passed without difficulty into the bladder.  The urethra was open without evidence of stricture.  The stent was emanating from the reimplanted ureteral orifice at the left dome.  It was grasped with an alligator forceps and extracted intact without difficulty.  The patient tolerated the procedure well    A/P Successful stent removal  Prophylactic antibiotic ordered   -Follow-up for surveillance cystoscopy in 3 months  - We discussed plan for surveillance ureteroscopy and order for this placed for January    Watch for any new onset fevers, signs of UTI.  May expect some pain after removal.  If this is severe, or last many hours, you may need to return for replacement of  stent.    Rogelio Gallagher MD   Urology  [facility]

## 2024-07-24 NOTE — PROGRESS NOTES
CYSTOSCOPY AND URETERAL STENT REMOVAL PROCEDURE NOTE:    Lorin Andrade is a 63 year old female who presents with ureteral stent  for a cystoscopy and ureteral stent removal.    Pt ID verified with patient: Yes     Procedure verified with patient: Yes     Procedure confirmed with physician and support staff: Yes     Consent confirmed with physician and support staff.    Sign In:  History and Physical Exam reviewed  Primary Diagnosis: ureteral stent   Informed Consent Discussed: Yes   Sign in Communication: Yes   Time Out:  Team Confirms the Correct Patient, Correct Procedure; Yes , Correct Site and Site Marking, Correct Position (if applicable).  Affirmation of Time Out: Yes   Sign Out:  Sign Out Discussion: Yes     Lorin Andrade is a 63 year old female with an indwelling ureteral stent in need of removal.    CYSTOSCOPY PROCEDURE:  After sterile preparation and draping of the patient,  a 17-Welsh flexible cystoscope was introduced via the urethra.  It was passed without difficulty into the bladder.  The urethra was open without evidence of stricture.  The stent was emanating from the reimplanted ureteral orifice at the left dome.  It was grasped with an alligator forceps and extracted intact without difficulty.  The patient tolerated the procedure well    A/P Successful stent removal  Prophylactic antibiotic ordered   -Follow-up for surveillance cystoscopy in 3 months  - We discussed plan for surveillance ureteroscopy and order for this placed for January    Watch for any new onset fevers, signs of UTI.  May expect some pain after removal.  If this is severe, or last many hours, you may need to return for replacement of stent.    Rogelio Gallagher MD   Urology  [facility]

## 2024-07-24 NOTE — PATIENT INSTRUCTIONS
"AFTER YOUR CYSTOSCOPY  ?  ?  You have just completed a cystoscopy, or \"cysto\", which allowed your physician to learn more about your bladder (or to remove a stent placed after surgery). We suggest that you continue to avoid caffeine, fruit juice, and alcohol for the next 24 hours, however, you are encouraged to return to your normal activities.  ?  ?  A few things that are considered normal after your cystoscopy:  ?  * small amount of bleeding (or spotting) that clears within the next 24 hours  ?  * slight burning sensation with urination  ?  * sensation of needing to void (urinate) more frequently  ?  * the feeling of \"air\" in your urine  ?  * mild discomfort that is relieved with Tylenol    * bladder spasms  ?  ?  ?  Please contact our office promptly if you:  ?  * develop a fever above 101 degrees  ?  * are unable to urinate  ?  * develop bright red blood that does not stop  ?  * experience severe pain or swelling  ?  ?  ?  And of course, please contact our office with any concerns or questions [phone].  ?   AFTER YOUR CYSTOSCOPY        You have just completed a cystoscopy, or \"cysto\", which allowed your physician to learn more about your bladder (or to remove a stent placed after surgery). We suggest that you continue to avoid caffeine, fruit juice, and alcohol for the next 24 hours, however, you are encouraged to return to your normal activities.         A few things that are considered normal after your cystoscopy:     * Small amount of bleeding (or spotting) that clears within the next 24 hours     * Slight burning sensation with urination     * Sensation to of needing to avoid more frequently     * The feeling of \"air\" in your urine     * Mild discomfort that is relieved with Tylenol        Please contact our office promptly if you:     * Develop a fever above 101 degrees     * Are unable to urinate     * Develop bright red blood that does not stop     * Severe pain or swelling         Please contact " our office with any concerns or questions @[facility].

## 2024-07-24 NOTE — NURSING NOTE
Chief Complaint   Patient presents with    ureter lesion    hydronephrosis     Stent removal post surgery.   Prior to the start of the procedure and with procedural staff participation, I verbally confirmed the patient s identity using two indicators, relevant allergies, that the procedure was appropriate and matched the consent or emergent situation, and that the correct equipment/implants were available. Immediately prior to starting the procedure I conducted the Time Out with the procedural staff and re-confirmed the patient s name, procedure, and site/side. I have wiped the patient off with the povidone-Iodine solution, draped them,  used Lidocaine hydrochloride jelly, and instilled sterile water into the bladder. (The Joint Commission universal protocol was followed.)  Yes    Sedation (Moderate or Deep): None   5mL 2% lidocaine hydrochloride Urojet instilled into urethra.    NDC# [address]-4  Lot #: [id number]  Expiration Date: 2-26

## 2024-07-24 NOTE — NURSING NOTE
Chief Complaint   Patient presents with    ureter lesion    hydronephrosis     Stent removal post surgery.    Prior to the start of the procedure and with procedural staff participation, I verbally confirmed the patient s identity using two indicators, relevant allergies, that the procedure was appropriate and matched the consent or emergent situation, and that the correct equipment/implants were available. Immediately prior to starting the procedure I conducted the Time Out with the procedural staff and re-confirmed the patient s name, procedure, and site/side. I have wiped the patient off with the povidone-Iodine solution, draped them,  used Lidocaine hydrochloride jelly, and instilled sterile water into the bladder. (The Joint Commission universal protocol was followed.)  Yes    Sedation (Moderate or Deep): None   Patient is here for a stent removal.  Ida Abad LPN

## 2024-08-27 DIAGNOSIS — F41.9 ANXIETY: ICD-10-CM

## 2024-08-27 DIAGNOSIS — G62.9 PERIPHERAL POLYNEUROPATHY: ICD-10-CM

## 2024-08-27 NOTE — TELEPHONE ENCOUNTER
Medication Question or Refill    Contacts       Contact Date/Time Type Contact Phone/Fax    08/27/2024 03:02 PM CDT Phone (Incoming) Raymond Lorin MATT (Self) [phone] (M)            What medication are you calling about (include dose and sig)?:     Pregabalin 75 mg   Citalopram 10 mg   Citalopram 20 mg     Preferred Pharmacy:     [place] [address] [place] [address] [place] [facility] from [facility] [address] [address]  Phone: [phone] Fax: [phone]    Controlled Substance Agreement on file:   CSA -- Patient Level:    CSA: None found at the patient level.       Who prescribed the medication?: Dr. Ruiz and Dr. Bella     Do you need a refill? Yes    When did you use the medication last? Patient still have enough until refill.     Patient offered an appointment? No    Do you have any questions or concerns?  Patient would like an early refill due to traveling out of the country on [place]. 9th.       Could we send this information to you in [employer] or would you prefer to receive a phone call?:   Patient would prefer a phone call   Okay to leave a detailed message?: Yes at Cell number on file:    Telephone Information:   Mobile [phone]

## 2024-08-31 DIAGNOSIS — F41.9 ANXIETY: ICD-10-CM

## 2024-09-01 RX ORDER — CITALOPRAM HYDROBROMIDE 10 MG/1
TABLET ORAL
Qty: 90 TABLET | Refills: 0 | OUTPATIENT
Start: 2024-09-01

## 2024-09-01 RX ORDER — CITALOPRAM HYDROBROMIDE 20 MG/1
TABLET ORAL
Qty: 90 TABLET | Refills: 0 | OUTPATIENT
Start: 2024-09-01

## 2024-09-02 RX ORDER — CITALOPRAM HYDROBROMIDE 10 MG/1
10 TABLET ORAL DAILY
Qty: 90 TABLET | Refills: 0 | Status: SHIPPED | OUTPATIENT
Start: 2024-09-02

## 2024-09-02 RX ORDER — PREGABALIN 75 MG/1
75 CAPSULE ORAL 2 TIMES DAILY
Qty: 180 CAPSULE | Refills: 0 | Status: SHIPPED | OUTPATIENT
Start: 2024-09-02

## 2024-09-02 RX ORDER — CITALOPRAM HYDROBROMIDE 20 MG/1
20 TABLET ORAL DAILY
Qty: 90 TABLET | Refills: 0 | Status: SHIPPED | OUTPATIENT
Start: 2024-09-02

## 2024-09-03 ENCOUNTER — THERAPY VISIT (OUTPATIENT)
Dept: PHYSICAL THERAPY | Facility: CLINIC | Age: 64
End: 2024-09-03
Payer: COMMERCIAL

## 2024-09-03 DIAGNOSIS — R10.2 PELVIC PAIN IN FEMALE: Primary | ICD-10-CM

## 2024-09-03 PROCEDURE — 97161 PT EVAL LOW COMPLEX 20 MIN: CPT | Mod: GP

## 2024-09-03 PROCEDURE — 97530 THERAPEUTIC ACTIVITIES: CPT | Mod: GP

## 2024-09-03 PROCEDURE — 97110 THERAPEUTIC EXERCISES: CPT | Mod: GP

## 2024-09-03 PROCEDURE — 97140 MANUAL THERAPY 1/> REGIONS: CPT | Mod: GP

## 2024-09-03 NOTE — PROGRESS NOTES
PHYSICAL THERAPY EVALUATION  Type of Visit: Evaluation              Subjective       Presenting condition or subjective complaint: When I excercise I often get a very tight pelvic foor and experience significant pain in my vagina and down my legs.  Patient reports to outpatient physical therapy with pelvic pain that started decades ago. Patient had cancer treatment when she 44 - after which she had more pelvic pain. She was recommend estrogen at the time but since her cancer fed on estrogen she did not take that. Has not had intercourse due to pain. She felt like there was a brick shoved in her vagina - started having pain with walking. She saw a pelvic therapist at the time - 4 years ago and there was significant pain with the exam. She was given a pelvic wand at the time to help with pain and does that every day to help with the pain. She continues to have pain with pilates warmup and modern dance. She does not have pain during the dance, but afterwards she has more pain. Notices it with lumbar hip roll and bridges. Has tried to be more mindful of using her pelvic floor so that it is not engaged all the time with lifting things off the floor. Saw another pelvic PT 2 years ago that helped reduce the pain. Within the past couple years patient also broke her leg and back - L4 and tibia on R side. 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 She is back to walking 50-55 min but dance is still painful. When she is dancing the pain either comes on after or the next day. Last time she did dance was 1 month ago - classes are an hour and a half. She only did 35-45 min the last class she went to. In the past she would dance 4x/week.     Also has bowel movement 1 x every 4-5 days; takes a laxative and prunes to assist with this.    Has neuropathy R>L in feet. Feels like it travels up to her vagina - not as bad at present.     Goals:     Date of  onset: 08/03/24    Relevant medical history:     Past Medical History:   Diagnosis Date    Cancer (H)     Sleep apnea      Dates & types of surgery:    Past Surgical History:   Procedure Laterality Date    COLONOSCOPY N/A 08/16/2023    Procedure: Colonoscopy;  Surgeon: Ana Paula Hurst MD;  Location:  GI    COMBINED CYSTOSCOPY, RETROGRADES, URETEROSCOPY, INSERT STENT Left 5/15/2024    Procedure: CYSTOSCOPY, LEFT URETERAL STENT REMOVAL,  LEFT RETROGRADE PYELOGRAM, LEFT URETEROSCOPY WITH BIOPSY AND URETERAL BRUSHING, LEFT URETERAL STENT PLACEMENT;  Surgeon: Rogelio Gallagher MD;  Location: [facility] OR    CYSTOSCOPY, URETEROSCOPY, INSERT STENT Left 5/1/2024    Procedure: CYSTOURETEROSCOPY, LEFT URETERAL BIOPSY WITH STENT INSERTION, LEFT RETROGRADE PYELOGRAM;  Surgeon: Rogelio Gallagher MD;  Location: [facility] OR    DAVINCI LYSIS OF ADHESIONS N/A 6/24/2024    Procedure: Davinci Lysis of Adhesions;  Surgeon: Rogelio Gallagher MD;  Location: [facility] OR    DAVINCI URETERECTOMY Left 6/24/2024    Procedure: ROBOT ASSISTED LAPAROSCOPIC LEFT DISTAL URETERECTOMY WITH URETERAL RE-IMPLANT, LEFT PELVIC LYMPH NODE DISSECTION, LYSIS OF ADHESIONS;  Surgeon: Rogelio Gallagher MD;  Location: [facility] OR    MASTECTOMY, BILATERAL      Double mastectomy     Prior diagnostic imaging/testing results:       Prior therapy history for the same diagnosis, illness or injury: Yes      Prior Level of Function  Transfers: Independent  Ambulation: Independent  ADL: Independent  IADL:     Living Environment  Social support: With a significant other or spouse   Type of home: Apartment/condo   Stairs to enter the home: No       Ramp: No   Stairs inside the home: No       Help at home: Home and Yard maintenance tasks; Assist for driving and community activities  Equipment owned:       Employment: Yes   Hobbies/Interests: dance    Patient goals for therapy: Dance, stand for long periods of time.    Pain assessment: Pain present     Objective      PELVIC  EVALUATION  ADDITIONAL HISTORY:  Sex assigned at birth: Female  Gender identity: Female    Pronouns: She/Her Hers      Bladder History:  Feels bladder filling: Yes  Triggers for feeling of inability to wait to go to the bathroom: No    How long can you wait to urinate: a while  Gets up at night to urinate: Yes 1-3  Can stop the flow of urine when urinating: Yes  Volume of urine usually released: Average   Other issues: Slow or hesitant urine stream  Number of bladder infections in last 12 months:    Fluid intake per day: 20 1shot    Medications taken for bladder: No     Activities causing urine leak:      Amount of urine typically leaked:    Pads used to help with leaking:          Bowel History:  Frequency of bowel movement: one time every 4-5days takes laxatives  Consistency of stool: Other hard or soft if laxatives  Ignores the urge to defecate: No  Other bowel issues: Straining to have bowel movement  Length of time spent trying to have a bowel movement: 10min    Sexual Function History:  Sexual orientation: Straight    Sexually active: No  Lubrication used:      Pelvic pain: Walking; Standing; Initial penetration (rectal or vaginal); Deep penetration (rectal or vaginal); Pelvic exams    Pain or difficulty with orgasms/erection/ejaculation:      State of menopause: Post-menopause (I am done with menopause)  Hormone medications: No      Are you currently pregnant: No  Number of previous pregnancies: 2  Number of deliveries: 2  If you have delivered before, did you have any of these issues during delivery: Vaginal delivery  Have you been diagnosed with pelvic prolapse or abdominal separation: No  Do you get regular exercise: Yes  I do this type of exercise: walk and dance pool aerobics  Have you tried pelvic floor strengthening exercises for 4 weeks: Yes  Do you have any history of trauma that is relevant to your care that you d like to share: No      Discussed reason for referral regarding pelvic health needs and  external/internal pelvic floor muscle examination with patient/guardian.  Opportunity provided to ask questions and verbal consent for assessment and intervention was given.    PAIN: Pain Level at Rest: 1/10  Pain Level with Use: 10/10  Pain Location: pelvic  Pain Quality: Dull  Pain Frequency: intermittent  POSTURE: WFL  Slightly slumped back - using a cushion for pelvic floor and lb  LUMBAR SCREEN:  flexion = limited, extension = limited, side bend =  WFL bilaterally  HIP SCREEN:  PROM: WNL bilaterally with flexion, IR and ER  Strength:  hip flexion: 3+ bilaterally, hip abd: 3+ bilaterally    Functional Strength Testing: Double Leg Squat: Anterior knee translation and Improper use of glutes/hips    BREATHING SYMMETRY: WNL    PELVIC EXAM  External Visual Inspection:  At rest: Normal  With voluntary pelvic floor contraction: Present  Relaxation of PFM: Partial/delayed relaxation  With intra-abdominal pressure: Cough: Perineal elevation    Integumentary:   Introitus: Unremarkable    External Digital Palpation per Perineum:   Ischiocavernosis: Unremarkable  Bulbo cavernosis: Unremarkable  Transverse perineal: Tenderness  Levator ani: Tenderness  Perineal body: Unremarkable  Public symphysis: Tenderness    Internal Digital Palpation:  Per Vagina:  Tenderness  Myofascial Resistance to Palpation: Soft  Digital Muscle Performance: P (Power): 4  E (Endurance): 10 sec  Compensations: none  Relaxation Post-Contraction: Partial/delayed relaxation  OI bilaterally/second and third layer of pelvic floor bilaterally    Per Rectum:  Not assessed      Pelvic Organ Prolapse:   Not assessed    ABDOMINAL ASSESSMENT  Abdominal Activation/Strength: SLR: 4+ bilaterally    Scar:   Location/Type: abdominal scars from recent surgery in June 2024  Mobility: Hypomobile, and mobile - multiple sites (more lateral)    Fascial Tension/Restriction: not assessed    BIOFEEDBACK:  Position:   Surface Electrodes:     Abdominals:     Perianals:        Assessment & Plan   CLINICAL IMPRESSIONS  Medical Diagnosis: pelvic floor pain    Treatment Diagnosis:     Impression/Assessment: Patient is a 63 year old female with pelvic pain and constipation complaints.  The following significant findings have been identified: Pain, Decreased ROM/flexibility, Decreased strength, Impaired muscle performance, and Decreased activity tolerance. These impairments interfere with their ability to perform self care tasks, recreational activities, household chores, household mobility, and community mobility as compared to previous level of function.     Clinical Decision Making (Complexity):  Clinical Presentation: Stable/Uncomplicated  Clinical Presentation Rationale: based on medical and personal factors listed in PT evaluation  Clinical Decision Making (Complexity): Low complexity    PLAN OF CARE  Treatment Interventions:  Modalities: Biofeedback, E-stim, Ultrasound  Interventions: Manual Therapy, Neuromuscular Re-education, Therapeutic Activity, Therapeutic Exercise, Self-Care/Home Management    Long Term Goals     PT Goal 1  Goal Identifier: bowel movements  Goal Description: patient will report soft formed bowels and not straining  Rationale: to maximize safety and independence with performance of ADLs and functional tasks;to maximize safety and independence within the home;to maximize safety and independence within the community;to maximize safety and independence with self cares  Target Date: 11/26/24  PT Goal 2  Goal Identifier: dancing  Goal Description: patient will be able to complete 50 + min of a dance class without pain afterwards  Rationale: to maximize safety and independence with performance of ADLs and functional tasks;to maximize safety and independence within the home;to maximize safety and independence within the community;to maximize safety and independence with self cares  Target Date: 11/26/24  PT Goal 3  Goal Identifier: voiding  Goal Description: patient  will be able to pee without report of difficulty starting stream  Rationale: to maximize safety and independence with performance of ADLs and functional tasks;to maximize safety and independence within the home;to maximize safety and independence within the community;to maximize safety and independence with self cares  Target Date: 11/26/24      Frequency of Treatment: 1x/wk progressing to 1 x every other week  Duration of Treatment: 2-3 months    Recommended Referrals to Other Professionals: n/a at present  Education Assessment:   Learner/Method: Patient    Risks and benefits of evaluation/treatment have been explained.   Patient/Family/caregiver agrees with Plan of Care.     Evaluation Time:     PT Eval, Low Complexity Minutes (77081): 28     Signing Clinician: Yi Brunner PT

## 2024-09-19 ENCOUNTER — THERAPY VISIT (OUTPATIENT)
Dept: PHYSICAL THERAPY | Facility: CLINIC | Age: 64
End: 2024-09-19
Payer: COMMERCIAL

## 2024-09-19 DIAGNOSIS — R10.2 PELVIC PAIN IN FEMALE: Primary | ICD-10-CM

## 2024-09-19 PROCEDURE — 97530 THERAPEUTIC ACTIVITIES: CPT | Mod: GP

## 2024-09-19 PROCEDURE — 97110 THERAPEUTIC EXERCISES: CPT | Mod: GP

## 2024-10-13 ENCOUNTER — MYC MEDICAL ADVICE (OUTPATIENT)
Dept: FAMILY MEDICINE | Facility: CLINIC | Age: 64
End: 2024-10-13
Payer: COMMERCIAL

## 2024-10-14 NOTE — TELEPHONE ENCOUNTER
Triage Patient Outreach    Attempt # 1    Was call answered?  No.  Left voicemail to return call to [facility]riage at [facility]    Upon c/b: please triage. It does not look like this has been assessed.    Angella Vasquez RN

## 2024-10-15 ENCOUNTER — NURSE TRIAGE (OUTPATIENT)
Dept: FAMILY MEDICINE | Facility: CLINIC | Age: 64
End: 2024-10-15
Payer: COMMERCIAL

## 2024-10-15 NOTE — TELEPHONE ENCOUNTER
Reason for Disposition   Hoarseness and swollen lymph node or lump in neck (Exception: Current common cold or mild URI symptoms.)   MODERATE to SEVERE hoarseness (e.g., interferes with work) and is professional voice user (e.g., call center worker, browning, teacher) (Exception: Current common cold or mild URI symptoms.)   Sore throat lasts > 5 days    Additional Information   Negative: SEVERE difficulty breathing (e.g., struggling for each breath, speaks in single words)   Negative: Bluish (or gray) lips or face now   Negative: Stridor with difficulty breathing   Negative: Started suddenly after sting from bee, wasp, or yellow jacket   Negative: Started suddenly after taking a medicine or allergic food (e.g., nuts, seafood)   Negative: Started suddenly along with widespread hives   Negative: Tongue or facial swelling   Negative: Can't swallow normal secretions (e.g., drooling or spitting)   Negative: Sounds like a life-threatening emergency to the triager   Negative: Nasal allergies also present and they are acting up   Negative: Cold symptoms are main concern   Negative: Sore throat is main symptom   Negative: Recovered from choking episode and hoarseness lasts > 30 minutes   Negative: Direct blow to front of neck   Negative: Fever > 103 F (39.4 C)   Negative: Hoarseness starting in past 24 hours AND taking an ACE Inhibitor medicine (e.g., benazepril/LOTENSIN, captopril/CAPOTEN, enalapril/VASOTEC, lisinopril/ZESTRIL)   Negative: Difficulty breathing   Negative: Patient sounds very sick or weak to the triager   Negative: SEVERE sore throat pain   Negative: Fever present > 3 days (72 hours)   Negative: Hoarseness starting > 24 hours ago AND taking an ACE Inhibitor medicine (e.g., benazepril/LOTENSIN, captopril/CAPOTEN, enalapril/VASOTEC, lisinopril/ZESTRIL)   Negative: Hoarseness and risk factor (e.g., recent neck surgery or intubation, smoker, unexpected weight loss) (Exception: Current common cold or mild URI  "symptoms.)    Answer Assessment - Initial Assessment Questions  1. DESCRIPTION: \"Describe your voice.\" (e.g., coarse, raspy, weaker, airy, scratchy, deeper)      Laryngitis. Unable to speak at all.   2. SEVERITY: \"How bad is it?\"    - MILD: doesn't interfere with normal activities    - MODERATE: interferes with normal activities such as school or work    - SEVERE: only able to whisper.      Severe. Only able to whisper.  3. ONSET: \"When did the hoarseness begin?\"      Covid 9/20. Lost voice for 10 days and voice came back 10/7 lost voice again. Has not retested for Covid 19.   4. COUGH: \"Is there a cough?\" If Yes, ask: \"How bad is it?\"      Cough, but not terrible.   5. FEVER: \"Do you have a fever?\" If Yes, ask: \"What is your temperature, how was it measured, and when did it start?\"      No fever.   6. ALLERGIES: \"Any allergy symptoms?\" If Yes, ask: \"What are they?\"      Yes, pretty bad allergies.   7. IRRITANTS: \"Do you smoke?\" \"Have you been exposed to any irritating fumes?\" (e.g., smoke)      no  8. CAUSE: \"What do you think is causing the hoarseness?\"      Wondering if long Covid or another cold. Has not lost voice with allergies. History of laryngitis for 4-5 days about 3 times a year when she gets a cold.   9. OTHER SYMPTOMS: \"Do you have any other symptoms?\" (e.g., breathing difficulty, fever, foreign body, lymph node swelling in neck, rash, sore throat, weight loss)      Swollen lymph nodes in neck, somewhat of a sore throat, denies rash and weight loss.   10. PREGNANCY: \"Is there any chance you are pregnant?\" \"When was your last menstrual period?\"        NA    Protocols used: [id number]  Lesly Henderson RN    "

## 2024-10-15 NOTE — TELEPHONE ENCOUNTER
Per chart review- pt never called the clinic.       Patient Contact    Attempt # 2    Was call answered?  No.  Left message on voicemail with information to call me back.

## 2024-10-16 ENCOUNTER — OFFICE VISIT (OUTPATIENT)
Dept: FAMILY MEDICINE | Facility: CLINIC | Age: 64
End: 2024-10-16
Payer: COMMERCIAL

## 2024-10-16 VITALS
TEMPERATURE: 98.3 F | RESPIRATION RATE: 16 BRPM | OXYGEN SATURATION: 96 % | HEART RATE: 88 BPM | HEIGHT: 66 IN | SYSTOLIC BLOOD PRESSURE: 91 MMHG | DIASTOLIC BLOOD PRESSURE: 55 MMHG | BODY MASS INDEX: 20.68 KG/M2 | WEIGHT: 128.7 LBS

## 2024-10-16 DIAGNOSIS — J04.0 LARYNGITIS: Primary | ICD-10-CM

## 2024-10-16 PROCEDURE — 99213 OFFICE O/P EST LOW 20 MIN: CPT | Performed by: PHYSICIAN ASSISTANT

## 2024-10-16 ASSESSMENT — ENCOUNTER SYMPTOMS: SORE THROAT: 1

## 2024-10-16 ASSESSMENT — PAIN SCALES - GENERAL: PAINLEVEL: MILD PAIN (2)

## 2024-10-16 NOTE — TELEPHONE ENCOUNTER
See 10/15/24 triage encounter     Yareli JONAS, Triage RN  [facility]

## 2024-10-16 NOTE — PROGRESS NOTES
"Assessment and Plan:     (J04.0) Laryngitis  (primary encounter diagnosis)  Comment: has had two successive viruses with complete loss of voice x last 8 days, has happened in the past but has never lasted this long, exam is normal aside from she cannot talk and is using white board to communicate, denies airway compromise, denies trouble handling secretions  Plan: Adult ENT  Referral        Lozenges fluids  Information given for ENT specialty care if can't get in with above in timely manner     [facility] Provider         Subjective   Lorin is a 63 year old, presenting for the following health issues:  Pharyngitis    Pharyngitis     History of Present Illness       Reason for visit:  Laryngitis  Symptom onset:  3-4 weeks ago  Symptoms include:  Sore throat  weak legs  lingering covid cough  some congestion sometimes runny nose    sometimes headache  tired  Symptom intensity:  Severe  Symptom progression:  Staying the same  Had these symptoms before:  No  What makes it worse:  Activity  What makes it better:  Cough drops  tea  rest  sleep   She is taking medications regularly.     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   She denies fever/chills, neck swelling, trouble handling secretions, airway compromise         Objective      BP 91/55 (BP Location: Right arm, Patient Position: Sitting, Cuff Size: Adult Regular)   Pulse 88   Temp 98.3  F (36.8  C) (Tympanic)   Resp 16   Ht 1.664 m (5' 5.5\")   Wt 58.4 kg (128 lb 11.2 oz)   SpO2 96%   BMI 21.09 kg/m        Physical Exam     EXAM:  GENERAL APPEARANCE: healthy, alert and no distress  EYES: Eyes grossly normal to inspection  HENT: ear canals and TMs " normal and nose and mouth without ulcers or lesions, floor of mouth is soft, oropharynx is clear without exudate or erythema, no tonsillar swelling  NECK: suppl,e no adenopathy, no asymmetry, masses  RESP: lungs clear to auscultation - no rales, rhonchi or wheezes  CV: regular rates and rhythm, normal S1 S2, no S3 or S4 and no murmur, click or rub  EXT: no edema bilateral lower extremities       Signed Electronically by: Meena Randhawa PA-C

## 2024-10-16 NOTE — PATIENT INSTRUCTIONS
Get plenty of fluids    Can try lozenges    Take tylenol as needed for pain up to 1000mg three times daily, do not exceed 3000mg in 24 hour period    Take ibuprofen as needed for pain up to 600mg three times daily with food    If you can't get in with [facility] in timely manner, schedule with [facility] (you can schedule online very easily, there is an office in [place], please also check with your insurance)

## 2024-10-20 ENCOUNTER — HEALTH MAINTENANCE LETTER (OUTPATIENT)
Age: 64
End: 2024-10-20

## 2024-10-30 ENCOUNTER — OFFICE VISIT (OUTPATIENT)
Dept: UROLOGY | Facility: CLINIC | Age: 64
End: 2024-10-30
Payer: COMMERCIAL

## 2024-10-30 ENCOUNTER — TELEPHONE (OUTPATIENT)
Dept: FAMILY MEDICINE | Facility: CLINIC | Age: 64
End: 2024-10-30

## 2024-10-30 VITALS
HEIGHT: 66 IN | SYSTOLIC BLOOD PRESSURE: 93 MMHG | OXYGEN SATURATION: 97 % | DIASTOLIC BLOOD PRESSURE: 57 MMHG | BODY MASS INDEX: 20.57 KG/M2 | WEIGHT: 128 LBS | HEART RATE: 69 BPM

## 2024-10-30 DIAGNOSIS — C66.2 UROTHELIAL CARCINOMA OF LEFT DISTAL URETER (H): Primary | ICD-10-CM

## 2024-10-30 DIAGNOSIS — N32.89 BLADDER MASS: ICD-10-CM

## 2024-10-30 LAB
ALBUMIN UR-MCNC: 30 MG/DL
APPEARANCE UR: CLEAR
BILIRUB UR QL STRIP: NEGATIVE
COLOR UR AUTO: YELLOW
GLUCOSE UR STRIP-MCNC: NEGATIVE MG/DL
HGB UR QL STRIP: ABNORMAL
KETONES UR STRIP-MCNC: ABNORMAL MG/DL
LEUKOCYTE ESTERASE UR QL STRIP: ABNORMAL
NITRATE UR QL: NEGATIVE
PH UR STRIP: 5.5 [PH] (ref 5–7)
SP GR UR STRIP: 1.02 (ref 1–1.03)
UROBILINOGEN UR STRIP-ACNC: 0.2 E.U./DL

## 2024-10-30 PROCEDURE — 52000 CYSTOURETHROSCOPY: CPT | Performed by: UROLOGY

## 2024-10-30 PROCEDURE — 81003 URINALYSIS AUTO W/O SCOPE: CPT | Mod: QW | Performed by: UROLOGY

## 2024-10-30 PROCEDURE — 99214 OFFICE O/P EST MOD 30 MIN: CPT | Mod: 25 | Performed by: UROLOGY

## 2024-10-30 RX ORDER — LIDOCAINE HYDROCHLORIDE 20 MG/ML
JELLY TOPICAL ONCE
Status: COMPLETED | OUTPATIENT
Start: 2024-10-30 | End: 2024-10-30

## 2024-10-30 RX ADMIN — LIDOCAINE HYDROCHLORIDE 5 ML: 20 JELLY TOPICAL at 14:45

## 2024-10-30 ASSESSMENT — PAIN SCALES - GENERAL: PAINLEVEL_OUTOF10: NO PAIN (0)

## 2024-10-30 NOTE — PROGRESS NOTES
"[facility]  CHIEF COMPLAINT   It was my pleasure to see Lorin Adnrade who is a 63 year old female for follow-up of LEFT hydroureteronephrosis.      HPI  5/8/2024   Lorin Andrade is a very pleasant 63 year old female with a history of left renal colic with workup showing moderate hydroureteronephrosis and enhancement of the last 5 cm of the left distal ureter.  Urine cytology with suspicion for high-grade urothelial carcinoma.  She underwent ureteroscopy with ureteral biopsy and stent placement on 5/1/2024 and returns today to discuss the results.    She has had resolution of her flank pain following stent placement  Her  joins her for this visit    5/15/2024:  Ureteroscopy     5/22/2024:  Follow-up today to review the ureteroscopy findings and pathology results    6/24/2024:  Robotic distal ureterectomy, ureteroneocystotomy and boari flap    7/9/2024:  Cystogram and dominguez removal    7/10/2024:  Follow-up today for postop check  She is doing very well after Dominguez removal yesterday    7/24/2024:  Ureteral stent removal    TODAY 10/30/2024:  Surveillance cystoscopy    PHYSICAL EXAM  Patient is a 63 year old  female   Vitals: Blood pressure 93/57, pulse 69, height 1.664 m (5' 5.5\"), weight 58.1 kg (128 lb), SpO2 97%.  Body mass index is 20.98 kg/m .  General Appearance Adult:   Alert, no acute distress, oriented  ABD: Incisions are clean, dry, and intact with peeling skin glue.  No evidence of hernia or infection  Neuro: Alert, oriented, speech and mentation normal  Psych: affect and mood normal       Creatinine   Date Value Ref Range Status   06/26/2024 0.89 0.51 - 0.95 mg/dL Final   06/25/2024 0.73 0.51 - 0.95 mg/dL Final   06/11/2024 0.74 0.51 - 0.95 mg/dL Final   05/01/2024 0.73 0.51 - 0.95 mg/dL Final   04/30/2024 0.89 0.51 - 0.95 mg/dL Final   04/19/2024 0.84 0.51 - 0.95 mg/dL Final   08/14/2023 0.61 0.51 - 0.95 mg/dL Final      Final Diagnosis   Specimen A                 Interpretation:                  -  " Suspicious for High-grade Urothelial Carcinoma      PATHOLOGY:  5/1/2024:  Final Diagnosis   Left distal ureter, biopsy:  -Normal urothelial lining without evidence of atypia or malignancy, please see description     5/15/2024:  Final Diagnosis   Ureter, left proximal, biopsy:  -- Urothelium overlying edematous stroma.  Negative for dysplasia or carcinoma.     5/15/2024:  Distal ureteral brushing  Final Diagnosis   Specimen A              Interpretation:               Negative for High Grade Urothelial Carcinoma     6/24/2024:  Distal ureterectomy and lymph node dissection  Final Diagnosis   A-C.  Left ureter with separately submitted proximal margin and left pelvic lymph nodes; left ureterectomy with left pelvic lymphadenectomy  -Proximal left ureter margin without evidence of in situ or invasive carcinoma  -2 benign lymph nodes, benign perinodal fibrovascular and fatty tissue, entire received tissue sample examined microscopically  -Urothelial carcinoma in situ present in association with background urothelial atypia/dysplasia; no evidence of carcinoma in situ at margins; please see microscopic description and synoptic template within report       IMAGING:  All pertinent imaging reviewed:    All imaging studies reviewed by me.  I personally reviewed these imaging films.  A formal report from radiology will follow.    CT UROGRAM 4/30/2024:  FINDINGS:   LOWER CHEST: No worrisome consolidation in the visualized lung bases. No pleural effusion. Partially imaged breast implants.     HEPATOBILIARY: Unchanged liver contours with benign cyst in the left hepatic lobe. No worrisome liver lesions. Normal gallbladder. No biliary ductal dilation.     PANCREAS: Normal.     SPLEEN: Normal.     ADRENAL GLANDS: Normal.     RIGHT KIDNEY/URETER: Normal morphology and enhancement of the right kidney. A few cortically based subcentimeter low-attenuation lesions are incompletely characterized though likely cysts. No urolithiasis or  hydronephrosis. Normal excretion of contrast   on delayed images without urothelial thickening or filling defects.     LEFT KIDNEY/URETER: Unchanged delayed left nephrogram. 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 No urolithiasis. There is unchanged moderate to severe hydroureteronephrosis with dependent excreted contrast   layering within the calyces. As before, there is a transition in the distal ureter where there is an approximately 5 cm segment of urothelial thickening and enhancement (series 12 image #42, #48).      BLADDER: Unremarkable.     BOWEL: Normal caliber small and large bowel. Normal appendix. No free fluid or free air.     LYMPH NODES: No lymphadenopathy.     VASCULATURE: The abdominal aorta is nonaneurysmal with mild atheromatous disease.     PELVIC ORGANS: Uterus is present. No adnexal mass.     MUSCULOSKELETAL: Unchanged mild L4 superior endplate deformity. No destructive osseous lesions.                                                                      IMPRESSION:  1.  Unchanged moderate left hydroureteronephrosis to the level of the distal ureter where there is an approximately 5 cm segment of urothelial thickening and enhancement. As before, etiology may be inflammatory or neoplastic.  2.  Additional noncritical details in the findings.    NM RENAL SCAN 4/17/2024:  FINDINGS:      Renal angiogram demonstrates: Flow to right kidney within normal  limits. There is asymmetrically slower and less flow to the left  kidney.   Renogram images demonstrate:     Right kidney: There is normal cortical uptake. Cortical transit is  noted at 3 minutes. Ureter is visualized at 4 minutes. There is normal  wash out. Following Lasix administration, there is prompt wash out.      Left kidney: There is delayed cortical uptake. Cortical transit is  noted at 6 minutes which is slightly delayed. Ureter is visualized at  20 minutes. There is no wash ou prior to  Lasix, minimal wash out after  Lasix.   Renogram curves demonstrate:     Right kidney: The radiotracer reaches the peak activity at 5 minutes  (time to peak). There is normal wash out and complete wash out  following Lasix. T1/2 is: 14 minutes.     Left kidney: The radiotracer reaches the peak activity at 22 minutes  (time to peak). There is delayed wash out and  incomplete wash out  following Lasix. T1/2 is: Not applicable.     Split function: Left: 38.7%, Right: 61.3%.                                                                      IMPRESSION:  1. Right kidney: Normal function. Normal excretion. No obstruction.     2. Left kidney: Decreased function. Decreased excretion, concerning  for partial obstruction.    CYSTOGRAM 7/9/2024:  FINDINGS: Left ureteral stent present on the  image. Cystografin  contrast was instilled into the bladder via existing Ayala catheter.  No visible leak.                                                                      IMPRESSION:  1.  No leak, status post ureteral reimplantation.    ASSESSMENT and PLAN  63-year-old female with left-sided hydroureteronephrosis and concern for urothelial malignancy of the left distal ureter.  Now status post a robotic distal ureterectomy, lymph node dissection, ureteroneocystotomy with a Boari flap on 6/24/2024 with evidence of CIS    High-grade CIS of the distal ureter  - Reviewed her pathology with high-grade CIS of the ureter with no evidence of positive margin  - We discussed her lymph nodes were negative  -Surveillance cystoscopy today with concern for urothelial carcinoma of the bladder    Bladder mass  - We discussed the need for a TURBT and that she may require intravesical BCG or chemotherapy following pathology results  - Order for surgery placed and I discussed with surgery scheduling to expedite scheduling for 11/4/2024      Time spent: 21 minutes spent on the date of the encounter doing chart review, history and exam,  documentation and further activities as noted above.  This was in addition to cystoscopy time    Rogelio Gallagher MD   Urology  [facility]  [facility] Phone: [phone]  [facility] Phone: [phone]

## 2024-10-30 NOTE — PROCEDURES
CYSTOSCOPY PROCEDURE NOTE:    Lorin Andrade is a 63 year old female who presents with history of LEFT ureteral urothelial carcinoma for a cystoscopy.    Pt ID verified with patient: Yes     Procedure verified with patient: Yes     Procedure confirmed with physician and support staff: Yes     Consent confirmed with physician and support staff.    Sign In:  History and Physical Exam reviewed  Primary Diagnosis: history of ureteral urothelial carcinoma  Informed Consent Discussed: Yes   Sign in Communication: Yes   Time Out:  Team Confirms the Correct Patient, Correct Procedure; Yes , Correct Site and Site Marking, Correct Position (if applicable).  Affirmation of Time Out: Yes   Sign Out:  Sign Out Discussion: Yes   Physician: Rogelio Gallagher MD  Indications for procedure:   Lorin Andrade is a 63 year old female with a history of left ureteral urothelial carcinoma.    Description of procedure:   After fully informed, voluntary consent was obtained, the patient was brought into the procedure room, identified and placed in a dorsal lithotomy position on the cystoscopy table.  The vagina/introitus were prepped with betadine and draped in a sterile fashion.  Urojet lidocaine gel was introduced.  A 15F flexible cystoscope was inserted into the urethra, and the bladder and urethra were examined in a systematic manner.  The patient tolerated the procedure well and there were no complications.      Findings:  External exam revealed no cystocele and no rectocele.   Cystoscopy then showed the urethra to be normal in appearance with normal coaptation. The bladder itself was completely surveyed.  The right ureteric orifice was normal in position and effluxing clear urine. The LEFT ureteral orifice and boari flap were evident on the anterior wall. There was no trabeculation.  There were no stones or diverticula identifed.  There was concerning masses within the bladder on the anterior wall as well as posterior wall which are  concerning for bladder recurrence of urothelial carcinoma.    Assessment/Plan:   Lorin Andrade is a 63 year old female with a history of left ureteral urothelial carcinoma status post distal ureterectomy, now with concerning findings on cystoscopy.      -See clinic note    Rogelio Gallagher MD

## 2024-10-30 NOTE — PATIENT INSTRUCTIONS
"      ?            AFTER YOUR CYSTOSCOPY         You have just completed a cystoscopy, or \"cysto\", which allowed your physician to learn more about your bladder (or to remove a stent placed after surgery). We suggest that you continue to avoid caffeine, fruit juice, chocolate, and alcohol for the next 24 hours; However, you are encouraged to return to your normal activities.       A few things that are considered normal after your cystoscopy:    * small amount of bleeding (or spotting) that clears within the next 24 hours    * Slight burning sensation with urination    * Sensation of needing to avoid more frequently    * The feeling of \"air\" in your urine or peeing out bubbles    * Mild discomfort that is relieved with Tylenol    *Bladder Spasms         Please contact our office promptly if you:    * develop a fever above 101 degrees    * are unable to urinate    * develop bright red blood that does not stop    * severe pain or swelling      And of course, please contact our office with any concerns or questions [phone] 8:00am-4:30pm after hours please call [phone].  ?    AFTER YOUR CYSTOSCOPY        You have just completed a cystoscopy, or \"cysto\", which allowed your physician to learn more about your bladder (or to remove a stent placed after surgery). We suggest that you continue to avoid caffeine, fruit juice, and alcohol for the next 24 hours, however, you are encouraged to return to your normal activities.         A few things that are considered normal after your cystoscopy:     * Small amount of bleeding (or spotting) that clears within the next 24 hours     * Slight burning sensation with urination     * Sensation to of needing to avoid more frequently     * The feeling of \"air\" in your urine     * Mild discomfort that is relieved with Tylenol        Please contact our office promptly if you:     * Develop a fever above 101 degrees     * Are unable to urinate     * Develop bright red blood " that does not stop     * Severe pain or swelling         Please contact our office with any concerns or questions @[facility].

## 2024-10-30 NOTE — TELEPHONE ENCOUNTER
Reason for Call:  Appointment Request    Patient requesting this type of appt: Pre-op    Requested provider:  Any provider    Reason patient unable to be scheduled: Not with their preferred provider    When does patient want to be seen/preferred time:  Before 11/4/2024    Comments: Patient was recently diagnosed with bladder cancer and needs to have an emergent procedure on Monday 11/4/2024. Requesting to schedule a pre-op physical at her primary care clinic before surgery. Currently scheduled at [facility] but strongly prefers to schedule with any provider at [place] if possible.    Could we send this information to you in [place] or would you prefer to receive a phone call?:   No preference   Okay to leave a detailed message?: Yes at Cell number on file:    Telephone Information:   Mobile [phone]       Call taken on 10/30/2024 at 3:35 PM by Nurys Harper

## 2024-10-30 NOTE — LETTER
"10/30/2024       RE: Lorin Andrade  [address] [facility] [address]     Dear Colleague,    Thank you for referring your patient, Lorin Andrade, to the [facility] at [facility]. Please see a copy of my visit note below.    [place]  CHIEF COMPLAINT   It was my pleasure to see Lorin Andrade who is a 63 year old female for follow-up of LEFT hydroureteronephrosis.      HPI  5/8/2024   Lorin Andrade is a very pleasant 63 year old female with a history of left renal colic with workup showing moderate hydroureteronephrosis and enhancement of the last 5 cm of the left distal ureter.  Urine cytology with suspicion for high-grade urothelial carcinoma.  She underwent ureteroscopy with ureteral biopsy and stent placement on 5/1/2024 and returns today to discuss the results.    She has had resolution of her flank pain following stent placement  Her  joins her for this visit    5/15/2024:  Ureteroscopy     5/22/2024:  Follow-up today to review the ureteroscopy findings and pathology results    6/24/2024:  Robotic distal ureterectomy, ureteroneocystotomy and boari flap    7/9/2024:  Cystogram and dominguez removal    7/10/2024:  Follow-up today for postop check  She is doing very well after Dominguez removal yesterday    7/24/2024:  Ureteral stent removal    TODAY 10/30/2024:  Surveillance cystoscopy    PHYSICAL EXAM  Patient is a 63 year old  female   Vitals: Blood pressure 93/57, pulse 69, height 1.664 m (5' 5.5\"), weight 58.1 kg (128 lb), SpO2 97%.  Body mass index is 20.98 kg/m .  General Appearance Adult:   Alert, no acute distress, oriented  ABD: Incisions are clean, dry, and intact with peeling skin glue.  No evidence of hernia or infection  Neuro: Alert, oriented, speech and mentation normal  Psych: affect and mood normal       Creatinine   Date Value Ref Range Status   06/26/2024 0.89 0.51 - 0.95 mg/dL Final   06/25/2024 0.73 0.51 - 0.95 " mg/dL Final   06/11/2024 0.74 0.51 - 0.95 mg/dL Final   05/01/2024 0.73 0.51 - 0.95 mg/dL Final   04/30/2024 0.89 0.51 - 0.95 mg/dL Final   04/19/2024 0.84 0.51 - 0.95 mg/dL Final   08/14/2023 0.61 0.51 - 0.95 mg/dL Final      Final Diagnosis   Specimen A                 Interpretation:                  -  Suspicious for High-grade Urothelial Carcinoma      PATHOLOGY:  5/1/2024:  Final Diagnosis   Left distal ureter, biopsy:  -Normal urothelial lining without evidence of atypia or malignancy, please see description     5/15/2024:  Final Diagnosis   Ureter, left proximal, biopsy:  -- Urothelium overlying edematous stroma.  Negative for dysplasia or carcinoma.     5/15/2024:  Distal ureteral brushing  Final Diagnosis   Specimen A              Interpretation:               Negative for High Grade Urothelial Carcinoma     6/24/2024:  Distal ureterectomy and lymph node dissection  Final Diagnosis   A-C.  Left ureter with separately submitted proximal margin and left pelvic lymph nodes; left ureterectomy with left pelvic lymphadenectomy  -Proximal left ureter margin without evidence of in situ or invasive carcinoma  -2 benign lymph nodes, benign perinodal fibrovascular and fatty tissue, entire received tissue sample examined microscopically  -Urothelial carcinoma in situ present in association with background urothelial atypia/dysplasia; no evidence of carcinoma in situ at margins; please see microscopic description and synoptic template within report       IMAGING:  All pertinent imaging reviewed:    All imaging studies reviewed by me.  I personally reviewed these imaging films.  A formal report from radiology will follow.    CT UROGRAM 4/30/2024:  FINDINGS:   LOWER CHEST: No worrisome consolidation in the visualized lung bases. No pleural effusion. Partially imaged breast implants.     HEPATOBILIARY: Unchanged liver contours with benign cyst in the left hepatic lobe. No worrisome liver lesions. Normal gallbladder. No  biliary ductal dilation.     PANCREAS: Normal.     SPLEEN: Normal.     ADRENAL GLANDS: Normal.     RIGHT KIDNEY/URETER: Normal morphology and enhancement of the right kidney. A few cortically based subcentimeter low-attenuation lesions are incompletely characterized though likely cysts. No urolithiasis or hydronephrosis. Normal excretion of contrast   on delayed images without urothelial thickening or filling defects.     LEFT KIDNEY/URETER: Unchanged delayed left nephrogram. 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 No urolithiasis. There is unchanged moderate to severe hydroureteronephrosis with dependent excreted contrast   layering within the calyces. As before, there is a transition in the distal ureter where there is an approximately 5 cm segment of urothelial thickening and enhancement (series 12 image #42, #48).      BLADDER: Unremarkable.     BOWEL: Normal caliber small and large bowel. Normal appendix. No free fluid or free air.     LYMPH NODES: No lymphadenopathy.     VASCULATURE: The abdominal aorta is nonaneurysmal with mild atheromatous disease.     PELVIC ORGANS: Uterus is present. No adnexal mass.     MUSCULOSKELETAL: Unchanged mild L4 superior endplate deformity. No destructive osseous lesions.                                                                      IMPRESSION:  1.  Unchanged moderate left hydroureteronephrosis to the level of the distal ureter where there is an approximately 5 cm segment of urothelial thickening and enhancement. As before, etiology may be inflammatory or neoplastic.  2.  Additional noncritical details in the findings.    NM RENAL SCAN 4/17/2024:  FINDINGS:      Renal angiogram demonstrates: Flow to right kidney within normal  limits. There is asymmetrically slower and less flow to the left  kidney.   Renogram images demonstrate:     Right kidney: There is normal cortical uptake. Cortical transit is  noted at 3 minutes.  Ureter is visualized at 4 minutes. There is normal  wash out. Following Lasix administration, there is prompt wash out.      Left kidney: There is delayed cortical uptake. Cortical transit is  noted at 6 minutes which is slightly delayed. Ureter is visualized at  20 minutes. There is no wash ou prior to Lasix, minimal wash out after  Lasix.   Renogram curves demonstrate:     Right kidney: The radiotracer reaches the peak activity at 5 minutes  (time to peak). There is normal wash out and complete wash out  following Lasix. T1/2 is: 14 minutes.     Left kidney: The radiotracer reaches the peak activity at 22 minutes  (time to peak). There is delayed wash out and  incomplete wash out  following Lasix. T1/2 is: Not applicable.     Split function: Left: 38.7%, Right: 61.3%.                                                                      IMPRESSION:  1. Right kidney: Normal function. Normal excretion. No obstruction.     2. Left kidney: Decreased function. Decreased excretion, concerning  for partial obstruction.    CYSTOGRAM 7/9/2024:  FINDINGS: Left ureteral stent present on the  image. Cystografin  contrast was instilled into the bladder via existing Ayala catheter.  No visible leak.                                                                      IMPRESSION:  1.  No leak, status post ureteral reimplantation.    ASSESSMENT and PLAN  63-year-old female with left-sided hydroureteronephrosis and concern for urothelial malignancy of the left distal ureter.  Now status post a robotic distal ureterectomy, lymph node dissection, ureteroneocystotomy with a Boari flap on 6/24/2024 with evidence of CIS    High-grade CIS of the distal ureter  - Reviewed her pathology with high-grade CIS of the ureter with no evidence of positive margin  - We discussed her lymph nodes were negative  -Surveillance cystoscopy today with concern for urothelial carcinoma of the bladder    Bladder mass  - We discussed the need for a  TURBT and that she may require intravesical BCG or chemotherapy following pathology results  - Order for surgery placed and I discussed with surgery scheduling to expedite scheduling for 11/4/2024      Time spent: 21 minutes spent on the date of the encounter doing chart review, history and exam, documentation and further activities as noted above.  This was in addition to cystoscopy time    Rogelio Gallagher MD   Urology  [facility]  [facility] Phone: [phone]  [facility] Phone: [phone]      Again, thank you for allowing me to participate in the care of your patient.      Sincerely,    Rogelio Gallagher MD

## 2024-10-30 NOTE — NURSING NOTE
Chief Complaint   Patient presents with    Urothelial Carcinoma     Patient is here for Cystoscopy      Prior to the start of the procedure and with procedural staff participation, I verbally confirmed the patient s identity using two indicators, relevant allergies, that the procedure was appropriate and matched the consent or emergent situation, and that the correct equipment/implants were available. Immediately prior to starting the procedure I conducted the Time Out with the procedural staff and re-confirmed the patient s name, procedure, and site/side. (The [facility] universal protocol was followed.)  Yes    Sedation (Moderate or Deep): None   Prior to procedure patient's urethra was cleansed with iodine and draped in sterile fashion. Lidocaine Gel was inserted into urethra. Patient tolerated well.   2% Lidocaine Hydrochloride Jelly Lot#[id number]    Exp:  06/2026    Patient tolerated instillation of lidocaine gel well. Stayed in clinic for up to 15 minutes, with no adverse reaction.    Sonia Franco LPN

## 2024-10-31 ENCOUNTER — OFFICE VISIT (OUTPATIENT)
Dept: FAMILY MEDICINE | Facility: CLINIC | Age: 64
End: 2024-10-31
Payer: COMMERCIAL

## 2024-10-31 VITALS
OXYGEN SATURATION: 98 % | SYSTOLIC BLOOD PRESSURE: 113 MMHG | HEIGHT: 66 IN | BODY MASS INDEX: 20.79 KG/M2 | TEMPERATURE: 97.1 F | DIASTOLIC BLOOD PRESSURE: 71 MMHG | WEIGHT: 129.4 LBS | RESPIRATION RATE: 12 BRPM | HEART RATE: 71 BPM

## 2024-10-31 DIAGNOSIS — F41.9 ANXIETY: ICD-10-CM

## 2024-10-31 DIAGNOSIS — C66.9 UROTHELIAL CARCINOMA OF DISTAL URETER (H): Primary | ICD-10-CM

## 2024-10-31 DIAGNOSIS — G62.9 PERIPHERAL POLYNEUROPATHY: ICD-10-CM

## 2024-10-31 DIAGNOSIS — Z01.818 PREOP GENERAL PHYSICAL EXAM: ICD-10-CM

## 2024-10-31 LAB
ERYTHROCYTE [DISTWIDTH] IN BLOOD BY AUTOMATED COUNT: 13.3 % (ref 10–15)
HCT VFR BLD AUTO: 36.7 % (ref 35–47)
HGB BLD-MCNC: 11.6 G/DL (ref 11.7–15.7)
MCH RBC QN AUTO: 30.1 PG (ref 26.5–33)
MCHC RBC AUTO-ENTMCNC: 31.6 G/DL (ref 31.5–36.5)
MCV RBC AUTO: 95 FL (ref 78–100)
PLATELET # BLD AUTO: 215 10E3/UL (ref 150–450)
RBC # BLD AUTO: 3.85 10E6/UL (ref 3.8–5.2)
WBC # BLD AUTO: 6.3 10E3/UL (ref 4–11)

## 2024-10-31 PROCEDURE — 85027 COMPLETE CBC AUTOMATED: CPT | Performed by: PHYSICIAN ASSISTANT

## 2024-10-31 PROCEDURE — 36415 COLL VENOUS BLD VENIPUNCTURE: CPT | Performed by: PHYSICIAN ASSISTANT

## 2024-10-31 PROCEDURE — 99214 OFFICE O/P EST MOD 30 MIN: CPT | Performed by: PHYSICIAN ASSISTANT

## 2024-10-31 ASSESSMENT — PAIN SCALES - GENERAL: PAINLEVEL_OUTOF10: NO PAIN (0)

## 2024-10-31 NOTE — PROGRESS NOTES
Preoperative Evaluation  [facility]  [address] [facility], [address]  [facility] [address]  Phone: [phone]  Primary Provider: Sandie Bella MD  Pre-op Performing Provider: Lorin Ochoa PA-C  Oct 31, 2024             10/31/2024   Surgical Information   What procedure is being done? CYSTOSCOPY, LEFT RETROGRADE PYELOGRAM, LEFT URETEROSCOPY DIAGNOSTIC SELECTIVE CYTOLOGY AND POSSIBLE BIOPSY, POSSIBLE LEFT URETERAL STENT PLACEMENT    Facility or Hospital where procedure/surgery will be performed: [facility]   Who is doing the procedure / surgery? Dr Gallagher    Date of surgery / procedure: Nov 4    Time of surgery / procedure: 1:00   Where do you plan to recover after surgery? at home with family        Patient-reported     Fax number for surgical facility: Note does not need to be faxed, will be available electronically in Epic.    Assessment & Plan     The proposed surgical procedure is considered INTERMEDIATE risk.    Urothelial carcinoma of distal ureter (H)      Preop general physical exam  Pt is not on any blood thinners    Peripheral polyneuropathy  Cont lyrica    Anxiety  Cont celexa        - No identified additional risk factors other than previously addressed         Recommendation  Approval given to proceed with proposed procedure, without further diagnostic evaluation.    Subjective   Lorin is a 63 year old, presenting for the following:  Pre-Op Exam        HPI related to upcoming procedure: urothelial ca        10/31/2024   Pre-Op Questionnaire   Have you ever had a heart attack or stroke? No    Have you ever had surgery on your heart or blood vessels, such as a stent placement, a coronary artery bypass, or surgery on an artery in your head, neck, heart, or legs? No    Do you have chest pain with activity? No    Do you have a history of heart failure? No    Do you currently have a cold, bronchitis or symptoms of other infection? No    Do you have a cough, shortness of breath, or  wheezing? No    Do you or anyone in your family have previous history of blood clots? No    Do you or does anyone in your family have a serious bleeding problem such as prolonged bleeding following surgeries or cuts? No    Have you ever had problems with anemia or been told to take iron pills? (!) YES   Have you had any abnormal blood loss such as black, tarry or bloody stools, or abnormal vaginal bleeding? No    Have you ever had a blood transfusion? No    Are you willing to have a blood transfusion if it is medically needed before, during, or after your surgery? Yes    Have you or any of your relatives ever had problems with anesthesia? No    Do you have sleep apnea, excessive snoring or daytime drowsiness? (!) YES    Do you have a CPAP machine? Yes    Do you have any artifical heart valves or other implanted medical devices like a pacemaker, defibrillator, or continuous glucose monitor? No    Do you have artificial joints? No    Are you allergic to latex? No        Patient-reported     Preoperative Review of    reviewed - controlled substances reflected in medication list.          Patient Active Problem List    Diagnosis Date Noted    Pelvic pain in female 09/03/2024     Priority: Medium    Ureteral mass 06/24/2024     Priority: Medium    Hydroureter 04/30/2024     Priority: Medium    Flank pain 04/30/2024     Priority: Medium    Hydronephrosis of left kidney 04/22/2024     Priority: Medium    Lesion of left native ureter 04/22/2024     Priority: Medium    Annual physical exam 08/23/2023     Priority: Medium    Constipation, unspecified constipation type 08/23/2023     Priority: Medium    Closed compression fracture of L4 vertebra, sequela 08/23/2023     Priority: Medium    Need for vaccination 08/23/2023     Priority: Medium    Asymptomatic postmenopausal status 05/25/2023     Priority: Medium    Visit for screening mammogram 05/25/2023     Priority: Medium    Screen for colon cancer 05/25/2023      Priority: Medium    Anxiety 05/25/2023     Priority: Medium    Pelvic floor dysfunction in female 05/25/2023     Priority: Medium    Peripheral polyneuropathy 05/25/2023     Priority: Medium    Malignant neoplasm of left female breast, unspecified estrogen receptor status, unspecified site of breast (H) 05/25/2023     Priority: Medium    Screening for hyperlipidemia 05/25/2023     Priority: Medium      Past Medical History:   Diagnosis Date    Cancer (H)     Sleep apnea      Past Surgical History:   Procedure Laterality Date    COLONOSCOPY N/A 08/16/2023    Procedure: Colonoscopy;  Surgeon: Ana Paula Hurst MD;  Location:  GI    COMBINED CYSTOSCOPY, RETROGRADES, URETEROSCOPY, INSERT STENT Left 5/15/2024    Procedure: CYSTOSCOPY, LEFT URETERAL STENT REMOVAL,  LEFT RETROGRADE PYELOGRAM, LEFT URETEROSCOPY WITH BIOPSY AND URETERAL BRUSHING, LEFT URETERAL STENT PLACEMENT;  Surgeon: Rogelio Gallagher MD;  Location:  OR    CYSTOSCOPY, URETEROSCOPY, INSERT STENT Left 5/1/2024    Procedure: CYSTOURETEROSCOPY, LEFT URETERAL BIOPSY WITH STENT INSERTION, LEFT RETROGRADE PYELOGRAM;  Surgeon: Rogelio Gallagher MD;  Location: [facility] OR    DAVINCI LYSIS OF ADHESIONS N/A 6/24/2024    Procedure: Davinci Lysis of Adhesions;  Surgeon: Rogelio Gallagher MD;  Location: [facility] OR    DAVINCI URETERECTOMY Left 6/24/2024    Procedure: ROBOT ASSISTED LAPAROSCOPIC LEFT DISTAL URETERECTOMY WITH URETERAL RE-IMPLANT, LEFT PELVIC LYMPH NODE DISSECTION, LYSIS OF ADHESIONS;  Surgeon: Rogelio Gallagher MD;  Location:  OR    MASTECTOMY, BILATERAL      Double mastectomy     Current Outpatient Medications   Medication Sig Dispense Refill    calcium carbonate (OS-JULIETTE) 500 MG tablet Take 1 tablet by mouth 2 times daily      citalopram (CELEXA) 10 MG tablet Take 1 tablet (10 mg) by mouth daily. Takes 20mg tab also 90 tablet 0    citalopram (CELEXA) 20 MG tablet Take 1 tablet (20 mg) by mouth daily. Takes 10mg tablet also 90 tablet 0    pregabalin (LYRICA) 75 MG  "capsule Take 1 capsule (75 mg) by mouth 2 times daily. 180 capsule 0    vitamin B-12 (CYANOCOBALAMIN) 100 MCG tablet Take 1,000 mcg by mouth daily         Allergies   Allergen Reactions    Animal Dander Shortness Of Breath    Gabapentin Other (See Comments)     Tolerating pregabalin well        Social History     Tobacco Use    Smoking status: Never     Passive exposure: Never    Smokeless tobacco: Never   Substance Use Topics    Alcohol use: Not Currently     Family History   Problem Relation Age of Onset    Coronary Artery Disease Mother         CABG    Arrhythmia Sister      History   Drug Use Unknown     Comment: medical marijuana             Review of Systems  Constitutional, neuro, ENT, endocrine, pulmonary, cardiac, gastrointestinal, genitourinary, musculoskeletal, integument and psychiatric systems are negative, except as otherwise noted.    Objective    /71 (BP Location: Right arm, Patient Position: Sitting, Cuff Size: Adult Regular)   Pulse 71   Temp 97.1  F (36.2  C) (Temporal)   Resp 12   Ht 1.664 m (5' 5.5\")   Wt 58.7 kg (129 lb 6.4 oz)   SpO2 98%   BMI 21.21 kg/m     Estimated body mass index is 21.21 kg/m  as calculated from the following:    Height as of this encounter: 1.664 m (5' 5.5\").    Weight as of this encounter: 58.7 kg (129 lb 6.4 oz).  Physical Exam  GENERAL: alert and no distress  EYES: Eyes grossly normal to inspection, PERRL and conjunctivae and sclerae normal  HENT: ear canals and TM's normal, nose and mouth without ulcers or lesions  NECK: no adenopathy, no asymmetry, masses, or scars  RESP: lungs clear to auscultation - no rales, rhonchi or wheezes  CV: regular rate and rhythm, normal S1 S2, no S3 or S4, no murmur, click or rub, no peripheral edema  ABDOMEN: soft, nontender, no hepatosplenomegaly, no masses and bowel sounds normal  MS: no gross musculoskeletal defects noted, no edema  SKIN: no suspicious lesions or rashes  NEURO: Normal strength and tone, mentation intact " and speech normal  PSYCH: mentation appears normal, affect normal/bright    Recent Labs   Lab Test 06/26/24  0728 06/25/24  0647   HGB 8.8* 9.5*   * 163    138   POTASSIUM 4.5 4.4   CR 0.89 0.73        Diagnostics  Results for orders placed or performed in visit on 10/31/24   CBC with platelets     Status: Abnormal   Result Value Ref Range    WBC Count 6.3 4.0 - 11.0 10e3/uL    RBC Count 3.85 3.80 - 5.20 10e6/uL    Hemoglobin 11.6 (L) 11.7 - 15.7 g/dL    Hematocrit 36.7 35.0 - 47.0 %    MCV 95 78 - 100 fL    MCH 30.1 26.5 - 33.0 pg    MCHC 31.6 31.5 - 36.5 g/dL    RDW 13.3 10.0 - 15.0 %    Platelet Count 215 150 - 450 10e3/uL       No EKG required, no history of coronary heart disease, significant arrhythmia, peripheral arterial disease or other structural heart disease.    Revised Cardiac Risk Index (RCRI)  The patient has the following serious cardiovascular risks for perioperative complications:   - No serious cardiac risks = 0 points     RCRI Interpretation: 0 points: Class I (very low risk - 0.4% complication rate)         Signed Electronically by: Lorin Ochoa PA-C  A copy of this evaluation report is provided to the requesting physician.

## 2024-11-03 ENCOUNTER — ANESTHESIA EVENT (OUTPATIENT)
Dept: SURGERY | Facility: CLINIC | Age: 64
End: 2024-11-03
Payer: COMMERCIAL

## 2024-11-03 NOTE — ANESTHESIA PREPROCEDURE EVALUATION
Anesthesia Pre-Procedure Evaluation    Patient: Lorin Andrade   MRN: [id number] : 1960        Procedure : Procedure(s):  CYSTOSCOPY, WITH TRANSURETHRAL RESECTION BLADDER TUMOR          Past Medical History:   Diagnosis Date     Cancer (H)      Sleep apnea       Past Surgical History:   Procedure Laterality Date     COLONOSCOPY N/A 2023    Procedure: Colonoscopy;  Surgeon: Ana Paula Hurst MD;  Location:  GI     COMBINED CYSTOSCOPY, RETROGRADES, URETEROSCOPY, INSERT STENT Left 5/15/2024    Procedure: CYSTOSCOPY, LEFT URETERAL STENT REMOVAL,  LEFT RETROGRADE PYELOGRAM, LEFT URETEROSCOPY WITH BIOPSY AND URETERAL BRUSHING, LEFT URETERAL STENT PLACEMENT;  Surgeon: Rogelio Gallagher MD;  Location: [facility] OR     CYSTOSCOPY, URETEROSCOPY, INSERT STENT Left 2024    Procedure: CYSTOURETEROSCOPY, LEFT URETERAL BIOPSY WITH STENT INSERTION, LEFT RETROGRADE PYELOGRAM;  Surgeon: Rogelio Gallagher MD;  Location: [facility] OR     DAVINCI LYSIS OF ADHESIONS N/A 2024    Procedure: Davinci Lysis of Adhesions;  Surgeon: Rogelio Gallagher MD;  Location: [facility] OR     DAVINCI URETERECTOMY Left 2024    Procedure: ROBOT ASSISTED LAPAROSCOPIC LEFT DISTAL URETERECTOMY WITH URETERAL RE-IMPLANT, LEFT PELVIC LYMPH NODE DISSECTION, LYSIS OF ADHESIONS;  Surgeon: Rogelio Gallagher MD;  Location: [facility] OR     MASTECTOMY, BILATERAL      Double mastectomy      Allergies   Allergen Reactions     Animal Dander Shortness Of Breath     Gabapentin Other (See Comments)     Tolerating pregabalin well      Social History     Tobacco Use     Smoking status: Never     Passive exposure: Never     Smokeless tobacco: Never   Substance Use Topics     Alcohol use: Not Currently      Wt Readings from Last 1 Encounters:   10/31/24 58.7 kg (129 lb 6.4 oz)        Anesthesia Evaluation   Pt has had prior anesthetic. Type: General.    No history of anesthetic complications       ROS/MED HX  ENT/Pulmonary:     (+) sleep apnea, uses CPAP,                             "          Neurologic:     (+)    peripheral neuropathy, - tingling and pain in feet.                           Cardiovascular:  - neg cardiovascular ROS     METS/Exercise Tolerance:     Hematologic:       Musculoskeletal:       GI/Hepatic:       Renal/Genitourinary:     (+) renal disease,             Endo:       Psychiatric/Substance Use:     (+) psychiatric history anxiety       Infectious Disease:       Malignancy:   (+) Malignancy, History of Breast.    Other:            Physical Exam    Airway        Mallampati: II   TM distance: > 3 FB   Neck ROM: full   Mouth opening: > 3 cm    Respiratory Devices and Support         Dental       (+) Minor Abnormalities - some fillings, tiny chips      Cardiovascular          Rhythm and rate: regular and normal     Pulmonary   pulmonary exam normal            OUTSIDE LABS:  CBC:   Lab Results   Component Value Date    WBC 6.3 10/31/2024    WBC 6.8 06/26/2024    HGB 11.6 (L) 10/31/2024    HGB 8.8 (L) 06/26/2024    HCT 36.7 10/31/2024    HCT 27.9 (L) 06/26/2024     10/31/2024     (L) 06/26/2024     BMP:   Lab Results   Component Value Date     06/26/2024     06/25/2024    POTASSIUM 4.5 06/26/2024    POTASSIUM 4.4 06/25/2024    CHLORIDE 102 06/26/2024    CHLORIDE 103 06/25/2024    CO2 32 (H) 06/26/2024    CO2 28 06/25/2024    BUN 12.4 06/26/2024    BUN 10.7 06/25/2024    CR 0.89 06/26/2024    CR 0.73 06/25/2024    GLC 95 06/26/2024     (H) 06/25/2024     COAGS: No results found for: \"PTT\", \"INR\", \"FIBR\"  POC: No results found for: \"BGM\", \"HCG\", \"HCGS\"  HEPATIC:   Lab Results   Component Value Date    ALBUMIN 4.3 04/30/2024    PROTTOTAL 6.6 04/30/2024    ALT 13 04/30/2024    AST 18 04/30/2024    ALKPHOS 65 04/30/2024    BILITOTAL 0.3 04/30/2024     OTHER:   Lab Results   Component Value Date    JULIETTE 8.3 (L) 06/26/2024       Anesthesia Plan    ASA Status:  2    NPO Status:  NPO Appropriate    Anesthesia Type: General.     - Airway: ETT   Induction: " Intravenous.   Maintenance: Balanced.        Consents    Anesthesia Plan(s) and associated risks, benefits, and realistic alternatives discussed. Questions answered and patient/representative(s) expressed understanding.     - Discussed:     - Discussed with:  Patient            Postoperative Care    Pain management: IV analgesics.   PONV prophylaxis: Ondansetron (or other 5HT-3), Dexamethasone or Solumedrol     Comments:    Other Comments: LMA ok if surgeon doesn't need relaxation           Manoj Hidalgo MD    I have reviewed the pertinent notes and labs in the chart from the past 30 days and (re)examined the patient.  Any updates or changes from those notes are reflected in this note.

## 2024-11-04 ENCOUNTER — HOSPITAL ENCOUNTER (OUTPATIENT)
Facility: CLINIC | Age: 64
Discharge: HOME | End: 2024-11-04
Attending: UROLOGY | Admitting: UROLOGY
Payer: COMMERCIAL

## 2024-11-04 ENCOUNTER — ANESTHESIA (OUTPATIENT)
Dept: SURGERY | Facility: CLINIC | Age: 64
End: 2024-11-04
Payer: COMMERCIAL

## 2024-11-04 ENCOUNTER — APPOINTMENT (OUTPATIENT)
Dept: GENERAL RADIOLOGY | Facility: CLINIC | Age: 64
End: 2024-11-04
Attending: UROLOGY
Payer: COMMERCIAL

## 2024-11-04 VITALS
BODY MASS INDEX: 20.7 KG/M2 | RESPIRATION RATE: 15 BRPM | HEIGHT: 66 IN | SYSTOLIC BLOOD PRESSURE: 103 MMHG | DIASTOLIC BLOOD PRESSURE: 59 MMHG | HEART RATE: 79 BPM | OXYGEN SATURATION: 94 % | TEMPERATURE: 97 F | WEIGHT: 128.8 LBS

## 2024-11-04 DIAGNOSIS — C66.2 MALIGNANT NEOPLASM OF LEFT URETER (H): Primary | ICD-10-CM

## 2024-11-04 DIAGNOSIS — N32.89 BLADDER MASS: ICD-10-CM

## 2024-11-04 PROCEDURE — 999N000179 XR SURGERY CARM FLUORO LESS THAN 5 MIN W STILLS: Mod: TC

## 2024-11-04 PROCEDURE — 52240 CYSTOSCOPY AND TREATMENT: CPT | Performed by: UROLOGY

## 2024-11-04 PROCEDURE — 250N000025 HC SEVOFLURANE, PER MIN: Performed by: UROLOGY

## 2024-11-04 PROCEDURE — 360N000076 HC SURGERY LEVEL 3, PER MIN: Performed by: UROLOGY

## 2024-11-04 PROCEDURE — 52224 CYSTOSCOPY AND TREATMENT: CPT | Performed by: ANESTHESIOLOGY

## 2024-11-04 PROCEDURE — 710N000009 HC RECOVERY PHASE 1, LEVEL 1, PER MIN: Performed by: UROLOGY

## 2024-11-04 PROCEDURE — 250N000011 HC RX IP 250 OP 636: Performed by: NURSE ANESTHETIST, CERTIFIED REGISTERED

## 2024-11-04 PROCEDURE — 272N000001 HC OR GENERAL SUPPLY STERILE: Performed by: UROLOGY

## 2024-11-04 PROCEDURE — 258N000001 HC RX 258: Performed by: UROLOGY

## 2024-11-04 PROCEDURE — 250N000011 HC RX IP 250 OP 636: Performed by: UROLOGY

## 2024-11-04 PROCEDURE — 710N000012 HC RECOVERY PHASE 2, PER MINUTE: Performed by: UROLOGY

## 2024-11-04 PROCEDURE — 250N000013 HC RX MED GY IP 250 OP 250 PS 637: Performed by: UROLOGY

## 2024-11-04 PROCEDURE — 52224 CYSTOSCOPY AND TREATMENT: CPT | Performed by: NURSE ANESTHETIST, CERTIFIED REGISTERED

## 2024-11-04 PROCEDURE — 250N000009 HC RX 250: Performed by: NURSE ANESTHETIST, CERTIFIED REGISTERED

## 2024-11-04 PROCEDURE — 88305 TISSUE EXAM BY PATHOLOGIST: CPT | Mod: TC | Performed by: UROLOGY

## 2024-11-04 PROCEDURE — 258N000003 HC RX IP 258 OP 636: Performed by: NURSE ANESTHETIST, CERTIFIED REGISTERED

## 2024-11-04 PROCEDURE — 250N000009 HC RX 250: Performed by: UROLOGY

## 2024-11-04 PROCEDURE — 999N000141 HC STATISTIC PRE-PROCEDURE NURSING ASSESSMENT: Performed by: UROLOGY

## 2024-11-04 PROCEDURE — 370N000017 HC ANESTHESIA TECHNICAL FEE, PER MIN: Performed by: UROLOGY

## 2024-11-04 PROCEDURE — C1769 GUIDE WIRE: HCPCS | Performed by: UROLOGY

## 2024-11-04 PROCEDURE — C1758 CATHETER, URETERAL: HCPCS | Performed by: UROLOGY

## 2024-11-04 PROCEDURE — 250N000011 HC RX IP 250 OP 636: Performed by: ANESTHESIOLOGY

## 2024-11-04 PROCEDURE — 52204 CYSTOSCOPY W/BIOPSY(S): CPT | Mod: 59 | Performed by: UROLOGY

## 2024-11-04 RX ORDER — FENTANYL CITRATE 50 UG/ML
INJECTION, SOLUTION INTRAMUSCULAR; INTRAVENOUS PRN
Status: DISCONTINUED | OUTPATIENT
Start: 2024-11-04 | End: 2024-11-04

## 2024-11-04 RX ORDER — NALOXONE HYDROCHLORIDE 0.4 MG/ML
0.1 INJECTION, SOLUTION INTRAMUSCULAR; INTRAVENOUS; SUBCUTANEOUS
Status: DISCONTINUED | OUTPATIENT
Start: 2024-11-04 | End: 2024-11-04 | Stop reason: HOSPADM

## 2024-11-04 RX ORDER — MAGNESIUM HYDROXIDE 1200 MG/15ML
LIQUID ORAL PRN
Status: DISCONTINUED | OUTPATIENT
Start: 2024-11-04 | End: 2024-11-04 | Stop reason: HOSPADM

## 2024-11-04 RX ORDER — HYDROMORPHONE HCL IN WATER/PF 6 MG/30 ML
0.4 PATIENT CONTROLLED ANALGESIA SYRINGE INTRAVENOUS EVERY 5 MIN PRN
Status: DISCONTINUED | OUTPATIENT
Start: 2024-11-04 | End: 2024-11-04 | Stop reason: HOSPADM

## 2024-11-04 RX ORDER — ACETAMINOPHEN 650 MG/1
650 SUPPOSITORY RECTAL ONCE
Status: COMPLETED | OUTPATIENT
Start: 2024-11-04 | End: 2024-11-04

## 2024-11-04 RX ORDER — ACETAMINOPHEN 325 MG/1
975 TABLET ORAL ONCE
Status: COMPLETED | OUTPATIENT
Start: 2024-11-04 | End: 2024-11-04

## 2024-11-04 RX ORDER — SODIUM CHLORIDE, SODIUM LACTATE, POTASSIUM CHLORIDE, CALCIUM CHLORIDE 600; 310; 30; 20 MG/100ML; MG/100ML; MG/100ML; MG/100ML
INJECTION, SOLUTION INTRAVENOUS CONTINUOUS
Status: DISCONTINUED | OUTPATIENT
Start: 2024-11-04 | End: 2024-11-04 | Stop reason: HOSPADM

## 2024-11-04 RX ORDER — ONDANSETRON 2 MG/ML
INJECTION INTRAMUSCULAR; INTRAVENOUS PRN
Status: DISCONTINUED | OUTPATIENT
Start: 2024-11-04 | End: 2024-11-04

## 2024-11-04 RX ORDER — PROPOFOL 10 MG/ML
INJECTION, EMULSION INTRAVENOUS PRN
Status: DISCONTINUED | OUTPATIENT
Start: 2024-11-04 | End: 2024-11-04

## 2024-11-04 RX ORDER — FUROSEMIDE 10 MG/ML
INJECTION INTRAMUSCULAR; INTRAVENOUS PRN
Status: DISCONTINUED | OUTPATIENT
Start: 2024-11-04 | End: 2024-11-04

## 2024-11-04 RX ORDER — HYDROMORPHONE HCL IN WATER/PF 6 MG/30 ML
0.2 PATIENT CONTROLLED ANALGESIA SYRINGE INTRAVENOUS EVERY 5 MIN PRN
Status: DISCONTINUED | OUTPATIENT
Start: 2024-11-04 | End: 2024-11-04 | Stop reason: HOSPADM

## 2024-11-04 RX ORDER — FENTANYL CITRATE 50 UG/ML
25 INJECTION, SOLUTION INTRAMUSCULAR; INTRAVENOUS EVERY 5 MIN PRN
Status: DISCONTINUED | OUTPATIENT
Start: 2024-11-04 | End: 2024-11-04 | Stop reason: HOSPADM

## 2024-11-04 RX ORDER — CEFAZOLIN SODIUM/WATER 2 G/20 ML
2 SYRINGE (ML) INTRAVENOUS
Status: COMPLETED | OUTPATIENT
Start: 2024-11-04 | End: 2024-11-04

## 2024-11-04 RX ORDER — ONDANSETRON 2 MG/ML
4 INJECTION INTRAMUSCULAR; INTRAVENOUS EVERY 30 MIN PRN
Status: DISCONTINUED | OUTPATIENT
Start: 2024-11-04 | End: 2024-11-04 | Stop reason: HOSPADM

## 2024-11-04 RX ORDER — ONDANSETRON 4 MG/1
4 TABLET, ORALLY DISINTEGRATING ORAL EVERY 30 MIN PRN
Status: DISCONTINUED | OUTPATIENT
Start: 2024-11-04 | End: 2024-11-04 | Stop reason: HOSPADM

## 2024-11-04 RX ORDER — DEXAMETHASONE SODIUM PHOSPHATE 4 MG/ML
INJECTION, SOLUTION INTRA-ARTICULAR; INTRALESIONAL; INTRAMUSCULAR; INTRAVENOUS; SOFT TISSUE PRN
Status: DISCONTINUED | OUTPATIENT
Start: 2024-11-04 | End: 2024-11-04

## 2024-11-04 RX ORDER — SODIUM CHLORIDE 9 MG/ML
INJECTION, SOLUTION INTRAVENOUS CONTINUOUS PRN
Status: DISCONTINUED | OUTPATIENT
Start: 2024-11-04 | End: 2024-11-04

## 2024-11-04 RX ORDER — LIDOCAINE HYDROCHLORIDE 20 MG/ML
INJECTION, SOLUTION INFILTRATION; PERINEURAL PRN
Status: DISCONTINUED | OUTPATIENT
Start: 2024-11-04 | End: 2024-11-04

## 2024-11-04 RX ORDER — CEPHALEXIN 500 MG/1
500 CAPSULE ORAL 2 TIMES DAILY
Qty: 6 CAPSULE | Refills: 0 | Status: SHIPPED | OUTPATIENT
Start: 2024-11-05 | End: 2024-11-08

## 2024-11-04 RX ORDER — DEXAMETHASONE SODIUM PHOSPHATE 4 MG/ML
4 INJECTION, SOLUTION INTRA-ARTICULAR; INTRALESIONAL; INTRAMUSCULAR; INTRAVENOUS; SOFT TISSUE
Status: DISCONTINUED | OUTPATIENT
Start: 2024-11-04 | End: 2024-11-04 | Stop reason: HOSPADM

## 2024-11-04 RX ORDER — CEFAZOLIN SODIUM/WATER 2 G/20 ML
2 SYRINGE (ML) INTRAVENOUS SEE ADMIN INSTRUCTIONS
Status: DISCONTINUED | OUTPATIENT
Start: 2024-11-04 | End: 2024-11-04 | Stop reason: HOSPADM

## 2024-11-04 RX ORDER — FENTANYL CITRATE 50 UG/ML
50 INJECTION, SOLUTION INTRAMUSCULAR; INTRAVENOUS EVERY 5 MIN PRN
Status: DISCONTINUED | OUTPATIENT
Start: 2024-11-04 | End: 2024-11-04 | Stop reason: HOSPADM

## 2024-11-04 RX ADMIN — ROCURONIUM BROMIDE 10 MG: 50 INJECTION, SOLUTION INTRAVENOUS at 14:34

## 2024-11-04 RX ADMIN — Medication 200 MG: at 15:10

## 2024-11-04 RX ADMIN — LIDOCAINE HYDROCHLORIDE 80 MG: 20 INJECTION, SOLUTION INFILTRATION; PERINEURAL at 14:04

## 2024-11-04 RX ADMIN — Medication 2 G: at 13:58

## 2024-11-04 RX ADMIN — FENTANYL CITRATE 50 MCG: 50 INJECTION, SOLUTION INTRAMUSCULAR; INTRAVENOUS at 16:18

## 2024-11-04 RX ADMIN — SODIUM CHLORIDE: 9 INJECTION, SOLUTION INTRAVENOUS at 13:58

## 2024-11-04 RX ADMIN — ONDANSETRON 4 MG: 2 INJECTION INTRAMUSCULAR; INTRAVENOUS at 14:58

## 2024-11-04 RX ADMIN — DEXAMETHASONE SODIUM PHOSPHATE 4 MG: 4 INJECTION, SOLUTION INTRA-ARTICULAR; INTRALESIONAL; INTRAMUSCULAR; INTRAVENOUS; SOFT TISSUE at 14:11

## 2024-11-04 RX ADMIN — FENTANYL CITRATE 50 MCG: 50 INJECTION, SOLUTION INTRAMUSCULAR; INTRAVENOUS at 15:49

## 2024-11-04 RX ADMIN — PROPOFOL 160 MG: 10 INJECTION, EMULSION INTRAVENOUS at 14:04

## 2024-11-04 RX ADMIN — MIDAZOLAM 2 MG: 1 INJECTION INTRAMUSCULAR; INTRAVENOUS at 13:58

## 2024-11-04 RX ADMIN — FUROSEMIDE 10 MG: 10 INJECTION, SOLUTION INTRAVENOUS at 15:06

## 2024-11-04 RX ADMIN — PHENYLEPHRINE HYDROCHLORIDE 0.5 MCG/KG/MIN: 10 INJECTION INTRAVENOUS at 14:04

## 2024-11-04 RX ADMIN — FENTANYL CITRATE 100 MCG: 50 INJECTION INTRAMUSCULAR; INTRAVENOUS at 14:04

## 2024-11-04 RX ADMIN — ROCURONIUM BROMIDE 40 MG: 50 INJECTION, SOLUTION INTRAVENOUS at 14:04

## 2024-11-04 RX ADMIN — PHENYLEPHRINE HYDROCHLORIDE 100 MCG: 10 INJECTION INTRAVENOUS at 14:58

## 2024-11-04 RX ADMIN — ACETAMINOPHEN 975 MG: 325 TABLET, FILM COATED ORAL at 13:28

## 2024-11-04 ASSESSMENT — ACTIVITIES OF DAILY LIVING (ADL)
ADLS_ACUITY_SCORE: 0

## 2024-11-04 NOTE — DISCHARGE INSTRUCTIONS
POSTOPERATIVE INSTRUCTIONS    Diagnosis-------------------------------   History of left ureteral cancer, bladder mass    Procedure-------------------------------  Procedure(s) (LRB):  CYSTOSCOPY, WITH TRANSURETHRAL RESECTION BLADDER TUMOR, LEFT RETROGRADE PYELOGRAM, LEFT URETEROSCOPY (N/A)      Findings--------------------------------  Cystoscopy with evidence of 3 areas of concern for bladder tumors on the posterior wall, left lateral wall, and left anterior wall. Retrograde pyelogram with no clear evidence of upper tract abnormalities. Flexible ureteroscopy with no evidence of concerning urothelial changes within the renal pelvis or proximal and mid ureter. Small possible tumor in the distal ureter/Boari flap region which was biopsied.     Home-going instructions-----------------         Activity Limitation:     - No driving or operating heavy machinery while on narcotic pain medication.     FOLLOW THESE INSTRUCTIONS AS INDICATED BELOW:  - Observe operative area for signs of excessive bleeding.  - You may shower.  - Increase fluid intake to promote clear urine.  - Resume usual diet as tolerated    What to expect while recovering-----------  - You may experience some intermittent bleeding that makes your urine pink or cherry colored. This is normal.  - However, if you are unable to urinate, passing large amount of clots, have ruddy blood in your urine, or have a temperature >101 degrees, call the urology nurse on call, or present to your nearest emergency department.  - You are encouraged to walk daily, and have no activity restrictions.     GILLIAM CATHETER  - You will go home with the catheter. You will be instructed on catheter care  - If your urine is clear in the tubing Thursday morning, you may remove your catheter.   - To remove your catheter, cut off the tubing with the colored cap. Some clean water will drain so have a washcloth ready. After all the water has drained, gently remove the catheter and throw  away.         Discharge Medications/instructions:   - Take Tylenol 1000mg every 6 hours for pain  - Take Ibuprofen 600mg every 6 hours as needed for additional pain control  - Take Keflex twice daily starting tomorrow for 3 days      Questions/concerns------------------------  [facility]: [phone]    Future appointments  You are scheduled for follow-up with Dr. Gallagher on 11/13/2024    Rogelio Gallagher MD       Today you were given 975 mg of Tylenol at 1:30pm. The recommended daily maximum dose is 4000 mg.     Same Day Surgery Discharge Instructions for  Sedation and General Anesthesia     It's not unusual to feel dizzy, light-headed or faint for up to 24 hours after surgery or while taking pain medication.  If you have these symptoms: sit for a few minutes before standing and have someone assist you when you get up to walk or use the bathroom.    You should rest and relax for the next 24 hours. We recommend you make arrangements to have an adult stay with you for at least 24 hours after your discharge.  Avoid hazardous and strenuous activity.    DO NOT DRIVE any vehicle or operate mechanical equipment for 24 hours following the end of your surgery.  Even though you may feel normal, your reactions may be affected by the medication you have received.    Do not drink alcoholic beverages for 24 hours following surgery.     Slowly progress to your regular diet as you feel able. It's not unusual to feel nauseated and/or vomit after receiving anesthesia.  If you develop these symptoms, drink clear liquids (apple juice, ginger ale, broth, 7-up, etc. ) until you feel better.  If your nausea and vomiting persists for 24 hours, please notify your surgeon.      All narcotic pain medications, along with inactivity and anesthesia, can cause constipation. Drinking plenty of liquids and increasing fiber intake will help.    For any questions of a medical nature, call your surgeon.    Do not make important  decisions for 24 hours.    If you had general anesthesia, you may have a sore throat for a couple of days related to the breathing tube used during surgery.  You may use Cepacol lozenges to help with this discomfort.  If it worsens or if you develop a fever, contact your surgeon.     If you feel your pain is not well managed with the pain medications prescribed by your surgeon, please contact your surgeon's office to let them know so they can address your concerns.     DISCHARGE INSTRUCTIONS FOR   CATHETER CARE AT HOME      .      Basic Catheter Care  Always wash hands before and after handling your catheter.  Use soap and water to wash the area around your catheter.  Do this procedure twice a day.  Proper cleansing will help keep the area from becoming irritated or infected.    Leg Bag  This is a small plastic bag that collects urine draining from your catheter and then strapped around your thigh. It will need to be emptied when the bag is 1/2 to 3/4 full.     Large Drainage Bag  This bag is larger than the leg bag and holds more urine.  It is to be used while at home, especially at night.    Before you go to bed, change the leg bag to the large drainage bag.  Pinch off the catheter with your fingers and swab the connection between the catheter and leg bag with alcohol sponge.  Disconnect the leg bag and connect the large drainage bag to your catheter.  When you get into bed, arrange the drainage tubing so that it doesn t kink.  Be sure to keep the bag below the level of your bladder and allow enough slack for turning.    Cleaning Your Drainage Bags    Wash hands.  Using funnel or syringe, fill the bag half full with a solution of 1/2  vinegar and 1/2 water.  Shake bag, allowing mixture to cleanse inside of bag.  Empty out all vinegar and water mixture from your bag.  Hang bag to dry when not in use.  Clean your bags anytime you change them.      Helpful Hints  Always keep drainage bags below bladder level to  insure adequate drainage.  Drink 4-6 glasses of water daily along with other fluids you normally drink to keep urine free of infection and / or clots.  If you notice no urine in your bag for 2 to 4 hours or you develop extreme discomfort in bladder area, your catheter maybe plugged.  Notify your doctor.  If you notice your urine becomes foul smelling and cloudy, notify your doctor.  Also notify your doctor if you develop fever or chills.  If you notice urine leaking around the outside of the catheter, check to be sure catheter or tubing is not kinked.  Don t use leg bag while in bed.     **If you have questions or concerns about your procedure,   call Dr. Gallagher at [phone]**

## 2024-11-04 NOTE — ANESTHESIA CARE TRANSFER NOTE
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    Anesthesia Type:   General     Note:    Oropharynx: oropharynx clear of all foreign objects and spontaneously breathing  Level of Consciousness: awake  Oxygen Supplementation: face mask  Level of Supplemental Oxygen (L/min / FiO2): 6  Independent Airway: airway patency satisfactory and stable  Dentition: dentition unchanged  Vital Signs Stable: post-procedure vital signs reviewed and stable  Report to RN Given: handoff report given  Patient transferred to: PACU    Handoff Report: Identifed the Patient, Identified the Reponsible Provider, Reviewed the pertinent medical history, Discussed the surgical course, Reviewed Intra-OP anesthesia mangement and issues during anesthesia, Set expectations for post-procedure period and Allowed opportunity for questions and acknowledgement of understanding      Vitals:  Vitals Value Taken Time   /94 11/04/24 1529   Temp     Pulse 90 11/04/24 1532   Resp 16 11/04/24 1532   SpO2 100 % 11/04/24 1532   Vitals shown include unfiled device data.    Electronically Signed By: TAL Green CRNA  November 4, 2024  3:33 PM

## 2024-11-04 NOTE — ANESTHESIA POSTPROCEDURE EVALUATION
Patient: Lorin Andrade    Procedure: Procedure(s):  CYSTOSCOPY, WITH TRANSURETHRAL RESECTION BLADDER TUMOR, LEFT RETROGRADE PYELOGRAM, LEFT URETEROSCOPY WITH BIOPSY       Anesthesia Type:  General    Note:  Disposition: Outpatient   Postop Pain Control: Uneventful            Sign Out: Well controlled pain   PONV: No   Neuro/Psych: Uneventful            Sign Out: Acceptable/Baseline neuro status   Airway/Respiratory: Uneventful            Sign Out: Acceptable/Baseline resp. status   CV/Hemodynamics: Uneventful            Sign Out: Acceptable CV status; No obvious hypovolemia; No obvious fluid overload   Other NRE: NONE   DID A NON-ROUTINE EVENT OCCUR? No       Last vitals:  Vitals Value Taken Time   /61 11/04/24 1645   Temp 36.1  C (97  F) 11/04/24 1529   Pulse 78 11/04/24 1645   Resp 0 11/04/24 1645   SpO2 93 % 11/04/24 1645   Vitals shown include unfiled device data.    Electronically Signed By: Manoj Hidalgo MD  November 4, 2024  5:40 PM

## 2024-11-04 NOTE — PROGRESS NOTES
Pt dressed, up in recliner and transported to Phase 2.       Ayala catheter teaching completed w/ pt and pt's spouse Gurdeep.  Both pt and spouse verbalize understanding.  Pt has had Ayala previously.

## 2024-11-04 NOTE — OP NOTE
OPERATIVE REPORT  DATE OF SURGERY: 11/04/24  LOCATION OF SURGERY: [facility]  PREOPERATIVE DIAGNOSIS:  (C66.2) Malignant neoplasm of left ureter (H)  (primary encounter diagnosis)  (N32.89) Bladder mass  POSTOPERATIVE DIAGNOSIS: (C66.2) Malignant neoplasm of left ureter (H)  (primary encounter diagnosis)  (N32.89) Bladder mass    START TIME: 2:19 PM  END TIME: 3:06 PM    PROCEDURE PERFORMED:   1. Cystoscopy  2. LEFT retrograde pyelogram  3. LEFT ureteroscopy with biopsy  4. Transurethral resection of bladder tumor (TURBT) >5cm total resection area  5. <1hr physician fluoroscopy time      STAFF SURGEON: Rogelio Gallagher MD  ANESTHESIA: General.   ESTIMATED BLOOD LOSS: 5 mL.   DRAINS AND TUBES: 18fr dominguez catheter  COMPLICATIONS: None.   DISPOSITION: PACU.   SPECIMENS OBTAINED:   ID Type Source Tests Collected by Time Destination   1 : LEFT URETERAL DISTAL BIOPSY Tissue Ureter, Left SURGICAL PATHOLOGY EXAM Rogelio Gallagher MD 11/4/2024  2:51 PM    2 : POSTERIOR BLADDER WALL TUMOR Tissue Urinary Bladder SURGICAL PATHOLOGY EXAM Rogelio Gallagher MD 11/4/2024  2:52 PM    3 : LEFT WALL BLADDER TUMOR Tissue Urinary Bladder SURGICAL PATHOLOGY EXAM Rogelio Gallagher MD 11/4/2024  2:58 PM    4 : LEFT ANTERIOR BLADDER WALL TUMOR Tissue Urinary Bladder SURGICAL PATHOLOGY EXAM Rogelio Gallagher MD 11/4/2024  3:07 PM       SIGNIFICANT FINDINGS: Cystoscopy with evidence of 3 areas of concern for bladder tumors on the posterior wall, left lateral wall, and left anterior wall.  Retrograde pyelogram with no clear evidence of upper tract abnormalities.  Flexible ureteroscopy with no evidence of concerning urothelial changes within the renal pelvis or proximal and mid ureter.  Small possible tumor in the distal ureter/Boari flap region which was biopsied.     HISTORY OF PRESENT ILLNESS: Lorin Andrade is a 63 year old female with a history of a left distal ureteral mass status post a robotic distal ureterectomy, lymph node dissection,  ureteroneocystotomy with a Boari flap on 6/24/2024 with evidence of high-grade CIS of the ureter with negative margins.  Recent surveillance cystoscopy with evidence of likely multifocal recurrence within the bladder and she was counseled on the need for the above surgery.    OPERATION PERFORMED:   Informed consent was obtained and the patient was brought to the operating room where general anesthesia was induced. The patient was given appropriate preoperative antibiotics and positioned supine. The patient was then repositioned in dorsal lithotomy with all pressure points padded. We then performed a timeout, verifying the correct patient's site and procedure to be performed.    A 22 Maori cystoscope was inserted atraumatically into the bladder.  Cystoscopy was performed with 3 areas of concern within the bladder on the posterior wall, left sidewall, and left anterior wall.  Ureteroneocystotomy was identified in the left anterior wall, away from the concerning lesion, and cannulated with a 5 Maori open-ended catheter.  A gentle retrograde pyelogram was performed outlining the collecting system with no clear evidence of upper tract filling defects or abnormalities.  A 0.035 sensor wire was advanced up to the upper pole and the open-ended catheter was removed.  A flexible ureteroscope was advanced over the wire to the upper pole and the wire was removed.  Flexible pyeloscopy was performed with complete visualization of the entire collecting system with no evidence of concerning urothelial changes within the kidney, renal pelvis, proximal or mid ureter.  At the site of the distal ureter/Boari flap there was a small concerning mucosal lesion which was then biopsied with flexible forceps.  Attention was then turned to the bladder and the 3 areas of concern were resected separately and sent as 3 separate specimens.  Total area of resection greater than 5 cm combined.  Hemostasis was ensured and noted to be excellent in  all 3 areas.  An 18 Hebrew Ayala catheter was placed with 10 cc in the balloon.  She was given 10 mg IV Lasix.  She was emerged anesthesia and taken to the recovery room in stable condition.    Plan:  - Plan for Ayala catheter for 3 days and will provide instructions for her to remove at home    Rogelio Gallagher MD   Urology  [facility]  Clinic Phone [phone]

## 2024-11-04 NOTE — ANESTHESIA PROCEDURE NOTES
Airway         Procedure Start/Stop Times: 11/4/2024 2:07 PM  Staff -        Anesthesiologist:  Manoj Hidalgo MD       CRNA: Mariama Montes De Oca APRN CRNA       Performed By: CRNA  Consent for Airway        Urgency: elective  Indications and Patient Condition       Indications for airway management: ilir-procedural       Induction type:intravenous       Mask difficulty assessment: 1 - vent by mask    Final Airway Details       Final airway type: endotracheal airway       Successful airway: ETT - single  Endotracheal Airway Details        ETT size (mm): 7.0       Cuffed: yes       Successful intubation technique: video laryngoscopy       VL Blade Size: Powell 3       Grade View of Cords: 1       Adjucts: stylet       Position: Right       Measured from: lips       Secured at (cm): 20       Bite block used: None    Post intubation assessment        Placement verified by: capnometry, equal breath sounds and chest rise        Number of attempts at approach: 1       Number of other approaches attempted: 0       Secured with: tape       Ease of procedure: easy       Dentition: Intact and Unchanged    Medication(s) Administered   Medication Administration Time: 11/4/2024 2:07 PM

## 2024-11-05 LAB
PATH REPORT.COMMENTS IMP SPEC: NORMAL
PATH REPORT.FINAL DX SPEC: NORMAL
PATH REPORT.GROSS SPEC: NORMAL
PATH REPORT.MICROSCOPIC SPEC OTHER STN: NORMAL
PATH REPORT.RELEVANT HX SPEC: NORMAL
PHOTO IMAGE: NORMAL

## 2024-11-05 PROCEDURE — 88305 TISSUE EXAM BY PATHOLOGIST: CPT | Mod: 26 | Performed by: PATHOLOGY

## 2024-11-06 ENCOUNTER — PATIENT OUTREACH (OUTPATIENT)
Dept: CARE COORDINATION | Facility: CLINIC | Age: 64
End: 2024-11-06
Payer: COMMERCIAL

## 2024-11-11 ENCOUNTER — TELEPHONE (OUTPATIENT)
Dept: UROLOGY | Facility: CLINIC | Age: 64
End: 2024-11-11
Payer: COMMERCIAL

## 2024-11-11 DIAGNOSIS — R30.0 DYSURIA: Primary | ICD-10-CM

## 2024-11-11 NOTE — TELEPHONE ENCOUNTER
Patient called complaining of sx's of a UTI. She had surgery last week. She will either go to UC or I did place orders in her chart if she wants to wait until tomorrow to leave a UA/UC.  Shayna Vences LPN

## 2024-11-13 ENCOUNTER — OFFICE VISIT (OUTPATIENT)
Dept: UROLOGY | Facility: CLINIC | Age: 64
End: 2024-11-13
Payer: COMMERCIAL

## 2024-11-13 VITALS
SYSTOLIC BLOOD PRESSURE: 97 MMHG | WEIGHT: 128 LBS | OXYGEN SATURATION: 96 % | BODY MASS INDEX: 20.57 KG/M2 | DIASTOLIC BLOOD PRESSURE: 62 MMHG | HEIGHT: 66 IN | HEART RATE: 79 BPM

## 2024-11-13 DIAGNOSIS — C67.8 MALIGNANT NEOPLASM OF OVERLAPPING SITES OF BLADDER (H): ICD-10-CM

## 2024-11-13 DIAGNOSIS — R30.0 DYSURIA: ICD-10-CM

## 2024-11-13 DIAGNOSIS — C66.2 UROTHELIAL CARCINOMA OF LEFT DISTAL URETER (H): Primary | ICD-10-CM

## 2024-11-13 LAB
ALBUMIN UR-MCNC: 100 MG/DL
APPEARANCE UR: CLEAR
BILIRUB UR QL STRIP: NEGATIVE
COLOR UR AUTO: YELLOW
GLUCOSE UR STRIP-MCNC: 100 MG/DL
HGB UR QL STRIP: ABNORMAL
KETONES UR STRIP-MCNC: ABNORMAL MG/DL
LEUKOCYTE ESTERASE UR QL STRIP: ABNORMAL
NITRATE UR QL: POSITIVE
PH UR STRIP: 7 [PH] (ref 5–7)
SP GR UR STRIP: 1.01 (ref 1–1.03)
UROBILINOGEN UR STRIP-ACNC: 2 E.U./DL

## 2024-11-13 PROCEDURE — 81003 URINALYSIS AUTO W/O SCOPE: CPT | Mod: QW | Performed by: UROLOGY

## 2024-11-13 PROCEDURE — 99214 OFFICE O/P EST MOD 30 MIN: CPT | Performed by: UROLOGY

## 2024-11-13 PROCEDURE — 87186 SC STD MICRODIL/AGAR DIL: CPT | Performed by: UROLOGY

## 2024-11-13 PROCEDURE — 87086 URINE CULTURE/COLONY COUNT: CPT | Performed by: UROLOGY

## 2024-11-13 RX ORDER — SULFAMETHOXAZOLE AND TRIMETHOPRIM 800; 160 MG/1; MG/1
TABLET ORAL
COMMUNITY
Start: 2024-11-12

## 2024-11-13 RX ORDER — PHENAZOPYRIDINE HYDROCHLORIDE 95 MG/1
190 TABLET ORAL 3 TIMES DAILY
COMMUNITY

## 2024-11-13 ASSESSMENT — PAIN SCALES - GENERAL: PAINLEVEL_OUTOF10: MODERATE PAIN (5)

## 2024-11-13 NOTE — LETTER
"11/13/2024       RE: Lorin Andrade  [address] [address]     Dear Colleague,    Thank you for referring your patient, Lorin Andrade, to the [facility] at [facility]. Please see a copy of my visit note below.    [place]  CHIEF COMPLAINT   It was my pleasure to see Lorin Andrade who is a 63 year old female for follow-up of LEFT hydroureteronephrosis.      HPI  5/8/2024   Lorin Andrade is a very pleasant 63 year old female with a history of left renal colic with workup showing moderate hydroureteronephrosis and enhancement of the last 5 cm of the left distal ureter.  Urine cytology with suspicion for high-grade urothelial carcinoma.  She underwent ureteroscopy with ureteral biopsy and stent placement on 5/1/2024 and returns today to discuss the results.    She has had resolution of her flank pain following stent placement  Her  joins her for this visit    5/15/2024:  Ureteroscopy     5/22/2024:  Follow-up today to review the ureteroscopy findings and pathology results    6/24/2024:  Robotic distal ureterectomy, ureteroneocystotomy and boari flap    7/9/2024:  Cystogram and dominguez removal    7/10/2024:  Follow-up today for postop check  She is doing very well after Dominguez removal yesterday    7/24/2024:  Ureteral stent removal    10/30/2024:  Surveillance cystoscopy    TODAY 11/13/2024:  Follow up after Transurethral resection of bladder tumor (TURBT) and Ureteroscopy last week  Developed symptoms of possible UTI on Monday  Symptoms of pelvic pain, dysuria   No notable gross hematuria   Is taking Azo    PHYSICAL EXAM  Patient is a 63 year old  female   Vitals: Blood pressure 97/62, pulse 79, height 1.664 m (5' 5.5\"), weight 58.1 kg (128 lb), SpO2 96%.  Body mass index is 20.98 kg/m .  General Appearance Adult:   Alert, no acute distress, oriented  HENT: throat/mouth:normal, good dentition  Lungs: no respiratory distress, " or pursed lip breathing  Heart: No obvious jugular venous distension present  Neuro: Alert, oriented, speech and mentation normal  Psych: affect and mood normal  Gait: Normal       Creatinine   Date Value Ref Range Status   06/26/2024 0.89 0.51 - 0.95 mg/dL Final   06/25/2024 0.73 0.51 - 0.95 mg/dL Final   06/11/2024 0.74 0.51 - 0.95 mg/dL Final   05/01/2024 0.73 0.51 - 0.95 mg/dL Final   04/30/2024 0.89 0.51 - 0.95 mg/dL Final   04/19/2024 0.84 0.51 - 0.95 mg/dL Final   08/14/2023 0.61 0.51 - 0.95 mg/dL Final     GFR Estimate   Date Value Ref Range Status   06/26/2024 73 >60 mL/min/1.73m2 Final     Comment:     eGFR calculated using 2021 CKD-EPI equation.   06/25/2024 >90 >60 mL/min/1.73m2 Final     Comment:     eGFR calculated using 2021 CKD-EPI equation.   06/11/2024 >90 >60 mL/min/1.73m2 Final          Final Diagnosis   Specimen A                 Interpretation:                  -  Suspicious for High-grade Urothelial Carcinoma      PATHOLOGY:  5/1/2024:  Final Diagnosis   Left distal ureter, biopsy:  -Normal urothelial lining without evidence of atypia or malignancy, please see description     5/15/2024:  Final Diagnosis   Ureter, left proximal, biopsy:  -- Urothelium overlying edematous stroma.  Negative for dysplasia or carcinoma.     5/15/2024:  Distal ureteral brushing  Final Diagnosis   Specimen A              Interpretation:               Negative for High Grade Urothelial Carcinoma     6/24/2024:  Distal ureterectomy and lymph node dissection  Final Diagnosis   A-C.  Left ureter with separately submitted proximal margin and left pelvic lymph nodes; left ureterectomy with left pelvic lymphadenectomy  -Proximal left ureter margin without evidence of in situ or invasive carcinoma  -2 benign lymph nodes, benign perinodal fibrovascular and fatty tissue, entire received tissue sample examined microscopically  -Urothelial carcinoma in situ present in association with background urothelial atypia/dysplasia; no  evidence of carcinoma in situ at margins; please see microscopic description and synoptic template within report     11/4/2024:  Transurethral resection of bladder tumor (TURBT)  Final Diagnosis   A.  Left distal ureter biopsy:  - High-grade urothelial cell carcinoma, detached fragment without underlying tissue for evaluation of invasion     B.  Posterior bladder wall tumor:   - Noninvasive high-grade urothelial cell carcinoma     C.  Left bladder wall tumor:  - Noninvasive high-grade urothelial cell carcinoma     D.  Left anterior bladder wall tumor:  - Noninvasive high-grade urothelial cell carcinoma       IMAGING:  All pertinent imaging reviewed:    All imaging studies reviewed by me.  I personally reviewed these imaging films.  A formal report from radiology will follow.    CT UROGRAM 4/30/2024:  FINDINGS:   LOWER CHEST: No worrisome consolidation in the visualized lung bases. No pleural effusion. Partially imaged breast implants.     HEPATOBILIARY: Unchanged liver contours with benign cyst in the left hepatic lobe. No worrisome liver lesions. Normal gallbladder. No biliary ductal dilation.     PANCREAS: Normal.     SPLEEN: Normal.     ADRENAL GLANDS: Normal.     RIGHT KIDNEY/URETER: Normal morphology and enhancement of the right kidney. A few cortically based subcentimeter low-attenuation lesions are incompletely characterized though likely cysts. No urolithiasis or hydronephrosis. Normal excretion of contrast   on delayed images without urothelial thickening or filling defects.     LEFT KIDNEY/URETER: Unchanged delayed left nephrogram. 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 No urolithiasis. There is unchanged moderate to severe hydroureteronephrosis with dependent excreted contrast   layering within the calyces. As before, there is a transition in the distal ureter where there is an approximately 5 cm segment of urothelial thickening and enhancement (series 12  image #42, #[id number]).      BLADDER: Unremarkable.     BOWEL: Normal caliber small and large bowel. Normal appendix. No free fluid or free air.     LYMPH NODES: No lymphadenopathy.     VASCULATURE: The abdominal aorta is nonaneurysmal with mild atheromatous disease.     PELVIC ORGANS: Uterus is present. No adnexal mass.     MUSCULOSKELETAL: Unchanged mild L4 superior endplate deformity. No destructive osseous lesions.                                                                      IMPRESSION:  1.  Unchanged moderate left hydroureteronephrosis to the level of the distal ureter where there is an approximately 5 cm segment of urothelial thickening and enhancement. As before, etiology may be inflammatory or neoplastic.  2.  Additional noncritical details in the findings.    NM RENAL SCAN 4/17/2024:  FINDINGS:      Renal angiogram demonstrates: Flow to right kidney within normal  limits. There is asymmetrically slower and less flow to the left  kidney.   Renogram images demonstrate:     Right kidney: There is normal cortical uptake. Cortical transit is  noted at 3 minutes. Ureter is visualized at 4 minutes. There is normal  wash out. Following Lasix administration, there is prompt wash out.      Left kidney: There is delayed cortical uptake. Cortical transit is  noted at 6 minutes which is slightly delayed. Ureter is visualized at  20 minutes. There is no wash ou prior to Lasix, minimal wash out after  Lasix.   Renogram curves demonstrate:     Right kidney: The radiotracer reaches the peak activity at 5 minutes  (time to peak). There is normal wash out and complete wash out  following Lasix. T1/2 is: 14 minutes.     Left kidney: The radiotracer reaches the peak activity at 22 minutes  (time to peak). There is delayed wash out and  incomplete wash out  following Lasix. T1/2 is: Not applicable.     Split function: Left: 38.7%, Right: 61.3%.                                                                      IMPRESSION:  1.  Right kidney: Normal function. Normal excretion. No obstruction.     2. Left kidney: Decreased function. Decreased excretion, concerning  for partial obstruction.    CYSTOGRAM 7/9/2024:  FINDINGS: Left ureteral stent present on the  image. Cystografin  contrast was instilled into the bladder via existing Ayala catheter.  No visible leak.                                                          IMPRESSION:  1.  No leak, status post ureteral reimplantation.    ASSESSMENT and PLAN  63-year-old female with left-sided hydroureteronephrosis and concern for urothelial malignancy of the left distal ureter.  Now status post a robotic distal ureterectomy, lymph node dissection, ureteroneocystotomy with a Boari flap on 6/24/2024 with evidence of CIS.  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  This did show evidence of high-grade noninvasive urothelial carcinoma  - We discussed the difficulty in managing the lesion noted with an either the distal ureter or Boari flap portion and that we will need to obtain a updated cystogram to assess for the level of any potential reflux or distention of the Boari flap portion to allow for treatment with BCG  - If there is not adequate reflux to allow for BCG penetration of the distal ureteral/Boari flap recurrence, we discussed that we may need to consider a nephro ureterectomy.  She is quite adamant that she would not consider a cystectomy and is also hoping to avoid systemic chemotherapy  - Order for cystogram placed  - We discussed that I will also present her case at our multidisciplinary tumor board next week and send her the  results on MyChart  - We will plan for a follow-up visit in about 4 weeks, after they return from [place], to finalize a treatment plan  -This is a chronic problem with evidence of recurrence      Time spent: 22 minutes spent on the date of the encounter doing chart review, history and exam, documentation and further activities as noted above.      Note copy attestation: the elements have been reviewed, edited as needed, and remain pertinent to today's visit.      Rogelio Gallaghre MD   Urology  [facility]  [facility] Phone: [phone]  [facility] Phone: [phone]        Again, thank you for allowing me to participate in the care of your patient.      Sincerely,    Rogelio Gallagher MD

## 2024-11-13 NOTE — PROGRESS NOTES
"[facility]  CHIEF COMPLAINT   It was my pleasure to see Lorin Andrade who is a 63 year old female for follow-up of LEFT hydroureteronephrosis.      HPI  5/8/2024   Lorin Andrade is a very pleasant 63 year old female with a history of left renal colic with workup showing moderate hydroureteronephrosis and enhancement of the last 5 cm of the left distal ureter.  Urine cytology with suspicion for high-grade urothelial carcinoma.  She underwent ureteroscopy with ureteral biopsy and stent placement on 5/1/2024 and returns today to discuss the results.    She has had resolution of her flank pain following stent placement  Her  joins her for this visit    5/15/2024:  Ureteroscopy     5/22/2024:  Follow-up today to review the ureteroscopy findings and pathology results    6/24/2024:  Robotic distal ureterectomy, ureteroneocystotomy and boari flap    7/9/2024:  Cystogram and dominguez removal    7/10/2024:  Follow-up today for postop check  She is doing very well after Dominguez removal yesterday    7/24/2024:  Ureteral stent removal    10/30/2024:  Surveillance cystoscopy    TODAY 11/13/2024:  Follow up after Transurethral resection of bladder tumor (TURBT) and Ureteroscopy last week  Developed symptoms of possible UTI on Monday  Symptoms of pelvic pain, dysuria   No notable gross hematuria   Is taking Azo    PHYSICAL EXAM  Patient is a 63 year old  female   Vitals: Blood pressure 97/62, pulse 79, height 1.664 m (5' 5.5\"), weight 58.1 kg (128 lb), SpO2 96%.  Body mass index is 20.98 kg/m .  General Appearance Adult:   Alert, no acute distress, oriented  HENT: throat/mouth:normal, good dentition  Lungs: no respiratory distress, or pursed lip breathing  Heart: No obvious jugular venous distension present  Neuro: Alert, oriented, speech and mentation normal  Psych: affect and mood normal  Gait: Normal       Creatinine   Date Value Ref Range Status   06/26/2024 0.89 0.51 - 0.95 mg/dL Final   06/25/2024 0.73 0.51 - 0.95 mg/dL Final "   06/11/2024 0.74 0.51 - 0.95 mg/dL Final   05/01/2024 0.73 0.51 - 0.95 mg/dL Final   04/30/2024 0.89 0.51 - 0.95 mg/dL Final   04/19/2024 0.84 0.51 - 0.95 mg/dL Final   08/14/2023 0.61 0.51 - 0.95 mg/dL Final     GFR Estimate   Date Value Ref Range Status   06/26/2024 73 >60 mL/min/1.73m2 Final     Comment:     eGFR calculated using 2021 CKD-EPI equation.   06/25/2024 >90 >60 mL/min/1.73m2 Final     Comment:     eGFR calculated using 2021 CKD-EPI equation.   06/11/2024 >90 >60 mL/min/1.73m2 Final          Final Diagnosis   Specimen A                 Interpretation:                  -  Suspicious for High-grade Urothelial Carcinoma      PATHOLOGY:  5/1/2024:  Final Diagnosis   Left distal ureter, biopsy:  -Normal urothelial lining without evidence of atypia or malignancy, please see description     5/15/2024:  Final Diagnosis   Ureter, left proximal, biopsy:  -- Urothelium overlying edematous stroma.  Negative for dysplasia or carcinoma.     5/15/2024:  Distal ureteral brushing  Final Diagnosis   Specimen A              Interpretation:               Negative for High Grade Urothelial Carcinoma     6/24/2024:  Distal ureterectomy and lymph node dissection  Final Diagnosis   A-C.  Left ureter with separately submitted proximal margin and left pelvic lymph nodes; left ureterectomy with left pelvic lymphadenectomy  -Proximal left ureter margin without evidence of in situ or invasive carcinoma  -2 benign lymph nodes, benign perinodal fibrovascular and fatty tissue, entire received tissue sample examined microscopically  -Urothelial carcinoma in situ present in association with background urothelial atypia/dysplasia; no evidence of carcinoma in situ at margins; please see microscopic description and synoptic template within report     11/4/2024:  Transurethral resection of bladder tumor (TURBT)  Final Diagnosis   A.  Left distal ureter biopsy:  - High-grade urothelial cell carcinoma, detached fragment without underlying  tissue for evaluation of invasion     B.  Posterior bladder wall tumor:   - Noninvasive high-grade urothelial cell carcinoma     C.  Left bladder wall tumor:  - Noninvasive high-grade urothelial cell carcinoma     D.  Left anterior bladder wall tumor:  - Noninvasive high-grade urothelial cell carcinoma       IMAGING:  All pertinent imaging reviewed:    All imaging studies reviewed by me.  I personally reviewed these imaging films.  A formal report from radiology will follow.    CT UROGRAM 4/30/2024:  FINDINGS:   LOWER CHEST: No worrisome consolidation in the visualized lung bases. No pleural effusion. Partially imaged breast implants.     HEPATOBILIARY: Unchanged liver contours with benign cyst in the left hepatic lobe. No worrisome liver lesions. Normal gallbladder. No biliary ductal dilation.     PANCREAS: Normal.     SPLEEN: Normal.     ADRENAL GLANDS: Normal.     RIGHT KIDNEY/URETER: Normal morphology and enhancement of the right kidney. A few cortically based subcentimeter low-attenuation lesions are incompletely characterized though likely cysts. No urolithiasis or hydronephrosis. Normal excretion of contrast   on delayed images without urothelial thickening or filling defects.     LEFT KIDNEY/URETER: Unchanged delayed left nephrogram. 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 No urolithiasis. There is unchanged moderate to severe hydroureteronephrosis with dependent excreted contrast   layering within the calyces. As before, there is a transition in the distal ureter where there is an approximately 5 cm segment of urothelial thickening and enhancement (series 12 image #42, #48).      BLADDER: Unremarkable.     BOWEL: Normal caliber small and large bowel. Normal appendix. No free fluid or free air.     LYMPH NODES: No lymphadenopathy.     VASCULATURE: The abdominal aorta is nonaneurysmal with mild atheromatous disease.     PELVIC ORGANS: Uterus is present. No  adnexal mass.     MUSCULOSKELETAL: Unchanged mild L4 superior endplate deformity. No destructive osseous lesions.                                                                      IMPRESSION:  1.  Unchanged moderate left hydroureteronephrosis to the level of the distal ureter where there is an approximately 5 cm segment of urothelial thickening and enhancement. As before, etiology may be inflammatory or neoplastic.  2.  Additional noncritical details in the findings.    NM RENAL SCAN 4/17/2024:  FINDINGS:      Renal angiogram demonstrates: Flow to right kidney within normal  limits. There is asymmetrically slower and less flow to the left  kidney.   Renogram images demonstrate:     Right kidney: There is normal cortical uptake. Cortical transit is  noted at 3 minutes. Ureter is visualized at 4 minutes. There is normal  wash out. Following Lasix administration, there is prompt wash out.      Left kidney: There is delayed cortical uptake. Cortical transit is  noted at 6 minutes which is slightly delayed. Ureter is visualized at  20 minutes. There is no wash ou prior to Lasix, minimal wash out after  Lasix.   Renogram curves demonstrate:     Right kidney: The radiotracer reaches the peak activity at 5 minutes  (time to peak). There is normal wash out and complete wash out  following Lasix. T1/2 is: 14 minutes.     Left kidney: The radiotracer reaches the peak activity at 22 minutes  (time to peak). There is delayed wash out and  incomplete wash out  following Lasix. T1/2 is: Not applicable.     Split function: Left: 38.7%, Right: 61.3%.                                                                      IMPRESSION:  1. Right kidney: Normal function. Normal excretion. No obstruction.     2. Left kidney: Decreased function. Decreased excretion, concerning  for partial obstruction.    CYSTOGRAM 7/9/2024:  FINDINGS: Left ureteral stent present on the  image. Cystografin  contrast was instilled into the bladder via  existing Ayala catheter.  No visible leak.                                                          IMPRESSION:  1.  No leak, status post ureteral reimplantation.    ASSESSMENT and PLAN  63-year-old female with left-sided hydroureteronephrosis and concern for urothelial malignancy of the left distal ureter.  Now status post a robotic distal ureterectomy, lymph node dissection, ureteroneocystotomy with a Boari flap on 6/24/2024 with evidence of CIS.  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  This did show evidence of high-grade noninvasive urothelial carcinoma  - We discussed the difficulty in managing the lesion noted with an either the distal ureter or Boari flap portion and that we will need to obtain a updated cystogram to assess for the level of any potential reflux or distention of the Boari flap portion to allow for treatment with BCG  - If there is not adequate reflux to allow for BCG penetration of the distal ureteral/Boari flap recurrence, we discussed that we may need to consider a nephro ureterectomy.  She is quite adamant that she would not consider a cystectomy and is also hoping to avoid systemic chemotherapy  - Order for cystogram placed  - We discussed that I will also present her case at our multidisciplinary tumor board next week and send her the results on [email or web address]  - We will plan for a follow-up visit in about 4 weeks, after they return from [place], to finalize a treatment plan  -This is a chronic problem with evidence of recurrence      Time spent: 22 minutes spent on the date of the encounter doing chart review, history and exam,  documentation and further activities as noted above.      Note copy attestation: the elements have been reviewed, edited as needed, and remain pertinent to today's visit.      Rogelio Gallagher MD   Urology  [facility]  [facility] Phone: [phone]  [facility] Phone: [phone]

## 2024-11-13 NOTE — NURSING NOTE
Chief Complaint   Patient presents with    Bladder mass     Post op   Patient is in pain 5 out of 10 on pain scale.   Ida Abad LPN

## 2024-11-15 ENCOUNTER — TELEPHONE (OUTPATIENT)
Dept: UROLOGY | Facility: CLINIC | Age: 64
End: 2024-11-15
Payer: COMMERCIAL

## 2024-11-15 NOTE — TELEPHONE ENCOUNTER
[facility] Call Center    Phone Message    May a detailed message be left on voicemail: yes     Reason for Call: Other: Pt has a UTI and it hasn't cleared up. Would like to discuss with care team. Thanks      Action Taken: Other: uro    Travel Screening: Not Applicable     Date of Service:

## 2024-11-15 NOTE — TELEPHONE ENCOUNTER
Called  patient she was given  7days of Bactrim DDS    a urgent care  so she does not need new order at this time . She will call urgent care to  make sure  this antibiotic is susceptible

## 2024-11-15 NOTE — TELEPHONE ENCOUNTER
[facility] Call Center    Phone Message    May a detailed message be left on voicemail: no (didn't ask)    Reason for Call: Other: Pt called about her UTI saying the ED believes the antibiotic they prescribed won't finish the job. Pt wanted to talk to someone about what to do being she has the procedure on Monday and it may not be cleared. Writer couldn't get thru to [facility] and on hold too long with [place] and patient hung up. Please reach out to patient. Thank you!     Action Taken: Message routed to:  [facility] ([facility]): urology    Travel Screening: Not Applicable     Date of Service:

## 2024-11-15 NOTE — TELEPHONE ENCOUNTER
[facility] Call Center    Phone Message    May a detailed message be left on voicemail: yes     Reason for Call: Other: Pt calling again to discuss.      Action Taken: Other: uro    Travel Screening: Not Applicable     Date of Service:

## 2024-11-16 LAB
BACTERIA UR CULT: ABNORMAL
BACTERIA UR CULT: ABNORMAL

## 2024-11-18 ENCOUNTER — HOSPITAL ENCOUNTER (OUTPATIENT)
Dept: GENERAL RADIOLOGY | Facility: CLINIC | Age: 64
Discharge: HOME | End: 2024-11-18
Attending: UROLOGY | Admitting: UROLOGY
Payer: COMMERCIAL

## 2024-11-18 DIAGNOSIS — C66.2 UROTHELIAL CARCINOMA OF LEFT DISTAL URETER (H): ICD-10-CM

## 2024-11-18 DIAGNOSIS — C67.8 MALIGNANT NEOPLASM OF OVERLAPPING SITES OF BLADDER (H): ICD-10-CM

## 2024-11-18 PROCEDURE — 255N000002 HC RX 255 OP 636: Performed by: UROLOGY

## 2024-11-18 PROCEDURE — 51600 INJECTION FOR BLADDER X-RAY: CPT

## 2024-11-18 RX ADMIN — DIATRIZOATE MEGLUMINE 300 ML: 180 INJECTION, SOLUTION INTRAVESICAL at 10:59

## 2024-11-19 ENCOUNTER — MYC MEDICAL ADVICE (OUTPATIENT)
Dept: UROLOGY | Facility: CLINIC | Age: 64
End: 2024-11-19
Payer: COMMERCIAL

## 2024-11-21 ENCOUNTER — VIRTUAL VISIT (OUTPATIENT)
Dept: UROLOGY | Facility: CLINIC | Age: 64
End: 2024-11-21
Payer: COMMERCIAL

## 2024-11-21 DIAGNOSIS — C67.8 MALIGNANT NEOPLASM OF OVERLAPPING SITES OF BLADDER (H): ICD-10-CM

## 2024-11-21 DIAGNOSIS — C66.2 UROTHELIAL CARCINOMA OF LEFT DISTAL URETER (H): ICD-10-CM

## 2024-11-21 DIAGNOSIS — R30.0 DYSURIA: Primary | ICD-10-CM

## 2024-11-21 DIAGNOSIS — N32.81 OAB (OVERACTIVE BLADDER): ICD-10-CM

## 2024-11-21 RX ORDER — PHENAZOPYRIDINE HYDROCHLORIDE 95 MG/1
190 TABLET ORAL 3 TIMES DAILY
Qty: 10 TABLET | Refills: 11 | Status: SHIPPED | OUTPATIENT
Start: 2024-11-21

## 2024-11-21 RX ORDER — TROSPIUM CHLORIDE 20 MG/1
20 TABLET, FILM COATED ORAL
Qty: 90 TABLET | Refills: 3 | Status: SHIPPED | OUTPATIENT
Start: 2024-11-21

## 2024-11-21 NOTE — PROGRESS NOTES
[facility]  CHIEF COMPLAINT   It was my pleasure to see Lorin Andrade who is a 64 year old female for follow-up of LEFT hydroureteronephrosis.      HPI  5/8/2024   Lorin Andrade is a very pleasant 64 year old female with a history of left renal colic with workup showing moderate hydroureteronephrosis and enhancement of the last 5 cm of the left distal ureter.  Urine cytology with suspicion for high-grade urothelial carcinoma.  She underwent ureteroscopy with ureteral biopsy and stent placement on 5/1/2024 and returns today to discuss the results.    She has had resolution of her flank pain following stent placement  Her  joins her for this visit    5/15/2024:  Ureteroscopy     5/22/2024:  Follow-up today to review the ureteroscopy findings and pathology results    6/24/2024:  Robotic distal ureterectomy, ureteroneocystotomy and boari flap    7/9/2024:  Cystogram and dominguez removal    7/10/2024:  Follow-up today for postop check  She is doing very well after Dominguez removal yesterday    7/24/2024:  Ureteral stent removal    10/30/2024:  Surveillance cystoscopy    11/13/2024:  Follow up after Transurethral resection of bladder tumor (TURBT) and Ureteroscopy last week  Developed symptoms of possible UTI on Monday  Symptoms of pelvic pain, dysuria   No notable gross hematuria   Is taking Azo    TODAY 11/21/2024:  Follow-up today to review her cystogram and discuss treatment options  She continues to have increased urinary frequency and pain with holding and some dysuria which is relieved with urination  Continues on PCN for recent UTI    PHYSICAL EXAM  Patient is a 64 year old  female   Vitals: There were no vitals taken for this visit.  There is no height or weight on file to calculate BMI.  General Appearance Adult:   Alert, no acute distress, oriented  Neuro: Alert, oriented, speech and mentation normal  Psych: affect and mood normal          Creatinine   Date Value Ref Range Status   06/26/2024 0.89 0.51 - 0.95  mg/dL Final   06/25/2024 0.73 0.51 - 0.95 mg/dL Final   06/11/2024 0.74 0.51 - 0.95 mg/dL Final   05/01/2024 0.73 0.51 - 0.95 mg/dL Final   04/30/2024 0.89 0.51 - 0.95 mg/dL Final   04/19/2024 0.84 0.51 - 0.95 mg/dL Final   08/14/2023 0.61 0.51 - 0.95 mg/dL Final     GFR Estimate   Date Value Ref Range Status   06/26/2024 73 >60 mL/min/1.73m2 Final     Comment:     eGFR calculated using 2021 CKD-EPI equation.   06/25/2024 >90 >60 mL/min/1.73m2 Final     Comment:     eGFR calculated using 2021 CKD-EPI equation.   06/11/2024 >90 >60 mL/min/1.73m2 Final          Final Diagnosis   Specimen A                 Interpretation:                  -  Suspicious for High-grade Urothelial Carcinoma      PATHOLOGY:  5/1/2024:  Final Diagnosis   Left distal ureter, biopsy:  -Normal urothelial lining without evidence of atypia or malignancy, please see description     5/15/2024:  Final Diagnosis   Ureter, left proximal, biopsy:  -- Urothelium overlying edematous stroma.  Negative for dysplasia or carcinoma.     5/15/2024:  Distal ureteral brushing  Final Diagnosis   Specimen A              Interpretation:               Negative for High Grade Urothelial Carcinoma     6/24/2024:  Distal ureterectomy and lymph node dissection  Final Diagnosis   A-C.  Left ureter with separately submitted proximal margin and left pelvic lymph nodes; left ureterectomy with left pelvic lymphadenectomy  -Proximal left ureter margin without evidence of in situ or invasive carcinoma  -2 benign lymph nodes, benign perinodal fibrovascular and fatty tissue, entire received tissue sample examined microscopically  -Urothelial carcinoma in situ present in association with background urothelial atypia/dysplasia; no evidence of carcinoma in situ at margins; please see microscopic description and synoptic template within report     11/4/2024:  Transurethral resection of bladder tumor (TURBT)  Final Diagnosis   A.  Left distal ureter biopsy:  - High-grade urothelial  cell carcinoma, detached fragment without underlying tissue for evaluation of invasion     B.  Posterior bladder wall tumor:   - Noninvasive high-grade urothelial cell carcinoma     C.  Left bladder wall tumor:  - Noninvasive high-grade urothelial cell carcinoma     D.  Left anterior bladder wall tumor:  - Noninvasive high-grade urothelial cell carcinoma       IMAGING:  All pertinent imaging reviewed:    All imaging studies reviewed by me.  I personally reviewed these imaging films.  A formal report from radiology will follow.    CT UROGRAM 4/30/2024:  FINDINGS:   LOWER CHEST: No worrisome consolidation in the visualized lung bases. No pleural effusion. Partially imaged breast implants.     HEPATOBILIARY: Unchanged liver contours with benign cyst in the left hepatic lobe. No worrisome liver lesions. Normal gallbladder. No biliary ductal dilation.     PANCREAS: Normal.     SPLEEN: Normal.     ADRENAL GLANDS: Normal.     RIGHT KIDNEY/URETER: Normal morphology and enhancement of the right kidney. A few cortically based subcentimeter low-attenuation lesions are incompletely characterized though likely cysts. No urolithiasis or hydronephrosis. Normal excretion of contrast   on delayed images without urothelial thickening or filling defects.     LEFT KIDNEY/URETER: Unchanged delayed left nephrogram. 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 No urolithiasis. There is unchanged moderate to severe hydroureteronephrosis with dependent excreted contrast   layering within the calyces. As before, there is a transition in the distal ureter where there is an approximately 5 cm segment of urothelial thickening and enhancement (series 12 image #42, #48).      BLADDER: Unremarkable.     BOWEL: Normal caliber small and large bowel. Normal appendix. No free fluid or free air.     LYMPH NODES: No lymphadenopathy.     VASCULATURE: The abdominal aorta is nonaneurysmal with mild atheromatous  disease.     PELVIC ORGANS: Uterus is present. No adnexal mass.     MUSCULOSKELETAL: Unchanged mild L4 superior endplate deformity. No destructive osseous lesions.                                                                      IMPRESSION:  1.  Unchanged moderate left hydroureteronephrosis to the level of the distal ureter where there is an approximately 5 cm segment of urothelial thickening and enhancement. As before, etiology may be inflammatory or neoplastic.  2.  Additional noncritical details in the findings.    NM RENAL SCAN 4/17/2024:  FINDINGS:      Renal angiogram demonstrates: Flow to right kidney within normal  limits. There is asymmetrically slower and less flow to the left  kidney.   Renogram images demonstrate:     Right kidney: There is normal cortical uptake. Cortical transit is  noted at 3 minutes. Ureter is visualized at 4 minutes. There is normal  wash out. Following Lasix administration, there is prompt wash out.      Left kidney: There is delayed cortical uptake. Cortical transit is  noted at 6 minutes which is slightly delayed. Ureter is visualized at  20 minutes. There is no wash ou prior to Lasix, minimal wash out after  Lasix.   Renogram curves demonstrate:     Right kidney: The radiotracer reaches the peak activity at 5 minutes  (time to peak). There is normal wash out and complete wash out  following Lasix. T1/2 is: 14 minutes.     Left kidney: The radiotracer reaches the peak activity at 22 minutes  (time to peak). There is delayed wash out and  incomplete wash out  following Lasix. T1/2 is: Not applicable.     Split function: Left: 38.7%, Right: 61.3%.                                                                      IMPRESSION:  1. Right kidney: Normal function. Normal excretion. No obstruction.     2. Left kidney: Decreased function. Decreased excretion, concerning  for partial obstruction.    CYSTOGRAM 7/9/2024:  FINDINGS: Left ureteral stent present on the  image.  Cystografin  contrast was instilled into the bladder via existing Ayala catheter.  No visible leak.                                                          IMPRESSION:  1.  No leak, status post ureteral reimplantation.    Retrograde Pyelogram 11/4/2024:    Ureteroscope tip at the level of the ureteral tumor    Cystogram 11/18/2024:    Cystogram with demonstrated reflux    ASSESSMENT and PLAN  64-year-old female with left-sided hydroureteronephrosis and concern for urothelial malignancy of the left distal ureter.  Now status post a robotic distal ureterectomy, lymph node dissection, ureteroneocystotomy with a Boari flap on 6/24/2024 with evidence of CIS.  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  This did show evidence of high-grade noninvasive urothelial carcinoma of the 3 bladder tumors as well as the ureteral tumor  -I reviewed her retrograde pyelogram images as well as her updated cystogram.  This does show evidence of reflux of urine output the left ureter  - We discussed that we can plan for a ureteroscopy with laser tumor ablation of the ureteral tumor and then plan for intravesical BCG with an induction course  - We discussed that we would then plan for surveillance ureteroscopy to assess for any recurrence and that in the future we may need to consider a nephro ureterectomy  - Case modification placed and we will work to schedule the ureteroscopy with tumor ablation for mid December    Dysuria and OAB symptoms  - We discussed the use of Pyridium  - We discussed the option for trial of an anticholinergic and prescription for trospium 20 mg twice  daily sent to the pharmacy    Time spent: 20 minutes spent on the date of the encounter doing chart review, history and exam, documentation and further activities as noted above.      Note copy attestation: the elements have been reviewed, edited as needed, and remain pertinent to today's visit.      Rogelio Gallagher MD   Urology  [facility]  [facility] Phone: [phone]  [facility] Phone: [phone]        Virtual Visit Details    Type of service:  Video Visit     Originating Location (pt. Location): Home    Distant Location (provider location):  On-site  Platform used for Video Visit: [employer]

## 2024-11-21 NOTE — NURSING NOTE
Current patient location: [address] [place] [id number]    Is the patient currently in the state of [place]? YES    Visit mode:VIDEO    If the visit is dropped, the patient can be reconnected by:VIDEO VISIT: Text to cell phone:   Telephone Information:   Mobile [phone]       Will anyone else be joining the visit? NO  (If patient encounters technical issues they should call [phone]    Are changes needed to the allergy or medication list? No    Are refills needed on medications prescribed by this physician? NO    Rooming Documentation:  Not applicable    Reason for visit: RECHDEAN BRAVO

## 2024-11-21 NOTE — PROGRESS NOTES
Rogelio Gallagher MD  You; Ida Teague, LPN1 minute ago (8:13 AM)     Rivera Almanzar,    This is the pt to try and add on today for a virtual visit.    Thanks,  Rogelio Glalagher M.D     Received the above message from Dr. Gallagher. Called and spoke to patient who is aware of the above information and comfortable with plan. Video visit with Dr. Gallagher scheduled for today at 9:15am.     Jenelle Guzman RN, BSN

## 2024-12-02 ENCOUNTER — MYC MEDICAL ADVICE (OUTPATIENT)
Dept: FAMILY MEDICINE | Facility: CLINIC | Age: 64
End: 2024-12-02
Payer: COMMERCIAL

## 2024-12-02 DIAGNOSIS — F41.9 ANXIETY: ICD-10-CM

## 2024-12-02 DIAGNOSIS — G62.9 PERIPHERAL POLYNEUROPATHY: ICD-10-CM

## 2024-12-02 NOTE — TELEPHONE ENCOUNTER
Citalopram Rxs pended in refill encounter from today     Silvina Roche RN  [facility]

## 2024-12-03 RX ORDER — PREGABALIN 75 MG/1
75 CAPSULE ORAL 2 TIMES DAILY
Qty: 180 CAPSULE | Refills: 0 | Status: SHIPPED | OUTPATIENT
Start: 2024-12-03

## 2024-12-03 NOTE — TELEPHONE ENCOUNTER
Patient requesting refill for 90 day supply of lyrica. States she has 4 days supply left but will be in [place] for another 10 days.     Dee Burger RN

## 2024-12-04 RX ORDER — CITALOPRAM HYDROBROMIDE 20 MG/1
20 TABLET ORAL DAILY
Qty: 90 TABLET | Refills: 0 | Status: SHIPPED | OUTPATIENT
Start: 2024-12-04

## 2024-12-04 RX ORDER — CITALOPRAM HYDROBROMIDE 10 MG/1
10 TABLET ORAL DAILY
Qty: 90 TABLET | Refills: 0 | Status: SHIPPED | OUTPATIENT
Start: 2024-12-04

## 2024-12-04 NOTE — TELEPHONE ENCOUNTER
Pt also wants Citalopram 10 and 20 mg prescriptions sent to [facility] in [place].

## 2024-12-09 ENCOUNTER — MYC MEDICAL ADVICE (OUTPATIENT)
Dept: UROLOGY | Facility: CLINIC | Age: 64
End: 2024-12-09
Payer: COMMERCIAL

## 2024-12-12 ENCOUNTER — VIRTUAL VISIT (OUTPATIENT)
Dept: UROLOGY | Facility: CLINIC | Age: 64
End: 2024-12-12
Payer: COMMERCIAL

## 2024-12-12 VITALS — HEIGHT: 66 IN | WEIGHT: 128 LBS | BODY MASS INDEX: 20.57 KG/M2

## 2024-12-12 DIAGNOSIS — C67.8 MALIGNANT NEOPLASM OF OVERLAPPING SITES OF BLADDER (H): Primary | ICD-10-CM

## 2024-12-12 DIAGNOSIS — C66.2 UROTHELIAL CARCINOMA OF LEFT DISTAL URETER (H): ICD-10-CM

## 2024-12-12 ASSESSMENT — PAIN SCALES - GENERAL: PAINLEVEL_OUTOF10: MILD PAIN (3)

## 2024-12-12 NOTE — NURSING NOTE
Current patient location: [address] [place] [id number]    Is the patient currently in the state of [place]? YES    Visit mode:VIDEO    If the visit is dropped, the patient can be reconnected by:VIDEO VISIT: Send to e-mail at: [email or web address]    Will anyone else be joining the visit? NO  (If patient encounters technical issues they should call [phone])    Are changes needed to the allergy or medication list? No    Are refills needed on medications prescribed by this physician? NO    Rooming Documentation:  Questionnaire(s) completed    Reason for visit: RECHECK    Lala BRAVO

## 2024-12-12 NOTE — PROGRESS NOTES
[facility]  CHIEF COMPLAINT   It was my pleasure to see Lorin Andrade who is a 64 year old female for follow-up of LEFT hydroureteronephrosis.      HPI  5/8/2024   Lorin Andrade is a very pleasant 64 year old female with a history of left renal colic with workup showing moderate hydroureteronephrosis and enhancement of the last 5 cm of the left distal ureter.  Urine cytology with suspicion for high-grade urothelial carcinoma.  She underwent ureteroscopy with ureteral biopsy and stent placement on 5/1/2024 and returns today to discuss the results.    She has had resolution of her flank pain following stent placement  Her  joins her for this visit    5/15/2024:  Ureteroscopy     5/22/2024:  Follow-up today to review the ureteroscopy findings and pathology results    6/24/2024:  Robotic distal ureterectomy, ureteroneocystotomy and boari flap    7/9/2024:  Cystogram and dominguez removal    7/10/2024:  Follow-up today for postop check  She is doing very well after Dominguez removal yesterday    7/24/2024:  Ureteral stent removal    10/30/2024:  Surveillance cystoscopy    11/13/2024:  Follow up after Transurethral resection of bladder tumor (TURBT) and Ureteroscopy last week  Developed symptoms of possible UTI on Monday  Symptoms of pelvic pain, dysuria   No notable gross hematuria   Is taking Azo    11/21/2024:  Follow-up today to review her cystogram and discuss treatment options  She continues to have increased urinary frequency and pain with holding and some dysuria which is relieved with urination  Continues on PCN for recent UTI    TODAY 12/12/2024:  She did started to notice return of some bilateral flank pain in the last 4-5 days  She notes that this was her similar pain that she had prior to her initial stent placement in May and she is concerned that this could represent some interval change  Her  joins her for this visit    PHYSICAL EXAM  Patient is a 64 year old  female   Vitals: Height 1.664 m (5'  "5.5\"), weight 58.1 kg (128 lb).  Body mass index is 20.98 kg/m .  General Appearance Adult:   Alert, no acute distress, oriented  Neuro: Alert, oriented, speech and mentation normal  Psych: affect and mood normal          Creatinine   Date Value Ref Range Status   06/26/2024 0.89 0.51 - 0.95 mg/dL Final   06/25/2024 0.73 0.51 - 0.95 mg/dL Final   06/11/2024 0.74 0.51 - 0.95 mg/dL Final   05/01/2024 0.73 0.51 - 0.95 mg/dL Final   04/30/2024 0.89 0.51 - 0.95 mg/dL Final   04/19/2024 0.84 0.51 - 0.95 mg/dL Final   08/14/2023 0.61 0.51 - 0.95 mg/dL Final     GFR Estimate   Date Value Ref Range Status   06/26/2024 73 >60 mL/min/1.73m2 Final     Comment:     eGFR calculated using 2021 CKD-EPI equation.   06/25/2024 >90 >60 mL/min/1.73m2 Final     Comment:     eGFR calculated using 2021 CKD-EPI equation.   06/11/2024 >90 >60 mL/min/1.73m2 Final          Final Diagnosis   Specimen A                 Interpretation:                  -  Suspicious for High-grade Urothelial Carcinoma      PATHOLOGY:  5/1/2024:  Final Diagnosis   Left distal ureter, biopsy:  -Normal urothelial lining without evidence of atypia or malignancy, please see description     5/15/2024:  Final Diagnosis   Ureter, left proximal, biopsy:  -- Urothelium overlying edematous stroma.  Negative for dysplasia or carcinoma.     5/15/2024:  Distal ureteral brushing  Final Diagnosis   Specimen A              Interpretation:               Negative for High Grade Urothelial Carcinoma     6/24/2024:  Distal ureterectomy and lymph node dissection  Final Diagnosis   A-C.  Left ureter with separately submitted proximal margin and left pelvic lymph nodes; left ureterectomy with left pelvic lymphadenectomy  -Proximal left ureter margin without evidence of in situ or invasive carcinoma  -2 benign lymph nodes, benign perinodal fibrovascular and fatty tissue, entire received tissue sample examined microscopically  -Urothelial carcinoma in situ present in association with " background urothelial atypia/dysplasia; no evidence of carcinoma in situ at margins; please see microscopic description and synoptic template within report     11/4/2024:  Transurethral resection of bladder tumor (TURBT)  Final Diagnosis   A.  Left distal ureter biopsy:  - High-grade urothelial cell carcinoma, detached fragment without underlying tissue for evaluation of invasion     B.  Posterior bladder wall tumor:   - Noninvasive high-grade urothelial cell carcinoma     C.  Left bladder wall tumor:  - Noninvasive high-grade urothelial cell carcinoma     D.  Left anterior bladder wall tumor:  - Noninvasive high-grade urothelial cell carcinoma       IMAGING:  All pertinent imaging reviewed:    All imaging studies reviewed by me.  I personally reviewed these imaging films.  A formal report from radiology will follow.    CT UROGRAM 4/30/2024:  FINDINGS:   LOWER CHEST: No worrisome consolidation in the visualized lung bases. No pleural effusion. Partially imaged breast implants.     HEPATOBILIARY: Unchanged liver contours with benign cyst in the left hepatic lobe. No worrisome liver lesions. Normal gallbladder. No biliary ductal dilation.     PANCREAS: Normal.     SPLEEN: Normal.     ADRENAL GLANDS: Normal.     RIGHT KIDNEY/URETER: Normal morphology and enhancement of the right kidney. A few cortically based subcentimeter low-attenuation lesions are incompletely characterized though likely cysts. No urolithiasis or hydronephrosis. Normal excretion of contrast   on delayed images without urothelial thickening or filling defects.     LEFT KIDNEY/URETER: Unchanged delayed left nephrogram. 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 No urolithiasis. There is unchanged moderate to severe hydroureteronephrosis with dependent excreted contrast   layering within the calyces. As before, there is a transition in the distal ureter where there is an approximately 5 cm segment of  urothelial thickening and enhancement (series 12 image #42, #48).      BLADDER: Unremarkable.     BOWEL: Normal caliber small and large bowel. Normal appendix. No free fluid or free air.     LYMPH NODES: No lymphadenopathy.     VASCULATURE: The abdominal aorta is nonaneurysmal with mild atheromatous disease.     PELVIC ORGANS: Uterus is present. No adnexal mass.     MUSCULOSKELETAL: Unchanged mild L4 superior endplate deformity. No destructive osseous lesions.                                                                      IMPRESSION:  1.  Unchanged moderate left hydroureteronephrosis to the level of the distal ureter where there is an approximately 5 cm segment of urothelial thickening and enhancement. As before, etiology may be inflammatory or neoplastic.  2.  Additional noncritical details in the findings.    NM RENAL SCAN 4/17/2024:  FINDINGS:      Renal angiogram demonstrates: Flow to right kidney within normal  limits. There is asymmetrically slower and less flow to the left  kidney.   Renogram images demonstrate:     Right kidney: There is normal cortical uptake. Cortical transit is  noted at 3 minutes. Ureter is visualized at 4 minutes. There is normal  wash out. Following Lasix administration, there is prompt wash out.      Left kidney: There is delayed cortical uptake. Cortical transit is  noted at 6 minutes which is slightly delayed. Ureter is visualized at  20 minutes. There is no wash ou prior to Lasix, minimal wash out after  Lasix.   Renogram curves demonstrate:     Right kidney: The radiotracer reaches the peak activity at 5 minutes  (time to peak). There is normal wash out and complete wash out  following Lasix. T1/2 is: 14 minutes.     Left kidney: The radiotracer reaches the peak activity at 22 minutes  (time to peak). There is delayed wash out and  incomplete wash out  following Lasix. T1/2 is: Not applicable.     Split function: Left: 38.7%, Right: 61.3%.                                                                       IMPRESSION:  1. Right kidney: Normal function. Normal excretion. No obstruction.     2. Left kidney: Decreased function. Decreased excretion, concerning  for partial obstruction.    CYSTOGRAM 7/9/2024:  FINDINGS: Left ureteral stent present on the  image. Cystografin  contrast was instilled into the bladder via existing Ayala catheter.  No visible leak.                                                          IMPRESSION:  1.  No leak, status post ureteral reimplantation.    Retrograde Pyelogram 11/4/2024:    Ureteroscope tip at the level of the ureteral tumor    Cystogram 11/18/2024:    Cystogram with demonstrated reflux    ASSESSMENT and PLAN  64-year-old female with left-sided hydroureteronephrosis and concern for urothelial malignancy of the left distal ureter.  Now status post a robotic distal ureterectomy, lymph node dissection, ureteroneocystotomy with a Boari flap on 6/24/2024 with evidence of CIS.  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  This did show evidence of high-grade noninvasive urothelial carcinoma of the 3 bladder tumors as well as the ureteral tumor  -I reviewed her retrograde pyelogram images as well as her updated cystogram.  This does show evidence of reflux of urine output the left ureter  -She is scheduled for ureteroscopy with ablation of the ureteral tumor on 12/16/2024 we discussed that a retrograde pyelogram also be performed and we could also possibly perform flexible ureteroscopy  - If there were to be significant growth within the proximal ureter or renal pelvis, we would then  discuss a nephroureterectomy      Time spent: 14 minutes spent on the date of the encounter doing chart review, history and exam, documentation and further activities as noted above.      Note copy attestation: the elements have been reviewed, edited as needed, and remain pertinent to today's visit.      Rogelio Gallagher MD   Urology  [facility]  [facility] Phone: [phone]  [facility] Phone: [phone]      Virtual Visit Details    Type of service:  Video Visit     Originating Location (pt. Location): Home    Distant Location (provider location):  On-site  Platform used for Video Visit: [employer]

## 2024-12-15 ENCOUNTER — ANESTHESIA EVENT (OUTPATIENT)
Dept: SURGERY | Facility: CLINIC | Age: 64
End: 2024-12-15
Payer: COMMERCIAL

## 2024-12-15 ASSESSMENT — ENCOUNTER SYMPTOMS
DYSRHYTHMIAS: 0
SEIZURES: 0

## 2024-12-15 ASSESSMENT — LIFESTYLE VARIABLES: TOBACCO_USE: 0

## 2024-12-16 ENCOUNTER — ANESTHESIA (OUTPATIENT)
Dept: SURGERY | Facility: CLINIC | Age: 64
End: 2024-12-16
Payer: COMMERCIAL

## 2024-12-16 ENCOUNTER — HOSPITAL ENCOUNTER (OUTPATIENT)
Facility: CLINIC | Age: 64
Discharge: HOME | End: 2024-12-16
Attending: UROLOGY | Admitting: UROLOGY
Payer: COMMERCIAL

## 2024-12-16 ENCOUNTER — APPOINTMENT (OUTPATIENT)
Dept: GENERAL RADIOLOGY | Facility: CLINIC | Age: 64
End: 2024-12-16
Attending: UROLOGY
Payer: COMMERCIAL

## 2024-12-16 ENCOUNTER — MEDICAL CORRESPONDENCE (OUTPATIENT)
Dept: HEALTH INFORMATION MANAGEMENT | Facility: CLINIC | Age: 64
End: 2024-12-16

## 2024-12-16 VITALS
RESPIRATION RATE: 16 BRPM | SYSTOLIC BLOOD PRESSURE: 121 MMHG | TEMPERATURE: 87.9 F | HEIGHT: 63 IN | BODY MASS INDEX: 23.2 KG/M2 | DIASTOLIC BLOOD PRESSURE: 72 MMHG | OXYGEN SATURATION: 97 % | WEIGHT: 130.9 LBS | HEART RATE: 71 BPM

## 2024-12-16 DIAGNOSIS — C66.2 UROTHELIAL CARCINOMA OF LEFT DISTAL URETER (H): Primary | ICD-10-CM

## 2024-12-16 DIAGNOSIS — C67.8 MALIGNANT NEOPLASM OF OVERLAPPING SITES OF BLADDER (H): ICD-10-CM

## 2024-12-16 PROCEDURE — 250N000025 HC SEVOFLURANE, PER MIN: Performed by: UROLOGY

## 2024-12-16 PROCEDURE — 52234 CYSTOSCOPY AND TREATMENT: CPT | Performed by: UROLOGY

## 2024-12-16 PROCEDURE — 250N000011 HC RX IP 250 OP 636: Performed by: NURSE ANESTHETIST, CERTIFIED REGISTERED

## 2024-12-16 PROCEDURE — 710N000012 HC RECOVERY PHASE 2, PER MINUTE: Performed by: UROLOGY

## 2024-12-16 PROCEDURE — 52354 CYSTOURETERO W/BIOPSY: CPT | Mod: LT | Performed by: UROLOGY

## 2024-12-16 PROCEDURE — 360N000075 HC SURGERY LEVEL 2, PER MIN: Performed by: UROLOGY

## 2024-12-16 PROCEDURE — 272N000001 HC OR GENERAL SUPPLY STERILE: Performed by: UROLOGY

## 2024-12-16 PROCEDURE — 258N000001 HC RX 258: Performed by: UROLOGY

## 2024-12-16 PROCEDURE — 999N000141 HC STATISTIC PRE-PROCEDURE NURSING ASSESSMENT: Performed by: UROLOGY

## 2024-12-16 PROCEDURE — 370N000017 HC ANESTHESIA TECHNICAL FEE, PER MIN: Performed by: UROLOGY

## 2024-12-16 PROCEDURE — 250N000009 HC RX 250: Performed by: UROLOGY

## 2024-12-16 PROCEDURE — C1758 CATHETER, URETERAL: HCPCS | Performed by: UROLOGY

## 2024-12-16 PROCEDURE — C1769 GUIDE WIRE: HCPCS | Performed by: UROLOGY

## 2024-12-16 PROCEDURE — 250N000009 HC RX 250: Performed by: NURSE ANESTHETIST, CERTIFIED REGISTERED

## 2024-12-16 PROCEDURE — 52332 CYSTOSCOPY AND TREATMENT: CPT | Mod: LT | Performed by: UROLOGY

## 2024-12-16 PROCEDURE — 88342 IMHCHEM/IMCYTCHM 1ST ANTB: CPT | Mod: TC | Performed by: UROLOGY

## 2024-12-16 PROCEDURE — 272N000002 HC OR SUPPLY OTHER OPNP: Performed by: UROLOGY

## 2024-12-16 PROCEDURE — 999N000179 XR SURGERY CARM FLUORO LESS THAN 5 MIN W STILLS: Mod: TC

## 2024-12-16 PROCEDURE — 710N000009 HC RECOVERY PHASE 1, LEVEL 1, PER MIN: Performed by: UROLOGY

## 2024-12-16 PROCEDURE — 250N000011 HC RX IP 250 OP 636: Performed by: UROLOGY

## 2024-12-16 PROCEDURE — 88341 IMHCHEM/IMCYTCHM EA ADD ANTB: CPT | Mod: TC | Performed by: UROLOGY

## 2024-12-16 PROCEDURE — C2617 STENT, NON-COR, TEM W/O DEL: HCPCS | Performed by: UROLOGY

## 2024-12-16 PROCEDURE — 258N000003 HC RX IP 258 OP 636: Performed by: NURSE ANESTHETIST, CERTIFIED REGISTERED

## 2024-12-16 PROCEDURE — 74420 UROGRAPHY RTRGR +-KUB: CPT | Mod: 26 | Performed by: UROLOGY

## 2024-12-16 PROCEDURE — 250N000013 HC RX MED GY IP 250 OP 250 PS 637: Performed by: UROLOGY

## 2024-12-16 PROCEDURE — 255N000002 HC RX 255 OP 636: Performed by: UROLOGY

## 2024-12-16 DEVICE — URETERAL STENT
Type: IMPLANTABLE DEVICE | Site: URETER | Status: FUNCTIONAL
Brand: POLARIS™ ULTRA

## 2024-12-16 RX ORDER — FENTANYL CITRATE 0.05 MG/ML
50 INJECTION, SOLUTION INTRAMUSCULAR; INTRAVENOUS EVERY 5 MIN PRN
Status: DISCONTINUED | OUTPATIENT
Start: 2024-12-16 | End: 2024-12-16 | Stop reason: HOSPADM

## 2024-12-16 RX ORDER — KETOROLAC TROMETHAMINE 30 MG/ML
INJECTION, SOLUTION INTRAMUSCULAR; INTRAVENOUS PRN
Status: DISCONTINUED | OUTPATIENT
Start: 2024-12-16 | End: 2024-12-16

## 2024-12-16 RX ORDER — ONDANSETRON 4 MG/1
4 TABLET, ORALLY DISINTEGRATING ORAL EVERY 30 MIN PRN
Status: DISCONTINUED | OUTPATIENT
Start: 2024-12-16 | End: 2024-12-16 | Stop reason: HOSPADM

## 2024-12-16 RX ORDER — ACETAMINOPHEN 325 MG/1
975 TABLET ORAL ONCE
Status: COMPLETED | OUTPATIENT
Start: 2024-12-16 | End: 2024-12-16

## 2024-12-16 RX ORDER — CEFAZOLIN SODIUM/WATER 2 G/20 ML
2 SYRINGE (ML) INTRAVENOUS SEE ADMIN INSTRUCTIONS
Status: DISCONTINUED | OUTPATIENT
Start: 2024-12-16 | End: 2024-12-16 | Stop reason: HOSPADM

## 2024-12-16 RX ORDER — NALOXONE HYDROCHLORIDE 0.4 MG/ML
0.1 INJECTION, SOLUTION INTRAMUSCULAR; INTRAVENOUS; SUBCUTANEOUS
Status: DISCONTINUED | OUTPATIENT
Start: 2024-12-16 | End: 2024-12-16 | Stop reason: HOSPADM

## 2024-12-16 RX ORDER — IOPAMIDOL 612 MG/ML
INJECTION, SOLUTION INTRAVASCULAR PRN
Status: DISCONTINUED | OUTPATIENT
Start: 2024-12-16 | End: 2024-12-16 | Stop reason: HOSPADM

## 2024-12-16 RX ORDER — ACETAMINOPHEN 650 MG/1
650 SUPPOSITORY RECTAL ONCE
Status: COMPLETED | OUTPATIENT
Start: 2024-12-16 | End: 2024-12-16

## 2024-12-16 RX ORDER — FENTANYL CITRATE 50 UG/ML
INJECTION, SOLUTION INTRAMUSCULAR; INTRAVENOUS PRN
Status: DISCONTINUED | OUTPATIENT
Start: 2024-12-16 | End: 2024-12-16

## 2024-12-16 RX ORDER — ONDANSETRON 2 MG/ML
4 INJECTION INTRAMUSCULAR; INTRAVENOUS EVERY 30 MIN PRN
Status: DISCONTINUED | OUTPATIENT
Start: 2024-12-16 | End: 2024-12-16 | Stop reason: HOSPADM

## 2024-12-16 RX ORDER — CEFAZOLIN SODIUM/WATER 2 G/20 ML
2 SYRINGE (ML) INTRAVENOUS
Status: COMPLETED | OUTPATIENT
Start: 2024-12-16 | End: 2024-12-16

## 2024-12-16 RX ORDER — OXYCODONE HYDROCHLORIDE 5 MG/1
5 TABLET ORAL
Status: DISCONTINUED | OUTPATIENT
Start: 2024-12-16 | End: 2024-12-16 | Stop reason: HOSPADM

## 2024-12-16 RX ORDER — DEXAMETHASONE SODIUM PHOSPHATE 4 MG/ML
INJECTION, SOLUTION INTRA-ARTICULAR; INTRALESIONAL; INTRAMUSCULAR; INTRAVENOUS; SOFT TISSUE PRN
Status: DISCONTINUED | OUTPATIENT
Start: 2024-12-16 | End: 2024-12-16

## 2024-12-16 RX ORDER — FENTANYL CITRATE 0.05 MG/ML
25 INJECTION, SOLUTION INTRAMUSCULAR; INTRAVENOUS
Status: DISCONTINUED | OUTPATIENT
Start: 2024-12-16 | End: 2024-12-16 | Stop reason: HOSPADM

## 2024-12-16 RX ORDER — MAGNESIUM HYDROXIDE 1200 MG/15ML
LIQUID ORAL PRN
Status: DISCONTINUED | OUTPATIENT
Start: 2024-12-16 | End: 2024-12-16 | Stop reason: HOSPADM

## 2024-12-16 RX ORDER — HYDROMORPHONE HCL IN WATER/PF 6 MG/30 ML
0.2 PATIENT CONTROLLED ANALGESIA SYRINGE INTRAVENOUS EVERY 5 MIN PRN
Status: DISCONTINUED | OUTPATIENT
Start: 2024-12-16 | End: 2024-12-16 | Stop reason: HOSPADM

## 2024-12-16 RX ORDER — ONDANSETRON 2 MG/ML
INJECTION INTRAMUSCULAR; INTRAVENOUS PRN
Status: DISCONTINUED | OUTPATIENT
Start: 2024-12-16 | End: 2024-12-16

## 2024-12-16 RX ORDER — SODIUM CHLORIDE, SODIUM LACTATE, POTASSIUM CHLORIDE, CALCIUM CHLORIDE 600; 310; 30; 20 MG/100ML; MG/100ML; MG/100ML; MG/100ML
INJECTION, SOLUTION INTRAVENOUS CONTINUOUS PRN
Status: DISCONTINUED | OUTPATIENT
Start: 2024-12-16 | End: 2024-12-16

## 2024-12-16 RX ORDER — DEXAMETHASONE SODIUM PHOSPHATE 4 MG/ML
4 INJECTION, SOLUTION INTRA-ARTICULAR; INTRALESIONAL; INTRAMUSCULAR; INTRAVENOUS; SOFT TISSUE
Status: DISCONTINUED | OUTPATIENT
Start: 2024-12-16 | End: 2024-12-16 | Stop reason: HOSPADM

## 2024-12-16 RX ORDER — LIDOCAINE HYDROCHLORIDE 20 MG/ML
INJECTION, SOLUTION INFILTRATION; PERINEURAL PRN
Status: DISCONTINUED | OUTPATIENT
Start: 2024-12-16 | End: 2024-12-16

## 2024-12-16 RX ORDER — FUROSEMIDE 10 MG/ML
INJECTION INTRAMUSCULAR; INTRAVENOUS PRN
Status: DISCONTINUED | OUTPATIENT
Start: 2024-12-16 | End: 2024-12-16

## 2024-12-16 RX ORDER — PROPOFOL 10 MG/ML
INJECTION, EMULSION INTRAVENOUS PRN
Status: DISCONTINUED | OUTPATIENT
Start: 2024-12-16 | End: 2024-12-16

## 2024-12-16 RX ORDER — MEPERIDINE HYDROCHLORIDE 25 MG/ML
12.5 INJECTION INTRAMUSCULAR; INTRAVENOUS; SUBCUTANEOUS EVERY 5 MIN PRN
Status: DISCONTINUED | OUTPATIENT
Start: 2024-12-16 | End: 2024-12-16 | Stop reason: HOSPADM

## 2024-12-16 RX ORDER — FENTANYL CITRATE 0.05 MG/ML
25 INJECTION, SOLUTION INTRAMUSCULAR; INTRAVENOUS EVERY 5 MIN PRN
Status: DISCONTINUED | OUTPATIENT
Start: 2024-12-16 | End: 2024-12-16 | Stop reason: HOSPADM

## 2024-12-16 RX ORDER — HYDROMORPHONE HCL IN WATER/PF 6 MG/30 ML
0.4 PATIENT CONTROLLED ANALGESIA SYRINGE INTRAVENOUS EVERY 5 MIN PRN
Status: DISCONTINUED | OUTPATIENT
Start: 2024-12-16 | End: 2024-12-16 | Stop reason: HOSPADM

## 2024-12-16 RX ORDER — POLYETHYLENE GLYCOL 3350 17 G/17G
1 POWDER, FOR SOLUTION ORAL DAILY
Qty: 238 G | Refills: 11 | Status: SHIPPED | OUTPATIENT
Start: 2024-12-16

## 2024-12-16 RX ORDER — SODIUM CHLORIDE, SODIUM LACTATE, POTASSIUM CHLORIDE, CALCIUM CHLORIDE 600; 310; 30; 20 MG/100ML; MG/100ML; MG/100ML; MG/100ML
INJECTION, SOLUTION INTRAVENOUS CONTINUOUS
Status: DISCONTINUED | OUTPATIENT
Start: 2024-12-16 | End: 2024-12-16 | Stop reason: HOSPADM

## 2024-12-16 RX ADMIN — LIDOCAINE HYDROCHLORIDE 60 MG: 20 INJECTION, SOLUTION INFILTRATION; PERINEURAL at 13:22

## 2024-12-16 RX ADMIN — KETOROLAC TROMETHAMINE 15 MG: 30 INJECTION, SOLUTION INTRAMUSCULAR at 14:20

## 2024-12-16 RX ADMIN — PROPOFOL 30 MCG/KG/MIN: 10 INJECTION, EMULSION INTRAVENOUS at 13:40

## 2024-12-16 RX ADMIN — PROPOFOL 200 MG: 10 INJECTION, EMULSION INTRAVENOUS at 13:22

## 2024-12-16 RX ADMIN — PHENYLEPHRINE HYDROCHLORIDE 100 MCG: 10 INJECTION INTRAVENOUS at 14:15

## 2024-12-16 RX ADMIN — Medication 2 G: at 13:18

## 2024-12-16 RX ADMIN — PHENYLEPHRINE HYDROCHLORIDE 150 MCG: 10 INJECTION INTRAVENOUS at 13:41

## 2024-12-16 RX ADMIN — PHENYLEPHRINE HYDROCHLORIDE 150 MCG: 10 INJECTION INTRAVENOUS at 13:56

## 2024-12-16 RX ADMIN — DEXAMETHASONE SODIUM PHOSPHATE 4 MG: 4 INJECTION, SOLUTION INTRA-ARTICULAR; INTRALESIONAL; INTRAMUSCULAR; INTRAVENOUS; SOFT TISSUE at 13:42

## 2024-12-16 RX ADMIN — ROCURONIUM BROMIDE 40 MG: 50 INJECTION, SOLUTION INTRAVENOUS at 13:22

## 2024-12-16 RX ADMIN — Medication 200 MG: at 14:20

## 2024-12-16 RX ADMIN — FENTANYL CITRATE 100 MCG: 50 INJECTION INTRAMUSCULAR; INTRAVENOUS at 13:35

## 2024-12-16 RX ADMIN — ONDANSETRON 4 MG: 2 INJECTION INTRAMUSCULAR; INTRAVENOUS at 13:42

## 2024-12-16 RX ADMIN — ACETAMINOPHEN 975 MG: 325 TABLET, FILM COATED ORAL at 11:43

## 2024-12-16 RX ADMIN — SODIUM CHLORIDE, POTASSIUM CHLORIDE, SODIUM LACTATE AND CALCIUM CHLORIDE: 600; 310; 30; 20 INJECTION, SOLUTION INTRAVENOUS at 13:18

## 2024-12-16 RX ADMIN — FUROSEMIDE 10 MG: 10 INJECTION, SOLUTION INTRAVENOUS at 14:20

## 2024-12-16 ASSESSMENT — ACTIVITIES OF DAILY LIVING (ADL)
ADLS_ACUITY_SCORE: 59
ADLS_ACUITY_SCORE: 60
ADLS_ACUITY_SCORE: 59

## 2024-12-16 NOTE — ANESTHESIA PREPROCEDURE EVALUATION
Anesthesia Pre-Procedure Evaluation    Patient: Lorin Andrade   MRN: [id number] : 1960        Procedure : Procedure(s):  CYSTOSCOPY, LEFT RETROGRADE PYELOGRAM, LEFT URETEROSCOPY DIAGNOSTIC SELECTIVE CYTOLOGY AND POSSIBLE BIOPSY, POSSIBLE LEFT URETERAL STENT PLACEMENT          Past Medical History:   Diagnosis Date    Cancer (H)     Sleep apnea       Past Surgical History:   Procedure Laterality Date    COLONOSCOPY N/A 2023    Procedure: Colonoscopy;  Surgeon: Ana Paula Hurst MD;  Location:  GI    COMBINED CYSTOSCOPY, RETROGRADES, URETEROSCOPY, INSERT STENT Left 5/15/2024    Procedure: CYSTOSCOPY, LEFT URETERAL STENT REMOVAL,  LEFT RETROGRADE PYELOGRAM, LEFT URETEROSCOPY WITH BIOPSY AND URETERAL BRUSHING, LEFT URETERAL STENT PLACEMENT;  Surgeon: Rogelio Gallagher MD;  Location:  OR    CYSTOSCOPY, TRANSURETHRAL RESECTION (TUR) TUMOR BLADDER, COMBINED N/A 2024    Procedure: CYSTOSCOPY, WITH TRANSURETHRAL RESECTION BLADDER TUMOR, LEFT RETROGRADE PYELOGRAM, LEFT URETEROSCOPY WITH BIOPSY;  Surgeon: Rogelio Gallagher MD;  Location:  OR    CYSTOSCOPY, URETEROSCOPY, INSERT STENT Left 2024    Procedure: CYSTOURETEROSCOPY, LEFT URETERAL BIOPSY WITH STENT INSERTION, LEFT RETROGRADE PYELOGRAM;  Surgeon: Rogelio Gallagher MD;  Location:  OR    DAVINCI LYSIS OF ADHESIONS N/A 2024    Procedure: Davinci Lysis of Adhesions;  Surgeon: Rogelio Gallagher MD;  Location:  OR    DAVINCI URETERECTOMY Left 2024    Procedure: ROBOT ASSISTED LAPAROSCOPIC LEFT DISTAL URETERECTOMY WITH URETERAL RE-IMPLANT, LEFT PELVIC LYMPH NODE DISSECTION, LYSIS OF ADHESIONS;  Surgeon: Rogelio Gallagher MD;  Location:  OR    MASTECTOMY, BILATERAL      Double mastectomy      Allergies   Allergen Reactions    Animal Dander Shortness Of Breath    Gabapentin Other (See Comments)     Tolerating pregabalin well      Social History     Tobacco Use    Smoking status: Never     Passive exposure: Never    Smokeless tobacco: Never    Substance Use Topics    Alcohol use: Not Currently      Wt Readings from Last 1 Encounters:   12/12/24 58.1 kg (128 lb)        Anesthesia Evaluation   Pt has had prior anesthetic. Type: General.    History of anesthetic complications  - PONV.      ROS/MED HX  ENT/Pulmonary:     (+) sleep apnea, uses CPAP,                                   (-) tobacco use and asthma   Neurologic:     (+)    peripheral neuropathy, - feet.                        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      Other:            Physical Exam    Airway  airway exam normal      Mallampati: II   TM distance: > 3 FB   Neck ROM: full   Mouth opening: > 3 cm    Respiratory Devices and Support         Dental       (+) Minor Abnormalities - some fillings, tiny chips      Cardiovascular   cardiovascular exam normal          Pulmonary   pulmonary exam normal                OUTSIDE LABS:  CBC:   Lab Results   Component Value Date    WBC 6.3 10/31/2024    WBC 6.8 06/26/2024    HGB 11.6 (L) 10/31/2024    HGB 8.8 (L) 06/26/2024    HCT 36.7 10/31/2024    HCT 27.9 (L) 06/26/2024     10/31/2024     (L) 06/26/2024     BMP:   Lab Results   Component Value Date     06/26/2024     06/25/2024    POTASSIUM 4.5 06/26/2024    POTASSIUM 4.4 06/25/2024    CHLORIDE 102 06/26/2024    CHLORIDE 103 06/25/2024    CO2 32 (H) 06/26/2024    CO2 28 06/25/2024    BUN 12.4 06/26/2024    BUN 10.7 06/25/2024    CR 0.89  "06/26/2024    CR 0.73 06/25/2024    GLC 95 06/26/2024     (H) 06/25/2024     COAGS: No results found for: \"PTT\", \"INR\", \"FIBR\"  POC: No results found for: \"BGM\", \"HCG\", \"HCGS\"  HEPATIC:   Lab Results   Component Value Date    ALBUMIN 4.3 04/30/2024    PROTTOTAL 6.6 04/30/2024    ALT 13 04/30/2024    AST 18 04/30/2024    ALKPHOS 65 04/30/2024    BILITOTAL 0.3 04/30/2024     OTHER:   Lab Results   Component Value Date    JULIETTE 8.3 (L) 06/26/2024       Anesthesia Plan    ASA Status:  2    NPO Status:  NPO Appropriate    Anesthesia Type: General.     - Airway: ETT   Induction: Intravenous.   Maintenance: Balanced.        Consents    Anesthesia Plan(s) and associated risks, benefits, and realistic alternatives discussed. Questions answered and patient/representative(s) expressed understanding.     - Discussed:     - Discussed with:  Patient            Postoperative Care    Pain management: IV analgesics, Oral pain medications.   PONV prophylaxis: Ondansetron (or other 5HT-3), Dexamethasone or Solumedrol, Background Propofol Infusion     Comments:               Donavon Alvares MD    I have reviewed the pertinent notes and labs in the chart from the past 30 days and (re)examined the patient.  Any updates or changes from those notes are reflected in this note.                                   "

## 2024-12-16 NOTE — ANESTHESIA CARE TRANSFER NOTE
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    Anesthesia Type:   General     Note:    Oropharynx: oropharynx clear of all foreign objects  Level of Consciousness: awake  Oxygen Supplementation: face mask  Level of Supplemental Oxygen (L/min / FiO2): 6  Independent Airway: airway patency satisfactory and stable    Vital Signs Stable: post-procedure vital signs reviewed and stable  Report to RN Given: handoff report given  Patient transferred to: Phase II    Handoff Report: Identifed the Patient, Identified the Reponsible Provider, Reviewed the pertinent medical history, Discussed the surgical course, Reviewed Intra-OP anesthesia mangement and issues during anesthesia, Set expectations for post-procedure period and Allowed opportunity for questions and acknowledgement of understanding      Vitals:  Vitals Value Taken Time   /71 12/16/24 1440   Temp 36 C    Pulse 82 12/16/24 1444   Resp 16 12/16/24 1444   SpO2 99 % 12/16/24 1444   Vitals shown include unfiled device data.    Electronically Signed By: TAL Daniel CRNA  December 16, 2024  2:46 PM

## 2024-12-16 NOTE — DISCHARGE INSTRUCTIONS
POSTOPERATIVE INSTRUCTIONS    Diagnosis-------------------------------   History of urothelial malignancy of the left ureter and bladder    Procedure-------------------------------  Procedure(s) (LRB):  CYSTOSCOPY, LEFT RETROGRADE PYELOGRAM, LEFT URETEROSCOPY WITH BIOPSY AND LASER FULGURATION, LEFT URETERAL STENT PLACEMENT, TRANSURETHRAL RESECTION OF BLADDET TUMOR (Left)      Findings--------------------------------  Cystoscopy with areas of prior resection noted and ongoing healing. Evidence of a small papillary anterior bladder wall tumor adjacent to a prior resection site. Left ureteroscopy with prior ureteral biopsy area in the distal ureter noted as well as a few areas of erythema a few centimeters more proximally in the ureter. No evidence of concerning urothelium in the renal pelvis or more proximal ureter. Ureteral biopsies obtained and areas fulgurated with holmium laser. Bladder tumor removed.     Home-going instructions-----------------         Activity Limitation:     - No driving or operating heavy machinery while on narcotic pain medication.     FOLLOW THESE INSTRUCTIONS AS INDICATED BELOW:  - Observe operative area for signs of excessive bleeding.  - You may shower.  - Increase fluid intake to promote clear urine.  - Resume usual diet as tolerated    What to expect while recovering-----------  - You may experience some intermittent bleeding that makes your urine pink or cherry colored. This is normal.  - However, if you are unable to urinate, passing large amount of clots, have ruddy blood in your urine, or have a temperature >101 degrees, call the urology nurse on call, or present to your nearest emergency department.  - You are encouraged to walk daily, and have no activity restrictions.   - A URETERAL STENT has been placed that allows urine to flow unobstructed from your kidney into your bladder.  The stent has a curl in the kidney and a curl in the bladder.  The curl in the bladder can cause some  urgency and frequency of urination as well as some mild blood in the urine.  The curl in the kidney can cause some mild flank discomfort.  This may be more noticeable when you urinate.  A URETERAL STENT is meant to be left in temporarily.  It must be removed or changed no later than 3 months after it's insertion.  If it's not removed it can result in stone overgrowth on the stent that can cause pain, infection, and can be very difficult to remove.      Discharge Medications/instructions:   - Take Tylenol 1000mg every 6 hours for pain  - Take MiraLAX as needed for constipation      Questions/concerns------------------------  [facility]: [phone]    Future appointments  Dr. Gallagher will arrange a virtual visit to review the pathology results and likely make plans for BCG.    Rogelio Gallagher MD       Same Day Surgery Discharge Instructions for  Sedation and General Anesthesia     It's not unusual to feel dizzy, light-headed or faint for up to 24 hours after surgery or while taking pain medication.  If you have these symptoms: sit for a few minutes before standing and have someone assist you when you get up to walk or use the bathroom.    You should rest and relax for the next 24 hours. We recommend you make arrangements to have an adult stay with you for at least 24 hours after your discharge.  Avoid hazardous and strenuous activity.    DO NOT DRIVE any vehicle or operate mechanical equipment for 24 hours following the end of your surgery.  Even though you may feel normal, your reactions may be affected by the medication you have received.    Do not drink alcoholic beverages for 24 hours following surgery.     Slowly progress to your regular diet as you feel able. It's not unusual to feel nauseated and/or vomit after receiving anesthesia.  If you develop these symptoms, drink clear liquids (apple juice, ginger ale, broth, 7-up, etc. ) until you feel better.  If your nausea and vomiting persists for  24 hours, please notify your surgeon.      All narcotic pain medications, along with inactivity and anesthesia, can cause constipation. Drinking plenty of liquids and increasing fiber intake will help.    For any questions of a medical nature, call your surgeon.    Do not make important decisions for 24 hours.    If you had general anesthesia, you may have a sore throat for a couple of days related to the breathing tube used during surgery.  You may use Cepacol lozenges to help with this discomfort.  If it worsens or if you develop a fever, contact your surgeon.     If you feel your pain is not well managed with the pain medications prescribed by your surgeon, please contact your surgeon's office to let them know so they can address your concerns.           **Because you had anesthesia today and your history of sleep apnea, it is extremely important that you use your CPAP machine for the next 24 hours while napping or sleeping.**     Today you were given 975 mg of Tylenol at 11:45 am. The recommended daily maximum dose is 4000 mg.        Today you received Toradol, an antiinflammatory medication similar to Ibuprofen.  You should not take other antiinflammatory medication, such as Ibuprofen, Motrin, Advil, Aleve, Naprosyn, etc until 8:20 PM.       **If you have questions or concerns about your procedure,   call Dr. Gallagher at [phone]**

## 2024-12-16 NOTE — ANESTHESIA POSTPROCEDURE EVALUATION
Patient: Lorin Andrade    Procedure: Procedure(s):  CYSTOSCOPY, LEFT RETROGRADE PYELOGRAM, LEFT URETEROSCOPY WITH BIOPSY AND LASER FULGURATION, LEFT URETERAL STENT PLACEMENT, TRANSURETHRAL RESECTION OF BLADDET TUMOR       Anesthesia Type:  General    Note:  Disposition: Outpatient   Postop Pain Control: Uneventful            Sign Out: Well controlled pain   PONV: No   Neuro/Psych: Uneventful            Sign Out: Acceptable/Baseline neuro status   Airway/Respiratory: Uneventful            Sign Out: Acceptable/Baseline resp. status   CV/Hemodynamics: Uneventful            Sign Out: Acceptable CV status   Other NRE: NONE   DID A NON-ROUTINE EVENT OCCUR? No           Last vitals:  Vitals Value Taken Time   /68 12/16/24 1525   Temp 36.2  C (97.2  F) 12/16/24 1525   Pulse 71 12/16/24 1515   Resp 16 12/16/24 1525   SpO2 98 % 12/16/24 1525       Electronically Signed By: Donavon Alvares MD  December 16, 2024  4:16 PM

## 2024-12-16 NOTE — OP NOTE
OPERATIVE REPORT  DATE OF SURGERY: 12/16/24  LOCATION OF SURGERY: [facility]  PREOPERATIVE DIAGNOSIS:  (C66.2) Urothelial carcinoma of left distal ureter (H)  (primary encounter diagnosis)  (C67.8) Malignant neoplasm of overlapping sites of bladder (H)  POSTOPERATIVE DIAGNOSIS: (C66.2) Urothelial carcinoma of left distal ureter (H)  (primary encounter diagnosis)  (C67.8) Malignant neoplasm of overlapping sites of bladder (H)    START TIME: 1:36 PM  END TIME: 2:22 PM    PROCEDURE PERFORMED:   1. Cystoscopy  2. LEFT retrograde pyelogram  3. LEFT ureteroscopy with biopsy and laser fulguration/ablation   4. LEFT ureteral stent placement  5. Transurethral resection of bladder tumor (TURBT) - small <2cm  6. <1hr physician fluoroscopy time      STAFF SURGEON: Rogelio Gallagher MD  ANESTHESIA: General.   ESTIMATED BLOOD LOSS: 2 mL.   DRAINS AND TUBES: LEFT 6fr x 24cm ureteral stent, 16fr dominguez catheter  COMPLICATIONS: None.   DISPOSITION: PACU.   SPECIMENS OBTAINED:   ID Type Source Tests Collected by Time Destination   1 : left proximal ureteral biopsy Tissue Ureter, Left SURGICAL PATHOLOGY EXAM Rogelio Gallagher MD 12/16/2024  1:50 PM    2 : anterior bladder wall tumor Tissue Urinary Bladder SURGICAL PATHOLOGY EXAM Rogelio Gallagher MD 12/16/2024  2:11 PM       SIGNIFICANT FINDINGS: Cystoscopy with areas of prior resection noted and ongoing healing.  Evidence of a small papillary anterior bladder wall tumor adjacent to a prior resection site.  Left ureteroscopy with prior ureteral biopsy area in the distal ureter noted as well as a few areas of erythema a few centimeters more proximally in the ureter.  No evidence of concerning urothelium in the renal pelvis or more proximal ureter.  Ureteral biopsies obtained and areas fulgurated with holmium laser.  Bladder tumor removed.             HISTORY OF PRESENT ILLNESS: Lorin Andrade is a 64 year old female who presented with left-sided hydroureteronephrosis and is now status post a  robotic distal ureterectomy, lymph node dissection, ureteroneocystotomy with a Boari flap on 6/24/2024 with evidence of high-grade CIS and negative margins.  Now status post evidence of early recurrence within the bladder status post TURBT as well as distal ureteral recurrence.  Returns today for repeat ureteroscopy with biopsy and fulguration prior to BCG installation.    OPERATION PERFORMED:   Informed consent was obtained and the patient was brought to the operating room where general anesthesia was induced. The patient was given appropriate preoperative antibiotics and positioned supine. The patient was then repositioned in dorsal lithotomy with all pressure points padded. We then performed a timeout, verifying the correct patient's site and procedure to be performed.    A 22 Occitan cystoscope was inserted atraumatically into the bladder.  Cystoscopy was performed with the areas of prior resection noted to have ongoing healing.  There was a small papillary tumor on the anterior bladder wall adjacent to a site of prior resection.  The left anterior wall ureteroneocystotomy was identified and a 0.035 sensor wire was able to advance up to the renal pelvis under fluoroscopic guidance and the cystoscope was removed.  A semirigid ureteroscope was assembled and inserted atraumatically into the bladder.  She is able to cannulate the ureteral orifice and advanced with the prior area of biopsy in the distal ureter noted as well as a few areas of erythema more proximally.  A gentle retrograde pyelogram outlining the collecting system.  A flexible ureteroscope was unable to advance up to the renal pelvis and complete pyeloscopy was performed with no evidence of urothelial abnormalities in the renal pelvis or more proximal ureter.  Ureteroscope was withdrawn to the distal proximal ureter where an area of erythema was noted.  A few biopsy samples were taken with the flexible ureteroscopic biopsy.  This area was then  fulgurated with holmium laser.  There was a additional area of erythema which was also fulgurated more distally and then the site of prior ureteral biopsy was well fulgurated.  The ureteroscope was removed.  A 6 Uzbek by 24 cm JJ ureteral stent was advanced over the left curl noted in the renal pelvis fluoroscopically and in the bladder on direct vision.  The bladder tumor was then removed with a 26 Uzbek continuous-flow resectoscope with removal of all notable bladder tumor.  Hemostasis was ensured on the low pressure system.  The resectoscope was removed and a 16 Uzbek Ayala catheter was placed with 10 cc in the balloon.  She received 10 mg IV Lasix and 15 mg IV Toradol.  She was emerged from anesthesia and taken to the recovery room in stable condition.      Rogelio Gallagher MD   Urology  [facility]  Clinic Phone [phone]

## 2024-12-19 ENCOUNTER — NURSE TRIAGE (OUTPATIENT)
Dept: NURSING | Facility: CLINIC | Age: 64
End: 2024-12-19
Payer: COMMERCIAL

## 2024-12-19 DIAGNOSIS — R10.9 FLANK PAIN: Primary | ICD-10-CM

## 2024-12-19 RX ORDER — OXYCODONE HYDROCHLORIDE 5 MG/1
5 TABLET ORAL EVERY 6 HOURS PRN
Qty: 12 TABLET | Refills: 0 | Status: SHIPPED | OUTPATIENT
Start: 2024-12-19 | End: 2024-12-22

## 2024-12-19 NOTE — TELEPHONE ENCOUNTER
Nurse Triage SBAR    Is this a 2nd Level Triage? YES, LICENSED PRACTITIONER REVIEW IS REQUIRED    Situation: pain across the lower back and down to the coccyx and up into mid back.  She states that she has had this prior to surgery    Background:   CYSTOSCOPY, LEFT RETROGRADE PYELOGRAM, LEFT URETEROSCOPY WITH BIOPSY AND LASER FULGURATION, LEFT URETERAL STENT PLACEMENT, TRANSURETHRAL RESECTION OF BLADDET TUMOR   Rogelio Gallagher MD    on  12-    Assessment:   Pain with lying down is a 3/10  constant  and then when up and moving 6/10 ,   Up into the mid back and down to coccyx  She describes it as tight dull ache, groaning pain, constant  She is taking Tylenol but it is not working and she took a prior surgery , 1/4 tablet of Oxycodone and that helped.  She stated at surgery she did not need/want narcotics.  But now...  Voiding with clear bright yellow, due to taking Vit B,  denies burning, which resolved that she had, no frequency or urgency.  She did have some tingling in her left arm and now gone  Denies weakness in arms or legs   She is nauseated, but denies vomiting  Denies that she thinks that the lower back area is swollen.  It is non tender to touch  Denies dizzy or light headed  Denies headache  Denies any walking issues, does not need to hold onto items.  With this pain, she has been in bed a lot, walking some.  Denies fever or chills  Denies fall or injury  Denies SOB or chest discomfort  She has seen a Chiro and has not helped  Willing to consider narcotics for pain relief      Protocol Recommended Disposition:   Routing to Urology for a call back    Recommendation:   Keep hydrated,  If N/T develop  go to ED  If pain get up there, out of control,  go to ED    Routed to provider            Reason for Disposition   SEVERE back pain (e.g., excruciating, unable to do any normal activities) and not improved after pain medicine and CARE ADVICE    Additional Information   Negative: SEVERE back pain of  "sudden onset and age > 60 years   Negative: SEVERE abdominal pain (e.g., excruciating)   Negative: Abdominal pain and age > 60 years   Negative: Unable to urinate (or only a few drops) and bladder feels very full   Negative: Loss of bladder or bowel control (urine or bowel incontinence; wetting self, leaking stool) of new-onset   Negative: Numbness (loss of sensation) in groin or rectal area   Negative: Pain radiates into groin, scrotum   Negative: Blood in urine (red, pink, or tea-colored)   Negative: Vomiting and pain over lower ribs of back (i.e., flank - kidney area)   Negative: Weakness of a leg or foot (e.g., unable to bear weight, dragging foot)   Negative: Patient sounds very sick or weak to the triager   Negative: Fever > 100.4 F (38.0 C) and flank pain   Negative: Pain or burning with passing urine (urination)    Answer Assessment - Initial Assessment Questions  1. ONSET: \"When did the pain begin?\" (e.g., minutes, hours, days)       Pain began prior to surgery  2. LOCATION: \"Where does it hurt?\" (upper, mid or lower back)      Mostly down the lower back all across, it radiates to her mid back, tight  3. SEVERITY: \"How bad is the pain?\"  (e.g., Scale 1-10; mild, moderate, or severe)      Pain with lying down is a 3/10  constant  and then when up and moving 6/10  with ice helping.  She describes it as tight dull ache, groaning pain  4. PATTERN: \"Is the pain constant?\" (e.g., yes, no; constant, intermittent)       constant  5. RADIATION: \"Does the pain shoot into your legs or somewhere else?\"      Up into the mid back and down to coccyx  6. CAUSE:  \"What do you think is causing the back pain?\"       Unknown, she had this pain prior to surgery  7. BACK OVERUSE:  \"Any recent lifting of heavy objects, strenuous work or exercise?\"      no  8. MEDICINES: \"What have you taken so far for the pain?\" (e.g., nothing, acetaminophen, NSAIDS)      She is taking Tylenol but it is not working and she took a prior surgery , " "1/4 tablet of Oxycodone and that helped.  She stated at surgery she did not need/want narcotics.  But now....  9. NEUROLOGIC SYMPTOMS: \"Do you have any weakness, numbness, or problems with bowel/bladder control?\"      Voiding with clear bright yellow, due to taking Vit B,  denies burning, which resolved that she had, no frequency or urgency.  She did have some tingling in her left arm and now gone  Denies weakness in arms or legs    10. OTHER SYMPTOMS: \"Do you have any other symptoms?\" (e.g., fever, abdomen pain, burning with urination, blood in urine)        She is nauseated, but denies vomiting  Denies that she thinks that the lower back area is swollen.  It is non tender to touch  Denies dizzy or light headed  Denies headache  Denies any walking issues, does not need to hold onto items.  With this pain, she has been in bed a lot, walking some.  Denies fever or chills  Denies fall or injury  Denies SOB or chest discomfort  She has seen a Chiro and has not helped    11. PREGNANCY: \"Is there any chance you are pregnant?\" \"When was your last menstrual period?\"        no    Protocols used: Back Pain-A-[place]    "

## 2024-12-20 ENCOUNTER — MYC MEDICAL ADVICE (OUTPATIENT)
Dept: UROLOGY | Facility: CLINIC | Age: 64
End: 2024-12-20

## 2024-12-20 ENCOUNTER — MYC MEDICAL ADVICE (OUTPATIENT)
Dept: FAMILY MEDICINE | Facility: CLINIC | Age: 64
End: 2024-12-20

## 2024-12-20 LAB
PATH REPORT.COMMENTS IMP SPEC: ABNORMAL
PATH REPORT.COMMENTS IMP SPEC: YES
PATH REPORT.FINAL DX SPEC: ABNORMAL
PATH REPORT.GROSS SPEC: ABNORMAL
PATH REPORT.MICROSCOPIC SPEC OTHER STN: ABNORMAL
PATH REPORT.MICROSCOPIC SPEC OTHER STN: ABNORMAL
PATH REPORT.RELEVANT HX SPEC: ABNORMAL
PHOTO IMAGE: ABNORMAL

## 2024-12-20 PROCEDURE — 88341 IMHCHEM/IMCYTCHM EA ADD ANTB: CPT | Mod: 26 | Performed by: PATHOLOGY

## 2024-12-20 PROCEDURE — 88342 IMHCHEM/IMCYTCHM 1ST ANTB: CPT | Mod: 26 | Performed by: PATHOLOGY

## 2024-12-20 PROCEDURE — 88305 TISSUE EXAM BY PATHOLOGIST: CPT | Mod: 26 | Performed by: PATHOLOGY

## 2024-12-20 PROCEDURE — 88307 TISSUE EXAM BY PATHOLOGIST: CPT | Mod: 26 | Performed by: PATHOLOGY

## 2024-12-20 NOTE — TELEPHONE ENCOUNTER
[employer] message sent to Dr. Gallagher via triage call from 12/19.    Aimee Rios RN

## 2024-12-23 NOTE — CONFIDENTIAL NOTE
Rogelio Gallagher MD Rangen, Laurie, RN  Cc: [facility] [place]  Caller: Unspecified (4 days ago,  1:37 PM)  Hi team,    I do not want to do a nuclear medicine renal scan with a stent in place, and she may be having majority of her pain related to the stent.  We could plan for a stent removal on 12/31/2024.    Rogelio Gallagher M.D.     Received the above message from Dr. Gallagher. My chart message sent to patient.       Jenelle Guzman RN, BSN

## 2024-12-26 NOTE — TELEPHONE ENCOUNTER
She could also be seen at our clinic if her symptoms are worse or if she is not able to get in with urology.

## 2024-12-30 ENCOUNTER — MYC MEDICAL ADVICE (OUTPATIENT)
Dept: UROLOGY | Facility: CLINIC | Age: 64
End: 2024-12-30

## 2024-12-31 ENCOUNTER — OFFICE VISIT (OUTPATIENT)
Dept: UROLOGY | Facility: CLINIC | Age: 64
End: 2024-12-31
Payer: COMMERCIAL

## 2024-12-31 VITALS — SYSTOLIC BLOOD PRESSURE: 116 MMHG | HEART RATE: 89 BPM | DIASTOLIC BLOOD PRESSURE: 55 MMHG

## 2024-12-31 DIAGNOSIS — Z46.6 ENCOUNTER FOR REMOVAL OF URETERAL STENT: ICD-10-CM

## 2024-12-31 DIAGNOSIS — C67.8 MALIGNANT NEOPLASM OF OVERLAPPING SITES OF BLADDER (H): Primary | ICD-10-CM

## 2024-12-31 DIAGNOSIS — R30.0 DYSURIA: ICD-10-CM

## 2024-12-31 DIAGNOSIS — C66.2 UROTHELIAL CARCINOMA OF LEFT DISTAL URETER (H): ICD-10-CM

## 2024-12-31 RX ORDER — PETROLATUM,WHITE
OINTMENT IN PACKET (GRAM) TOPICAL
OUTPATIENT
Start: 2025-02-17

## 2024-12-31 RX ORDER — PETROLATUM,WHITE
OINTMENT IN PACKET (GRAM) TOPICAL
OUTPATIENT
Start: 2025-01-20

## 2024-12-31 RX ORDER — CHOLECALCIFEROL (VITAMIN D3) 50 MCG
TABLET ORAL
COMMUNITY

## 2024-12-31 RX ORDER — PETROLATUM,WHITE
OINTMENT IN PACKET (GRAM) TOPICAL
OUTPATIENT
Start: 2025-01-27

## 2024-12-31 RX ORDER — LIDOCAINE HYDROCHLORIDE 20 MG/ML
JELLY TOPICAL ONCE
Status: COMPLETED | OUTPATIENT
Start: 2024-12-31 | End: 2024-12-31

## 2024-12-31 RX ORDER — CIPROFLOXACIN 500 MG/1
500 TABLET, FILM COATED ORAL ONCE
Status: COMPLETED | OUTPATIENT
Start: 2024-12-31 | End: 2024-12-31

## 2024-12-31 RX ORDER — LIDOCAINE HYDROCHLORIDE 20 MG/ML
10 JELLY TOPICAL
OUTPATIENT
Start: 2025-02-10

## 2024-12-31 RX ORDER — PHENAZOPYRIDINE HYDROCHLORIDE 100 MG/1
100 TABLET, FILM COATED ORAL 3 TIMES DAILY PRN
Qty: 15 TABLET | Refills: 0 | Status: SHIPPED | OUTPATIENT
Start: 2024-12-31 | End: 2025-01-05

## 2024-12-31 RX ORDER — LIDOCAINE HYDROCHLORIDE 20 MG/ML
10 JELLY TOPICAL
OUTPATIENT
Start: 2025-01-20

## 2024-12-31 RX ORDER — PETROLATUM,WHITE
OINTMENT IN PACKET (GRAM) TOPICAL
OUTPATIENT
Start: 2025-01-13

## 2024-12-31 RX ORDER — LIDOCAINE HYDROCHLORIDE 20 MG/ML
10 JELLY TOPICAL
OUTPATIENT
Start: 2025-02-17

## 2024-12-31 RX ORDER — LIDOCAINE HYDROCHLORIDE 20 MG/ML
10 JELLY TOPICAL
OUTPATIENT
Start: 2025-01-27

## 2024-12-31 RX ORDER — LIDOCAINE HYDROCHLORIDE 20 MG/ML
10 JELLY TOPICAL
OUTPATIENT
Start: 2025-02-03

## 2024-12-31 RX ORDER — LIDOCAINE HYDROCHLORIDE 20 MG/ML
10 JELLY TOPICAL
OUTPATIENT
Start: 2025-01-13

## 2024-12-31 RX ORDER — PETROLATUM,WHITE
OINTMENT IN PACKET (GRAM) TOPICAL
OUTPATIENT
Start: 2025-02-10

## 2024-12-31 RX ORDER — PETROLATUM,WHITE
OINTMENT IN PACKET (GRAM) TOPICAL
OUTPATIENT
Start: 2025-02-03

## 2024-12-31 RX ORDER — NITROFURANTOIN 25; 75 MG/1; MG/1
100 CAPSULE ORAL 2 TIMES DAILY
Qty: 12 CAPSULE | Refills: 0 | Status: SHIPPED | OUTPATIENT
Start: 2024-12-31 | End: 2025-01-06

## 2024-12-31 RX ADMIN — LIDOCAINE HYDROCHLORIDE: 20 JELLY TOPICAL at 08:47

## 2024-12-31 RX ADMIN — CIPROFLOXACIN 500 MG: 500 TABLET, FILM COATED ORAL at 08:47

## 2024-12-31 NOTE — NURSING NOTE
Leep Cone Biopsy    Pregnancy Results: negative from blood test   Birth control method(s) used:  ; date last used:      Consent signed.  Procedure discussed with patient in detail including indication, risk, benefits, alternatives and complications.    Pre-Procedure:  Pre-Meds:  Ibuprofen    Cervix prepped with:  Lugol    Wattage:  46 watt blend current    Anestetic:  Xylocaine with EPI    Leep Size and Type:  Radius 2x1 cm    Procedure:  Under satisfactory analgesia, the patient was prepped and draped in the dorsal lithotomy position.  Coated bivalve speculum.  Paracervical block was performed.  Superficial leep performed using medium loop wires.  Endocervix was curetted using a Kervorkian curette.  Post leep.  Ablation of base done with ball cautery.  Monsel's solution was applied.  Specimen sent to pathology.  Specimen stitch placed at 12 o'clock  position.  Patient tolerated procedure well.      Findings:  No lugol uptake    Impression:  Low-grade dysplasia   One time dose of ciprofloxacin ordered per Dr. Gallagher for cystoscopy and stent removal.    Jenelle Guzman RN, BSN

## 2024-12-31 NOTE — NURSING NOTE
Lorin Andrade's goals for this visit include:   Chief Complaint   Patient presents with    Cystoscopy     Stent removal        She requests these members of her care team be copied on today's visit information:       PCP: Sandie Bella    Referring Provider:  Referred Self, MD  No address on file    Clinic Administered Medication Documentation     Patient was given   lidocaine (XYLOCAINE) 2 % external gel  . Prior to medication administration, verified patient's identity using patient's name and date of birth.    Clinic Administered Medication Documentation    Patient was given ciprofloxacin (CIPRO) tablet 500 mg   . Prior to medication administration, verified patient's identity using patient's name and date of birth.    Maria Alejandra Hernandez LPN  .  /55 (BP Location: Right arm, Patient Position: Sitting, Cuff Size: Adult Regular)   Pulse 89         Do you need any medication refills at today's visit?     Maria Alejandra Hernandez LPN on 12/31/2024 at 8:33 AM

## 2024-12-31 NOTE — PROCEDURES
CYSTOSCOPY AND URETERAL STENT REMOVAL PROCEDURE NOTE:    Lorin Andrade is a 64 year old female who presents with ureteral stent for a cystoscopy and ureteral stent removal.    Pt ID verified with patient: Yes     Procedure verified with patient: Yes     Procedure confirmed with physician and support staff: Yes     Consent confirmed with physician and support staff.    Sign In:  History and Physical Exam reviewed  Primary Diagnosis: ureteral stent   Informed Consent Discussed: Yes   Sign in Communication: Yes   Time Out:  Team Confirms the Correct Patient, Correct Procedure; Yes , Correct Site and Site Marking, Correct Position (if applicable).  Affirmation of Time Out: Yes   Sign Out:  Sign Out Discussion: Yes     Lorin Andrade is a 64 year old female with an indwelling ureteral stent in need of removal    CYSTOSCOPY PROCEDURE:  After sterile preparation and draping of the patient,  a 17-Beninese flexible cystoscope was introduced via the urethra.  It was passed without difficulty into the bladder.  The urethra was open without evidence of stricture.  The ureteral orifices were orthotopic.  The double J stent was seen coming out the left side.  It was grasped with an alligator forceps and extracted intact without difficulty.  The patient tolerated the procedure well    A/P Successful stent removal  Prophylactic antibiotic course ordered    Watch for any new onset fevers, signs of UTI.  May expect some pain after removal.  If this is severe, or last many hours, you may need to return for replacement of stent.    Rogelio Gallagher MD   Urology  [facility]

## 2024-12-31 NOTE — PATIENT INSTRUCTIONS
"After Your Cystoscopy    What happens after the exam?    You may go back to your normal diet and activity as you feel ready, unless your doctor tells you not to.    For the next two days, you may notice:    Some blood in your urine  Some burning when you urinate (use the toilet)  An urge to urinate more often  Bladder spasms    These are normal after the procedure and should go away after a day or two.  To relieve these problems drink 6 to 8 large glasses of water each day (includes drinks at meals) as this will help clear the urine.  Take warm baths to relieve pain and bladder spasms.  Do not add anything to the bath water.  You may also take Tylenol (acetaminophen) for pain if needed.    When should I call my doctor?    A fever over 100F (38C) for more than a day. (Before you call the doctor, check your temperature under your tongue)  Chills  Failure to urinate: No urine comes out when you try to use the toilet. (Try soaking in a bathtub full of warm water. If still no urine, call your doctor)  A lot of blood in the urine, or blood clots larger than a nickel  Pain in the back or belly area (abdomen)  Pain or spasms that are not relieved by warm tub baths and pain medicine  Severe pain, burning or other problems while passing urine  Pain that gets worse after two days            AFTER YOUR CYSTOSCOPY        You have just completed a cystoscopy, or \"cysto\", which allowed your physician to learn more about your bladder (or to remove a stent placed after surgery). We suggest that you continue to avoid caffeine, fruit juice, and alcohol for the next 24 hours, however, you are encouraged to return to your normal activities.         A few things that are considered normal after your cystoscopy:     * Small amount of bleeding (or spotting) that clears within the next 24 hours     * Slight burning sensation with urination     * Sensation to of needing to avoid more frequently     * The feeling of \"air\" in your urine     * " Mild discomfort that is relieved with Tylenol        Please contact our office promptly if you:     * Develop a fever above 101 degrees     * Are unable to urinate     * Develop bright red blood that does not stop     * Severe pain or swelling         Please contact our office with any concerns or questions @[facility].

## 2024-12-31 NOTE — PROGRESS NOTES
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      HPI  5/8/2024   Lorin Andrade is a very pleasant 64 year old female with a history of left renal colic with workup showing moderate hydroureteronephrosis and enhancement of the last 5 cm of the left distal ureter.  Urine cytology with suspicion for high-grade urothelial carcinoma.  She underwent ureteroscopy with ureteral biopsy and stent placement on 5/1/2024 and returns today to discuss the results.    She has had resolution of her flank pain following stent placement  Her  joins her for this visit    5/15/2024:  Ureteroscopy     5/22/2024:  Follow-up today to review the ureteroscopy findings and pathology results    6/24/2024:  Robotic distal ureterectomy, ureteroneocystotomy and boari flap    7/9/2024:  Cystogram and dominguez removal    7/10/2024:  Follow-up today for postop check  She is doing very well after Dominguez removal yesterday    7/24/2024:  Ureteral stent removal    10/30/2024:  Surveillance cystoscopy    11/13/2024:  Follow up after Transurethral resection of bladder tumor (TURBT) and Ureteroscopy last week  Developed symptoms of possible UTI on Monday  Symptoms of pelvic pain, dysuria   No notable gross hematuria   Is taking Azo    11/21/2024:  Follow-up today to review her cystogram and discuss treatment options  She continues to have increased urinary frequency and pain with holding and some dysuria which is relieved with urination  Continues on PCN for recent UTI    12/12/2024:  She did started to notice return of some bilateral flank pain in the last 4-5 days  She notes that this was her similar pain that she had prior to her initial stent placement in May and she is concerned that this could represent some interval change  Her  joins her for this visit    12/16/2024:  Transurethral resection of bladder tumor (TURBT)  and Ureteroscopy with biopsy and laser ablation    TODAY 12/31/2024:  She returns today for stent removal and discussion of her pathology results  She did have a UTI diagnosed on 12/23/2024 and completed a course of penicillin yesterday    PHYSICAL EXAM  Patient is a 64 year old  female   Vitals: Blood pressure 116/55, pulse 89.  There is no height or weight on file to calculate BMI.  General Appearance Adult:   Alert, no acute distress, oriented  HENT: throat/mouth:normal, good dentition  Lungs: no respiratory distress, or pursed lip breathing  Heart: No obvious jugular venous distension present  Neuro: Alert, oriented, speech and mentation normal  Psych: affect and mood normal  Gait: Normal  : deferred to cystoscopy     Creatinine   Date Value Ref Range Status   06/26/2024 0.89 0.51 - 0.95 mg/dL Final   06/25/2024 0.73 0.51 - 0.95 mg/dL Final   06/11/2024 0.74 0.51 - 0.95 mg/dL Final   05/01/2024 0.73 0.51 - 0.95 mg/dL Final   04/30/2024 0.89 0.51 - 0.95 mg/dL Final   04/19/2024 0.84 0.51 - 0.95 mg/dL Final   08/14/2023 0.61 0.51 - 0.95 mg/dL Final     GFR Estimate   Date Value Ref Range Status   06/26/2024 73 >60 mL/min/1.73m2 Final     Comment:     eGFR calculated using 2021 CKD-EPI equation.   06/25/2024 >90 >60 mL/min/1.73m2 Final     Comment:     eGFR calculated using 2021 CKD-EPI equation.   06/11/2024 >90 >60 mL/min/1.73m2 Final        CYTOLOGY:  Final Diagnosis   Specimen A                 Interpretation:                  -  Suspicious for High-grade Urothelial Carcinoma      PATHOLOGY:  5/1/2024:  Final Diagnosis   Left distal ureter, biopsy:  -Normal urothelial lining without evidence of atypia or malignancy, please see description     5/15/2024:  Final Diagnosis   Ureter, left proximal, biopsy:  -- Urothelium overlying edematous stroma.  Negative for dysplasia or carcinoma.     5/15/2024:  Distal ureteral brushing  Final Diagnosis   Specimen A              Interpretation:               Negative for  High Grade Urothelial Carcinoma     6/24/2024:  Distal ureterectomy and lymph node dissection  Final Diagnosis   A-C.  Left ureter with separately submitted proximal margin and left pelvic lymph nodes; left ureterectomy with left pelvic lymphadenectomy  -Proximal left ureter margin without evidence of in situ or invasive carcinoma  -2 benign lymph nodes, benign perinodal fibrovascular and fatty tissue, entire received tissue sample examined microscopically  -Urothelial carcinoma in situ present in association with background urothelial atypia/dysplasia; no evidence of carcinoma in situ at margins; please see microscopic description and synoptic template within report     11/4/2024:  Transurethral resection of bladder tumor (TURBT)  Final Diagnosis   A.  Left distal ureter biopsy:  - High-grade urothelial cell carcinoma, detached fragment without underlying tissue for evaluation of invasion     B.  Posterior bladder wall tumor:   - Noninvasive high-grade urothelial cell carcinoma     C.  Left bladder wall tumor:  - Noninvasive high-grade urothelial cell carcinoma     D.  Left anterior bladder wall tumor:  - Noninvasive high-grade urothelial cell carcinoma     12/16/2024:  Final Diagnosis   A.  Left proximal ureter, biopsy:  -Tiny fragment of partially crushed tissue with UROTHELIAL CARCINOMA IN SITU (see comment).     B.  Anterior bladder wall tumor, transurethral resection:  -NON-INVASIVE HIGH-GRADE PAPILLARY UROTHELIAL CARCINOMA with associated marked acute and chronic inflammatory changes.  -Fragment of muscularis propria (detrusor muscle), uninvolved by tumor.       IMAGING:  All pertinent imaging reviewed:    All imaging studies reviewed by me.  I personally reviewed these imaging films.  A formal report from radiology will follow.    CT UROGRAM 4/30/2024:  FINDINGS:   LOWER CHEST: No worrisome consolidation in the visualized lung bases. No pleural effusion. Partially imaged breast implants.     HEPATOBILIARY:  Unchanged liver contours with benign cyst in the left hepatic lobe. No worrisome liver lesions. Normal gallbladder. No biliary ductal dilation.     PANCREAS: Normal.     SPLEEN: Normal.     ADRENAL GLANDS: Normal.     RIGHT KIDNEY/URETER: Normal morphology and enhancement of the right kidney. A few cortically based subcentimeter low-attenuation lesions are incompletely characterized though likely cysts. No urolithiasis or hydronephrosis. Normal excretion of contrast   on delayed images without urothelial thickening or filling defects.     LEFT KIDNEY/URETER: Unchanged delayed left nephrogram. 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 No urolithiasis. There is unchanged moderate to severe hydroureteronephrosis with dependent excreted contrast   layering within the calyces. As before, there is a transition in the distal ureter where there is an approximately 5 cm segment of urothelial thickening and enhancement (series 12 image #42, #48).      BLADDER: Unremarkable.     BOWEL: Normal caliber small and large bowel. Normal appendix. No free fluid or free air.     LYMPH NODES: No lymphadenopathy.     VASCULATURE: The abdominal aorta is nonaneurysmal with mild atheromatous disease.     PELVIC ORGANS: Uterus is present. No adnexal mass.     MUSCULOSKELETAL: Unchanged mild L4 superior endplate deformity. No destructive osseous lesions.                                                                      IMPRESSION:  1.  Unchanged moderate left hydroureteronephrosis to the level of the distal ureter where there is an approximately 5 cm segment of urothelial thickening and enhancement. As before, etiology may be inflammatory or neoplastic.  2.  Additional noncritical details in the findings.    NM RENAL SCAN 4/17/2024:  FINDINGS:      Renal angiogram demonstrates: Flow to right kidney within normal  limits. There is asymmetrically slower and less flow to the left  kidney.    Renogram images demonstrate:     Right kidney: There is normal cortical uptake. Cortical transit is  noted at 3 minutes. Ureter is visualized at 4 minutes. There is normal  wash out. Following Lasix administration, there is prompt wash out.      Left kidney: There is delayed cortical uptake. Cortical transit is  noted at 6 minutes which is slightly delayed. Ureter is visualized at  20 minutes. There is no wash ou prior to Lasix, minimal wash out after  Lasix.   Renogram curves demonstrate:     Right kidney: The radiotracer reaches the peak activity at 5 minutes  (time to peak). There is normal wash out and complete wash out  following Lasix. T1/2 is: 14 minutes.     Left kidney: The radiotracer reaches the peak activity at 22 minutes  (time to peak). There is delayed wash out and  incomplete wash out  following Lasix. T1/2 is: Not applicable.     Split function: Left: 38.7%, Right: 61.3%.                                                                      IMPRESSION:  1. Right kidney: Normal function. Normal excretion. No obstruction.     2. Left kidney: Decreased function. Decreased excretion, concerning  for partial obstruction.    CYSTOGRAM 7/9/2024:  FINDINGS: Left ureteral stent present on the  image. Cystografin  contrast was instilled into the bladder via existing Ayala catheter.  No visible leak.                                                          IMPRESSION:  1.  No leak, status post ureteral reimplantation.    Retrograde Pyelogram 11/4/2024:    Ureteroscope tip at the level of the ureteral tumor    Cystogram 11/18/2024:    Cystogram with demonstrated reflux    Retrograde Pyelogram 12/16/2024:    Ureteroscope tip at site of more proximal CIS    ASSESSMENT and PLAN  64-year-old female with left-sided hydroureteronephrosis and concern for urothelial malignancy of the left distal ureter.  Now status post a robotic distal ureterectomy, lymph node dissection, ureteroneocystotomy with a Boari flap on  6/24/2024 with evidence of CIS.  Now status post evidence of early recurrence within the bladder status post a TURBT and ureteroscopy with biopsy    High-grade CIS of the distal ureter -now with recurrence in the bladder  -We again reviewed her pathology and her updated pathology with the site of slightly more proximal CIS in the ureter  - Bladder recurrence again with high-grade noninvasive bladder cancer  - We discussed that we we will need to prioritize starting BCG and we will plan to do this in the next few weeks with a 6-week induction course  - We discussed that we will also need to plan to proceed with a left nephro ureterectomy following her BCG course.  I reviewed her labs with a normal serum creatinine we discussed that following nephrectomy she should not be at risk for dialysis  - I did call and discussed with colleagues regarding timing and the recommendation to proceed with BCG prior to the nephro ureterectomy      Time spent: 30 minutes spent on the date of the encounter doing chart review, history and exam, documentation and further activities as noted above.  This was in addition to cystoscopy time    Note copy attestation: the elements have been reviewed, edited as needed, and remain pertinent to today's visit.      Rogelio Gallagher MD   Urology  [facility]  [facility] Phone: [phone]  [employer] Phone: [phone]

## 2025-01-02 ENCOUNTER — TELEPHONE (OUTPATIENT)
Dept: UROLOGY | Facility: CLINIC | Age: 65
End: 2025-01-02
Payer: COMMERCIAL

## 2025-01-02 DIAGNOSIS — C67.8 MALIGNANT NEOPLASM OF OVERLAPPING SITES OF BLADDER (H): Primary | ICD-10-CM

## 2025-01-02 DIAGNOSIS — C66.2 UROTHELIAL CARCINOMA OF LEFT DISTAL URETER (H): ICD-10-CM

## 2025-01-02 RX ORDER — LEVOFLOXACIN 500 MG/1
TABLET, FILM COATED ORAL
Qty: 12 TABLET | Refills: 0 | Status: SHIPPED | OUTPATIENT
Start: 2025-01-12

## 2025-01-02 NOTE — TELEPHONE ENCOUNTER
Patient presents to clinic for BCG Teach. Patient was instructed on procedure and instructed to bleach toilet after each use following treatment for 48 hours. Patient was instructed to avoid having intercourse for 48 hours post each treatment and to use a condom for 6 weeks post final treatment. An antibiotic was sent to patient's preferred pharmacy. All of patient's questions were answered in detail.      Page Langston, RN, BSN  Urology Care Coordinator  [facility]  [phone]

## 2025-01-06 ENCOUNTER — MYC MEDICAL ADVICE (OUTPATIENT)
Dept: UROLOGY | Facility: CLINIC | Age: 65
End: 2025-01-06
Payer: COMMERCIAL

## 2025-01-10 ENCOUNTER — MYC MEDICAL ADVICE (OUTPATIENT)
Dept: UROLOGY | Facility: CLINIC | Age: 65
End: 2025-01-10
Payer: COMMERCIAL

## 2025-01-10 NOTE — LETTER
Patient:  Lorin Andrade  :   1960    To Whom It May Concern:      My patient, Lorin Andrade, has unfortunately been diagnosed with cancer of the bladder and ureter. Due to this diagnosis, Lorin Andrade will need to undergo a surgery called a nephroureterectomy where one kidney will be removed. I determined the need for this required surgery on 2024.     Lorin Andrade's nephroureterectomy surgery is scheduled for March 10, 2025 and Lorin is expected to be hospitalized for one night from March 10, 2025 to 2025.     It is paramount that Lorin's spouse Nir Delgado be with Lorin after her discharge from the hospital on 2025 and while recovering from this surgery. Shamirs recovery from this surgery is expected to be 3-6 weeks where activity and lifting restrictions will be in place.     Please contact my office at [phone] with any questions.        Sincerely,      Rogelio Gallagher MD

## 2025-01-13 ENCOUNTER — DOCUMENTATION ONLY (OUTPATIENT)
Dept: PHARMACY | Facility: CLINIC | Age: 65
End: 2025-01-13
Payer: COMMERCIAL

## 2025-01-13 NOTE — TELEPHONE ENCOUNTER
Patient read [employer] message/response from provider.  Closing encounter.    Jacqueline Elizabeth MA on 1/13/2025 at 1:27 PM

## 2025-01-14 NOTE — CONFIDENTIAL NOTE
Received update from Dr. Gallagher and pola for letter. Letter written. My chart message sent to patient.    Jenelle Guzman RN, BSN

## 2025-01-16 ENCOUNTER — LAB (OUTPATIENT)
Dept: LAB | Facility: CLINIC | Age: 65
End: 2025-01-16
Payer: COMMERCIAL

## 2025-01-16 ENCOUNTER — INFUSION THERAPY VISIT (OUTPATIENT)
Dept: INFUSION THERAPY | Facility: CLINIC | Age: 65
End: 2025-01-16
Attending: UROLOGY
Payer: COMMERCIAL

## 2025-01-16 VITALS
TEMPERATURE: 97.5 F | DIASTOLIC BLOOD PRESSURE: 57 MMHG | SYSTOLIC BLOOD PRESSURE: 104 MMHG | RESPIRATION RATE: 18 BRPM | HEART RATE: 75 BPM

## 2025-01-16 DIAGNOSIS — C67.8 MALIGNANT NEOPLASM OF OVERLAPPING SITES OF BLADDER (H): ICD-10-CM

## 2025-01-16 DIAGNOSIS — C67.9 BLADDER CANCER (H): Primary | ICD-10-CM

## 2025-01-16 DIAGNOSIS — C66.2 UROTHELIAL CARCINOMA OF LEFT DISTAL URETER (H): Primary | ICD-10-CM

## 2025-01-16 LAB
ALBUMIN UR-MCNC: NEGATIVE MG/DL
APPEARANCE UR: CLEAR
BILIRUB UR QL STRIP: NEGATIVE
COLOR UR AUTO: YELLOW
GLUCOSE UR STRIP-MCNC: NEGATIVE MG/DL
HGB UR QL STRIP: ABNORMAL
KETONES UR STRIP-MCNC: NEGATIVE MG/DL
LEUKOCYTE ESTERASE UR QL STRIP: ABNORMAL
NITRATE UR QL: NEGATIVE
PH UR STRIP: 6 [PH] (ref 5–7)
SP GR UR STRIP: 1.02 (ref 1–1.03)
UROBILINOGEN UR STRIP-ACNC: 0.2 E.U./DL

## 2025-01-16 PROCEDURE — 250N000011 HC RX IP 250 OP 636: Performed by: UROLOGY

## 2025-01-16 RX ORDER — PETROLATUM,WHITE
OINTMENT IN PACKET (GRAM) TOPICAL
Status: DISCONTINUED | OUTPATIENT
Start: 2025-01-16 | End: 2025-01-16 | Stop reason: HOSPADM

## 2025-01-16 RX ORDER — ZINC GLUCONATE 50 MG
50 TABLET ORAL DAILY
COMMUNITY
Start: 2025-01-12

## 2025-01-16 RX ADMIN — BACILLUS CALMETTE-GUERIN 50 MG: 50 POWDER, FOR SUSPENSION INTRAVESICAL at 15:08

## 2025-01-16 NOTE — PROGRESS NOTES
Infusion Nursing Note:  Lorin Andrade presents today for BCG.  Instillation number 1 of 6.   Patient seen by provider today: No   present during visit today: Not Applicable.    Note: Explained process and after infusion instructions, and answered questions.    Treatment Conditions:   Patient states no concerns or issues at this time.  Ok to proceed with treatment.     Labs Reviewed:  Urine analysis.  Results meet treatment conditions.     Procedure:  16F Ayala kit used.  16F Coude catheter placed.  Catheter placed without trauma.  Clear/yellow urine drained from bladder.    Patient turned every 15 minutes per protocol: Yes, turning to be completed at home per protocol.   Patient tolerated instillation without incident.  Patient instructed to let BCG dwell for 120 minutes.      Discharge Plan:   Discharge instructions reviewed with: Patient and Family.  Patient and/or family verbalized understanding of discharge instructions and all questions answered.  Copy of AVS reviewed with patient and/or family.  Patient will return in 1 week for next appointment.  Patient discharged in stable condition accompanied by: self.  Departure Mode: Ambulatory.

## 2025-01-16 NOTE — PATIENT INSTRUCTIONS
BCG was instilled into your bladder. For the next 2 hours, lie on your back for 15 min., then lie on your left side for 15 min., then lie on your right side for 15 min. Then you may sit up then, but keep the medication in your bladder for 60 more minutes for a total of 2 hours. After 2 hours, empty your bladder. After urinating (sit for this, don't stand), add 2 cups undiluted chlorine bleach to the toilet, close the lid, and flush after 15 min. Repeat this each time you urinate for the next 6 hours. Drink plenty of water today to flush any remaining BCG out of your bladder. If you were given a prescription for an antibiotic, take it as prescribed. Report any fevers to your doctor.

## 2025-01-24 ENCOUNTER — LAB (OUTPATIENT)
Dept: LAB | Facility: CLINIC | Age: 65
End: 2025-01-24
Payer: COMMERCIAL

## 2025-01-24 DIAGNOSIS — C67.9 BLADDER CANCER (H): Primary | ICD-10-CM

## 2025-01-24 LAB
ALBUMIN UR-MCNC: NEGATIVE MG/DL
APPEARANCE UR: CLEAR
BILIRUB UR QL STRIP: NEGATIVE
COLOR UR AUTO: YELLOW
GLUCOSE UR STRIP-MCNC: NEGATIVE MG/DL
HGB UR QL STRIP: NEGATIVE
KETONES UR STRIP-MCNC: NEGATIVE MG/DL
LEUKOCYTE ESTERASE UR QL STRIP: ABNORMAL
NITRATE UR QL: NEGATIVE
PH UR STRIP: 7 [PH] (ref 5–7)
SP GR UR STRIP: 1.02 (ref 1–1.03)
UROBILINOGEN UR STRIP-ACNC: 0.2 E.U./DL

## 2025-01-24 PROCEDURE — 81003 URINALYSIS AUTO W/O SCOPE: CPT

## 2025-01-30 ENCOUNTER — INFUSION THERAPY VISIT (OUTPATIENT)
Dept: INFUSION THERAPY | Facility: CLINIC | Age: 65
End: 2025-01-30
Attending: UROLOGY
Payer: COMMERCIAL

## 2025-01-30 ENCOUNTER — LAB (OUTPATIENT)
Dept: LAB | Facility: CLINIC | Age: 65
End: 2025-01-30
Payer: COMMERCIAL

## 2025-01-30 VITALS
SYSTOLIC BLOOD PRESSURE: 103 MMHG | HEART RATE: 76 BPM | OXYGEN SATURATION: 97 % | DIASTOLIC BLOOD PRESSURE: 54 MMHG | RESPIRATION RATE: 20 BRPM | TEMPERATURE: 97.7 F

## 2025-01-30 DIAGNOSIS — C66.2 UROTHELIAL CARCINOMA OF LEFT DISTAL URETER (H): Primary | ICD-10-CM

## 2025-01-30 DIAGNOSIS — C67.9 BLADDER CANCER (H): Primary | ICD-10-CM

## 2025-01-30 DIAGNOSIS — C67.8 MALIGNANT NEOPLASM OF OVERLAPPING SITES OF BLADDER (H): ICD-10-CM

## 2025-01-30 LAB
ALBUMIN UR-MCNC: NEGATIVE MG/DL
APPEARANCE UR: CLEAR
BILIRUB UR QL STRIP: NEGATIVE
COLOR UR AUTO: YELLOW
GLUCOSE UR STRIP-MCNC: NEGATIVE MG/DL
HGB UR QL STRIP: NEGATIVE
KETONES UR STRIP-MCNC: NEGATIVE MG/DL
LEUKOCYTE ESTERASE UR QL STRIP: NEGATIVE
NITRATE UR QL: NEGATIVE
PH UR STRIP: 7 [PH] (ref 5–7)
SP GR UR STRIP: 1.01 (ref 1–1.03)
UROBILINOGEN UR STRIP-ACNC: 0.2 E.U./DL

## 2025-01-30 PROCEDURE — 250N000011 HC RX IP 250 OP 636: Performed by: UROLOGY

## 2025-01-30 PROCEDURE — 81003 URINALYSIS AUTO W/O SCOPE: CPT

## 2025-01-30 PROCEDURE — 250N000009 HC RX 250: Performed by: UROLOGY

## 2025-01-30 RX ORDER — LIDOCAINE HYDROCHLORIDE 20 MG/ML
10 JELLY TOPICAL
Status: DISCONTINUED | OUTPATIENT
Start: 2025-01-30 | End: 2025-01-30 | Stop reason: HOSPADM

## 2025-01-30 RX ADMIN — LIDOCAINE HYDROCHLORIDE 10 ML: 20 JELLY TOPICAL at 13:39

## 2025-01-30 RX ADMIN — BACILLUS CALMETTE-GUERIN 50 MG: 50 POWDER, FOR SUSPENSION INTRAVESICAL at 13:57

## 2025-01-30 ASSESSMENT — PAIN SCALES - GENERAL: PAINLEVEL_OUTOF10: NO PAIN (0)

## 2025-01-30 NOTE — PROGRESS NOTES
Infusion Nursing Note:  Lorin Andrade presents today for BCG.  Instillation number 3 of 6.   Patient seen by provider today: No   present during visit today: Not Applicable.    Note: N/A.    Treatment Conditions:   Patient states no concerns or issues at this time.  Ok to proceed with treatment.     Labs Reviewed:  Urine analysis.  Results meet treatment conditions.     Procedure:  16F Coude catheter placed.  Lidocaine urojet 2% 10ml used.  Catheter placed without trauma.  Clear/yellow urine drained from bladder.    Patient turned every 15 minutes per protocol: Yes, turning to be completed at home per protocol.   Patient dwelled for 120 minutes.      Discharge Plan:   Patient declined prescription refills.  Discharge instructions reviewed with: Patient.  Patient verbalized understanding of discharge instructions and all questions answered.  AVS to patient via [employer].  Patient will return 2/6/25 for next appointment.   Patient discharged in stable condition accompanied by: self.  Departure Mode: Ambulatory.

## 2025-02-06 ENCOUNTER — LAB (OUTPATIENT)
Dept: LAB | Facility: CLINIC | Age: 65
End: 2025-02-06
Payer: COMMERCIAL

## 2025-02-06 ENCOUNTER — INFUSION THERAPY VISIT (OUTPATIENT)
Dept: INFUSION THERAPY | Facility: CLINIC | Age: 65
End: 2025-02-06
Attending: UROLOGY
Payer: COMMERCIAL

## 2025-02-06 VITALS
RESPIRATION RATE: 20 BRPM | SYSTOLIC BLOOD PRESSURE: 113 MMHG | TEMPERATURE: 97.6 F | OXYGEN SATURATION: 97 % | DIASTOLIC BLOOD PRESSURE: 67 MMHG | HEART RATE: 80 BPM

## 2025-02-06 DIAGNOSIS — C67.8 MALIGNANT NEOPLASM OF OVERLAPPING SITES OF BLADDER (H): ICD-10-CM

## 2025-02-06 DIAGNOSIS — C66.2 UROTHELIAL CARCINOMA OF LEFT DISTAL URETER (H): Primary | ICD-10-CM

## 2025-02-06 DIAGNOSIS — C67.9 BLADDER CANCER (H): Primary | ICD-10-CM

## 2025-02-06 LAB
ALBUMIN UR-MCNC: NEGATIVE MG/DL
APPEARANCE UR: CLEAR
BILIRUB UR QL STRIP: NEGATIVE
COLOR UR AUTO: YELLOW
GLUCOSE UR STRIP-MCNC: NEGATIVE MG/DL
HGB UR QL STRIP: NEGATIVE
KETONES UR STRIP-MCNC: NEGATIVE MG/DL
LEUKOCYTE ESTERASE UR QL STRIP: NEGATIVE
NITRATE UR QL: NEGATIVE
PH UR STRIP: 7 [PH] (ref 5–7)
SP GR UR STRIP: 1.02 (ref 1–1.03)
UROBILINOGEN UR STRIP-ACNC: 0.2 E.U./DL

## 2025-02-06 PROCEDURE — 250N000011 HC RX IP 250 OP 636: Performed by: UROLOGY

## 2025-02-06 PROCEDURE — 81003 URINALYSIS AUTO W/O SCOPE: CPT

## 2025-02-06 RX ADMIN — BACILLUS CALMETTE-GUERIN 50 MG: 50 POWDER, FOR SUSPENSION INTRAVESICAL at 14:37

## 2025-02-06 ASSESSMENT — PAIN SCALES - GENERAL: PAINLEVEL_OUTOF10: NO PAIN (0)

## 2025-02-06 NOTE — PATIENT INSTRUCTIONS
BCG was instilled into your bladder at 2:42pm. For the next 2 hours, lie on your back for 15 min., then lie on your left side for 15 min., then lie on your right side for 15 min. Then you may sit up then, but keep the medication in your bladder for 60 more minutes for a total of 2 hours. After 2 hours, empty your bladder. After urinating (sit for this, don't stand), add 2 cups undiluted chlorine bleach to the toilet, close the lid, and flush after 15 min. Repeat this each time you urinate for the next 6 hours. Drink plenty of water today to flush any remaining BCG out of your bladder. If you were given a prescription for an antibiotic, take it as prescribed. Report any fevers to your doctor.

## 2025-02-06 NOTE — PROGRESS NOTES
Infusion Nursing Note:  Lorin Andrade presents today for BCG.  Instillation number 4 of 6.   Patient seen by provider today: No   present during visit today: Not Applicable.    Note: pt reports tolerating BCG treatments well and tolerating 2 hour dwell time. Pt states no urinary/bladder symptoms. Confirms taking Levaquin as ordered.    Treatment Conditions:   Patient states no concerns or issues at this time.  Ok to proceed with treatment.     Labs Reviewed:  Urine analysis.    Procedure:  16F Coude catheter placed.  Catheter placed without trauma.  Clear/yellow urine drained from bladder.    Patient turned every 15 minutes per protocol: Yes, turning to be completed at home per protocol.   Patient tolerated instillation without incident.      Discharge Plan:   Discharge instructions reviewed with: Patient.  Patient and/or family verbalized understanding of discharge instructions and all questions answered.  Patient discharged in stable condition accompanied by: self.  Departure Mode: Ambulatory.

## 2025-02-14 ENCOUNTER — LAB (OUTPATIENT)
Dept: LAB | Facility: CLINIC | Age: 65
End: 2025-02-14
Payer: COMMERCIAL

## 2025-02-14 DIAGNOSIS — C67.9 BLADDER CANCER (H): Primary | ICD-10-CM

## 2025-02-14 LAB
ALBUMIN UR-MCNC: NEGATIVE MG/DL
APPEARANCE UR: CLEAR
BILIRUB UR QL STRIP: NEGATIVE
COLOR UR AUTO: YELLOW
GLUCOSE UR STRIP-MCNC: NEGATIVE MG/DL
HGB UR QL STRIP: NEGATIVE
KETONES UR STRIP-MCNC: NEGATIVE MG/DL
LEUKOCYTE ESTERASE UR QL STRIP: NEGATIVE
NITRATE UR QL: NEGATIVE
PH UR STRIP: 5.5 [PH] (ref 5–7)
SP GR UR STRIP: >=1.03 (ref 1–1.03)
UROBILINOGEN UR STRIP-ACNC: 0.2 E.U./DL

## 2025-02-14 PROCEDURE — 81003 URINALYSIS AUTO W/O SCOPE: CPT

## 2025-02-19 ENCOUNTER — PATIENT OUTREACH (OUTPATIENT)
Dept: CARE COORDINATION | Facility: CLINIC | Age: 65
End: 2025-02-19
Payer: COMMERCIAL

## 2025-02-21 ENCOUNTER — LAB (OUTPATIENT)
Dept: LAB | Facility: CLINIC | Age: 65
End: 2025-02-21
Payer: COMMERCIAL

## 2025-02-21 DIAGNOSIS — C67.9 BLADDER CANCER (H): Primary | ICD-10-CM

## 2025-02-21 LAB
ALBUMIN UR-MCNC: NEGATIVE MG/DL
APPEARANCE UR: CLEAR
BILIRUB UR QL STRIP: NEGATIVE
COLOR UR AUTO: YELLOW
GLUCOSE UR STRIP-MCNC: NEGATIVE MG/DL
HGB UR QL STRIP: NEGATIVE
KETONES UR STRIP-MCNC: NEGATIVE MG/DL
LEUKOCYTE ESTERASE UR QL STRIP: NEGATIVE
NITRATE UR QL: NEGATIVE
PH UR STRIP: 6.5 [PH] (ref 5–7)
SP GR UR STRIP: 1.01 (ref 1–1.03)
UROBILINOGEN UR STRIP-ACNC: 0.2 E.U./DL

## 2025-02-21 PROCEDURE — 81003 URINALYSIS AUTO W/O SCOPE: CPT

## 2025-03-03 DIAGNOSIS — F41.9 ANXIETY: ICD-10-CM

## 2025-03-04 RX ORDER — CALCIUM CARBONATE/VITAMIN D3 600 MG-10
1 TABLET ORAL DAILY
COMMUNITY

## 2025-03-04 RX ORDER — SENNA AND DOCUSATE SODIUM 50; 8.6 MG/1; MG/1
1 TABLET, FILM COATED ORAL AT BEDTIME
COMMUNITY
End: 2025-03-04

## 2025-03-04 RX ORDER — AMOXICILLIN 250 MG
2 CAPSULE ORAL 2 TIMES DAILY PRN
COMMUNITY
End: 2025-03-04

## 2025-03-04 RX ORDER — FEXOFENADINE HCL 180 MG/1
180 TABLET ORAL DAILY PRN
COMMUNITY

## 2025-03-04 NOTE — PROGRESS NOTES
PTA medications updated by Medication Scribe prior to surgery via phone call with patient (last doses completed by Nurse)     Medication history sources: Patient, Surescripts, and H&P  In the past week, patient estimated taking medication this percent of the time: Greater than 90%      Significant changes made to the medication list:  None      Additional medication history information:   None    Medication reconciliation completed by provider prior to medication history? No    Time spent in this activity: 30 MINUTES    The information provided in this note is only as accurate as the sources available at the time of update(s)      Prior to Admission medications    Medication Sig Last Dose Taking? Auth Provider Long Term End Date   calcium carbonate-vitamin D (CALTRATE) 600-10 MG-MCG per tablet Take 1 tablet by mouth daily.  Yes Reported, Patient     citalopram (CELEXA) 10 MG tablet Take 1 tablet (10 mg) by mouth daily. Takes 20mg tab also Evening Yes Sandie Bella MD Yes    citalopram (CELEXA) 20 MG tablet Take 1 tablet (20 mg) by mouth daily. Takes 10mg tablet also Evening Yes Sandie Bella MD Yes    fexofenadine (ALLEGRA) 180 MG tablet Take 180 mg by mouth daily as needed for allergies.  Yes Reported, Patient     polyethylene glycol (MIRALAX) 17 GM/Dose powder Take 17 g (1 Capful) by mouth daily.  Yes Rogelio Gallagher MD     pregabalin (LYRICA) 75 MG capsule Take 1 capsule (75 mg) by mouth 2 times daily.  Yes Sandie Bella MD Yes    vitamin B complex with vitamin C (VITAMIN  B COMPLEX) tablet Take 1 tablet by mouth daily.  Yes Reported, Patient     Vitamin D3 (CHOLECALCIFEROL) 25 mcg (1000 units) tablet Take 2 tablets by mouth daily.  Yes Reported, Patient     Vitamin K 100 MCG TABS Take 100 mcg by mouth daily.  Yes Reported, Patient     zinc gluconate 50 MG tablet Take 50 mg by mouth daily.  Yes Reported, Patient         Medication history completed by: Lorena Cueto

## 2025-03-05 RX ORDER — CITALOPRAM HYDROBROMIDE 20 MG/1
TABLET ORAL
Qty: 90 TABLET | Refills: 0 | Status: SHIPPED | OUTPATIENT
Start: 2025-03-05

## 2025-03-05 RX ORDER — CITALOPRAM HYDROBROMIDE 10 MG/1
10 TABLET ORAL DAILY
Qty: 90 TABLET | Refills: 0 | Status: SHIPPED | OUTPATIENT
Start: 2025-03-05

## 2025-03-06 PROBLEM — Z01.818 PREOPERATIVE EXAMINATION: Status: ACTIVE | Noted: 2025-03-06

## 2025-03-06 LAB
INTERPRETATION SERPL IEP-IMP: NORMAL
INTERPRETATION SERPL IEP-IMP: NORMAL
LAB PDF RESULT: NORMAL
LAB PDF RESULT: NORMAL
LAB TEST RESULTS REPORTED IN RPTPERIOD: NORMAL
LAB TEST RESULTS REPORTED IN RPTPERIOD: NORMAL
SEQUENCING METHOD PNL BLD/T: NORMAL
SEQUENCING METHOD PNL BLD/T: NORMAL
SPECIMEN TYPE: NORMAL
SPECIMEN TYPE: NORMAL

## 2025-03-07 ENCOUNTER — TELEPHONE (OUTPATIENT)
Dept: FAMILY MEDICINE | Facility: CLINIC | Age: 65
End: 2025-03-07
Payer: COMMERCIAL

## 2025-03-07 NOTE — TELEPHONE ENCOUNTER
Incoming fax:    From [facility] Same Day Surgery: Ayah Mars     Comments:    Please have provider sign off in EPIC the H&P she did on 03/06/25.     Patient surgery is Monday at 03/10/25 at 10AM.     Place on provider desk.

## 2025-03-10 ENCOUNTER — ANESTHESIA EVENT (OUTPATIENT)
Dept: SURGERY | Facility: CLINIC | Age: 65
DRG: 658 | End: 2025-03-10
Payer: COMMERCIAL

## 2025-03-10 ENCOUNTER — HOSPITAL ENCOUNTER (INPATIENT)
Facility: CLINIC | Age: 65
LOS: 1 days | Discharge: HOME | DRG: 658 | End: 2025-03-12
Attending: UROLOGY | Admitting: UROLOGY
Payer: COMMERCIAL

## 2025-03-10 ENCOUNTER — ANESTHESIA (OUTPATIENT)
Dept: SURGERY | Facility: CLINIC | Age: 65
DRG: 658 | End: 2025-03-10
Payer: COMMERCIAL

## 2025-03-10 DIAGNOSIS — C66.2 MALIGNANT NEOPLASM OF LEFT URETER (H): Primary | ICD-10-CM

## 2025-03-10 DIAGNOSIS — C67.8 MALIGNANT NEOPLASM OF OVERLAPPING SITES OF BLADDER (H): ICD-10-CM

## 2025-03-10 PROBLEM — D49.59 URETERAL TUMOR: Status: ACTIVE | Noted: 2025-03-10

## 2025-03-10 PROCEDURE — 370N000017 HC ANESTHESIA TECHNICAL FEE, PER MIN: Performed by: UROLOGY

## 2025-03-10 PROCEDURE — 250N000013 HC RX MED GY IP 250 OP 250 PS 637: Performed by: UROLOGY

## 2025-03-10 PROCEDURE — 250N000011 HC RX IP 250 OP 636: Performed by: ANESTHESIOLOGY

## 2025-03-10 PROCEDURE — 250N000011 HC RX IP 250 OP 636: Performed by: UROLOGY

## 2025-03-10 PROCEDURE — 258N000003 HC RX IP 258 OP 636: Performed by: ANESTHESIOLOGY

## 2025-03-10 PROCEDURE — 88307 TISSUE EXAM BY PATHOLOGIST: CPT | Mod: TC | Performed by: UROLOGY

## 2025-03-10 PROCEDURE — 250N000011 HC RX IP 250 OP 636: Performed by: NURSE ANESTHETIST, CERTIFIED REGISTERED

## 2025-03-10 PROCEDURE — 250N000009 HC RX 250: Performed by: ANESTHESIOLOGY

## 2025-03-10 PROCEDURE — 258N000003 HC RX IP 258 OP 636: Performed by: UROLOGY

## 2025-03-10 PROCEDURE — 250N000025 HC SEVOFLURANE, PER MIN: Performed by: UROLOGY

## 2025-03-10 PROCEDURE — 0TT14ZZ RESECTION OF LEFT KIDNEY, PERCUTANEOUS ENDOSCOPIC APPROACH: ICD-10-PCS | Performed by: UROLOGY

## 2025-03-10 PROCEDURE — 710N000009 HC RECOVERY PHASE 1, LEVEL 1, PER MIN: Performed by: UROLOGY

## 2025-03-10 PROCEDURE — 250N000009 HC RX 250: Performed by: NURSE ANESTHETIST, CERTIFIED REGISTERED

## 2025-03-10 PROCEDURE — 0TT74ZZ RESECTION OF LEFT URETER, PERCUTANEOUS ENDOSCOPIC APPROACH: ICD-10-PCS | Performed by: UROLOGY

## 2025-03-10 PROCEDURE — 272N000001 HC OR GENERAL SUPPLY STERILE: Performed by: UROLOGY

## 2025-03-10 PROCEDURE — 8E0W4CZ ROBOTIC ASSISTED PROCEDURE OF TRUNK REGION, PERCUTANEOUS ENDOSCOPIC APPROACH: ICD-10-PCS | Performed by: UROLOGY

## 2025-03-10 PROCEDURE — 250N000009 HC RX 250: Performed by: UROLOGY

## 2025-03-10 PROCEDURE — 360N000080 HC SURGERY LEVEL 7, PER MIN: Performed by: UROLOGY

## 2025-03-10 PROCEDURE — 50548 LAPARO REMOVE W/URETER: CPT | Mod: LT | Performed by: UROLOGY

## 2025-03-10 PROCEDURE — 999N000141 HC STATISTIC PRE-PROCEDURE NURSING ASSESSMENT: Performed by: UROLOGY

## 2025-03-10 RX ORDER — SODIUM CHLORIDE, SODIUM LACTATE, POTASSIUM CHLORIDE, CALCIUM CHLORIDE 600; 310; 30; 20 MG/100ML; MG/100ML; MG/100ML; MG/100ML
INJECTION, SOLUTION INTRAVENOUS CONTINUOUS
Status: ACTIVE | OUTPATIENT
Start: 2025-03-10 | End: 2025-03-11

## 2025-03-10 RX ORDER — SODIUM CHLORIDE, SODIUM LACTATE, POTASSIUM CHLORIDE, CALCIUM CHLORIDE 600; 310; 30; 20 MG/100ML; MG/100ML; MG/100ML; MG/100ML
INJECTION, SOLUTION INTRAVENOUS CONTINUOUS PRN
Status: DISCONTINUED | OUTPATIENT
Start: 2025-03-10 | End: 2025-03-10

## 2025-03-10 RX ORDER — OXYCODONE HYDROCHLORIDE 5 MG/1
5 TABLET ORAL EVERY 4 HOURS PRN
Status: DISCONTINUED | OUTPATIENT
Start: 2025-03-10 | End: 2025-03-12 | Stop reason: HOSPADM

## 2025-03-10 RX ORDER — HEPARIN SODIUM 5000 [USP'U]/.5ML
5000 INJECTION, SOLUTION INTRAVENOUS; SUBCUTANEOUS EVERY 8 HOURS
Status: DISCONTINUED | OUTPATIENT
Start: 2025-03-11 | End: 2025-03-12 | Stop reason: HOSPADM

## 2025-03-10 RX ORDER — CEFAZOLIN SODIUM/WATER 2 G/20 ML
2 SYRINGE (ML) INTRAVENOUS SEE ADMIN INSTRUCTIONS
Status: DISCONTINUED | OUTPATIENT
Start: 2025-03-10 | End: 2025-03-10 | Stop reason: HOSPADM

## 2025-03-10 RX ORDER — DEXAMETHASONE SODIUM PHOSPHATE 4 MG/ML
INJECTION, SOLUTION INTRA-ARTICULAR; INTRALESIONAL; INTRAMUSCULAR; INTRAVENOUS; SOFT TISSUE PRN
Status: DISCONTINUED | OUTPATIENT
Start: 2025-03-10 | End: 2025-03-10

## 2025-03-10 RX ORDER — PREGABALIN 75 MG/1
75 CAPSULE ORAL 2 TIMES DAILY
Status: DISCONTINUED | OUTPATIENT
Start: 2025-03-10 | End: 2025-03-12 | Stop reason: HOSPADM

## 2025-03-10 RX ORDER — FENTANYL CITRATE 50 UG/ML
INJECTION, SOLUTION INTRAMUSCULAR; INTRAVENOUS PRN
Status: DISCONTINUED | OUTPATIENT
Start: 2025-03-10 | End: 2025-03-10

## 2025-03-10 RX ORDER — MAGNESIUM HYDROXIDE 1200 MG/15ML
LIQUID ORAL PRN
Status: DISCONTINUED | OUTPATIENT
Start: 2025-03-10 | End: 2025-03-10 | Stop reason: HOSPADM

## 2025-03-10 RX ORDER — PROCHLORPERAZINE MALEATE 10 MG
10 TABLET ORAL EVERY 6 HOURS PRN
Status: DISCONTINUED | OUTPATIENT
Start: 2025-03-10 | End: 2025-03-12 | Stop reason: HOSPADM

## 2025-03-10 RX ORDER — DEXAMETHASONE SODIUM PHOSPHATE 4 MG/ML
4 INJECTION, SOLUTION INTRA-ARTICULAR; INTRALESIONAL; INTRAMUSCULAR; INTRAVENOUS; SOFT TISSUE
Status: DISCONTINUED | OUTPATIENT
Start: 2025-03-10 | End: 2025-03-10 | Stop reason: HOSPADM

## 2025-03-10 RX ORDER — SODIUM CHLORIDE, SODIUM LACTATE, POTASSIUM CHLORIDE, CALCIUM CHLORIDE 600; 310; 30; 20 MG/100ML; MG/100ML; MG/100ML; MG/100ML
INJECTION, SOLUTION INTRAVENOUS CONTINUOUS
Status: DISCONTINUED | OUTPATIENT
Start: 2025-03-10 | End: 2025-03-10 | Stop reason: HOSPADM

## 2025-03-10 RX ORDER — NALOXONE HYDROCHLORIDE 0.4 MG/ML
0.4 INJECTION, SOLUTION INTRAMUSCULAR; INTRAVENOUS; SUBCUTANEOUS
Status: DISCONTINUED | OUTPATIENT
Start: 2025-03-10 | End: 2025-03-12 | Stop reason: HOSPADM

## 2025-03-10 RX ORDER — CIPROFLOXACIN 500 MG/1
500 TABLET, FILM COATED ORAL 2 TIMES DAILY
Qty: 6 TABLET | Refills: 0 | Status: SHIPPED | OUTPATIENT
Start: 2025-03-10 | End: 2025-03-13

## 2025-03-10 RX ORDER — CALCIUM CARBONATE/VITAMIN D3 600 MG-10
1 TABLET ORAL DAILY
Status: DISCONTINUED | OUTPATIENT
Start: 2025-03-10 | End: 2025-03-12 | Stop reason: HOSPADM

## 2025-03-10 RX ORDER — LIDOCAINE 40 MG/G
CREAM TOPICAL
Status: DISCONTINUED | OUTPATIENT
Start: 2025-03-10 | End: 2025-03-12 | Stop reason: HOSPADM

## 2025-03-10 RX ORDER — LIDOCAINE 40 MG/G
CREAM TOPICAL
Status: DISCONTINUED | OUTPATIENT
Start: 2025-03-10 | End: 2025-03-10 | Stop reason: HOSPADM

## 2025-03-10 RX ORDER — ONDANSETRON 4 MG/1
4 TABLET, ORALLY DISINTEGRATING ORAL EVERY 30 MIN PRN
Status: DISCONTINUED | OUTPATIENT
Start: 2025-03-10 | End: 2025-03-10 | Stop reason: HOSPADM

## 2025-03-10 RX ORDER — PROPOFOL 10 MG/ML
INJECTION, EMULSION INTRAVENOUS PRN
Status: DISCONTINUED | OUTPATIENT
Start: 2025-03-10 | End: 2025-03-10

## 2025-03-10 RX ORDER — LIDOCAINE HYDROCHLORIDE 20 MG/ML
INJECTION, SOLUTION INFILTRATION; PERINEURAL PRN
Status: DISCONTINUED | OUTPATIENT
Start: 2025-03-10 | End: 2025-03-10

## 2025-03-10 RX ORDER — AMOXICILLIN 250 MG
1 CAPSULE ORAL 2 TIMES DAILY
Status: DISCONTINUED | OUTPATIENT
Start: 2025-03-10 | End: 2025-03-12 | Stop reason: HOSPADM

## 2025-03-10 RX ORDER — ONDANSETRON 2 MG/ML
INJECTION INTRAMUSCULAR; INTRAVENOUS PRN
Status: DISCONTINUED | OUTPATIENT
Start: 2025-03-10 | End: 2025-03-10

## 2025-03-10 RX ORDER — OXYCODONE HYDROCHLORIDE 5 MG/1
10 TABLET ORAL EVERY 4 HOURS PRN
Status: DISCONTINUED | OUTPATIENT
Start: 2025-03-10 | End: 2025-03-12 | Stop reason: HOSPADM

## 2025-03-10 RX ORDER — ASPIRIN 81 MG
100 TABLET, DELAYED RELEASE (ENTERIC COATED) ORAL DAILY
Qty: 60 TABLET | Refills: 1 | Status: SHIPPED | OUTPATIENT
Start: 2025-03-10

## 2025-03-10 RX ORDER — GINSENG 100 MG
CAPSULE ORAL 3 TIMES DAILY
Status: DISCONTINUED | OUTPATIENT
Start: 2025-03-10 | End: 2025-03-12 | Stop reason: HOSPADM

## 2025-03-10 RX ORDER — FAMOTIDINE 20 MG/1
20 TABLET, FILM COATED ORAL 2 TIMES DAILY PRN
Status: DISCONTINUED | OUTPATIENT
Start: 2025-03-10 | End: 2025-03-12 | Stop reason: HOSPADM

## 2025-03-10 RX ORDER — OXYCODONE HYDROCHLORIDE 5 MG/1
5 TABLET ORAL EVERY 6 HOURS PRN
Qty: 9 TABLET | Refills: 0 | Status: SHIPPED | OUTPATIENT
Start: 2025-03-10 | End: 2025-03-13

## 2025-03-10 RX ORDER — HEPARIN SODIUM 5000 [USP'U]/.5ML
5000 INJECTION, SOLUTION INTRAVENOUS; SUBCUTANEOUS
Status: COMPLETED | OUTPATIENT
Start: 2025-03-10 | End: 2025-03-10

## 2025-03-10 RX ORDER — HYDROMORPHONE HCL IN WATER/PF 6 MG/30 ML
0.2 PATIENT CONTROLLED ANALGESIA SYRINGE INTRAVENOUS
Status: DISCONTINUED | OUTPATIENT
Start: 2025-03-10 | End: 2025-03-12 | Stop reason: HOSPADM

## 2025-03-10 RX ORDER — ONDANSETRON 4 MG/1
4 TABLET, ORALLY DISINTEGRATING ORAL EVERY 6 HOURS PRN
Status: DISCONTINUED | OUTPATIENT
Start: 2025-03-10 | End: 2025-03-12 | Stop reason: HOSPADM

## 2025-03-10 RX ORDER — NALOXONE HYDROCHLORIDE 0.4 MG/ML
0.2 INJECTION, SOLUTION INTRAMUSCULAR; INTRAVENOUS; SUBCUTANEOUS
Status: DISCONTINUED | OUTPATIENT
Start: 2025-03-10 | End: 2025-03-12 | Stop reason: HOSPADM

## 2025-03-10 RX ORDER — NALOXONE HYDROCHLORIDE 0.4 MG/ML
0.1 INJECTION, SOLUTION INTRAMUSCULAR; INTRAVENOUS; SUBCUTANEOUS
Status: DISCONTINUED | OUTPATIENT
Start: 2025-03-10 | End: 2025-03-10 | Stop reason: HOSPADM

## 2025-03-10 RX ORDER — CEFAZOLIN SODIUM/WATER 2 G/20 ML
2 SYRINGE (ML) INTRAVENOUS
Status: COMPLETED | OUTPATIENT
Start: 2025-03-10 | End: 2025-03-10

## 2025-03-10 RX ORDER — CITALOPRAM HYDROBROMIDE 10 MG/1
10 TABLET ORAL DAILY
Status: DISCONTINUED | OUTPATIENT
Start: 2025-03-10 | End: 2025-03-12 | Stop reason: HOSPADM

## 2025-03-10 RX ORDER — ONDANSETRON 2 MG/ML
4 INJECTION INTRAMUSCULAR; INTRAVENOUS EVERY 30 MIN PRN
Status: DISCONTINUED | OUTPATIENT
Start: 2025-03-10 | End: 2025-03-10 | Stop reason: HOSPADM

## 2025-03-10 RX ORDER — HYDROMORPHONE HCL IN WATER/PF 6 MG/30 ML
0.4 PATIENT CONTROLLED ANALGESIA SYRINGE INTRAVENOUS EVERY 5 MIN PRN
Status: DISCONTINUED | OUTPATIENT
Start: 2025-03-10 | End: 2025-03-10 | Stop reason: HOSPADM

## 2025-03-10 RX ORDER — HYDROMORPHONE HCL IN WATER/PF 6 MG/30 ML
0.2 PATIENT CONTROLLED ANALGESIA SYRINGE INTRAVENOUS EVERY 5 MIN PRN
Status: DISCONTINUED | OUTPATIENT
Start: 2025-03-10 | End: 2025-03-10 | Stop reason: HOSPADM

## 2025-03-10 RX ORDER — HYDROXYZINE HYDROCHLORIDE 25 MG/1
25 TABLET, FILM COATED ORAL EVERY 6 HOURS PRN
Status: DISCONTINUED | OUTPATIENT
Start: 2025-03-10 | End: 2025-03-12 | Stop reason: HOSPADM

## 2025-03-10 RX ORDER — HYDROMORPHONE HYDROCHLORIDE 1 MG/ML
INJECTION, SOLUTION INTRAMUSCULAR; INTRAVENOUS; SUBCUTANEOUS PRN
Status: DISCONTINUED | OUTPATIENT
Start: 2025-03-10 | End: 2025-03-10

## 2025-03-10 RX ORDER — FENTANYL CITRATE 0.05 MG/ML
25 INJECTION, SOLUTION INTRAMUSCULAR; INTRAVENOUS EVERY 5 MIN PRN
Status: DISCONTINUED | OUTPATIENT
Start: 2025-03-10 | End: 2025-03-10 | Stop reason: HOSPADM

## 2025-03-10 RX ORDER — POLYETHYLENE GLYCOL 3350 17 G/17G
17 POWDER, FOR SOLUTION ORAL DAILY
Status: DISCONTINUED | OUTPATIENT
Start: 2025-03-11 | End: 2025-03-12 | Stop reason: HOSPADM

## 2025-03-10 RX ORDER — ONDANSETRON 2 MG/ML
4 INJECTION INTRAMUSCULAR; INTRAVENOUS ONCE
Status: COMPLETED | OUTPATIENT
Start: 2025-03-10 | End: 2025-03-10

## 2025-03-10 RX ORDER — BISACODYL 10 MG
10 SUPPOSITORY, RECTAL RECTAL DAILY PRN
Status: DISCONTINUED | OUTPATIENT
Start: 2025-03-13 | End: 2025-03-12

## 2025-03-10 RX ORDER — ACETAMINOPHEN 650 MG/1
650 SUPPOSITORY RECTAL ONCE
Status: COMPLETED | OUTPATIENT
Start: 2025-03-10 | End: 2025-03-10

## 2025-03-10 RX ORDER — METHOCARBAMOL 500 MG/1
500 TABLET, FILM COATED ORAL EVERY 6 HOURS PRN
Status: DISCONTINUED | OUTPATIENT
Start: 2025-03-10 | End: 2025-03-12

## 2025-03-10 RX ORDER — ONDANSETRON 2 MG/ML
4 INJECTION INTRAMUSCULAR; INTRAVENOUS EVERY 6 HOURS PRN
Status: DISCONTINUED | OUTPATIENT
Start: 2025-03-10 | End: 2025-03-12 | Stop reason: HOSPADM

## 2025-03-10 RX ORDER — CITALOPRAM HYDROBROMIDE 20 MG/1
20 TABLET ORAL DAILY
Status: DISCONTINUED | OUTPATIENT
Start: 2025-03-10 | End: 2025-03-12 | Stop reason: HOSPADM

## 2025-03-10 RX ORDER — ATROPA BELLADONNA AND OPIUM 16.2; 3 MG/1; MG/1
30 SUPPOSITORY RECTAL EVERY 12 HOURS PRN
Status: DISCONTINUED | OUTPATIENT
Start: 2025-03-10 | End: 2025-03-12 | Stop reason: HOSPADM

## 2025-03-10 RX ORDER — PROPOFOL 10 MG/ML
INJECTION, EMULSION INTRAVENOUS CONTINUOUS PRN
Status: DISCONTINUED | OUTPATIENT
Start: 2025-03-10 | End: 2025-03-10

## 2025-03-10 RX ORDER — TOLTERODINE 2 MG/1
4 CAPSULE, EXTENDED RELEASE ORAL DAILY
Status: DISCONTINUED | OUTPATIENT
Start: 2025-03-10 | End: 2025-03-12

## 2025-03-10 RX ORDER — FENTANYL CITRATE 0.05 MG/ML
50 INJECTION, SOLUTION INTRAMUSCULAR; INTRAVENOUS EVERY 5 MIN PRN
Status: DISCONTINUED | OUTPATIENT
Start: 2025-03-10 | End: 2025-03-10 | Stop reason: HOSPADM

## 2025-03-10 RX ORDER — BUPIVACAINE HYDROCHLORIDE 2.5 MG/ML
INJECTION, SOLUTION INFILTRATION; PERINEURAL PRN
Status: DISCONTINUED | OUTPATIENT
Start: 2025-03-10 | End: 2025-03-10 | Stop reason: HOSPADM

## 2025-03-10 RX ORDER — ACETAMINOPHEN 325 MG/1
975 TABLET ORAL ONCE
Status: COMPLETED | OUTPATIENT
Start: 2025-03-10 | End: 2025-03-10

## 2025-03-10 RX ORDER — HYDROMORPHONE HCL IN WATER/PF 6 MG/30 ML
0.4 PATIENT CONTROLLED ANALGESIA SYRINGE INTRAVENOUS
Status: DISCONTINUED | OUTPATIENT
Start: 2025-03-10 | End: 2025-03-12 | Stop reason: HOSPADM

## 2025-03-10 RX ORDER — ACETAMINOPHEN 325 MG/1
975 TABLET ORAL EVERY 8 HOURS
Status: DISCONTINUED | OUTPATIENT
Start: 2025-03-10 | End: 2025-03-12 | Stop reason: HOSPADM

## 2025-03-10 RX ORDER — OXYBUTYNIN CHLORIDE 5 MG/1
5 TABLET, EXTENDED RELEASE ORAL DAILY
Qty: 14 TABLET | Refills: 0 | Status: SHIPPED | OUTPATIENT
Start: 2025-03-10 | End: 2025-03-24

## 2025-03-10 RX ADMIN — HEPARIN SODIUM 5000 UNITS: 5000 INJECTION, SOLUTION INTRAVENOUS; SUBCUTANEOUS at 11:07

## 2025-03-10 RX ADMIN — ONDANSETRON 4 MG: 2 INJECTION, SOLUTION INTRAMUSCULAR; INTRAVENOUS at 17:36

## 2025-03-10 RX ADMIN — FENTANYL CITRATE 50 MCG: 50 INJECTION, SOLUTION INTRAMUSCULAR; INTRAVENOUS at 16:07

## 2025-03-10 RX ADMIN — PHENYLEPHRINE HYDROCHLORIDE 50 MCG: 10 INJECTION INTRAVENOUS at 13:30

## 2025-03-10 RX ADMIN — PHENYLEPHRINE HYDROCHLORIDE 0.2 MCG/KG/MIN: 10 INJECTION INTRAVENOUS at 12:05

## 2025-03-10 RX ADMIN — Medication 1 LOZENGE: at 17:16

## 2025-03-10 RX ADMIN — DEXAMETHASONE SODIUM PHOSPHATE 4 MG: 4 INJECTION, SOLUTION INTRA-ARTICULAR; INTRALESIONAL; INTRAMUSCULAR; INTRAVENOUS; SOFT TISSUE at 11:42

## 2025-03-10 RX ADMIN — Medication 1 TABLET: at 17:14

## 2025-03-10 RX ADMIN — ROCURONIUM BROMIDE 10 MG: 50 INJECTION, SOLUTION INTRAVENOUS at 14:30

## 2025-03-10 RX ADMIN — PHENYLEPHRINE HYDROCHLORIDE 100 MCG: 10 INJECTION INTRAVENOUS at 11:51

## 2025-03-10 RX ADMIN — PHENYLEPHRINE HYDROCHLORIDE 50 MCG: 10 INJECTION INTRAVENOUS at 13:33

## 2025-03-10 RX ADMIN — HYDROMORPHONE HYDROCHLORIDE 0.2 MG: 0.2 INJECTION, SOLUTION INTRAMUSCULAR; INTRAVENOUS; SUBCUTANEOUS at 17:16

## 2025-03-10 RX ADMIN — SODIUM CHLORIDE, SODIUM LACTATE, POTASSIUM CHLORIDE, AND CALCIUM CHLORIDE: .6; .31; .03; .02 INJECTION, SOLUTION INTRAVENOUS at 11:11

## 2025-03-10 RX ADMIN — METHOCARBAMOL 500 MG: 500 TABLET ORAL at 17:15

## 2025-03-10 RX ADMIN — ACETAMINOPHEN 975 MG: 325 TABLET, FILM COATED ORAL at 17:14

## 2025-03-10 RX ADMIN — PROPOFOL 200 MG: 10 INJECTION, EMULSION INTRAVENOUS at 11:42

## 2025-03-10 RX ADMIN — PHENYLEPHRINE HYDROCHLORIDE 200 MCG: 10 INJECTION INTRAVENOUS at 11:54

## 2025-03-10 RX ADMIN — PROPOFOL 100 MCG/KG/MIN: 10 INJECTION, EMULSION INTRAVENOUS at 11:46

## 2025-03-10 RX ADMIN — ROCURONIUM BROMIDE 40 MG: 50 INJECTION, SOLUTION INTRAVENOUS at 11:42

## 2025-03-10 RX ADMIN — TOLTERODINE 4 MG: 2 CAPSULE, EXTENDED RELEASE ORAL at 17:24

## 2025-03-10 RX ADMIN — FENTANYL CITRATE 50 MCG: 50 INJECTION INTRAMUSCULAR; INTRAVENOUS at 11:42

## 2025-03-10 RX ADMIN — ROCURONIUM BROMIDE 10 MG: 50 INJECTION, SOLUTION INTRAVENOUS at 12:12

## 2025-03-10 RX ADMIN — PHENYLEPHRINE HYDROCHLORIDE 100 MCG: 10 INJECTION INTRAVENOUS at 12:07

## 2025-03-10 RX ADMIN — FENTANYL CITRATE 50 MCG: 50 INJECTION INTRAMUSCULAR; INTRAVENOUS at 12:08

## 2025-03-10 RX ADMIN — CITALOPRAM HYDROBROMIDE 10 MG: 10 TABLET ORAL at 17:15

## 2025-03-10 RX ADMIN — CITALOPRAM HYDROBROMIDE 20 MG: 20 TABLET ORAL at 17:15

## 2025-03-10 RX ADMIN — SENNOSIDES AND DOCUSATE SODIUM 1 TABLET: 50; 8.6 TABLET ORAL at 21:20

## 2025-03-10 RX ADMIN — Medication 200 MG: at 15:09

## 2025-03-10 RX ADMIN — PHENYLEPHRINE HYDROCHLORIDE 100 MCG: 10 INJECTION INTRAVENOUS at 13:44

## 2025-03-10 RX ADMIN — PREGABALIN 75 MG: 75 CAPSULE ORAL at 21:20

## 2025-03-10 RX ADMIN — OXYCODONE HYDROCHLORIDE 10 MG: 5 TABLET ORAL at 18:50

## 2025-03-10 RX ADMIN — SODIUM CHLORIDE, POTASSIUM CHLORIDE, SODIUM LACTATE AND CALCIUM CHLORIDE: 600; 310; 30; 20 INJECTION, SOLUTION INTRAVENOUS at 11:36

## 2025-03-10 RX ADMIN — HYDROMORPHONE HYDROCHLORIDE 0.5 MG: 1 INJECTION, SOLUTION INTRAMUSCULAR; INTRAVENOUS; SUBCUTANEOUS at 12:30

## 2025-03-10 RX ADMIN — SODIUM CHLORIDE, SODIUM LACTATE, POTASSIUM CHLORIDE, AND CALCIUM CHLORIDE: .6; .31; .03; .02 INJECTION, SOLUTION INTRAVENOUS at 17:19

## 2025-03-10 RX ADMIN — ROCURONIUM BROMIDE 20 MG: 50 INJECTION, SOLUTION INTRAVENOUS at 12:31

## 2025-03-10 RX ADMIN — MIDAZOLAM 2 MG: 1 INJECTION INTRAMUSCULAR; INTRAVENOUS at 11:40

## 2025-03-10 RX ADMIN — LIDOCAINE HYDROCHLORIDE 100 MG: 20 INJECTION, SOLUTION INFILTRATION; PERINEURAL at 11:42

## 2025-03-10 RX ADMIN — Medication 2 G: at 11:36

## 2025-03-10 RX ADMIN — ROCURONIUM BROMIDE 10 MG: 50 INJECTION, SOLUTION INTRAVENOUS at 13:11

## 2025-03-10 RX ADMIN — ACETAMINOPHEN 975 MG: 325 TABLET, FILM COATED ORAL at 11:05

## 2025-03-10 RX ADMIN — ONDANSETRON 4 MG: 2 INJECTION, SOLUTION INTRAMUSCULAR; INTRAVENOUS at 11:08

## 2025-03-10 RX ADMIN — ONDANSETRON 4 MG: 2 INJECTION INTRAMUSCULAR; INTRAVENOUS at 14:45

## 2025-03-10 RX ADMIN — SODIUM CHLORIDE, POTASSIUM CHLORIDE, SODIUM LACTATE AND CALCIUM CHLORIDE: 600; 310; 30; 20 INJECTION, SOLUTION INTRAVENOUS at 14:10

## 2025-03-10 ASSESSMENT — ACTIVITIES OF DAILY LIVING (ADL)
ADLS_ACUITY_SCORE: 33
ADLS_ACUITY_SCORE: 33
ADLS_ACUITY_SCORE: 35
ADLS_ACUITY_SCORE: 37
ADLS_ACUITY_SCORE: 35
ADLS_ACUITY_SCORE: 33
ADLS_ACUITY_SCORE: 59
ADLS_ACUITY_SCORE: 39
ADLS_ACUITY_SCORE: 39
ADLS_ACUITY_SCORE: 59
ADLS_ACUITY_SCORE: 39
ADLS_ACUITY_SCORE: 39
ADLS_ACUITY_SCORE: 33
ADLS_ACUITY_SCORE: 39

## 2025-03-10 ASSESSMENT — LIFESTYLE VARIABLES: TOBACCO_USE: 0

## 2025-03-10 ASSESSMENT — COPD QUESTIONNAIRES: COPD: 0

## 2025-03-10 NOTE — BRIEF OP NOTE
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]  Malignant neoplasm of overlapping sites of bladder (H) [C67.8]  Post-operative diagnosis Same as pre-operative diagnosis    Procedure: NEPHROURETERECTOMY, ROBOT-ASSISTED, USING DA MIGUEL XI - LEFT, Left - Pelvis    Surgeon: Surgeons and Role:     * Rogelio Gallagher MD - Primary  Anesthesia: General   Estimated Blood Loss: Less than 100 ml    Drains: Irco-Calzada and Ayala  Specimens:   ID Type Source Tests Collected by Time Destination   1 : Left Kidney with ureter and bladder Cuff Tissue Kidney with Ureter, Left SURGICAL PATHOLOGY EXAM Rogelio Gallagher MD 3/10/2025  2:47 PM      Findings:   None.  Complications: None.  Implants: * No implants in log *        - Ayala x1 week  - VISH out prior to discharge, hopefully tomorrow    Manjit Franco MD  Urology Pgy-5

## 2025-03-10 NOTE — ANESTHESIA POSTPROCEDURE EVALUATION
Patient: Lorin Andrade    Procedure: Procedure(s):  NEPHROURETERECTOMY, ROBOT-ASSISTED, USING DA MIGUEL XI - LEFT       Anesthesia Type:  General    Note:  Disposition: Inpatient   Postop Pain Control: Uneventful            Sign Out: Well controlled pain   PONV:    Neuro/Psych: Uneventful            Sign Out: Acceptable/Baseline neuro status   Airway/Respiratory: Uneventful            Sign Out: Acceptable/Baseline resp. status   CV/Hemodynamics: Uneventful            Sign Out: Acceptable CV status   Other NRE: NONE   DID A NON-ROUTINE EVENT OCCUR? No           Last vitals:  Vitals Value Taken Time   /56 03/10/25 1615   Temp 36.4  C (97.52  F) 03/10/25 1619   Pulse 67 03/10/25 1620   Resp 9 03/10/25 1620   SpO2 99 % 03/10/25 1620   Vitals shown include unfiled device data.    Electronically Signed By: Chad Chi MD  March 10, 2025  4:21 PM

## 2025-03-10 NOTE — ANESTHESIA PREPROCEDURE EVALUATION
Anesthesia Pre-Procedure Evaluation    Patient: Lorin Andrade   MRN: [id number] : 1960        Procedure : Procedure(s):  NEPHROURETERECTOMY, ROBOT-ASSISTED, USING DA MIGUEL XI - LEFT          Past Medical History:   Diagnosis Date    Cancer (H)     Depressive disorder     More anxiety related    Sleep apnea       Past Surgical History:   Procedure Laterality Date    ABDOMEN SURGERY  Ureter cancer     BIOPSY   and     COLONOSCOPY N/A 2023    Procedure: Colonoscopy;  Surgeon: Ana Paula Hurst MD;  Location:  GI    COMBINED CYSTOSCOPY, RETROGRADES, URETEROSCOPY, INSERT STENT Left 05/15/2024    Procedure: CYSTOSCOPY, LEFT URETERAL STENT REMOVAL,  LEFT RETROGRADE PYELOGRAM, LEFT URETEROSCOPY WITH BIOPSY AND URETERAL BRUSHING, LEFT URETERAL STENT PLACEMENT;  Surgeon: Rogelio Gallagher MD;  Location:  OR    COMBINED CYSTOSCOPY, RETROGRADES, URETEROSCOPY, INSERT STENT Left 2024    Procedure: CYSTOSCOPY, LEFT RETROGRADE PYELOGRAM, LEFT URETEROSCOPY WITH BIOPSY AND LASER FULGURATION, LEFT URETERAL STENT PLACEMENT, TRANSURETHRAL RESECTION OF BLADDET TUMOR;  Surgeon: Rogelio Gallagher MD;  Location:  OR    CYSTOSCOPY, TRANSURETHRAL RESECTION (TUR) TUMOR BLADDER, COMBINED N/A 2024    Procedure: CYSTOSCOPY, WITH TRANSURETHRAL RESECTION BLADDER TUMOR, LEFT RETROGRADE PYELOGRAM, LEFT URETEROSCOPY WITH BIOPSY;  Surgeon: Rogelio Gallagher MD;  Location:  OR    CYSTOSCOPY, URETEROSCOPY, INSERT STENT Left 2024    Procedure: CYSTOURETEROSCOPY, LEFT URETERAL BIOPSY WITH STENT INSERTION, LEFT RETROGRADE PYELOGRAM;  Surgeon: Rogelio Gallagher MD;  Location:  OR    DAVINCI LYSIS OF ADHESIONS N/A 2024    Procedure: Davinci Lysis of Adhesions;  Surgeon: Rogelio Gallagher MD;  Location: [facility] OR    DAVINCI URETERECTOMY Left 2024    Procedure: ROBOT ASSISTED LAPAROSCOPIC LEFT DISTAL URETERECTOMY WITH URETERAL RE-IMPLANT, LEFT PELVIC LYMPH NODE DISSECTION, LYSIS OF ADHESIONS;  Surgeon:  Rogelio Gallagher MD;  Location: [facility] OR    MASTECTOMY, BILATERAL      Double mastectomy    OOPHORECTOMY Bilateral     ORTHOPEDIC SURGERY  '07    Torn meniscus      Allergies   Allergen Reactions    Animal Dander Shortness Of Breath    Gabapentin Other (See Comments)     Tolerating pregabalin well    Ibuprofen       Social History     Tobacco Use    Smoking status: Never     Passive exposure: Never    Smokeless tobacco: Never   Substance Use Topics    Alcohol use: Not Currently      Wt Readings from Last 1 Encounters:   03/10/25 59.1 kg (130 lb 6.4 oz)        Anesthesia Evaluation            ROS/MED HX  ENT/Pulmonary:     (+) sleep apnea,                                    (-) tobacco use, asthma and COPD   Neurologic:  - neg neurologic ROS     Cardiovascular:    (-) hypertension, CAD and dyslipidemia   METS/Exercise Tolerance: >4 METS    Hematologic:  - neg hematologic  ROS     Musculoskeletal:  - neg musculoskeletal ROS     GI/Hepatic:  - neg GI/hepatic ROS     Renal/Genitourinary:    (-) renal disease   Endo:  - neg endo ROS     Psychiatric/Substance Use:     (+) psychiatric history anxiety       Infectious Disease:       Malignancy:   (+) Malignancy, History of Breast and Other.Other CA Ureter status post.    Other:            Physical Exam    Airway        Mallampati: III   TM distance: > 3 FB   Neck ROM: full   Mouth opening: > 3 cm    Respiratory Devices and Support         Dental       (+) Minor Abnormalities - some fillings, tiny chips      Cardiovascular   cardiovascular exam normal          Pulmonary   pulmonary exam normal                OUTSIDE LABS:  CBC:   Lab Results   Component Value Date    WBC 6.3 10/31/2024    WBC 6.8 06/26/2024    HGB 12.2 03/06/2025    HGB 11.6 (L) 10/31/2024    HCT 36.7 10/31/2024    HCT 27.9 (L) 06/26/2024     10/31/2024     (L) 06/26/2024     BMP:   Lab Results   Component Value Date     06/26/2024     06/25/2024    POTASSIUM 4.5 06/26/2024    POTASSIUM  "4.4 06/25/2024    CHLORIDE 102 06/26/2024    CHLORIDE 103 06/25/2024    CO2 32 (H) 06/26/2024    CO2 28 06/25/2024    BUN 12.4 06/26/2024    BUN 10.7 06/25/2024    CR 0.89 06/26/2024    CR 0.73 06/25/2024    GLC 95 06/26/2024     (H) 06/25/2024     COAGS: No results found for: \"PTT\", \"INR\", \"FIBR\"  POC: No results found for: \"BGM\", \"HCG\", \"HCGS\"  HEPATIC:   Lab Results   Component Value Date    ALBUMIN 4.3 04/30/2024    PROTTOTAL 6.6 04/30/2024    ALT 13 04/30/2024    AST 18 04/30/2024    ALKPHOS 65 04/30/2024    BILITOTAL 0.3 04/30/2024     OTHER:   Lab Results   Component Value Date    JULIETTE 8.3 (L) 06/26/2024       Anesthesia Plan    ASA Status:  3    NPO Status:  NPO Appropriate    Anesthesia Type: General.     - Airway: ETT   Induction: Intravenous, Propofol.   Maintenance: Balanced.        Consents    Anesthesia Plan(s) and associated risks, benefits, and realistic alternatives discussed. Questions answered and patient/representative(s) expressed understanding.     - Discussed:     - Discussed with:  Patient      - Extended Intubation/Ventilatory Support Discussed: No.      - Patient is DNR/DNI Status: No     Use of blood products discussed: Yes.     - Discussed with: Patient.     - Consented: consented to blood products     Postoperative Care    Pain management: IV analgesics, Multi-modal analgesia.   PONV prophylaxis: Ondansetron (or other 5HT-3), Dexamethasone or Solumedrol     Comments:               Chad Chi MD    I have reviewed the pertinent notes and labs in the chart from the past 30 days and (re)examined the patient.  Any updates or changes from those notes are reflected in this note.    Clinically Significant Risk Factors Present on Admission                                          "

## 2025-03-10 NOTE — ANESTHESIA CARE TRANSFER NOTE
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    Anesthesia Type:   General     Note:    Oropharynx: oropharynx clear of all foreign objects  Level of Consciousness: drowsy  Oxygen Supplementation: nasal cannula  Level of Supplemental Oxygen (L/min / FiO2): 4  Independent Airway: airway patency satisfactory and stable  Dentition: dentition unchanged  Vital Signs Stable: post-procedure vital signs reviewed and stable  Report to RN Given: handoff report given  Patient transferred to: PACU    Handoff Report: Identifed the Patient, Identified the Reponsible Provider, Reviewed the pertinent medical history, Discussed the surgical course, Reviewed Intra-OP anesthesia mangement and issues during anesthesia, Set expectations for post-procedure period and Allowed opportunity for questions and acknowledgement of understanding    Vitals:  Vitals Value Taken Time   /63 03/10/25 1527   Temp 36.3  C (97.34  F) 03/10/25 1528   Pulse 81 03/10/25 1529   Resp 14 03/10/25 1529   SpO2 97 % 03/10/25 1529   Vitals shown include unfiled device data.    Electronically Signed By: TAL Phoenix CRNA  March 10, 2025  3:30 PM

## 2025-03-10 NOTE — DISCHARGE INSTRUCTIONS
POSTOPERATIVE INSTRUCTIONS    Diagnosis-------------------------------   LEFT kidney/ureter urothelial carcinoma    Procedure-------------------------------  Procedure(s) (LRB):  NEPHROURETERECTOMY, ROBOT-ASSISTED, USING DA MIGUEL XI - LEFT (Left)      Findings--------------------------------  LEFT kidney, ureter, and bladder cuff removed  Water-tight bladder repair    Home-going instructions-----------------         Activity Limitation:     - No driving or operating heavy machinery while on narcotic pain medication.   - No strenuous exercise for 4 weeks.   - No lifting, pushing, pulling more than 10 pounds for 4 weeks. Take care when pushing with your arms to stand up.   - Do not strain your belly area. When you bend, sit up or twice, you could strain the area around your incision.   - Do not strain with bowel movements.   - Do not drive until you can press the brake pedal quickly and fully without pain.   - Do not operate a motor vehicle while taking narcotic pain medications    FOLLOW THESE INSTRUCTIONS AS INDICATED BELOW:  - Observe operative area for signs of excessive bleeding.  - You may shower.  - Increase fluid intake to promote clear urine.  - Resume usual diet as tolerated    What to expect while recovering----------- TUBES AND DRAINS:  1) Ayala Catheter: You are going home with a Ayala catheter which will remain in place until your follow-up appointment. Your nurse or  will provide written catheter care instructions for you to take home.   - Protect the catheter and treat it like an extension of your body - keep it secured to your leg and do not let it get caught, snagged, or tugged.     What to expect while recovering----------- WOUND CARE:  - You may shower and get incisions wet starting 48 hrs after surgery.  - Do not scrub incisions or submerge wounds (aka, bath, pool, hot tub, etc.) for 2 weeks or until wounds have healed and catheter is removed.   - Remove wound dressing 48 hours after  "surgery if already not removed.   - If purple dermabond glue was used, avoid applying any lotions or ointments.   - Leave incision open to air. Cover with gauze only if needed for comfort or to protect clothing from drainage.    Discharge Medications/instructions:   1) PAIN: Oxycodone is a narcotic medication that has been prescribed for pain. Narcotics will cause sleepiness and constipation, therefore it is best to stop or reduce them as soon as you can and switch to using acetaminophen (Tylenol) and/or ibuprofen (Advil/Motrin), taken as directed on the packaging. Keep in mind that certain narcotics can contain acetaminophen, also called \"APAP\" on prescription bottles. Do not take more than 4,000mg of Tylenol (acetaminophen/ APAP) from all sources in any 24 hour period since this can cause liver damage. Never drive, operate machinery or drink alcoholic beverages while you are taking narcotic pain medications.     2) CONSTIPATION: Pericolace (senna/docusate sodium) can be taken twice daily for prevention of constipation since surgery, pain medications and bladder spasm medications can all make you constipated. Please reduce or stop pericolace if you develop loose stools. Other over the counter solutions such as prune juice, miralax, fiber products, senna, and dulcolax can also be used. If you are taking the pericolace but still have not had a bowel movement in 3 days, start over-the-counter Milk of Magnesia taken twice daily until you have a nice bowel movement. Call the office with any concerns.     3) ANTIBIOTICS - Ciprofloxacin 500mg should be taken started the day prior to your your followup appointment to remove your Ayala catheter and continued for 3 days.    4) Ditropan: Take daily to decrease catheter discomfort. Stop taking one day before your urology clinic visit      Questions/concerns------------------------  [facility]: [phone]    Future appointments:  Dr. Gallagher will call with your " pathology when available.   You will be contacted to schedule follow up in Dr. Gallagher's office next week for dominguez catheter removal.   Dr. Gallagher will see you in 1 month for surveillance cystoscopy.      Rogelio Gallagher MD

## 2025-03-10 NOTE — OP NOTE
OPERATIVE REPORT  DATE OF SURGERY: 03/10/25  LOCATION OF SURGERY: [facility]  PREOPERATIVE DIAGNOSIS:  (C66.2) Malignant neoplasm of left ureter (H)  (primary encounter diagnosis)  (C67.8) Malignant neoplasm of overlapping sites of bladder (H)  POSTOPERATIVE DIAGNOSIS: (C66.2) Malignant neoplasm of left ureter (H)  (primary encounter diagnosis)  (C67.8) Malignant neoplasm of overlapping sites of bladder (H)     START TIME: 12:07 PM  END TIME: 15:08 PM    PROCEDURE PERFORMED:   ROBOTIC ASSISTED LAPAROSCOPIC RADICAL NEPHROURETERECTOMY - LEFT      STAFF SURGEON: Rogelio Gallagher MD  RESIDENT SURGEON: Manjit Franco MD  ASSISTANT: Jyoti Dwyer  ANESTHESIA: General.   ESTIMATED BLOOD LOSS: 20 mL.   DRAINS AND TUBES: 19fr Manpreet drain, 16fr dominguez catheter  COMPLICATIONS: None.   DISPOSITION: PACU.   SPECIMENS OBTAINED:   ID Type Source Tests Collected by Time Destination   1 : Left Kidney with ureter and bladder Cuff Tissue Kidney with Ureter, Left SURGICAL PATHOLOGY EXAM Rogelio Gallagher MD 3/10/2025  2:47 PM       SIGNIFICANT FINDINGS: LEFT kidney, ureter, and bladder cuff removed. Water-tight bladder closure.     HISTORY OF PRESENT ILLNESS: Lorin Andrade is a 64 year old female with left-sided hydroureteronephrosis and concern for urothelial malignancy of the left distal ureter. Now status post a robotic distal ureterectomy, lymph node dissection, ureteroneocystotomy with a Boari flap on 6/24/2024 with evidence of CIS. 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 She was counseled on treatment options, started on induction BCG and presents for the above surgery.      OPERATION PERFORMED:   Informed consent was obtained and the patient was brought to the operating room where general anesthesia was induced. The patient was given appropriate preoperative antibiotics and positioned supine. We then  performed a timeout, verifying the correct patient's site and procedure to be performed.    The patient was then placed in the lateral flank position with LEFT side up in preparation for a LEFT-sided nephroureterectomy. We prepped and draped in the usual sterile fashion, and then after being prepped and draped in the usual sterile fashion, we gained access to the abdomen for CO2 insufflation with a Veress needle at the site of our left robotic arm 2 fingers below the costal margin given her prior incision sites. After access was obtained, the 8-mm port was inserted and the camera was inserted. No adhesions were noted. An 8-mm port was placed for our camera port utilizing a prior incision site. Two 8-mm ports were placed in the LEFT lower quadrant, medial and anterior to the anterior iliac spine, in line with our planned Asher extraction incision.  We then placed a 12-mm assistant port. The robot was then docked, and the descending colon was dropped off the lateral wall. The plane was found between the mesentery and Gerota's fascia. The ureter was identified and the gonadal vein was identified.  We were able to follow this up to the renal vein. Dissection was carried to the hilum and both the renal artery and vein were exposed. The renal artery was controlled with Weck clips, with 3 clips on the stay side, and divided sharply. A large lumbar vein was controled with a 30mm robotic stapler vascular load.  The renal vein was controlled with a single load of the 30 mm robotic vascular stapler. The adrenal vein was spared. Once this was done the superior and lateral attachments of the kidney was released, again sparing the adrenal gland. The gonadal vein was transected with hem-o-lock clips. The entire kidney was mobilized and freed. The ureter was dissected down to the level of the bladder and prior boari flap. The bladder was distended and a wide bladder cuff was excised. The bladder was closed in two layers with 3-0  V-lock suture. The bladder was refilled with no evidence of a leak. A peritoneal window was closed with 3-0 V-lock.    The kidney was placed in a endo-catch bag. A 0 Vicryl tie was placed at the 12 mm assistant port site with a Johny-Plata. A Asher incision was made in a muscle sparing fashion and the specimen was extracted. The muscle was reapproximated with 0 Vicryl and the external fascia was closed with 0 PDS. At that point the patient's abdomen was then re-insuflated and the camera was replaced to re-assess the renal fossa and interior edge of our fascial closure. The closure was intact and free of bowel or mesentary. The renal fossa was dry. A drain was placed through the camera port site. The port sites were closed in a running fashion with 4-0 Monocylr suture. The Asher closed with 4-0 Monocryl. The patient was awoken from anesthesia and taken to PACU in stable condition.      The patient was awoken from anesthesia and taken to PACU in stable condition.    Rogelio Gallagher MD   Urology  [facility]  Clinic Phone [phone]

## 2025-03-10 NOTE — PLAN OF CARE
Goal Outcome Evaluation:  Summary:  PODX0 NEPHROURETERECTOMY, ROBOT-ASSISTED  Hx of Urothelial carcinoma of left distal ureter  DATE & TIME: 9234-2726 3/10/2025   Cognitive Concerns/ Orientation : AOX4, lethargic at times.   BEHAVIOR & AGGRESSION TOOL COLOR: green  ABNL VS/O2: VSS RA  MOBILITY: not OOB yet.   PAIN MANAGMENT: pain managing with PRN IV dilaudid x1, robaxin x1. Scheduled tylenol.   DIET: reg. Pt had some ice chip and apple sauce, tolerating ok.   BOWEL/BLADDER: dominguez catheter in placed. LBM 3/9. BS+  ABNL LAB/BG: see lab results  DRAIN/DEVICES: 1 VISH drain to the L side  TELEMETRY RHYTHM: NA  SKIN: 2 surgical sites, 1 VISH site with dressing CDI  TESTS/PROCEDURES: None  D/C DATE: TBD  OTHER IMPORTANT INFO:   - Dominguez x1 week  - VISH out prior to discharge, hopefully tomorrow  - pt's CPAP in the room.

## 2025-03-10 NOTE — ANESTHESIA PROCEDURE NOTES
Airway       Patient location during procedure: OR       Procedure Start/Stop Times: 3/10/2025 11:44 AM  Staff -        Other Anesthesia Staff: Belen Goldstein       Performed By: SRNA  Consent for Airway        Urgency: elective  Indications and Patient Condition       Indications for airway management: ilir-procedural       Induction type:intravenous       Mask difficulty assessment: 1 - vent by mask    Final Airway Details       Final airway type: endotracheal airway       Successful airway: ETT - single and Oral  Endotracheal Airway Details        ETT size (mm): 7.0       Cuffed: yes       Successful intubation technique: direct laryngoscopy       DL Blade Type: Perez 2       Grade View of Cords: 1       Adjucts: stylet       Position: Right       Measured from: lips       Secured at (cm): 21       Bite block used: None    Post intubation assessment        Placement verified by: capnometry, equal breath sounds and chest rise        Number of attempts at approach: 1       Number of other approaches attempted: 0       Secured with: tape       Ease of procedure: easy       Dentition: Intact and Unchanged    Medication(s) Administered   Medication Administration Time: 3/10/2025 11:44 AM

## 2025-03-11 PROBLEM — C67.8 MALIGNANT NEOPLASM OF OVERLAPPING SITES OF BLADDER (H): Status: ACTIVE | Noted: 2024-12-31

## 2025-03-11 PROBLEM — C66.2: Status: ACTIVE | Noted: 2025-03-11

## 2025-03-11 LAB
ANION GAP SERPL CALCULATED.3IONS-SCNC: 11 MMOL/L (ref 7–15)
BUN SERPL-MCNC: 14.4 MG/DL (ref 8–23)
CALCIUM SERPL-MCNC: 9.2 MG/DL (ref 8.8–10.4)
CHLORIDE SERPL-SCNC: 104 MMOL/L (ref 98–107)
CREAT SERPL-MCNC: 1.12 MG/DL (ref 0.51–0.95)
EGFRCR SERPLBLD CKD-EPI 2021: 55 ML/MIN/1.73M2
ERYTHROCYTE [DISTWIDTH] IN BLOOD BY AUTOMATED COUNT: 12.4 % (ref 10–15)
GLUCOSE SERPL-MCNC: 102 MG/DL (ref 70–99)
HCO3 SERPL-SCNC: 26 MMOL/L (ref 22–29)
HCT VFR BLD AUTO: 29.7 % (ref 35–47)
HGB BLD-MCNC: 11.2 G/DL (ref 11.7–15.7)
HGB BLD-MCNC: 9.8 G/DL (ref 11.7–15.7)
MCH RBC QN AUTO: 30.6 PG (ref 26.5–33)
MCHC RBC AUTO-ENTMCNC: 33 G/DL (ref 31.5–36.5)
MCV RBC AUTO: 93 FL (ref 78–100)
PLATELET # BLD AUTO: 157 10E3/UL (ref 150–450)
POTASSIUM SERPL-SCNC: 4.5 MMOL/L (ref 3.4–5.3)
RBC # BLD AUTO: 3.2 10E6/UL (ref 3.8–5.2)
SODIUM SERPL-SCNC: 141 MMOL/L (ref 135–145)
WBC # BLD AUTO: 8 10E3/UL (ref 4–11)

## 2025-03-11 PROCEDURE — 80048 BASIC METABOLIC PNL TOTAL CA: CPT | Performed by: UROLOGY

## 2025-03-11 PROCEDURE — 250N000013 HC RX MED GY IP 250 OP 250 PS 637: Performed by: UROLOGY

## 2025-03-11 PROCEDURE — 96372 THER/PROPH/DIAG INJ SC/IM: CPT | Performed by: UROLOGY

## 2025-03-11 PROCEDURE — 120N000001 HC R&B MED SURG/OB

## 2025-03-11 PROCEDURE — 36415 COLL VENOUS BLD VENIPUNCTURE: CPT | Performed by: UROLOGY

## 2025-03-11 PROCEDURE — 85027 COMPLETE CBC AUTOMATED: CPT | Performed by: UROLOGY

## 2025-03-11 PROCEDURE — 250N000011 HC RX IP 250 OP 636: Performed by: UROLOGY

## 2025-03-11 PROCEDURE — 258N000003 HC RX IP 258 OP 636: Performed by: UROLOGY

## 2025-03-11 PROCEDURE — 85018 HEMOGLOBIN: CPT | Performed by: UROLOGY

## 2025-03-11 RX ADMIN — PREGABALIN 75 MG: 75 CAPSULE ORAL at 09:16

## 2025-03-11 RX ADMIN — OXYCODONE HYDROCHLORIDE 5 MG: 5 TABLET ORAL at 14:35

## 2025-03-11 RX ADMIN — CITALOPRAM HYDROBROMIDE 20 MG: 20 TABLET ORAL at 18:11

## 2025-03-11 RX ADMIN — SENNOSIDES AND DOCUSATE SODIUM 1 TABLET: 50; 8.6 TABLET ORAL at 20:42

## 2025-03-11 RX ADMIN — POLYETHYLENE GLYCOL 3350 17 G: 17 POWDER, FOR SOLUTION ORAL at 09:17

## 2025-03-11 RX ADMIN — ACETAMINOPHEN 975 MG: 325 TABLET, FILM COATED ORAL at 20:42

## 2025-03-11 RX ADMIN — OXYCODONE HYDROCHLORIDE 5 MG: 5 TABLET ORAL at 21:04

## 2025-03-11 RX ADMIN — HEPARIN SODIUM 5000 UNITS: 5000 INJECTION, SOLUTION INTRAVENOUS; SUBCUTANEOUS at 18:13

## 2025-03-11 RX ADMIN — CITALOPRAM HYDROBROMIDE 10 MG: 10 TABLET ORAL at 18:11

## 2025-03-11 RX ADMIN — ONDANSETRON 4 MG: 4 TABLET, ORALLY DISINTEGRATING ORAL at 01:25

## 2025-03-11 RX ADMIN — ACETAMINOPHEN 975 MG: 325 TABLET, FILM COATED ORAL at 01:13

## 2025-03-11 RX ADMIN — PREGABALIN 75 MG: 75 CAPSULE ORAL at 20:42

## 2025-03-11 RX ADMIN — SODIUM CHLORIDE, POTASSIUM CHLORIDE, SODIUM LACTATE AND CALCIUM CHLORIDE 1000 ML: 600; 310; 30; 20 INJECTION, SOLUTION INTRAVENOUS at 18:36

## 2025-03-11 RX ADMIN — TOLTERODINE 4 MG: 2 CAPSULE, EXTENDED RELEASE ORAL at 09:16

## 2025-03-11 RX ADMIN — OXYCODONE HYDROCHLORIDE 5 MG: 5 TABLET ORAL at 01:12

## 2025-03-11 RX ADMIN — SENNOSIDES AND DOCUSATE SODIUM 1 TABLET: 50; 8.6 TABLET ORAL at 09:16

## 2025-03-11 RX ADMIN — OXYCODONE HYDROCHLORIDE 5 MG: 5 TABLET ORAL at 09:29

## 2025-03-11 RX ADMIN — Medication 1 TABLET: at 09:16

## 2025-03-11 RX ADMIN — HEPARIN SODIUM 5000 UNITS: 5000 INJECTION, SOLUTION INTRAVENOUS; SUBCUTANEOUS at 09:17

## 2025-03-11 ASSESSMENT — ACTIVITIES OF DAILY LIVING (ADL)
ADLS_ACUITY_SCORE: 36
ADLS_ACUITY_SCORE: 40
ADLS_ACUITY_SCORE: 37
ADLS_ACUITY_SCORE: 40
ADLS_ACUITY_SCORE: 36
ADLS_ACUITY_SCORE: 40
ADLS_ACUITY_SCORE: 36
ADLS_ACUITY_SCORE: 40
ADLS_ACUITY_SCORE: 36
ADLS_ACUITY_SCORE: 40
ADLS_ACUITY_SCORE: 40
ADLS_ACUITY_SCORE: 37
ADLS_ACUITY_SCORE: 37
ADLS_ACUITY_SCORE: 40
ADLS_ACUITY_SCORE: 36
ADLS_ACUITY_SCORE: 40
ADLS_ACUITY_SCORE: 36
ADLS_ACUITY_SCORE: 36
ADLS_ACUITY_SCORE: 40

## 2025-03-11 NOTE — PLAN OF CARE
Date & Time: 1900-0700  Surgery/POD#: POD 1 from   NEPHROURETERECTOMY, ROBOT-ASSISTED, USING DA MIGUEL XI - LEFT   Behavior & Aggression: Green   Fall Risk: Yes   Orientation: A&Ox4  ABNL VS/O2:VSS on RA except soft BP   Pain Management:Scheduled, PRN oxy   Bowel/Bladder: Ayala in place. Denied flatus, no BM    Drains: PIV infusing LR. VISH drain   Wounds/incisions: Abd lap sites CDI. VISH site   Diet:Regular- nauseous x1, Zofran given with relief   Activity Level: AX1 GB   Anticipated  DC Date: Pending   Significant Information: When getting pt up to ambulated, pt stated she felt some dizziness when first sitting up. After a few moments this had resolved and walked a short distance in room. When pt was sitting at side of bed took a set of vitals and BP was low. After getting pt back into bed re took BP and came up some. Denied dizziness during walk or while lying in bed

## 2025-03-11 NOTE — PLAN OF CARE
Goal Outcome Evaluation:  Summary:  POD1 NEPHROURETERECTOMY, ROBOT-ASSISTED  Hx of Urothelial carcinoma of left distal ureter  DATE & TIME: 3/11/2025 6658-0821  Cognitive Concerns/ Orientation : AOX4, lethargic at times.   BEHAVIOR & AGGRESSION TOOL COLOR: green  ABNL VS/O2: VSS RA  MOBILITY: ambulating in hallway; recommend asstx1 gb/walker, refuses, education provided  PAIN MANAGMENT: intermittent, ice packs, as needed oxycodone  DIET: Regular  BOWEL/BLADDER: dominguez catheter in placed, no flatus per pt, BS audible   ABNL LAB/BG: see lab results  DRAIN/DEVICES: 1 VISH drain to the L side; small serosanguineous  TELEMETRY RHYTHM: NA  SKIN: 2 surgical sites, 1 VISH site with dressing CDI  TESTS/PROCEDURES: None  D/C DATE: mine  OTHER IMPORTANT INFO:   - Dominguez x1 week  - VISH out prior to discharge, hopefully tomorrow  - pt's CPAP in the room.   -1,000 ml LR bolus given x1 per orders

## 2025-03-11 NOTE — PLAN OF CARE
Date & Time: 03/11/25 1570-3775    Surgery/POD#: POD 1 from   NEPHROURETERECTOMY, ROBOT-ASSISTED, USING DA MIGUEL XI - LEFT   Behavior & Aggression: Green   Fall Risk: Yes   Orientation: A&Ox4  ABNL VS/O2: VSS on RA except soft BP at times   Pain Management: Declined scheduled tylenol, PRN oxy given x1. Robaxin available  Bowel/Bladder: Ayala in place. Denied flatus, no BM    Drains: PIV infusing LR. VISH drain   Wounds/incisions: Abd lap sites CDI. VISH site, dried drainage.   Diet: Regular, tolerating  Activity Level: Independent  Anticipated  DC Date: Tomorrow 3/12  Significant Information: Patient was endorsing lightheadedness overnight with some hypotension with position changes. Resolved in AM after breakfast. Orthostatics negative.

## 2025-03-11 NOTE — PROGRESS NOTES
"Urology  Progress Note    NAEO  Some lower Bps, a little dizzy when walking  Tolerating diet but no gas yet  Pain okay    Exam  BP 92/46 (BP Location: Right arm)   Pulse 74   Temp 98.5  F (36.9  C) (Oral)   Resp 18   Ht 1.676 m (5' 6\")   Wt 59.1 kg (130 lb 6.4 oz)   SpO2 94%   BMI 21.05 kg/m    No acute distress  Unlabored breathing  Abdomen soft, nontender, nondistended. Incisions c/d/i  VISH serosanguinous serosang  Dominguez with clear urine in tubing    UOP 1700  VISH 65    Labs  WBC 8  Hgb 9.8 (12)  Cr 1.12      Assessment/Plan  64 year old y/o female POD#1 s/p left Nephrouretectomy for UTUC.     - Will recheck hgb today and give a bolus after  - Orthostatic vitals ordered  - SQH  - dominguez x 1 week  - Keep her tonight      Seen and examined. Will discuss with Dr. Gallagher.    Manjit Franco MD, PGY-5  Urology Resident  "

## 2025-03-12 VITALS
DIASTOLIC BLOOD PRESSURE: 47 MMHG | BODY MASS INDEX: 20.96 KG/M2 | WEIGHT: 130.4 LBS | RESPIRATION RATE: 18 BRPM | OXYGEN SATURATION: 96 % | HEIGHT: 66 IN | HEART RATE: 80 BPM | SYSTOLIC BLOOD PRESSURE: 95 MMHG | TEMPERATURE: 99.7 F

## 2025-03-12 LAB
PATH REPORT.COMMENTS IMP SPEC: ABNORMAL
PATH REPORT.COMMENTS IMP SPEC: ABNORMAL
PATH REPORT.COMMENTS IMP SPEC: YES
PATH REPORT.FINAL DX SPEC: ABNORMAL
PATH REPORT.GROSS SPEC: ABNORMAL
PATH REPORT.MICROSCOPIC SPEC OTHER STN: ABNORMAL
PATH REPORT.RELEVANT HX SPEC: ABNORMAL
PATHOLOGY SYNOPTIC REPORT: ABNORMAL
PHOTO IMAGE: ABNORMAL

## 2025-03-12 PROCEDURE — 250N000013 HC RX MED GY IP 250 OP 250 PS 637: Performed by: UROLOGY

## 2025-03-12 PROCEDURE — 88307 TISSUE EXAM BY PATHOLOGIST: CPT | Mod: 26 | Performed by: PATHOLOGY

## 2025-03-12 PROCEDURE — 250N000011 HC RX IP 250 OP 636: Performed by: UROLOGY

## 2025-03-12 PROCEDURE — 250N000009 HC RX 250: Performed by: UROLOGY

## 2025-03-12 RX ORDER — OXYBUTYNIN CHLORIDE 5 MG/1
5 TABLET ORAL 3 TIMES DAILY PRN
Status: DISCONTINUED | OUTPATIENT
Start: 2025-03-12 | End: 2025-03-12 | Stop reason: HOSPADM

## 2025-03-12 RX ORDER — BISACODYL 10 MG
10 SUPPOSITORY, RECTAL RECTAL DAILY
Status: DISCONTINUED | OUTPATIENT
Start: 2025-03-12 | End: 2025-03-12 | Stop reason: HOSPADM

## 2025-03-12 RX ORDER — BISACODYL 10 MG
10 SUPPOSITORY, RECTAL RECTAL DAILY
Status: DISCONTINUED | OUTPATIENT
Start: 2025-03-13 | End: 2025-03-12

## 2025-03-12 RX ADMIN — SIMETHICONE 226 ML: 125 CAPSULE, LIQUID FILLED ORAL at 14:00

## 2025-03-12 RX ADMIN — POLYETHYLENE GLYCOL 3350 17 G: 17 POWDER, FOR SOLUTION ORAL at 09:56

## 2025-03-12 RX ADMIN — MAGNESIUM HYDROXIDE 30 ML: 400 SUSPENSION ORAL at 09:56

## 2025-03-12 RX ADMIN — TOLTERODINE 4 MG: 2 CAPSULE, EXTENDED RELEASE ORAL at 09:24

## 2025-03-12 RX ADMIN — PREGABALIN 75 MG: 75 CAPSULE ORAL at 09:24

## 2025-03-12 RX ADMIN — BACITRACIN: 500 OINTMENT TOPICAL at 03:59

## 2025-03-12 RX ADMIN — BACITRACIN: 500 OINTMENT TOPICAL at 09:25

## 2025-03-12 RX ADMIN — HEPARIN SODIUM 5000 UNITS: 5000 INJECTION, SOLUTION INTRAVENOUS; SUBCUTANEOUS at 09:25

## 2025-03-12 RX ADMIN — Medication 1 TABLET: at 09:24

## 2025-03-12 RX ADMIN — METHOCARBAMOL 500 MG: 500 TABLET ORAL at 03:52

## 2025-03-12 RX ADMIN — HEPARIN SODIUM 5000 UNITS: 5000 INJECTION, SOLUTION INTRAVENOUS; SUBCUTANEOUS at 00:06

## 2025-03-12 RX ADMIN — SENNOSIDES AND DOCUSATE SODIUM 1 TABLET: 50; 8.6 TABLET ORAL at 09:25

## 2025-03-12 RX ADMIN — BISACODYL 10 MG: 10 SUPPOSITORY RECTAL at 09:57

## 2025-03-12 RX ADMIN — OXYCODONE HYDROCHLORIDE 5 MG: 5 TABLET ORAL at 03:58

## 2025-03-12 ASSESSMENT — ACTIVITIES OF DAILY LIVING (ADL)
ADLS_ACUITY_SCORE: 36

## 2025-03-12 NOTE — PROGRESS NOTES
"Urology  Progress Note    NAEO  Feeling better than yesterday  Tolerating diet, passing gas  Pain tolerable  No BM yet  Orthostatics this morning negative (BP increased on standing)    Exam  BP 95/47 (BP Location: Right arm)   Pulse 80   Temp 99.7  F (37.6  C) (Oral)   Resp 18   Ht 1.676 m (5' 6\")   Wt 59.1 kg (130 lb 6.4 oz)   SpO2 96%   BMI 21.05 kg/m    No acute distress  Unlabored breathing  Abdomen soft, nontender, nondistended. Incisions c/d/i  VISH serosanguinous serosang  Dominguez with clear urine in tubing    UOP 3500  VISH 80    Labs  WBC 8  Hgb 11.2 (9.8)  Cr 1.12      Assessment/Plan  64 year old y/o female POD#2 s/p left Nephrouretectomy for UTUC.     - Jenny Milk of mag and suppository per pt request  - Stop robaxin also per pt request (feels funny from the med)  - Will transition from tolterodine to oxybutynin prn given dry mouth and feeling of anticholinergic ADRs  - Her BP runs on the lower end at home (low 100's on pre-op H+P), suspect this is her baseline.  - dominguez x 1 week - follow-up on 03/17  - Will tentatively plan for discharge this afternoon    Seen and examined. Will discuss with Dr. Gallagher.    Manjit Franco MD, PGY-5  Urology Resident  "

## 2025-03-12 NOTE — DISCHARGE SUMMARY
Discharge Summary     Lorin Andrade MRN# [id number]   YOB: 1960 Age: 64 year old     Date of Admission:  3/10/2025  Date of Discharge::  3/12/2025  Admitting Physician:  Rogelio Gallagher MD  Discharge Physician:  Manjit Franco MD  Primary Care Physician:         Sandie Bella          Admission Diagnoses:   Urothelial carcinoma of left distal ureter (H) [C66.2]  Malignant neoplasm of overlapping sites of bladder (H) [C67.8]  Malignant neoplasm of left ureter (H) [C66.2]            Discharge Diagnosis:   Same as above           Procedures:   : Procedure(s):  NEPHROURETERECTOMY, ROBOT-ASSISTED, USING DA MIGUEL XI - LEFT        Non-operative procedures:   None performed          Consultations:   None           Imaging Studies:     Results for orders placed or performed during the hospital encounter of 12/16/24   XR Surgery ARNULFO L/T 5 Min Fluoro w Stills    Narrative    This exam was marked as non-reportable because it will not be read by a   radiologist or a [place] non-radiologist provider.                  Medications Prior to Admission:     Medications Prior to Admission   Medication Sig Dispense Refill Last Dose/Taking    calcium carbonate-vitamin D (CALTRATE) 600-10 MG-MCG per tablet Take 1 tablet by mouth daily.   3/3/2025    citalopram (CELEXA) 10 MG tablet Take 1 tablet (10 mg) by mouth daily. Takes 20 mg tab also 90 tablet 0 3/9/2025 Evening    citalopram (CELEXA) 20 MG tablet TAKE 1 TABLET(20MG) BY MOUTH DAILY. TAKES 10MG TABLET ALSO 90 tablet 0 3/9/2025 Evening    fexofenadine (ALLEGRA) 180 MG tablet Take 180 mg by mouth daily as needed for allergies.   More than a month    polyethylene glycol (MIRALAX) 17 GM/Dose powder Take 17 g (1 Capful) by mouth daily. 238 g 11 3/9/2025    pregabalin (LYRICA) 75 MG capsule Take 1 capsule (75 mg) by mouth 2 times daily. 180 capsule 3 3/10/2025 Morning    vitamin B complex with vitamin C (VITAMIN  B COMPLEX) tablet Take 1 tablet  by mouth daily.   3/3/2025    Vitamin D3 (CHOLECALCIFEROL) 25 mcg (1000 units) tablet Take 2 tablets by mouth daily.   3/3/2025    Vitamin K 100 MCG TABS Take 100 mcg by mouth daily.   3/3/2025    zinc gluconate 50 MG tablet Take 50 mg by mouth daily.   3/3/2025            Discharge Medications:     Current Discharge Medication List        START taking these medications    Details   ciprofloxacin (CIPRO) 500 MG tablet Take 1 tablet (500 mg) by mouth 2 times daily for 3 days. Start taking the day before your catheter is removed.  Qty: 6 tablet, Refills: 0    Associated Diagnoses: Malignant neoplasm of left ureter (H)      docusate sodium (COLACE) 100 MG tablet Take 1 tablet (100 mg) by mouth daily.  Qty: 60 tablet, Refills: 1    Associated Diagnoses: Malignant neoplasm of left ureter (H)      oxyBUTYnin ER (DITROPAN XL) 5 MG 24 hr tablet Take 1 tablet (5 mg) by mouth daily for 14 days. Take daily until the day prior to dominguez removal.  Qty: 14 tablet, Refills: 0    Associated Diagnoses: Malignant neoplasm of left ureter (H)      oxyCODONE (ROXICODONE) 5 MG tablet Take 1 tablet (5 mg) by mouth every 6 hours as needed for pain.  Qty: 9 tablet, Refills: 0    Associated Diagnoses: Malignant neoplasm of left ureter (H)           CONTINUE these medications which have NOT CHANGED    Details   calcium carbonate-vitamin D (CALTRATE) 600-10 MG-MCG per tablet Take 1 tablet by mouth daily.      !! citalopram (CELEXA) 10 MG tablet Take 1 tablet (10 mg) by mouth daily. Takes 20 mg tab also  Qty: 90 tablet, Refills: 0    Associated Diagnoses: Anxiety      !! citalopram (CELEXA) 20 MG tablet TAKE 1 TABLET(20MG) BY MOUTH DAILY. TAKES 10MG TABLET ALSO  Qty: 90 tablet, Refills: 0    Associated Diagnoses: Anxiety      fexofenadine (ALLEGRA) 180 MG tablet Take 180 mg by mouth daily as needed for allergies.      polyethylene glycol (MIRALAX) 17 GM/Dose powder Take 17 g (1 Capful) by mouth daily.  Qty: 238 g, Refills: 11    Associated  Diagnoses: Urothelial carcinoma of left distal ureter (H)      pregabalin (LYRICA) 75 MG capsule Take 1 capsule (75 mg) by mouth 2 times daily.  Qty: 180 capsule, Refills: 3    Associated Diagnoses: Peripheral polyneuropathy      vitamin B complex with vitamin C (VITAMIN  B COMPLEX) tablet Take 1 tablet by mouth daily.      Vitamin D3 (CHOLECALCIFEROL) 25 mcg (1000 units) tablet Take 2 tablets by mouth daily.      Vitamin K 100 MCG TABS Take 100 mcg by mouth daily.      zinc gluconate 50 MG tablet Take 50 mg by mouth daily.       !! - Potential duplicate medications found. Please discuss with provider.                 Brief History of Illness:   Reason for admission requiring a surgical or invasive procedure:   Urothelial carcinoma of left distal ureter (H) [C66.2]  Malignant neoplasm of overlapping sites of bladder (H) [C67.8]   The patient underwent the following procedure(s):   See above   There were no immediate complications during this procedure.    Please refer to the full operative summary for details.           Hospital Course:   The patient's hospital course was unremarkable.  Lorin Andrade recovered as anticipated and experienced no post-operative complications.      On POD#2 patient was ambulating without assitance, tolerating a regular diet, had pain controlled with PO medications to go home with, and requiring no IV medications or fluids. Patient was discharged home with appropriate contact information, follow-up and instructions as seen below in the discharge paperwork.         Final Pathology Result:   Pending at time of discharge         Discharge Instructions and Follow-Up:     Discharge Procedure Orders   Reason for your hospital stay   Order Comments: Urologic surgery     Activity   Order Comments: See Discharge Instructions     Order Specific Question Answer Comments   Is discharge order? Yes      Follow Up   Order Comments: See Discharge Instructions     Diet   Order Comments: See Discharge  Instructions     Order Specific Question Answer Comments   Is discharge order? Yes             Discharge Disposition:     Discharged to Home      Condition at discharge: Good    --    Manjit Franco MD  Urology Resident    10:20 AM, 3/12/2025

## 2025-03-12 NOTE — PLAN OF CARE
Goal Outcome Evaluation:      Plan of Care Reviewed With: patient    Overall Patient Progress: improvingOverall Patient Progress: improving         Date & Time: 3/12/2025   11p-7a  Surgery/POD#: 2 NEPHROURETERECTOMY, ROBOT-ASSISTED  Hx of Urothelial carcinoma of left distal ureter    Behavior & Aggression: Green  Fall Risk: Yes   Orientation:Aox4, lethargic/tired   ABNL VS/O2:VSS on RA/Capno ex soft BP (91/42)  ABNL Labs: See labs  Pain Management: Oxycodone 5mg x1. Refused scheduled Tylenol.   Bowel/Bladder: dominguez in place with good UOP, -BM, +gas, +BS.   Drains: PIV SL, VISH drain  Wounds/incisions: Abd inc, CDI  Diet:Regular   Activity Level: SBA refused assistant at the times.    Tests/Procedures: N/A  Anticipated  DC Date: Pending   Significant Information: Refused all activities overnight, stated being very tired and want to sleep and rest.

## 2025-03-13 ENCOUNTER — TELEPHONE (OUTPATIENT)
Dept: UROLOGY | Facility: CLINIC | Age: 65
End: 2025-03-13
Payer: COMMERCIAL

## 2025-03-13 NOTE — TELEPHONE ENCOUNTER
Urology Postop Phone Note:    Lorin underwent surgery on 3/10/25    POSTOPERATIVE DIAGNOSIS: (C66.2) Malignant neoplasm of left ureter (H)  (primary encounter diagnosis)  (C67.8) Malignant neoplasm of overlapping sites of bladder (H)     PROCEDURE PERFORMED:   ROBOTIC ASSISTED LAPAROSCOPIC RADICAL NEPHROURETERECTOMY - LEFT      STAFF SURGEON: Rogelio Gallagher MD  RESIDENT SURGEON: Manjit Franco MD  ASSISTANT: Jyoti Dwyer  ANESTHESIA: General.   DRAINS AND TUBES: 19fr Mnapreet drain, 16fr dominguez catheter    Postop course:   dominguez x 1 week - follow-up on 03/17    Questions:  Fevers/chills: Afebrile. No chills or diaphoresis  Eating/drinking: Yes, adequate amounts. No N/V or any other issues.    Urine output:   1509: Dominguez seems to not be draining. She has a leg bag hooked up. She reports clear, yellow urine in entire length of tubing. Urine has not gone into the bag at all. Tried to troubleshoot over the phone. Patient is going into shower to switch from leg bag to overnight bag to see if that will flow.   Notified her that if she cannot get it flowing she either needs to come into clinic during clinic hours yet today or go be seen in ER if after 5pm. She cannot go over 6-8 hours without it draining into catheter bag. We ended call so she could switch bags out in the shower, said she will update me in a little while.  1630: Lorin switched from leg bag to overnight bag. Urine is now flowing into bag without issue. She has no further concerns postoperatively.     Incisions: edges approximated, cdi. No drainage or s/s of infection. Covered. Open to air.     Bowel movement since surgery: Yes, no issues.   Pain: 2 out of 10. Pain goal: 3  Pain med(s): Tylenol, oxycodone, and oxybutynin    Follow up:  Follow up appointment scheduled on 3/17 at 8:00 am with Frannie Reilly CNP at Urology clinic in [place].        Thank you,    DAVIE RiceN, RN  Care Coordinator  [facility]  "Urology   [facility] [facility] [place]  My direct line: [phone]  Clinic/Schedulers: [phone]  Fax #: [phone]  Monday-Friday 8am- 4:15pm      After Hours:  If it is a night, weekend, or holiday call the [facility] number at [phone] and ask the  to page the \"urology resident on call\".    Typically, the on-call provider should return your call within 45 minutes.  Please page the on-call provider again if you haven't been contacted as expected.    Rarely, the on-call provider will be unable to promptly return a call due to a hospital emergency.  If you have paged twice and are still not contacted, ask the hospital  to page the \"urology chief-resident on call\".  For emergencies, or if you cannot wait for a call back: call 911 or go to the Emergency Department      Wound Care:  - You may shower and get incisions wet starting 48 hrs after surgery, unless surgeon told you otherwise.   - Do not scrub incisions or submerge wounds (bath, pool, hot tub, etc.) until catheter is removed and wounds have fully healed, typically 4-6 weeks. Blot incisions dry with clean towel, do not rub.   - Remove wound dressing 48 hours after surgery if already not removed.   - Your incisions were closed with dissolvable suture that will not need to be removed.  Commonly, purple dermabond glue is applied over the top of the sutures.  Avoid using lotions or ointments on your incisions.    - Leave incisions open to air.  Cover with gauze only if needed for comfort or to protect clothing from drainage.      "

## 2025-03-17 ENCOUNTER — OFFICE VISIT (OUTPATIENT)
Dept: UROLOGY | Facility: CLINIC | Age: 65
End: 2025-03-17
Payer: COMMERCIAL

## 2025-03-17 VITALS
HEIGHT: 66 IN | BODY MASS INDEX: 20.89 KG/M2 | OXYGEN SATURATION: 100 % | HEART RATE: 80 BPM | SYSTOLIC BLOOD PRESSURE: 105 MMHG | WEIGHT: 130 LBS | DIASTOLIC BLOOD PRESSURE: 65 MMHG

## 2025-03-17 DIAGNOSIS — N32.81 OAB (OVERACTIVE BLADDER): ICD-10-CM

## 2025-03-17 DIAGNOSIS — R30.0 DYSURIA: Primary | ICD-10-CM

## 2025-03-17 DIAGNOSIS — C66.2 UROTHELIAL CARCINOMA OF LEFT DISTAL URETER (H): ICD-10-CM

## 2025-03-17 DIAGNOSIS — C67.8 MALIGNANT NEOPLASM OF OVERLAPPING SITES OF BLADDER (H): ICD-10-CM

## 2025-03-17 PROCEDURE — 3078F DIAST BP <80 MM HG: CPT | Performed by: NURSE PRACTITIONER

## 2025-03-17 PROCEDURE — 3074F SYST BP LT 130 MM HG: CPT | Performed by: NURSE PRACTITIONER

## 2025-03-17 PROCEDURE — 99213 OFFICE O/P EST LOW 20 MIN: CPT | Performed by: NURSE PRACTITIONER

## 2025-03-17 NOTE — PROGRESS NOTES
Urology Outpatient Visit    Name: Lorin Andrade    MRN: [id number]   YOB: 1960               Chief Complaint:   Post-op f/u         Impression and Plan:   Impression / Plan:   Lorin Andrade is a 64 year old female s/p L nephroureterectomy for management of UTUC. Doing well postoperatively.       Catheter removed without complication.  We discussed gradually returning to activity.   Follow-up 4/9 with Dr. Gallagher as scheduled.     Thank you for the opportunity to participate in the care of Lorin Andrade.     Frannie Reilly, APRN, CNP  [facility] - Department of Urology  [phone]          History of Present Illness:   Lorin Andrade is a 64 year old female with history of left-sided hydroureteronephrosis and concern for urothelial malignancy of the left distal ureter, now status post a robotic distal ureterectomy, lymph node dissection, ureteroneocystotomy with a Boari flap on 6/24/2024 with evidence of CIS. 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 She was counseled on treatment options, started on induction BCG and most recently underwent Left robotic radical nephroureterectomy 3/10/2025.    Presents today for cath out and post-op follow-up.    She is doing very well postoperatively, incisions CDI, no acute concerns or urinary Sx.     History is obtained from patient & EMR          Past Medical History:     Past Medical History:   Diagnosis Date    Cancer (H) 2006    breast    Depressive disorder '99    More anxiety related    Sleep apnea     CPAP          Past Surgical History:     Past Surgical History:   Procedure Laterality Date    ABDOMEN SURGERY  Ureter cancer '24    BIOPSY  '06 and '24    COLONOSCOPY N/A 08/16/2023    Procedure: Colonoscopy;  Surgeon: Ana Paula Hurst MD;  Location:  GI    COMBINED CYSTOSCOPY, RETROGRADES, URETEROSCOPY, INSERT STENT Left 05/15/2024     Procedure: CYSTOSCOPY, LEFT URETERAL STENT REMOVAL,  LEFT RETROGRADE PYELOGRAM, LEFT URETEROSCOPY WITH BIOPSY AND URETERAL BRUSHING, LEFT URETERAL STENT PLACEMENT;  Surgeon: Rogelio Gallagher MD;  Location:  OR    COMBINED CYSTOSCOPY, RETROGRADES, URETEROSCOPY, INSERT STENT Left 12/16/2024    Procedure: CYSTOSCOPY, LEFT RETROGRADE PYELOGRAM, LEFT URETEROSCOPY WITH BIOPSY AND LASER FULGURATION, LEFT URETERAL STENT PLACEMENT, TRANSURETHRAL RESECTION OF BLADDET TUMOR;  Surgeon: Rogelio Gallagher MD;  Location:  OR    CYSTOSCOPY      CYSTOSCOPY, TRANSURETHRAL RESECTION (TUR) TUMOR BLADDER, COMBINED N/A 11/04/2024    Procedure: CYSTOSCOPY, WITH TRANSURETHRAL RESECTION BLADDER TUMOR, LEFT RETROGRADE PYELOGRAM, LEFT URETEROSCOPY WITH BIOPSY;  Surgeon: Rogelio Gallagher MD;  Location:  OR    CYSTOSCOPY, URETEROSCOPY, INSERT STENT Left 05/01/2024    Procedure: CYSTOURETEROSCOPY, LEFT URETERAL BIOPSY WITH STENT INSERTION, LEFT RETROGRADE PYELOGRAM;  Surgeon: Rogelio Gallagher MD;  Location:  OR    DAVINCI LYSIS OF ADHESIONS N/A 06/24/2024    Procedure: Davinci Lysis of Adhesions;  Surgeon: Rogelio Gallagher MD;  Location:  OR    DAVINCI NEPHROURETERECTOMY Left 03/10/2025    Procedure: NEPHROURETERECTOMY, ROBOT-ASSISTED, USING DA MIGUEL XI - LEFT;  Surgeon: Rogelio Gallagher MD;  Location:  OR    DAVINCI URETERECTOMY Left 06/24/2024    Procedure: ROBOT ASSISTED LAPAROSCOPIC LEFT DISTAL URETERECTOMY WITH URETERAL RE-IMPLANT, LEFT PELVIC LYMPH NODE DISSECTION, LYSIS OF ADHESIONS;  Surgeon: Rogelio Gallagher MD;  Location:  OR    MASTECTOMY, BILATERAL      Double mastectomy    OOPHORECTOMY Bilateral     ORTHOPEDIC SURGERY  '07    Torn meniscus          Social History:     Social History     Tobacco Use    Smoking status: Never     Passive exposure: Never    Smokeless tobacco: Never   Substance Use Topics    Alcohol use: Not Currently          Family History:     Family History   Problem Relation Age of Onset    Coronary Artery  Disease Mother         CABG    Hypertension Mother     Hyperlipidemia Mother     Diabetes Father     Arrhythmia Sister     Breast Cancer Sister     Osteoporosis Sister     Breast Cancer Sister     Depression Sister     Anxiety Disorder Sister     Substance Abuse Brother     Obesity Brother     Asthma Sister           Allergies:     Allergies   Allergen Reactions    Animal Dander Shortness Of Breath    Gabapentin Other (See Comments)     Tolerating pregabalin well.  Felt out of it, didn't help    Ibuprofen      Had ulcer          Medications:     Current Outpatient Medications   Medication Sig Dispense Refill    calcium carbonate-vitamin D (CALTRATE) 600-10 MG-MCG per tablet Take 1 tablet by mouth daily.      citalopram (CELEXA) 10 MG tablet Take 1 tablet (10 mg) by mouth daily. Takes 20 mg tab also 90 tablet 0    citalopram (CELEXA) 20 MG tablet TAKE 1 TABLET(20MG) BY MOUTH DAILY. TAKES 10MG TABLET ALSO 90 tablet 0    docusate sodium (COLACE) 100 MG tablet Take 1 tablet (100 mg) by mouth daily. 60 tablet 1    fexofenadine (ALLEGRA) 180 MG tablet Take 180 mg by mouth daily as needed for allergies.      oxyBUTYnin ER (DITROPAN XL) 5 MG 24 hr tablet Take 1 tablet (5 mg) by mouth daily for 14 days. Take daily until the day prior to dominguez removal. 14 tablet 0    polyethylene glycol (MIRALAX) 17 GM/Dose powder Take 17 g (1 Capful) by mouth daily. 238 g 11    pregabalin (LYRICA) 75 MG capsule Take 1 capsule (75 mg) by mouth 2 times daily. 180 capsule 3    vitamin B complex with vitamin C (VITAMIN  B COMPLEX) tablet Take 1 tablet by mouth daily.      Vitamin D3 (CHOLECALCIFEROL) 25 mcg (1000 units) tablet Take 2 tablets by mouth daily.      Vitamin K 100 MCG TABS Take 100 mcg by mouth daily.      zinc gluconate 50 MG tablet Take 50 mg by mouth daily.       No current facility-administered medications for this visit.          Review of Systems:    ROS: See HPI for pertinent details.  Remainder of 10-point ROS negative.        "  Physical Exam:   VS:  T: Data Unavailable    HR: 80    BP: 105/65    RR: Data Unavailable     GEN:  Alert.  NAD.   HEENT:  Sclerae anicteric.    CV:  No obvious jugular venous distension.  LUNGS: No respiratory distress, breathing comfortably wo accessory muscle use.  ABD:  ND.  Soft. NT. Incisions CDI.  SKIN:  Dry. No visible rashes.   NEURO:  CN grossly intact.          Laboratory Data:   No results found for: \"PSA\"  Lab Results   Component Value Date    CR 1.12 03/11/2025    CR 0.89 06/26/2024    CR 0.73 06/25/2024    CR 0.74 06/11/2024    CR 0.73 05/01/2024    CR 0.89 04/30/2024    CR 0.84 04/19/2024    CR 0.61 08/14/2023     Lab Results   Component Value Date    GFRESTIMATED 55 03/11/2025    GFRESTIMATED 73 06/26/2024    GFRESTIMATED >90 06/25/2024    GFRESTIMATED >90 06/11/2024    GFRESTIMATED >90 05/01/2024    GFRESTIMATED 73 04/30/2024    GFRESTIMATED 79 04/19/2024    GFRESTIMATED >90 08/14/2023        "

## 2025-03-17 NOTE — LETTER
3/17/2025       RE: Lorin Andrade  [address]  [facility] [place] [address]     Dear Colleague,    Thank you for referring your patient, Lorin Andrade, to the [facility] at [facility]. Please see a copy of my visit note below.      Urology Outpatient Visit    Name: Lorin Andrade    MRN: [id number]   YOB: 1960               Chief Complaint:   Post-op f/u         Impression and Plan:   Impression / Plan:   Lorin Andrade is a 64 year old female s/p L nephroureterectomy for management of UTUC. Doing well postoperatively.       Catheter removed without complication.  We discussed gradually returning to activity.   Follow-up 4/9 with Dr. Gallagher as scheduled.     Thank you for the opportunity to participate in the care of Lorin Andrade.     TAL Hooks, CNP   Physicians - Department of Urology  [phone]          History of Present Illness:   Lorin Andrade is a 64 year old female with history of left-sided hydroureteronephrosis and concern for urothelial malignancy of the left distal ureter, now status post a robotic distal ureterectomy, lymph node dissection, ureteroneocystotomy with a Boari flap on 6/24/2024 with evidence of CIS. 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 She was counseled on treatment options, started on induction BCG and most recently underwent Left robotic radical nephroureterectomy 3/10/2025.    Presents today for cath out and post-op follow-up.    She is doing very well postoperatively, incisions CDI, no acute concerns or urinary Sx.     History is obtained from patient & EMR          Past Medical History:     Past Medical History:   Diagnosis Date     Cancer (H) 2006    breast     Depressive disorder '99    More anxiety related     Sleep apnea     CPAP          Past Surgical History:      Past Surgical History:   Procedure Laterality Date     ABDOMEN SURGERY  Ureter cancer '24     BIOPSY  '06 and '24     COLONOSCOPY N/A 08/16/2023    Procedure: Colonoscopy;  Surgeon: Ana Paula Hurst MD;  Location:  GI     COMBINED CYSTOSCOPY, RETROGRADES, URETEROSCOPY, INSERT STENT Left 05/15/2024    Procedure: CYSTOSCOPY, LEFT URETERAL STENT REMOVAL,  LEFT RETROGRADE PYELOGRAM, LEFT URETEROSCOPY WITH BIOPSY AND URETERAL BRUSHING, LEFT URETERAL STENT PLACEMENT;  Surgeon: Rogelio Gallagher MD;  Location:  OR     COMBINED CYSTOSCOPY, RETROGRADES, URETEROSCOPY, INSERT STENT Left 12/16/2024    Procedure: CYSTOSCOPY, LEFT RETROGRADE PYELOGRAM, LEFT URETEROSCOPY WITH BIOPSY AND LASER FULGURATION, LEFT URETERAL STENT PLACEMENT, TRANSURETHRAL RESECTION OF BLADDET TUMOR;  Surgeon: Rogelio Gallagher MD;  Location:  OR     CYSTOSCOPY       CYSTOSCOPY, TRANSURETHRAL RESECTION (TUR) TUMOR BLADDER, COMBINED N/A 11/04/2024    Procedure: CYSTOSCOPY, WITH TRANSURETHRAL RESECTION BLADDER TUMOR, LEFT RETROGRADE PYELOGRAM, LEFT URETEROSCOPY WITH BIOPSY;  Surgeon: Rogelio Gallagher MD;  Location:  OR     CYSTOSCOPY, URETEROSCOPY, INSERT STENT Left 05/01/2024    Procedure: CYSTOURETEROSCOPY, LEFT URETERAL BIOPSY WITH STENT INSERTION, LEFT RETROGRADE PYELOGRAM;  Surgeon: Rogelio Gallagher MD;  Location:  OR     DAVINCI LYSIS OF ADHESIONS N/A 06/24/2024    Procedure: Davinci Lysis of Adhesions;  Surgeon: Rogelio Gallagher MD;  Location:  OR     DAVINCI NEPHROURETERECTOMY Left 03/10/2025    Procedure: NEPHROURETERECTOMY, ROBOT-ASSISTED, USING DA MIGUEL XI - LEFT;  Surgeon: Rogelio Gallagher MD;  Location:  OR     DAVINCI URETERECTOMY Left 06/24/2024    Procedure: ROBOT ASSISTED LAPAROSCOPIC LEFT DISTAL URETERECTOMY WITH URETERAL RE-IMPLANT, LEFT PELVIC LYMPH NODE DISSECTION, LYSIS OF ADHESIONS;  Surgeon: Rogelio Gallagher MD;  Location:  OR     MASTECTOMY, BILATERAL      Double mastectomy     OOPHORECTOMY Bilateral      ORTHOPEDIC  SURGERY  '07    Torn meniscus          Social History:     Social History     Tobacco Use     Smoking status: Never     Passive exposure: Never     Smokeless tobacco: Never   Substance Use Topics     Alcohol use: Not Currently          Family History:     Family History   Problem Relation Age of Onset     Coronary Artery Disease Mother         CABG     Hypertension Mother      Hyperlipidemia Mother      Diabetes Father      Arrhythmia Sister      Breast Cancer Sister      Osteoporosis Sister      Breast Cancer Sister      Depression Sister      Anxiety Disorder Sister      Substance Abuse Brother      Obesity Brother      Asthma Sister           Allergies:     Allergies   Allergen Reactions     Animal Dander Shortness Of Breath     Gabapentin Other (See Comments)     Tolerating pregabalin well.  Felt out of it, didn't help     Ibuprofen      Had ulcer          Medications:     Current Outpatient Medications   Medication Sig Dispense Refill     calcium carbonate-vitamin D (CALTRATE) 600-10 MG-MCG per tablet Take 1 tablet by mouth daily.       citalopram (CELEXA) 10 MG tablet Take 1 tablet (10 mg) by mouth daily. Takes 20 mg tab also 90 tablet 0     citalopram (CELEXA) 20 MG tablet TAKE 1 TABLET(20MG) BY MOUTH DAILY. TAKES 10MG TABLET ALSO 90 tablet 0     docusate sodium (COLACE) 100 MG tablet Take 1 tablet (100 mg) by mouth daily. 60 tablet 1     fexofenadine (ALLEGRA) 180 MG tablet Take 180 mg by mouth daily as needed for allergies.       oxyBUTYnin ER (DITROPAN XL) 5 MG 24 hr tablet Take 1 tablet (5 mg) by mouth daily for 14 days. Take daily until the day prior to dominguez removal. 14 tablet 0     polyethylene glycol (MIRALAX) 17 GM/Dose powder Take 17 g (1 Capful) by mouth daily. 238 g 11     pregabalin (LYRICA) 75 MG capsule Take 1 capsule (75 mg) by mouth 2 times daily. 180 capsule 3     vitamin B complex with vitamin C (VITAMIN  B COMPLEX) tablet Take 1 tablet by mouth daily.       Vitamin D3 (CHOLECALCIFEROL) 25  "mcg (1000 units) tablet Take 2 tablets by mouth daily.       Vitamin K 100 MCG TABS Take 100 mcg by mouth daily.       zinc gluconate 50 MG tablet Take 50 mg by mouth daily.       No current facility-administered medications for this visit.          Review of Systems:    ROS: See HPI for pertinent details.  Remainder of 10-point ROS negative.         Physical Exam:   VS:  T: Data Unavailable    HR: 80    BP: 105/65    RR: Data Unavailable     GEN:  Alert.  NAD.   HEENT:  Sclerae anicteric.    CV:  No obvious jugular venous distension.  LUNGS: No respiratory distress, breathing comfortably wo accessory muscle use.  ABD:  ND.  Soft. NT. Incisions CDI.  SKIN:  Dry. No visible rashes.   NEURO:  CN grossly intact.          Laboratory Data:   No results found for: \"PSA\"  Lab Results   Component Value Date    CR 1.12 03/11/2025    CR 0.89 06/26/2024    CR 0.73 06/25/2024    CR 0.74 06/11/2024    CR 0.73 05/01/2024    CR 0.89 04/30/2024    CR 0.84 04/19/2024    CR 0.61 08/14/2023     Lab Results   Component Value Date    GFRESTIMATED 55 03/11/2025    GFRESTIMATED 73 06/26/2024    GFRESTIMATED >90 06/25/2024    GFRESTIMATED >90 06/11/2024    GFRESTIMATED >90 05/01/2024    GFRESTIMATED 73 04/30/2024    GFRESTIMATED 79 04/19/2024    GFRESTIMATED >90 08/14/2023            Again, thank you for allowing me to participate in the care of your patient.      Sincerely,    TAL Vallecillo CNP    "

## 2025-03-19 DIAGNOSIS — R30.0 DYSURIA: Primary | ICD-10-CM

## 2025-03-20 ENCOUNTER — ALLIED HEALTH/NURSE VISIT (OUTPATIENT)
Dept: UROLOGY | Facility: CLINIC | Age: 65
End: 2025-03-20
Payer: COMMERCIAL

## 2025-03-20 ENCOUNTER — LAB (OUTPATIENT)
Dept: LAB | Facility: CLINIC | Age: 65
End: 2025-03-20
Payer: COMMERCIAL

## 2025-03-20 DIAGNOSIS — R30.0 DYSURIA: ICD-10-CM

## 2025-03-20 DIAGNOSIS — C67.8 MALIGNANT NEOPLASM OF OVERLAPPING SITES OF BLADDER (H): Primary | ICD-10-CM

## 2025-03-20 DIAGNOSIS — C66.2 UROTHELIAL CARCINOMA OF LEFT DISTAL URETER (H): ICD-10-CM

## 2025-03-20 PROCEDURE — 81003 URINALYSIS AUTO W/O SCOPE: CPT | Mod: QW

## 2025-03-20 NOTE — PROGRESS NOTES
"Lorin Andrade comes into clinic today for Bladder Cancer  at the request of Dr. Gallagher Ordering Provider for surgical incision check.    Patient presented to clinic today to leave UA/UC due to having UTI symptoms and also wanted to have one of her incisions checked as she felt it had \"opened up\".  Patient is s/p robotic assisted laparoscopic left radical nephroureterectomy on 3/10/25 with Dr. Gallagher.  Assessed LLQ surgical incision which is healing well and is C/D/I.  The surgical glue is starting to fall off on the medical aspect of the incision so therefore why patient thought the incision was opening up.  Reassured her that her incision is healing well.  Patient states understanding.    This service provided today was under the supervising provider of the day Dr. Nixon, who was available if needed.    Page Langston RN    "

## 2025-03-27 ENCOUNTER — MYC MEDICAL ADVICE (OUTPATIENT)
Dept: UROLOGY | Facility: CLINIC | Age: 65
End: 2025-03-27
Payer: COMMERCIAL

## 2025-03-31 NOTE — TELEPHONE ENCOUNTER
Rogelio Gallagher MD  [facility] Adult [place]10 minutes ago (2:11 PM)      Hi team,    Yes, if the incisions have healed then swimming should be okay.    Thanks,  Rogelio CONLEY. Elliott RANGEL    Received above message from provider.  Jacqueline Elizabeth MA on 3/31/2025 at 2:22 PM

## 2025-04-09 ENCOUNTER — OFFICE VISIT (OUTPATIENT)
Dept: UROLOGY | Facility: CLINIC | Age: 65
End: 2025-04-09
Payer: COMMERCIAL

## 2025-04-09 VITALS
WEIGHT: 127 LBS | DIASTOLIC BLOOD PRESSURE: 69 MMHG | HEART RATE: 75 BPM | SYSTOLIC BLOOD PRESSURE: 106 MMHG | BODY MASS INDEX: 20.41 KG/M2 | OXYGEN SATURATION: 98 % | HEIGHT: 66 IN

## 2025-04-09 DIAGNOSIS — C66.2 UROTHELIAL CARCINOMA OF LEFT DISTAL URETER (H): ICD-10-CM

## 2025-04-09 DIAGNOSIS — C67.8 MALIGNANT NEOPLASM OF OVERLAPPING SITES OF BLADDER (H): Primary | ICD-10-CM

## 2025-04-09 PROCEDURE — 3074F SYST BP LT 130 MM HG: CPT | Performed by: UROLOGY

## 2025-04-09 PROCEDURE — 81003 URINALYSIS AUTO W/O SCOPE: CPT | Mod: QW | Performed by: UROLOGY

## 2025-04-09 PROCEDURE — 1126F AMNT PAIN NOTED NONE PRSNT: CPT | Performed by: UROLOGY

## 2025-04-09 PROCEDURE — 3078F DIAST BP <80 MM HG: CPT | Performed by: UROLOGY

## 2025-04-09 PROCEDURE — 52000 CYSTOURETHROSCOPY: CPT | Performed by: UROLOGY

## 2025-04-09 PROCEDURE — 99214 OFFICE O/P EST MOD 30 MIN: CPT | Mod: 25 | Performed by: UROLOGY

## 2025-04-09 RX ORDER — LIDOCAINE HYDROCHLORIDE 20 MG/ML
JELLY TOPICAL ONCE
Status: COMPLETED | OUTPATIENT
Start: 2025-04-09 | End: 2025-04-09

## 2025-04-09 RX ADMIN — LIDOCAINE HYDROCHLORIDE 5 ML: 20 JELLY TOPICAL at 13:31

## 2025-04-09 ASSESSMENT — PAIN SCALES - GENERAL: PAINLEVEL_OUTOF10: NO PAIN (0)

## 2025-04-09 NOTE — PATIENT INSTRUCTIONS
"AFTER YOUR CYSTOSCOPY  ?  ?  You have just completed a cystoscopy, or \"cysto\", which allowed your physician to learn more about your bladder (or to remove a stent placed after surgery). We suggest that you continue to avoid caffeine, fruit juice, and alcohol for the next 24 hours, however, you are encouraged to return to your normal activities.  ?  ?  A few things that are considered normal after your cystoscopy:  ?  * small amount of bleeding (or spotting) that clears within the next 24 hours  ?  * slight burning sensation with urination  ?  * sensation of needing to void (urinate) more frequently  ?  * the feeling of \"air\" in your urine  ?  * mild discomfort that is relieved with Tylenol    * bladder spasms  ?  ?  ?  Please contact our office promptly if you:  ?  * develop a fever above 101 degrees  ?  * are unable to urinate  ?  * develop bright red blood that does not stop  ?  * experience severe pain or swelling  ?  ?  ?  And of course, please contact our office with any concerns or questions [phone].  ?   AFTER YOUR CYSTOSCOPY        You have just completed a cystoscopy, or \"cysto\", which allowed your physician to learn more about your bladder (or to remove a stent placed after surgery). We suggest that you continue to avoid caffeine, fruit juice, and alcohol for the next 24 hours, however, you are encouraged to return to your normal activities.         A few things that are considered normal after your cystoscopy:     * Small amount of bleeding (or spotting) that clears within the next 24 hours     * Slight burning sensation with urination     * Sensation to of needing to avoid more frequently     * The feeling of \"air\" in your urine     * Mild discomfort that is relieved with Tylenol        Please contact our office promptly if you:     * Develop a fever above 101 degrees     * Are unable to urinate     * Develop bright red blood that does not stop     * Severe pain or swelling         Please contact " our office with any concerns or questions @[facility].

## 2025-04-09 NOTE — LETTER
4/9/2025       RE: Lorin Andrade  [address]  [place] [facility] [address]     Dear Colleague,    Thank you for referring your patient, Lorin Andrade, to the [facility] at [facility]. Please see a copy of my visit note below.    [place]  CHIEF COMPLAINT   It was my pleasure to see Lorin Andrade who is a 64 year old female for follow-up of LEFT ureteral urothelial carcinoma and bladder cancer.      HPI  5/8/2024   Lorin Andrade is a very pleasant 64 year old female with a history of left renal colic with workup showing moderate hydroureteronephrosis and enhancement of the last 5 cm of the left distal ureter.  Urine cytology with suspicion for high-grade urothelial carcinoma.  She underwent ureteroscopy with ureteral biopsy and stent placement on 5/1/2024 and returns today to discuss the results.    She has had resolution of her flank pain following stent placement  Her  joins her for this visit    5/15/2024:  Ureteroscopy     5/22/2024:  Follow-up today to review the ureteroscopy findings and pathology results    6/24/2024:  Robotic distal ureterectomy, ureteroneocystotomy and boari flap    7/9/2024:  Cystogram and dominguez removal    7/10/2024:  Follow-up today for postop check  She is doing very well after Dominguez removal yesterday    7/24/2024:  Ureteral stent removal    10/30/2024:  Surveillance cystoscopy    11/13/2024:  Follow up after Transurethral resection of bladder tumor (TURBT) and Ureteroscopy last week  Developed symptoms of possible UTI on Monday  Symptoms of pelvic pain, dysuria   No notable gross hematuria   Is taking Azo    11/21/2024:  Follow-up today to review her cystogram and discuss treatment options  She continues to have increased urinary frequency and pain with holding and some dysuria which is relieved with urination  Continues on PCN for recent UTI    12/12/2024:  She did started to notice return of some bilateral  "flank pain in the last 4-5 days  She notes that this was her similar pain that she had prior to her initial stent placement in May and she is concerned that this could represent some interval change  Her  joins her for this visit    12/16/2024:  Transurethral resection of bladder tumor (TURBT) and Ureteroscopy with biopsy and laser ablation    12/31/2024:  She returns today for stent removal and discussion of her pathology results  She did have a UTI diagnosed on 12/23/2024 and completed a course of penicillin yesterday    3/10/2025:  Robotic nephroureterectomy    TODAY 4/9/2025:  Return for surveillance cystoscopy  She notes she still feels like she is recovering from her nephrectomy    PHYSICAL EXAM  Patient is a 64 year old  female   Vitals: Blood pressure 106/69, pulse 75, height 1.664 m (5' 5.5\"), weight 57.6 kg (127 lb), SpO2 98%.  Body mass index is 20.81 kg/m .  General Appearance Adult:   Alert, no acute distress, oriented  HENT: throat/mouth:normal, good dentition  Lungs: no respiratory distress, or pursed lip breathing  Heart: No obvious jugular venous distension present  Abd: Well-healing surgical incisions, no evidence of infection or hernia, small less than 1 cm scab at the extraction site  Neuro: Alert, oriented, speech and mentation normal  Psych: affect and mood normal  Gait: Normal  : deferred to cystoscopy     Creatinine   Date Value Ref Range Status   03/11/2025 1.12 (H) 0.51 - 0.95 mg/dL Final   06/26/2024 0.89 0.51 - 0.95 mg/dL Final   06/25/2024 0.73 0.51 - 0.95 mg/dL Final   06/11/2024 0.74 0.51 - 0.95 mg/dL Final   05/01/2024 0.73 0.51 - 0.95 mg/dL Final   04/30/2024 0.89 0.51 - 0.95 mg/dL Final   04/19/2024 0.84 0.51 - 0.95 mg/dL Final   08/14/2023 0.61 0.51 - 0.95 mg/dL Final     GFR Estimate   Date Value Ref Range Status   03/11/2025 55 (L) >60 mL/min/1.73m2 Final     Comment:     eGFR calculated using 2021 CKD-EPI equation.   06/26/2024 73 >60 mL/min/1.73m2 Final     Comment:    "  eGFR calculated using 2021 CKD-EPI equation.   06/25/2024 >90 >60 mL/min/1.73m2 Final     Comment:     eGFR calculated using 2021 CKD-EPI equation.        CYTOLOGY:  Final Diagnosis   Specimen A                 Interpretation:                  -  Suspicious for High-grade Urothelial Carcinoma      PATHOLOGY:  5/1/2024:  Final Diagnosis   Left distal ureter, biopsy:  -Normal urothelial lining without evidence of atypia or malignancy, please see description     5/15/2024:  Final Diagnosis   Ureter, left proximal, biopsy:  -- Urothelium overlying edematous stroma.  Negative for dysplasia or carcinoma.     5/15/2024:  Distal ureteral brushing  Final Diagnosis   Specimen A              Interpretation:               Negative for High Grade Urothelial Carcinoma     6/24/2024:  Distal ureterectomy and lymph node dissection  Final Diagnosis   A-C.  Left ureter with separately submitted proximal margin and left pelvic lymph nodes; left ureterectomy with left pelvic lymphadenectomy  -Proximal left ureter margin without evidence of in situ or invasive carcinoma  -2 benign lymph nodes, benign perinodal fibrovascular and fatty tissue, entire received tissue sample examined microscopically  -Urothelial carcinoma in situ present in association with background urothelial atypia/dysplasia; no evidence of carcinoma in situ at margins; please see microscopic description and synoptic template within report     11/4/2024:  Transurethral resection of bladder tumor (TURBT)  Final Diagnosis   A.  Left distal ureter biopsy:  - High-grade urothelial cell carcinoma, detached fragment without underlying tissue for evaluation of invasion     B.  Posterior bladder wall tumor:   - Noninvasive high-grade urothelial cell carcinoma     C.  Left bladder wall tumor:  - Noninvasive high-grade urothelial cell carcinoma     D.  Left anterior bladder wall tumor:  - Noninvasive high-grade urothelial cell carcinoma     12/16/2024:  Final Diagnosis   A.   Left proximal ureter, biopsy:  -Tiny fragment of partially crushed tissue with UROTHELIAL CARCINOMA IN SITU (see comment).     B.  Anterior bladder wall tumor, transurethral resection:  -NON-INVASIVE HIGH-GRADE PAPILLARY UROTHELIAL CARCINOMA with associated marked acute and chronic inflammatory changes.  -Fragment of muscularis propria (detrusor muscle), uninvolved by tumor.     3/10/2025:  Final Diagnosis   Kidney, ureter, left, resection-  Urothelial carcinoma in situ involving renal pelvis and mid ureter  No evidence of invasive malignancy  Resection margins free of malignancy.     IMAGING:  All pertinent imaging reviewed:    All imaging studies reviewed by me.  I personally reviewed these imaging films.  A formal report from radiology will follow.    CT UROGRAM 4/30/2024:  FINDINGS:   LOWER CHEST: No worrisome consolidation in the visualized lung bases. No pleural effusion. Partially imaged breast implants.     HEPATOBILIARY: Unchanged liver contours with benign cyst in the left hepatic lobe. No worrisome liver lesions. Normal gallbladder. No biliary ductal dilation.     PANCREAS: Normal.     SPLEEN: Normal.     ADRENAL GLANDS: Normal.     RIGHT KIDNEY/URETER: Normal morphology and enhancement of the right kidney. A few cortically based subcentimeter low-attenuation lesions are incompletely characterized though likely cysts. No urolithiasis or hydronephrosis. Normal excretion of contrast   on delayed images without urothelial thickening or filling defects.     LEFT KIDNEY/URETER: Unchanged delayed left nephrogram. 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 No urolithiasis. There is unchanged moderate to severe hydroureteronephrosis with dependent excreted contrast   layering within the calyces. As before, there is a transition in the distal ureter where there is an approximately 5 cm segment of urothelial thickening and enhancement (series 12 image #42, #48).       BLADDER: Unremarkable.     BOWEL: Normal caliber small and large bowel. Normal appendix. No free fluid or free air.     LYMPH NODES: No lymphadenopathy.     VASCULATURE: The abdominal aorta is nonaneurysmal with mild atheromatous disease.     PELVIC ORGANS: Uterus is present. No adnexal mass.     MUSCULOSKELETAL: Unchanged mild L4 superior endplate deformity. No destructive osseous lesions.                                                                      IMPRESSION:  1.  Unchanged moderate left hydroureteronephrosis to the level of the distal ureter where there is an approximately 5 cm segment of urothelial thickening and enhancement. As before, etiology may be inflammatory or neoplastic.  2.  Additional noncritical details in the findings.    NM RENAL SCAN 4/17/2024:  FINDINGS:      Renal angiogram demonstrates: Flow to right kidney within normal  limits. There is asymmetrically slower and less flow to the left  kidney.   Renogram images demonstrate:     Right kidney: There is normal cortical uptake. Cortical transit is  noted at 3 minutes. Ureter is visualized at 4 minutes. There is normal  wash out. Following Lasix administration, there is prompt wash out.      Left kidney: There is delayed cortical uptake. Cortical transit is  noted at 6 minutes which is slightly delayed. Ureter is visualized at  20 minutes. There is no wash ou prior to Lasix, minimal wash out after  Lasix.   Renogram curves demonstrate:     Right kidney: The radiotracer reaches the peak activity at 5 minutes  (time to peak). There is normal wash out and complete wash out  following Lasix. T1/2 is: 14 minutes.     Left kidney: The radiotracer reaches the peak activity at 22 minutes  (time to peak). There is delayed wash out and  incomplete wash out  following Lasix. T1/2 is: Not applicable.     Split function: Left: 38.7%, Right: 61.3%.                                                                      IMPRESSION:  1. Right kidney:  Normal function. Normal excretion. No obstruction.     2. Left kidney: Decreased function. Decreased excretion, concerning  for partial obstruction.    CYSTOGRAM 7/9/2024:  FINDINGS: Left ureteral stent present on the  image. Cystografin  contrast was instilled into the bladder via existing Ayala catheter.  No visible leak.                                                          IMPRESSION:  1.  No leak, status post ureteral reimplantation.    Retrograde Pyelogram 11/4/2024:    Ureteroscope tip at the level of the ureteral tumor    Cystogram 11/18/2024:    Cystogram with demonstrated reflux    Retrograde Pyelogram 12/16/2024:    Ureteroscope tip at site of more proximal CIS    ASSESSMENT and PLAN  64-year-old female with left-sided hydroureteronephrosis and concern for urothelial malignancy of the left distal ureter.  Now status post a robotic distal ureterectomy, lymph node dissection, ureteroneocystotomy with a Boari flap on 6/24/2024 with evidence of CIS.  Early recurrence within the bladder status post a TURBT and ureteroscopy with biopsy.  Now status post a robotic nephro ureterectomy on 3/10/2025.    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  This was in addition to cystoscopy time    Note copy attestation: the elements have been reviewed, edited as needed, and remain pertinent to today's visit.      Rogelio Gallagher MD   Urology  [facility]  [place]  [facility] Phone: [phone]  [facility] Phone: [phone]      Again, thank you for allowing me to participate in the care of your patient.      Sincerely,    Rogelio Gallagher MD

## 2025-04-09 NOTE — PROCEDURES
CYSTOSCOPY PROCEDURE NOTE:    Lorin Andrade is a 64 year old female who presents with history of left ureteral and bladder cancer for a surveillance cystoscopy.    Pt ID verified with patient: Yes     Procedure verified with patient: [email or web address]     Procedure confirmed with physician and support staff: Yes     Consent confirmed with physician and support staff.    Sign In:  History and Physical Exam reviewed  Primary Diagnosis: History of left ureteral and bladder cancer  Informed Consent Discussed: Yes   Sign in Communication: Yes   Time Out:  Team Confirms the Correct Patient, Correct Procedure; Yes , Correct Site and Site Marking, Correct Position (if applicable).  Affirmation of Time Out: Yes   Sign Out:  Sign Out Discussion: Yes   Physician: Rogelio Gallagher MD  Indications for procedure:   Lorin Andrade is a 64 year old female with a history of left ureteral and bladder cancer.    Description of procedure:   After fully informed, voluntary consent was obtained, the patient was brought into the procedure room, identified and placed in a dorsal lithotomy position on the cystoscopy table.  The vagina/introitus were prepped with betadine and draped in a sterile fashion.  Urojet lidocaine gel was introduced.  A 15F flexible cystoscope was inserted into the urethra, and the bladder and urethra were examined in a systematic manner.  The patient tolerated the procedure well and there were no complications.      Findings:  External exam revealed no cystocele and no rectocele.   Cystoscopy then showed the urethra to be normal in appearance with normal coaptation. The bladder itself was completely surveyed.  The right ureteral orifice was identified and normal with clear reflux of urine.  The left ureteral orifice had been surgically removed and well-healed.  The reimplanted ureter at the left dome has been removed and is healing well.  A few small areas of visible V-Loc suture remain.  No evidence of local recurrence at that site.   There was no trabeculation.  On retroflexion there was a small area of papillary changes at the bladder neck consistent with a recurrence of urothelial malignancy.    Assessment/Plan:   Lorin Andrade is a 64 year old female with a history of left ureteral and bladder cancer, now with concern for a small recurrence findings on cystoscopy.      -Orders for surgery placed    Rogelio Gallagher MD

## 2025-04-09 NOTE — NURSING NOTE
Chief Complaint   Patient presents with    Bladder cancer (H)     In office cystoscopy    Prior to the start of the procedure and with procedural staff participation, I verbally confirmed the patient s identity using two indicators, relevant allergies, that the procedure was appropriate and matched the consent or emergent situation, and that the correct equipment/implants were available. Immediately prior to starting the procedure I conducted the Time Out with the procedural staff and re-confirmed the patient s name, procedure, and site/side. I have wiped the patient off with the povidone-Iodine solution, draped them,  used Lidocaine hydrochloride jelly, and instilled sterile water into the bladder. (The Joint Commission universal protocol was followed.)  Yes    Sedation (Moderate or Deep): None   5mL 2% lidocaine hydrochloride Urojet instilled into urethra.    NDC# [address]-1  Lot #: [id number]  Expiration Date:  11-26   Ida Abad LPN

## 2025-04-09 NOTE — PROGRESS NOTES
[facility]  CHIEF COMPLAINT   It was my pleasure to see Lorin Andrade who is a 64 year old female for follow-up of LEFT ureteral urothelial carcinoma and bladder cancer.      HPI  5/8/2024   Lorin Andrade is a very pleasant 64 year old female with a history of left renal colic with workup showing moderate hydroureteronephrosis and enhancement of the last 5 cm of the left distal ureter.  Urine cytology with suspicion for high-grade urothelial carcinoma.  She underwent ureteroscopy with ureteral biopsy and stent placement on 5/1/2024 and returns today to discuss the results.    She has had resolution of her flank pain following stent placement  Her  joins her for this visit    5/15/2024:  Ureteroscopy     5/22/2024:  Follow-up today to review the ureteroscopy findings and pathology results    6/24/2024:  Robotic distal ureterectomy, ureteroneocystotomy and boari flap    7/9/2024:  Cystogram and dominguez removal    7/10/2024:  Follow-up today for postop check  She is doing very well after Dominguez removal yesterday    7/24/2024:  Ureteral stent removal    10/30/2024:  Surveillance cystoscopy    11/13/2024:  Follow up after Transurethral resection of bladder tumor (TURBT) and Ureteroscopy last week  Developed symptoms of possible UTI on Monday  Symptoms of pelvic pain, dysuria   No notable gross hematuria   Is taking Azo    11/21/2024:  Follow-up today to review her cystogram and discuss treatment options  She continues to have increased urinary frequency and pain with holding and some dysuria which is relieved with urination  Continues on PCN for recent UTI    12/12/2024:  She did started to notice return of some bilateral flank pain in the last 4-5 days  She notes that this was her similar pain that she had prior to her initial stent placement in May and she is concerned that this could represent some interval change  Her  joins her for this visit    12/16/2024:  Transurethral resection of bladder tumor (TURBT)  "and Ureteroscopy with biopsy and laser ablation    12/31/2024:  She returns today for stent removal and discussion of her pathology results  She did have a UTI diagnosed on 12/23/2024 and completed a course of penicillin yesterday    3/10/2025:  Robotic nephroureterectomy    TODAY 4/9/2025:  Return for surveillance cystoscopy  She notes she still feels like she is recovering from her nephrectomy    PHYSICAL EXAM  Patient is a 64 year old  female   Vitals: Blood pressure 106/69, pulse 75, height 1.664 m (5' 5.5\"), weight 57.6 kg (127 lb), SpO2 98%.  Body mass index is 20.81 kg/m .  General Appearance Adult:   Alert, no acute distress, oriented  HENT: throat/mouth:normal, good dentition  Lungs: no respiratory distress, or pursed lip breathing  Heart: No obvious jugular venous distension present  Abd: Well-healing surgical incisions, no evidence of infection or hernia, small less than 1 cm scab at the extraction site  Neuro: Alert, oriented, speech and mentation normal  Psych: affect and mood normal  Gait: Normal  : deferred to cystoscopy     Creatinine   Date Value Ref Range Status   03/11/2025 1.12 (H) 0.51 - 0.95 mg/dL Final   06/26/2024 0.89 0.51 - 0.95 mg/dL Final   06/25/2024 0.73 0.51 - 0.95 mg/dL Final   06/11/2024 0.74 0.51 - 0.95 mg/dL Final   05/01/2024 0.73 0.51 - 0.95 mg/dL Final   04/30/2024 0.89 0.51 - 0.95 mg/dL Final   04/19/2024 0.84 0.51 - 0.95 mg/dL Final   08/14/2023 0.61 0.51 - 0.95 mg/dL Final     GFR Estimate   Date Value Ref Range Status   03/11/2025 55 (L) >60 mL/min/1.73m2 Final     Comment:     eGFR calculated using 2021 CKD-EPI equation.   06/26/2024 73 >60 mL/min/1.73m2 Final     Comment:     eGFR calculated using 2021 CKD-EPI equation.   06/25/2024 >90 >60 mL/min/1.73m2 Final     Comment:     eGFR calculated using 2021 CKD-EPI equation.        CYTOLOGY:  Final Diagnosis   Specimen A                 Interpretation:                  -  Suspicious for High-grade Urothelial Carcinoma  "     PATHOLOGY:  5/1/2024:  Final Diagnosis   Left distal ureter, biopsy:  -Normal urothelial lining without evidence of atypia or malignancy, please see description     5/15/2024:  Final Diagnosis   Ureter, left proximal, biopsy:  -- Urothelium overlying edematous stroma.  Negative for dysplasia or carcinoma.     5/15/2024:  Distal ureteral brushing  Final Diagnosis   Specimen A              Interpretation:               Negative for High Grade Urothelial Carcinoma     6/24/2024:  Distal ureterectomy and lymph node dissection  Final Diagnosis   A-C.  Left ureter with separately submitted proximal margin and left pelvic lymph nodes; left ureterectomy with left pelvic lymphadenectomy  -Proximal left ureter margin without evidence of in situ or invasive carcinoma  -2 benign lymph nodes, benign perinodal fibrovascular and fatty tissue, entire received tissue sample examined microscopically  -Urothelial carcinoma in situ present in association with background urothelial atypia/dysplasia; no evidence of carcinoma in situ at margins; please see microscopic description and synoptic template within report     11/4/2024:  Transurethral resection of bladder tumor (TURBT)  Final Diagnosis   A.  Left distal ureter biopsy:  - High-grade urothelial cell carcinoma, detached fragment without underlying tissue for evaluation of invasion     B.  Posterior bladder wall tumor:   - Noninvasive high-grade urothelial cell carcinoma     C.  Left bladder wall tumor:  - Noninvasive high-grade urothelial cell carcinoma     D.  Left anterior bladder wall tumor:  - Noninvasive high-grade urothelial cell carcinoma     12/16/2024:  Final Diagnosis   A.  Left proximal ureter, biopsy:  -Tiny fragment of partially crushed tissue with UROTHELIAL CARCINOMA IN SITU (see comment).     B.  Anterior bladder wall tumor, transurethral resection:  -NON-INVASIVE HIGH-GRADE PAPILLARY UROTHELIAL CARCINOMA with associated marked acute and chronic inflammatory  changes.  -Fragment of muscularis propria (detrusor muscle), uninvolved by tumor.     3/10/2025:  Final Diagnosis   Kidney, ureter, left, resection-  Urothelial carcinoma in situ involving renal pelvis and mid ureter  No evidence of invasive malignancy  Resection margins free of malignancy.     IMAGING:  All pertinent imaging reviewed:    All imaging studies reviewed by me.  I personally reviewed these imaging films.  A formal report from radiology will follow.    CT UROGRAM 4/30/2024:  FINDINGS:   LOWER CHEST: No worrisome consolidation in the visualized lung bases. No pleural effusion. Partially imaged breast implants.     HEPATOBILIARY: Unchanged liver contours with benign cyst in the left hepatic lobe. No worrisome liver lesions. Normal gallbladder. No biliary ductal dilation.     PANCREAS: Normal.     SPLEEN: Normal.     ADRENAL GLANDS: Normal.     RIGHT KIDNEY/URETER: Normal morphology and enhancement of the right kidney. A few cortically based subcentimeter low-attenuation lesions are incompletely characterized though likely cysts. No urolithiasis or hydronephrosis. Normal excretion of contrast   on delayed images without urothelial thickening or filling defects.     LEFT KIDNEY/URETER: Unchanged delayed left nephrogram. 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 No urolithiasis. There is unchanged moderate to severe hydroureteronephrosis with dependent excreted contrast   layering within the calyces. As before, there is a transition in the distal ureter where there is an approximately 5 cm segment of urothelial thickening and enhancement (series 12 image #42, #48).      BLADDER: Unremarkable.     BOWEL: Normal caliber small and large bowel. Normal appendix. No free fluid or free air.     LYMPH NODES: No lymphadenopathy.     VASCULATURE: The abdominal aorta is nonaneurysmal with mild atheromatous disease.     PELVIC ORGANS: Uterus is present. No adnexal mass.      MUSCULOSKELETAL: Unchanged mild L4 superior endplate deformity. No destructive osseous lesions.                                                                      IMPRESSION:  1.  Unchanged moderate left hydroureteronephrosis to the level of the distal ureter where there is an approximately 5 cm segment of urothelial thickening and enhancement. As before, etiology may be inflammatory or neoplastic.  2.  Additional noncritical details in the findings.    NM RENAL SCAN 4/17/2024:  FINDINGS:      Renal angiogram demonstrates: Flow to right kidney within normal  limits. There is asymmetrically slower and less flow to the left  kidney.   Renogram images demonstrate:     Right kidney: There is normal cortical uptake. Cortical transit is  noted at 3 minutes. Ureter is visualized at 4 minutes. There is normal  wash out. Following Lasix administration, there is prompt wash out.      Left kidney: There is delayed cortical uptake. Cortical transit is  noted at 6 minutes which is slightly delayed. Ureter is visualized at  20 minutes. There is no wash ou prior to Lasix, minimal wash out after  Lasix.   Renogram curves demonstrate:     Right kidney: The radiotracer reaches the peak activity at 5 minutes  (time to peak). There is normal wash out and complete wash out  following Lasix. T1/2 is: 14 minutes.     Left kidney: The radiotracer reaches the peak activity at 22 minutes  (time to peak). There is delayed wash out and  incomplete wash out  following Lasix. T1/2 is: Not applicable.     Split function: Left: 38.7%, Right: 61.3%.                                                                      IMPRESSION:  1. Right kidney: Normal function. Normal excretion. No obstruction.     2. Left kidney: Decreased function. Decreased excretion, concerning  for partial obstruction.    CYSTOGRAM 7/9/2024:  FINDINGS: Left ureteral stent present on the  image. Cystografin  contrast was instilled into the bladder via existing Ayala  catheter.  No visible leak.                                                          IMPRESSION:  1.  No leak, status post ureteral reimplantation.    Retrograde Pyelogram 11/4/2024:    Ureteroscope tip at the level of the ureteral tumor    Cystogram 11/18/2024:    Cystogram with demonstrated reflux    Retrograde Pyelogram 12/16/2024:    Ureteroscope tip at site of more proximal CIS    ASSESSMENT and PLAN  64-year-old female with left-sided hydroureteronephrosis and concern for urothelial malignancy of the left distal ureter.  Now status post a robotic distal ureterectomy, lymph node dissection, ureteroneocystotomy with a Boari flap on 6/24/2024 with evidence of CIS.  Early recurrence within the bladder status post a TURBT and ureteroscopy with biopsy.  Now status post a robotic nephro ureterectomy on 3/10/2025.    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  This was in addition to cystoscopy time    Note copy attestation: the elements have been reviewed, edited as needed, and remain pertinent to today's visit.      Rogelio Gallagher MD   Urology  [facility]  [facility] Phone: [phone]  [facility] Phone: [phone]

## 2025-04-13 ENCOUNTER — MYC MEDICAL ADVICE (OUTPATIENT)
Dept: UROLOGY | Facility: CLINIC | Age: 65
End: 2025-04-13
Payer: COMMERCIAL

## 2025-04-17 ENCOUNTER — OFFICE VISIT (OUTPATIENT)
Dept: FAMILY MEDICINE | Facility: CLINIC | Age: 65
End: 2025-04-17
Payer: COMMERCIAL

## 2025-04-17 VITALS
OXYGEN SATURATION: 97 % | RESPIRATION RATE: 18 BRPM | HEART RATE: 89 BPM | TEMPERATURE: 97.3 F | SYSTOLIC BLOOD PRESSURE: 102 MMHG | HEIGHT: 66 IN | WEIGHT: 131.7 LBS | DIASTOLIC BLOOD PRESSURE: 50 MMHG | BODY MASS INDEX: 21.17 KG/M2

## 2025-04-17 DIAGNOSIS — G62.9 PERIPHERAL POLYNEUROPATHY: ICD-10-CM

## 2025-04-17 DIAGNOSIS — C66.2 UROTHELIAL CARCINOMA OF LEFT DISTAL URETER (H): ICD-10-CM

## 2025-04-17 DIAGNOSIS — C50.912 MALIGNANT NEOPLASM OF LEFT FEMALE BREAST, UNSPECIFIED ESTROGEN RECEPTOR STATUS, UNSPECIFIED SITE OF BREAST (H): ICD-10-CM

## 2025-04-17 DIAGNOSIS — Z01.818 PREOPERATIVE EXAMINATION: Primary | ICD-10-CM

## 2025-04-17 DIAGNOSIS — C67.8 MALIGNANT NEOPLASM OF OVERLAPPING SITES OF BLADDER (H): ICD-10-CM

## 2025-04-17 DIAGNOSIS — F41.9 ANXIETY: ICD-10-CM

## 2025-04-17 PROBLEM — Z12.4 CERVICAL CANCER SCREENING: Status: ACTIVE | Noted: 2025-04-17

## 2025-04-17 PROCEDURE — 3074F SYST BP LT 130 MM HG: CPT | Performed by: INTERNAL MEDICINE

## 2025-04-17 PROCEDURE — 3078F DIAST BP <80 MM HG: CPT | Performed by: INTERNAL MEDICINE

## 2025-04-17 PROCEDURE — 1126F AMNT PAIN NOTED NONE PRSNT: CPT | Performed by: INTERNAL MEDICINE

## 2025-04-17 PROCEDURE — G2211 COMPLEX E/M VISIT ADD ON: HCPCS | Performed by: INTERNAL MEDICINE

## 2025-04-17 PROCEDURE — 99214 OFFICE O/P EST MOD 30 MIN: CPT | Performed by: INTERNAL MEDICINE

## 2025-04-17 ASSESSMENT — PAIN SCALES - GENERAL: PAINLEVEL_OUTOF10: NO PAIN (0)

## 2025-04-17 NOTE — H&P (VIEW-ONLY)
Preoperative Evaluation  [facility]  [address] [facility], [address]  [facility] [address]  Phone: [phone]  Primary Provider: Sandie Bella MD  Pre-op Performing Provider: Sandie Bella MD  Apr 17, 2025 4/17/2025   Surgical Information   What procedure is being done? Cystescopy Transurethral Resection of Bladder Tumor    Facility or Hospital where procedure/surgery will be performed: [facility]    Who is doing the procedure / surgery? Dr. Rogelio Gallagher    Date of surgery / procedure: 4/23/25    Time of surgery / procedure: 10:00    Where do you plan to recover after surgery? at home with family        Proxy-reported     Fax number for surgical facility: Note does not need to be faxed, will be available electronically in Epic.    Assessment & Plan     The proposed surgical procedure is considered INTERMEDIATE risk.    Preoperative examination  Medically optimized for the procedure.    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   Scheduled for undergo transurethral bladder tumor resection.     Malignant neoplasm of left female breast, unspecified estrogen receptor status, unspecified site of breast (H)  Stable. Follows with oncology.   S/p surgery and chemoradiation 2006    Anxiety  Stable. Continue medication.    Peripheral polyneuropathy  Stable. Continue medication.      Possible Sleep Apnea: consider ilir-operative bipap.     - No identified additional risk factors other than previously addressed     Avoid aspirin 7 days before the surgery. Avoid nonsteroidal anti-inflammatory pain medication like ibuprofen, Motrin, or Aleve 7 days before the surgery.  Tylenol can be used for pain.  Avoid any over the counter multivitamins or herbal supplement 7 days before surgery   You can resume these medications after surgery  Continue Celexa, Allegra, Pregabalin as prescribed  Hold Miralax  on the morning of your procedure.     Recommendation  Approval given to proceed with proposed procedure, without further diagnostic evaluation.    Follow-up  No follow-ups on file.    Felix Padgett is a 64 year old, presenting for the following:  No chief complaint on file.          4/17/2025     1:59 PM   Additional Questions   Roomed by Andrea SPRINGER MA   Accompanied by Self     HPI:       Patient denies chest pain, shortness of breath, palpitations, headache, lightheadedness, syncope, fever or chills. Patient is able to climb 1 flight of stairs without feeling short of breath or having chest pain.  Patient denies personal or family history of complications with anesthesia. Patient does not have a history of heart failure or TIA/stroke. Patient does not smoke or drink alcohol.    Labs: hemoglobin improved  Creatinine stable.      4/17/2025   Pre-Op Questionnaire   Have you ever had a heart attack or stroke? No    Have you ever had surgery on your heart or blood vessels, such as a stent placement, a coronary artery bypass, or surgery on an artery in your head, neck, heart, or legs? No    Do you have chest pain with activity? No    Do you have a history of heart failure? No    Do you currently have a cold, bronchitis or symptoms of other infection? No    Do you have a cough, shortness of breath, or wheezing? No    Do you or anyone in your family have previous history of blood clots? No    Do you or does anyone in your family have a serious bleeding problem such as prolonged bleeding following surgeries or cuts? No    Have you ever had problems with anemia or been told to take iron pills? No    Have you had any abnormal blood loss such as black, tarry or bloody stools, or abnormal vaginal bleeding? No    Have you ever had a blood transfusion? No    Are you willing to have a blood transfusion if it is medically needed before, during, or after your surgery? Yes    Have you or any of your relatives ever had problems with  anesthesia? No    Do you have sleep apnea, excessive snoring or daytime drowsiness? (!) YES    Do you have a CPAP machine? Yes    Do you have any artifical heart valves or other implanted medical devices like a pacemaker, defibrillator, or continuous glucose monitor? No    Do you have artificial joints? No    Are you allergic to latex? No        Proxy-reported     Advance Care Planning  Advance care planning document is on file but is outdated.  Patient encouraged to update or provider to update POLST.    Preoperative Review of    reviewed - no record of controlled substances prescribed.        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) 12/31/2024     Priority: Medium    Pelvic pain in female 09/03/2024     Priority: Medium    Ureteral mass 06/24/2024     Priority: Medium    Hydroureter 04/30/2024     Priority: Medium    Flank pain 04/30/2024     Priority: Medium    Hydronephrosis of left kidney 04/22/2024     Priority: Medium    Lesion of left native ureter 04/22/2024     Priority: Medium    Annual physical exam 08/23/2023     Priority: Medium    Constipation, unspecified constipation type 08/23/2023     Priority: Medium    Closed compression fracture of L4 vertebra, sequela 08/23/2023     Priority: Medium    Need for vaccination 08/23/2023     Priority: Medium    Asymptomatic postmenopausal status 05/25/2023     Priority: Medium    Visit for screening mammogram 05/25/2023     Priority: Medium    Screen for colon cancer 05/25/2023     Priority: Medium    Anxiety 05/25/2023     Priority: Medium    Pelvic floor dysfunction in  female 05/25/2023     Priority: Medium    Peripheral polyneuropathy 05/25/2023     Priority: Medium    Malignant neoplasm of left female breast, unspecified estrogen receptor status, unspecified site of breast (H) 05/25/2023     Priority: Medium    Screening for hyperlipidemia 05/25/2023     Priority: Medium      Past Medical History:   Diagnosis Date    Cancer (H) 2006    breast    Depressive disorder '99    More anxiety related    Sleep apnea     CPAP     Past Surgical History:   Procedure Laterality Date    ABDOMEN SURGERY  Ureter cancer '24    BIOPSY  '06 and '24    COLONOSCOPY N/A 08/16/2023    Procedure: Colonoscopy;  Surgeon: Ana Paula Hurst MD;  Location:  GI    COMBINED CYSTOSCOPY, RETROGRADES, URETEROSCOPY, INSERT STENT Left 05/15/2024    Procedure: CYSTOSCOPY, LEFT URETERAL STENT REMOVAL,  LEFT RETROGRADE PYELOGRAM, LEFT URETEROSCOPY WITH BIOPSY AND URETERAL BRUSHING, LEFT URETERAL STENT PLACEMENT;  Surgeon: Rogelio Gallagher MD;  Location:  OR    COMBINED CYSTOSCOPY, RETROGRADES, URETEROSCOPY, INSERT STENT Left 12/16/2024    Procedure: CYSTOSCOPY, LEFT RETROGRADE PYELOGRAM, LEFT URETEROSCOPY WITH BIOPSY AND LASER FULGURATION, LEFT URETERAL STENT PLACEMENT, TRANSURETHRAL RESECTION OF BLADDET TUMOR;  Surgeon: Rogelio Gallagher MD;  Location:  OR    CYSTOSCOPY      CYSTOSCOPY, TRANSURETHRAL RESECTION (TUR) TUMOR BLADDER, COMBINED N/A 11/04/2024    Procedure: CYSTOSCOPY, WITH TRANSURETHRAL RESECTION BLADDER TUMOR, LEFT RETROGRADE PYELOGRAM, LEFT URETEROSCOPY WITH BIOPSY;  Surgeon: Rogelio Gallagher MD;  Location:  OR    CYSTOSCOPY, URETEROSCOPY, INSERT STENT Left 05/01/2024    Procedure: CYSTOURETEROSCOPY, LEFT URETERAL BIOPSY WITH STENT INSERTION, LEFT RETROGRADE PYELOGRAM;  Surgeon: Rogelio Gallagher MD;  Location:  OR    DAVINCI LYSIS OF ADHESIONS N/A 06/24/2024    Procedure: Davinci Lysis of Adhesions;  Surgeon: Rogelio Gallagher MD;  Location:  OR    DAVINCI NEPHROURETERECTOMY Left 03/10/2025     Procedure: NEPHROURETERECTOMY, ROBOT-ASSISTED, USING DA MIGUEL XI - LEFT;  Surgeon: Rogelio Gallagher MD;  Location: [facility]    DAVINCI URETERECTOMY Left 06/24/2024    Procedure: ROBOT ASSISTED LAPAROSCOPIC LEFT DISTAL URETERECTOMY WITH URETERAL RE-IMPLANT, LEFT PELVIC LYMPH NODE DISSECTION, LYSIS OF ADHESIONS;  Surgeon: Rogelio Gallagher MD;  Location: [facility] OR    MASTECTOMY, BILATERAL      Double mastectomy    OOPHORECTOMY Bilateral     ORTHOPEDIC SURGERY  '07    Torn meniscus     Current Outpatient Medications   Medication Sig Dispense Refill    calcium carbonate-vitamin D (CALTRATE) 600-10 MG-MCG per tablet Take 1 tablet by mouth daily.      citalopram (CELEXA) 10 MG tablet Take 1 tablet (10 mg) by mouth daily. Takes 20 mg tab also 90 tablet 0    citalopram (CELEXA) 20 MG tablet TAKE 1 TABLET(20MG) BY MOUTH DAILY. TAKES 10MG TABLET ALSO 90 tablet 0    docusate sodium (COLACE) 100 MG tablet Take 1 tablet (100 mg) by mouth daily. 60 tablet 1    fexofenadine (ALLEGRA) 180 MG tablet Take 180 mg by mouth daily as needed for allergies.      polyethylene glycol (MIRALAX) 17 GM/Dose powder Take 17 g (1 Capful) by mouth daily. 238 g 11    pregabalin (LYRICA) 75 MG capsule Take 1 capsule (75 mg) by mouth 2 times daily. 180 capsule 3    vitamin B complex with vitamin C (VITAMIN  B COMPLEX) tablet Take 1 tablet by mouth daily.      Vitamin D3 (CHOLECALCIFEROL) 25 mcg (1000 units) tablet Take 2 tablets by mouth daily.      Vitamin K 100 MCG TABS Take 100 mcg by mouth daily.      zinc gluconate 50 MG tablet Take 50 mg by mouth daily.         Allergies   Allergen Reactions    Animal Dander Shortness Of Breath    Gabapentin Other (See Comments)     Tolerating pregabalin well.  Felt out of it, didn't help    Ibuprofen      Had ulcer        Social History     Tobacco Use    Smoking status: Never     Passive exposure: Never    Smokeless tobacco: Never   Substance Use Topics    Alcohol use: Not Currently     History   Drug Use Unknown      "Comment: Medical marijuana and other medical opioids.           Objective    /50 (BP Location: Right arm, Patient Position: Sitting, Cuff Size: Adult Regular)   Pulse 89   Temp 97.3  F (36.3  C) (Temporal)   Resp 18   Ht 1.664 m (5' 5.5\")   Wt 59.7 kg (131 lb 11.2 oz)   SpO2 97%   BMI 21.58 kg/m     Estimated body mass index is 21.58 kg/m  as calculated from the following:    Height as of this encounter: 1.664 m (5' 5.5\").    Weight as of this encounter: 59.7 kg (131 lb 11.2 oz).  Physical Exam  HENT:      Mouth/Throat:      Mouth: Mucous membranes are moist.      Pharynx: Oropharynx is clear. No oropharyngeal exudate or posterior oropharyngeal erythema.   Cardiovascular:      Rate and Rhythm: Normal rate and regular rhythm.      Heart sounds: Normal heart sounds. No murmur heard.     No gallop.   Pulmonary:      Effort: Pulmonary effort is normal. No respiratory distress.      Breath sounds: Normal breath sounds. No stridor. No wheezing, rhonchi or rales.   Musculoskeletal:         General: Normal range of motion.      Right lower leg: No edema.      Left lower leg: No edema.           Recent Labs   Lab Test 03/11/25  1224 03/11/25  0630 03/06/25  1001 10/31/24  1447 06/26/24  0728   HGB 11.2* 9.8*   < > 11.6* 8.8*   PLT  --  157  --  215 134*   NA  --  141  --   --  136   POTASSIUM  --  4.5  --   --  4.5   CR  --  1.12*  --   --  0.89    < > = values in this interval not displayed.        Diagnostics  No labs were ordered during this visit.   No EKG required, no history of coronary heart disease, significant arrhythmia, peripheral arterial disease or other structural heart disease.    Revised Cardiac Risk Index (RCRI)  The patient has the following serious cardiovascular risks for perioperative complications:   - No serious cardiac risks = 0 points     RCRI Interpretation: 0 points: Class I (very low risk - 0.4% complication rate)         Signed Electronically by: Sandie Bella MD  A copy of this " evaluation report is provided to the requesting physician.

## 2025-04-17 NOTE — PROGRESS NOTES
Preoperative Evaluation  [facility]  [address] [place] [address], [address]  [facility] [address]  Phone: [phone]  Primary Provider: Sandie Bella MD  Pre-op Performing Provider: Sandie Bella MD  Apr 17, 2025   {Provider  Link to PREOP SmartSet  REQUIRED to apply standard patient instructions and medication directions to the AVS :479169}  {ROOMER review and update patient entered surgical information if needed :418748}        4/17/2025   Surgical Information   What procedure is being done? Cystescopy Transurethral Resection of Bladder Tumor    Facility or Hospital where procedure/surgery will be performed: [facility]    Who is doing the procedure / surgery? Dr. Rogelio Gallagher    Date of surgery / procedure: 4/23/25    Time of surgery / procedure: 10:00    Where do you plan to recover after surgery? at home with family        Proxy-reported     Fax number for surgical facility: Note does not need to be faxed, will be available electronically in Epic.    {Provider Charting Preference for Preop :323617}    Felix Padgett is a 64 year old, presenting for the following:  No chief complaint on file.          4/17/2025     1:59 PM   Additional Questions   Roomed by Andrea SPRINGER MA   Accompanied by Self     HPI:       Labs: hemoglobin improved  Creatinine stable.      4/17/2025   Pre-Op Questionnaire   Have you ever had a heart attack or stroke? No    Have you ever had surgery on your heart or blood vessels, such as a stent placement, a coronary artery bypass, or surgery on an artery in your head, neck, heart, or legs? No    Do you have chest pain with activity? No    Do you have a history of heart failure? No    Do you currently have a cold, bronchitis or symptoms of other infection? No    Do you have a cough, shortness of breath, or wheezing? No    Do you or anyone in your family have previous history of blood clots? No    Do you or does anyone in your family have a serious bleeding problem such  as prolonged bleeding following surgeries or cuts? No    Have you ever had problems with anemia or been told to take iron pills? No    Have you had any abnormal blood loss such as black, tarry or bloody stools, or abnormal vaginal bleeding? No    Have you ever had a blood transfusion? No    Are you willing to have a blood transfusion if it is medically needed before, during, or after your surgery? Yes    Have you or any of your relatives ever had problems with anesthesia? No    Do you have sleep apnea, excessive snoring or daytime drowsiness? (!) YES    Do you have a CPAP machine? Yes    Do you have any artifical heart valves or other implanted medical devices like a pacemaker, defibrillator, or continuous glucose monitor? No    Do you have artificial joints? No    Are you allergic to latex? No        Proxy-reported     Advance Care Planning  Advance care planning document is on file but is outdated.  Patient encouraged to update or provider to update POLST.    Preoperative Review of    reviewed - no record of controlled substances prescribed.        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) 12/31/2024     Priority: Medium    Pelvic pain in female 09/03/2024     Priority: Medium    Ureteral mass 06/24/2024     Priority: Medium    Hydroureter 04/30/2024     Priority: Medium    Flank pain 04/30/2024     Priority: Medium    Hydronephrosis of left kidney 04/22/2024     Priority: Medium    Lesion of left native ureter 04/22/2024     Priority: Medium    Annual physical exam 08/23/2023     Priority: Medium    Constipation,  unspecified constipation type 08/23/2023     Priority: Medium    Closed compression fracture of L4 vertebra, sequela 08/23/2023     Priority: Medium    Need for vaccination 08/23/2023     Priority: Medium    Asymptomatic postmenopausal status 05/25/2023     Priority: Medium    Visit for screening mammogram 05/25/2023     Priority: Medium    Screen for colon cancer 05/25/2023     Priority: Medium    Anxiety 05/25/2023     Priority: Medium    Pelvic floor dysfunction in female 05/25/2023     Priority: Medium    Peripheral polyneuropathy 05/25/2023     Priority: Medium    Malignant neoplasm of left female breast, unspecified estrogen receptor status, unspecified site of breast (H) 05/25/2023     Priority: Medium    Screening for hyperlipidemia 05/25/2023     Priority: Medium      Past Medical History:   Diagnosis Date    Cancer (H) 2006    breast    Depressive disorder '99    More anxiety related    Sleep apnea     CPAP     Past Surgical History:   Procedure Laterality Date    ABDOMEN SURGERY  Ureter cancer '24    BIOPSY  '06 and '24    COLONOSCOPY N/A 08/16/2023    Procedure: Colonoscopy;  Surgeon: Ana Paula Hurst MD;  Location:  GI    COMBINED CYSTOSCOPY, RETROGRADES, URETEROSCOPY, INSERT STENT Left 05/15/2024    Procedure: CYSTOSCOPY, LEFT URETERAL STENT REMOVAL,  LEFT RETROGRADE PYELOGRAM, LEFT URETEROSCOPY WITH BIOPSY AND URETERAL BRUSHING, LEFT URETERAL STENT PLACEMENT;  Surgeon: Rogelio Gallagher MD;  Location:  OR    COMBINED CYSTOSCOPY, RETROGRADES, URETEROSCOPY, INSERT STENT Left 12/16/2024    Procedure: CYSTOSCOPY, LEFT RETROGRADE PYELOGRAM, LEFT URETEROSCOPY WITH BIOPSY AND LASER FULGURATION, LEFT URETERAL STENT PLACEMENT, TRANSURETHRAL RESECTION OF BLADDET TUMOR;  Surgeon: Rogelio Gallagher MD;  Location:  OR    CYSTOSCOPY      CYSTOSCOPY, TRANSURETHRAL RESECTION (TUR) TUMOR BLADDER, COMBINED N/A 11/04/2024    Procedure: CYSTOSCOPY, WITH TRANSURETHRAL RESECTION BLADDER TUMOR, LEFT RETROGRADE  PYELOGRAM, LEFT URETEROSCOPY WITH BIOPSY;  Surgeon: Rogelio Gallagher MD;  Location: [facility] OR    CYSTOSCOPY, URETEROSCOPY, INSERT STENT Left 05/01/2024    Procedure: CYSTOURETEROSCOPY, LEFT URETERAL BIOPSY WITH STENT INSERTION, LEFT RETROGRADE PYELOGRAM;  Surgeon: Rogelio Gallagher MD;  Location: [facility]    DAVINCI LYSIS OF ADHESIONS N/A 06/24/2024    Procedure: Davinci Lysis of Adhesions;  Surgeon: Rogelio Gallagher MD;  Location: [facility]    DAVINCI NEPHROURETERECTOMY Left 03/10/2025    Procedure: NEPHROURETERECTOMY, ROBOT-ASSISTED, USING DA MIGUEL XI - LEFT;  Surgeon: Rogelio Gallagher MD;  Location: [facility]    DAVINCI URETERECTOMY Left 06/24/2024    Procedure: ROBOT ASSISTED LAPAROSCOPIC LEFT DISTAL URETERECTOMY WITH URETERAL RE-IMPLANT, LEFT PELVIC LYMPH NODE DISSECTION, LYSIS OF ADHESIONS;  Surgeon: Rogelio Gallagher MD;  Location: [facility]    MASTECTOMY, BILATERAL      Double mastectomy    OOPHORECTOMY Bilateral     ORTHOPEDIC SURGERY  '07    Torn meniscus     Current Outpatient Medications   Medication Sig Dispense Refill    calcium carbonate-vitamin D (CALTRATE) 600-10 MG-MCG per tablet Take 1 tablet by mouth daily.      citalopram (CELEXA) 10 MG tablet Take 1 tablet (10 mg) by mouth daily. Takes 20 mg tab also 90 tablet 0    citalopram (CELEXA) 20 MG tablet TAKE 1 TABLET(20MG) BY MOUTH DAILY. TAKES 10MG TABLET ALSO 90 tablet 0    docusate sodium (COLACE) 100 MG tablet Take 1 tablet (100 mg) by mouth daily. 60 tablet 1    fexofenadine (ALLEGRA) 180 MG tablet Take 180 mg by mouth daily as needed for allergies.      polyethylene glycol (MIRALAX) 17 GM/Dose powder Take 17 g (1 Capful) by mouth daily. 238 g 11    pregabalin (LYRICA) 75 MG capsule Take 1 capsule (75 mg) by mouth 2 times daily. 180 capsule 3    vitamin B complex with vitamin C (VITAMIN  B COMPLEX) tablet Take 1 tablet by mouth daily.      Vitamin D3 (CHOLECALCIFEROL) 25 mcg (1000 units) tablet Take 2 tablets by mouth daily.      Vitamin K 100 MCG TABS Take 100 mcg by  "mouth daily.      zinc gluconate 50 MG tablet Take 50 mg by mouth daily.         Allergies   Allergen Reactions    Animal Dander Shortness Of Breath    Gabapentin Other (See Comments)     Tolerating pregabalin well.  Felt out of it, didn't help    Ibuprofen      Had ulcer        Social History     Tobacco Use    Smoking status: Never     Passive exposure: Never    Smokeless tobacco: Never   Substance Use Topics    Alcohol use: Not Currently     History   Drug Use Unknown     Comment: Medical marijuana and other medical opioids.           Objective    /50 (BP Location: Right arm, Patient Position: Sitting, Cuff Size: Adult Regular)   Pulse 89   Temp 97.3  F (36.3  C) (Temporal)   Resp 18   Ht 1.664 m (5' 5.5\")   Wt 59.7 kg (131 lb 11.2 oz)   SpO2 97%   BMI 21.58 kg/m     Estimated body mass index is 21.58 kg/m  as calculated from the following:    Height as of this encounter: 1.664 m (5' 5.5\").    Weight as of this encounter: 59.7 kg (131 lb 11.2 oz).  Physical Exam  HENT:      Mouth/Throat:      Mouth: Mucous membranes are moist.      Pharynx: Oropharynx is clear. No oropharyngeal exudate or posterior oropharyngeal erythema.   Cardiovascular:      Rate and Rhythm: Normal rate and regular rhythm.      Heart sounds: Normal heart sounds. No murmur heard.     No gallop.   Pulmonary:      Effort: Pulmonary effort is normal. No respiratory distress.      Breath sounds: Normal breath sounds. No stridor. No wheezing, rhonchi or rales.   Musculoskeletal:         General: Normal range of motion.      Right lower leg: No edema.      Left lower leg: No edema.           Recent Labs   Lab Test 03/11/25  1224 03/11/25  0630 03/06/25  1001 10/31/24  1447 06/26/24  0728   HGB 11.2* 9.8*   < > 11.6* 8.8*   PLT  --  157  --  215 134*   NA  --  141  --   --  136   POTASSIUM  --  4.5  --   --  4.5   CR  --  1.12*  --   --  0.89    < > = values in this interval not displayed.        Diagnostics  No labs were ordered during " this visit.   No EKG required, no history of coronary heart disease, significant arrhythmia, peripheral arterial disease or other structural heart disease.    Revised Cardiac Risk Index (RCRI)  The patient has the following serious cardiovascular risks for perioperative complications:   - No serious cardiac risks = 0 points     RCRI Interpretation: 0 points: Class I (very low risk - 0.4% complication rate)         Signed Electronically by: Sandie Bella MD  A copy of this evaluation report is provided to the requesting physician.     {Email feedback regarding this note to [email or web address]   :[id number]}   evaluation report is provided to the requesting physician.

## 2025-04-17 NOTE — PATIENT INSTRUCTIONS
Avoid aspirin 7 days before the surgery. Avoid nonsteroidal anti-inflammatory pain medication like ibuprofen, Motrin, or Aleve 7 days before the surgery.  Tylenol can be used for pain.  Avoid any over the counter multivitamins or herbal supplement 7 days before surgery   You can resume these medications after surgery  Continue Celexa, Allegra, Pregabalin as prescribed  Hold Miralax on the morning of your procedure.

## 2025-04-22 ENCOUNTER — ANESTHESIA EVENT (OUTPATIENT)
Dept: SURGERY | Facility: CLINIC | Age: 65
End: 2025-04-22
Payer: COMMERCIAL

## 2025-04-22 ASSESSMENT — COPD QUESTIONNAIRES: COPD: 0

## 2025-04-22 ASSESSMENT — LIFESTYLE VARIABLES: TOBACCO_USE: 0

## 2025-04-22 ASSESSMENT — ENCOUNTER SYMPTOMS: SEIZURES: 0

## 2025-04-22 NOTE — ANESTHESIA PREPROCEDURE EVALUATION
Anesthesia Pre-Procedure Evaluation    Patient: Lorin Andrade   MRN: [id number] : 1960        Procedure : Procedure(s):  CYSTOSCOPY, WITH TRANSURETHRAL RESECTION BLADDER TUMOR          Past Medical History:   Diagnosis Date    Cancer (H) 2006    breast    Depressive disorder     More anxiety related    Sleep apnea     CPAP      Past Surgical History:   Procedure Laterality Date    ABDOMEN SURGERY  Ureter cancer     BIOPSY   and     COLONOSCOPY N/A 2023    Procedure: Colonoscopy;  Surgeon: Ana Paula Hurst MD;  Location:  GI    COMBINED CYSTOSCOPY, RETROGRADES, URETEROSCOPY, INSERT STENT Left 05/15/2024    Procedure: CYSTOSCOPY, LEFT URETERAL STENT REMOVAL,  LEFT RETROGRADE PYELOGRAM, LEFT URETEROSCOPY WITH BIOPSY AND URETERAL BRUSHING, LEFT URETERAL STENT PLACEMENT;  Surgeon: Rogelio Gallagher MD;  Location:  OR    COMBINED CYSTOSCOPY, RETROGRADES, URETEROSCOPY, INSERT STENT Left 2024    Procedure: CYSTOSCOPY, LEFT RETROGRADE PYELOGRAM, LEFT URETEROSCOPY WITH BIOPSY AND LASER FULGURATION, LEFT URETERAL STENT PLACEMENT, TRANSURETHRAL RESECTION OF BLADDET TUMOR;  Surgeon: Rogelio Gallagher MD;  Location:  OR    CYSTOSCOPY      CYSTOSCOPY, TRANSURETHRAL RESECTION (TUR) TUMOR BLADDER, COMBINED N/A 2024    Procedure: CYSTOSCOPY, WITH TRANSURETHRAL RESECTION BLADDER TUMOR, LEFT RETROGRADE PYELOGRAM, LEFT URETEROSCOPY WITH BIOPSY;  Surgeon: Rogelio Gallagher MD;  Location:  OR    CYSTOSCOPY, URETEROSCOPY, INSERT STENT Left 2024    Procedure: CYSTOURETEROSCOPY, LEFT URETERAL BIOPSY WITH STENT INSERTION, LEFT RETROGRADE PYELOGRAM;  Surgeon: Rogelio Gallagher MD;  Location:  OR    DAVINCI LYSIS OF ADHESIONS N/A 2024    Procedure: Davinci Lysis of Adhesions;  Surgeon: Rogelio Gallagher MD;  Location:  OR    DAVINCI NEPHROURETERECTOMY Left 03/10/2025    Procedure: NEPHROURETERECTOMY, ROBOT-ASSISTED, USING DA MIGUEL XI - LEFT;  Surgeon: Rogelio Gallagher MD;  Location:  OR     ADALINCI URETERECTOMY Left 06/24/2024    Procedure: ROBOT ASSISTED LAPAROSCOPIC LEFT DISTAL URETERECTOMY WITH URETERAL RE-IMPLANT, LEFT PELVIC LYMPH NODE DISSECTION, LYSIS OF ADHESIONS;  Surgeon: Rogelio Gallagher MD;  Location: [facility]    MASTECTOMY, BILATERAL      Double mastectomy    OOPHORECTOMY Bilateral     ORTHOPEDIC SURGERY  '07    Torn meniscus      Allergies   Allergen Reactions    Animal Dander Shortness Of Breath    Gabapentin Other (See Comments)     Tolerating pregabalin well.  Felt out of it, didn't help    Ibuprofen      Had ulcer      Social History     Tobacco Use    Smoking status: Never     Passive exposure: Never    Smokeless tobacco: Never   Substance Use Topics    Alcohol use: Not Currently      Wt Readings from Last 1 Encounters:   04/17/25 59.7 kg (131 lb 11.2 oz)        Anesthesia Evaluation            ROS/MED HX  ENT/Pulmonary:     (+) sleep apnea, uses CPAP,                                   (-) tobacco use, asthma and COPD   Neurologic:  - neg neurologic ROS   (+)    peripheral neuropathy,                         (-) no seizures and migraines   Cardiovascular:     (+) Dyslipidemia - -   -  - -                                   (-) hypertension and CAD   METS/Exercise Tolerance: >4 METS    Hematologic:  - neg hematologic  ROS     Musculoskeletal:   (+)  arthritis,             GI/Hepatic:  - neg GI/hepatic ROS     Renal/Genitourinary: Comment: Hydronephrosis   (-) renal disease   Endo:  - neg endo ROS  (-) obesity   Psychiatric/Substance Use:     (+) psychiatric history anxiety       Infectious Disease:       Malignancy:   (+) Malignancy, History of Breast and Other.Other CA Ureter with extension to bladder status post.    Other:            Physical Exam    Airway        Mallampati: II   TM distance: > 3 FB   Neck ROM: full   Mouth opening: > 3 cm    Respiratory Devices and Support         Dental       (+) Minor Abnormalities - some fillings, tiny chips      Cardiovascular   cardiovascular  "exam normal          Pulmonary   pulmonary exam normal                OUTSIDE LABS:  CBC:   Lab Results   Component Value Date    WBC 8.0 03/11/2025    WBC 6.3 10/31/2024    HGB 11.2 (L) 03/11/2025    HGB 9.8 (L) 03/11/2025    HCT 29.7 (L) 03/11/2025    HCT 36.7 10/31/2024     03/11/2025     10/31/2024     BMP:   Lab Results   Component Value Date     03/11/2025     06/26/2024    POTASSIUM 4.5 03/11/2025    POTASSIUM 4.5 06/26/2024    CHLORIDE 104 03/11/2025    CHLORIDE 102 06/26/2024    CO2 26 03/11/2025    CO2 32 (H) 06/26/2024    BUN 14.4 03/11/2025    BUN 12.4 06/26/2024    CR 1.12 (H) 03/11/2025    CR 0.89 06/26/2024     (H) 03/11/2025    GLC 95 06/26/2024     COAGS: No results found for: \"PTT\", \"INR\", \"FIBR\"  POC: No results found for: \"BGM\", \"HCG\", \"HCGS\"  HEPATIC:   Lab Results   Component Value Date    ALBUMIN 4.3 04/30/2024    PROTTOTAL 6.6 04/30/2024    ALT 13 04/30/2024    AST 18 04/30/2024    ALKPHOS 65 04/30/2024    BILITOTAL 0.3 04/30/2024     OTHER:   Lab Results   Component Value Date    JULIETTE 9.2 03/11/2025       Anesthesia Plan    ASA Status:  3    NPO Status:  NPO Appropriate    Anesthesia Type: General.     - Airway: LMA   Induction: Propofol.           Consents    Anesthesia Plan(s) and associated risks, benefits, and realistic alternatives discussed. Questions answered and patient/representative(s) expressed understanding.     - Discussed:     - Discussed with:  Patient            Postoperative Care    Pain management: Multi-modal analgesia.   PONV prophylaxis: Ondansetron (or other 5HT-3), Dexamethasone or Solumedrol, Background Propofol Infusion     Comments:               Mago Cisneros MD    Clinically Significant Risk Factors Present on Admission                                          "

## 2025-04-23 ENCOUNTER — HOSPITAL ENCOUNTER (OUTPATIENT)
Facility: CLINIC | Age: 65
Discharge: HOME | End: 2025-04-23
Attending: UROLOGY | Admitting: UROLOGY
Payer: COMMERCIAL

## 2025-04-23 ENCOUNTER — ANESTHESIA (OUTPATIENT)
Dept: SURGERY | Facility: CLINIC | Age: 65
End: 2025-04-23
Payer: COMMERCIAL

## 2025-04-23 VITALS
TEMPERATURE: 96.4 F | SYSTOLIC BLOOD PRESSURE: 110 MMHG | DIASTOLIC BLOOD PRESSURE: 64 MMHG | HEART RATE: 64 BPM | RESPIRATION RATE: 20 BRPM | HEIGHT: 65 IN | BODY MASS INDEX: 21.99 KG/M2 | WEIGHT: 132 LBS | OXYGEN SATURATION: 99 %

## 2025-04-23 DIAGNOSIS — C66.2 UROTHELIAL CARCINOMA OF LEFT DISTAL URETER (H): ICD-10-CM

## 2025-04-23 DIAGNOSIS — C67.8 MALIGNANT NEOPLASM OF OVERLAPPING SITES OF BLADDER (H): Primary | ICD-10-CM

## 2025-04-23 PROCEDURE — 710N000009 HC RECOVERY PHASE 1, LEVEL 1, PER MIN: Performed by: UROLOGY

## 2025-04-23 PROCEDURE — 360N000076 HC SURGERY LEVEL 3, PER MIN: Performed by: UROLOGY

## 2025-04-23 PROCEDURE — 250N000009 HC RX 250: Performed by: UROLOGY

## 2025-04-23 PROCEDURE — 710N000012 HC RECOVERY PHASE 2, PER MINUTE: Performed by: UROLOGY

## 2025-04-23 PROCEDURE — 250N000025 HC SEVOFLURANE, PER MIN: Performed by: UROLOGY

## 2025-04-23 PROCEDURE — 272N000001 HC OR GENERAL SUPPLY STERILE: Performed by: UROLOGY

## 2025-04-23 PROCEDURE — 258N000003 HC RX IP 258 OP 636: Performed by: NURSE ANESTHETIST, CERTIFIED REGISTERED

## 2025-04-23 PROCEDURE — 258N000001 HC RX 258: Performed by: UROLOGY

## 2025-04-23 PROCEDURE — 250N000013 HC RX MED GY IP 250 OP 250 PS 637: Performed by: UROLOGY

## 2025-04-23 PROCEDURE — 250N000009 HC RX 250: Performed by: NURSE ANESTHETIST, CERTIFIED REGISTERED

## 2025-04-23 PROCEDURE — 250N000011 HC RX IP 250 OP 636: Performed by: NURSE ANESTHETIST, CERTIFIED REGISTERED

## 2025-04-23 PROCEDURE — 999N000141 HC STATISTIC PRE-PROCEDURE NURSING ASSESSMENT: Performed by: UROLOGY

## 2025-04-23 PROCEDURE — 52235 CYSTOSCOPY AND TREATMENT: CPT | Performed by: UROLOGY

## 2025-04-23 PROCEDURE — 370N000017 HC ANESTHESIA TECHNICAL FEE, PER MIN: Performed by: UROLOGY

## 2025-04-23 PROCEDURE — 88341 IMHCHEM/IMCYTCHM EA ADD ANTB: CPT | Mod: TC | Performed by: UROLOGY

## 2025-04-23 PROCEDURE — 250N000011 HC RX IP 250 OP 636: Performed by: UROLOGY

## 2025-04-23 RX ORDER — CEFAZOLIN SODIUM/WATER 2 G/20 ML
2 SYRINGE (ML) INTRAVENOUS
Status: COMPLETED | OUTPATIENT
Start: 2025-04-23 | End: 2025-04-23

## 2025-04-23 RX ORDER — PETROLATUM,WHITE
OINTMENT IN PACKET (GRAM) TOPICAL
OUTPATIENT
Start: 2025-05-26

## 2025-04-23 RX ORDER — FENTANYL CITRATE 50 UG/ML
INJECTION, SOLUTION INTRAMUSCULAR; INTRAVENOUS PRN
Status: DISCONTINUED | OUTPATIENT
Start: 2025-04-23 | End: 2025-04-23

## 2025-04-23 RX ORDER — ONDANSETRON 2 MG/ML
INJECTION INTRAMUSCULAR; INTRAVENOUS PRN
Status: DISCONTINUED | OUTPATIENT
Start: 2025-04-23 | End: 2025-04-23

## 2025-04-23 RX ORDER — CEFAZOLIN SODIUM/WATER 2 G/20 ML
2 SYRINGE (ML) INTRAVENOUS SEE ADMIN INSTRUCTIONS
Status: DISCONTINUED | OUTPATIENT
Start: 2025-04-23 | End: 2025-04-23 | Stop reason: HOSPADM

## 2025-04-23 RX ORDER — ONDANSETRON 4 MG/1
4 TABLET, ORALLY DISINTEGRATING ORAL EVERY 30 MIN PRN
Status: DISCONTINUED | OUTPATIENT
Start: 2025-04-23 | End: 2025-04-23 | Stop reason: HOSPADM

## 2025-04-23 RX ORDER — LIDOCAINE HYDROCHLORIDE 20 MG/ML
10 JELLY TOPICAL
OUTPATIENT
Start: 2025-06-23

## 2025-04-23 RX ORDER — LIDOCAINE HYDROCHLORIDE 20 MG/ML
10 JELLY TOPICAL
OUTPATIENT
Start: 2025-06-16

## 2025-04-23 RX ORDER — ACETAMINOPHEN 650 MG/1
650 SUPPOSITORY RECTAL ONCE
Status: COMPLETED | OUTPATIENT
Start: 2025-04-23 | End: 2025-04-23

## 2025-04-23 RX ORDER — ONDANSETRON 2 MG/ML
4 INJECTION INTRAMUSCULAR; INTRAVENOUS EVERY 30 MIN PRN
Status: DISCONTINUED | OUTPATIENT
Start: 2025-04-23 | End: 2025-04-23 | Stop reason: HOSPADM

## 2025-04-23 RX ORDER — LIDOCAINE HYDROCHLORIDE 20 MG/ML
10 JELLY TOPICAL
OUTPATIENT
Start: 2025-06-02

## 2025-04-23 RX ORDER — PETROLATUM,WHITE
OINTMENT IN PACKET (GRAM) TOPICAL
OUTPATIENT
Start: 2025-06-02

## 2025-04-23 RX ORDER — LIDOCAINE HYDROCHLORIDE 20 MG/ML
10 JELLY TOPICAL
OUTPATIENT
Start: 2025-06-30

## 2025-04-23 RX ORDER — MAGNESIUM HYDROXIDE 1200 MG/15ML
LIQUID ORAL PRN
Status: DISCONTINUED | OUTPATIENT
Start: 2025-04-23 | End: 2025-04-23 | Stop reason: HOSPADM

## 2025-04-23 RX ORDER — FENTANYL CITRATE 50 UG/ML
50 INJECTION, SOLUTION INTRAMUSCULAR; INTRAVENOUS EVERY 5 MIN PRN
Status: DISCONTINUED | OUTPATIENT
Start: 2025-04-23 | End: 2025-04-23 | Stop reason: HOSPADM

## 2025-04-23 RX ORDER — SODIUM CHLORIDE, SODIUM LACTATE, POTASSIUM CHLORIDE, CALCIUM CHLORIDE 600; 310; 30; 20 MG/100ML; MG/100ML; MG/100ML; MG/100ML
INJECTION, SOLUTION INTRAVENOUS CONTINUOUS PRN
Status: DISCONTINUED | OUTPATIENT
Start: 2025-04-23 | End: 2025-04-23

## 2025-04-23 RX ORDER — DEXAMETHASONE SODIUM PHOSPHATE 4 MG/ML
INJECTION, SOLUTION INTRA-ARTICULAR; INTRALESIONAL; INTRAMUSCULAR; INTRAVENOUS; SOFT TISSUE PRN
Status: DISCONTINUED | OUTPATIENT
Start: 2025-04-23 | End: 2025-04-23

## 2025-04-23 RX ORDER — FUROSEMIDE 10 MG/ML
INJECTION INTRAMUSCULAR; INTRAVENOUS PRN
Status: DISCONTINUED | OUTPATIENT
Start: 2025-04-23 | End: 2025-04-23

## 2025-04-23 RX ORDER — PETROLATUM,WHITE
OINTMENT IN PACKET (GRAM) TOPICAL
OUTPATIENT
Start: 2025-06-30

## 2025-04-23 RX ORDER — PROPOFOL 10 MG/ML
INJECTION, EMULSION INTRAVENOUS CONTINUOUS PRN
Status: DISCONTINUED | OUTPATIENT
Start: 2025-04-23 | End: 2025-04-23

## 2025-04-23 RX ORDER — HYDROMORPHONE HCL IN WATER/PF 6 MG/30 ML
0.4 PATIENT CONTROLLED ANALGESIA SYRINGE INTRAVENOUS EVERY 5 MIN PRN
Status: DISCONTINUED | OUTPATIENT
Start: 2025-04-23 | End: 2025-04-23 | Stop reason: HOSPADM

## 2025-04-23 RX ORDER — HYDROMORPHONE HCL IN WATER/PF 6 MG/30 ML
0.2 PATIENT CONTROLLED ANALGESIA SYRINGE INTRAVENOUS EVERY 5 MIN PRN
Status: DISCONTINUED | OUTPATIENT
Start: 2025-04-23 | End: 2025-04-23 | Stop reason: HOSPADM

## 2025-04-23 RX ORDER — PROPOFOL 10 MG/ML
INJECTION, EMULSION INTRAVENOUS PRN
Status: DISCONTINUED | OUTPATIENT
Start: 2025-04-23 | End: 2025-04-23

## 2025-04-23 RX ORDER — NALOXONE HYDROCHLORIDE 0.4 MG/ML
0.1 INJECTION, SOLUTION INTRAMUSCULAR; INTRAVENOUS; SUBCUTANEOUS
Status: DISCONTINUED | OUTPATIENT
Start: 2025-04-23 | End: 2025-04-23 | Stop reason: HOSPADM

## 2025-04-23 RX ORDER — PETROLATUM,WHITE
OINTMENT IN PACKET (GRAM) TOPICAL
OUTPATIENT
Start: 2025-06-09

## 2025-04-23 RX ORDER — LIDOCAINE HYDROCHLORIDE 20 MG/ML
INJECTION, SOLUTION INFILTRATION; PERINEURAL PRN
Status: DISCONTINUED | OUTPATIENT
Start: 2025-04-23 | End: 2025-04-23

## 2025-04-23 RX ORDER — PETROLATUM,WHITE
OINTMENT IN PACKET (GRAM) TOPICAL
OUTPATIENT
Start: 2025-06-23

## 2025-04-23 RX ORDER — LIDOCAINE HYDROCHLORIDE 20 MG/ML
10 JELLY TOPICAL
OUTPATIENT
Start: 2025-06-09

## 2025-04-23 RX ORDER — LIDOCAINE HYDROCHLORIDE 20 MG/ML
10 JELLY TOPICAL
OUTPATIENT
Start: 2025-05-26

## 2025-04-23 RX ORDER — LABETALOL HYDROCHLORIDE 5 MG/ML
10 INJECTION, SOLUTION INTRAVENOUS
Status: DISCONTINUED | OUTPATIENT
Start: 2025-04-23 | End: 2025-04-23 | Stop reason: HOSPADM

## 2025-04-23 RX ORDER — DEXAMETHASONE SODIUM PHOSPHATE 4 MG/ML
4 INJECTION, SOLUTION INTRA-ARTICULAR; INTRALESIONAL; INTRAMUSCULAR; INTRAVENOUS; SOFT TISSUE
Status: DISCONTINUED | OUTPATIENT
Start: 2025-04-23 | End: 2025-04-23 | Stop reason: HOSPADM

## 2025-04-23 RX ORDER — SODIUM CHLORIDE, SODIUM LACTATE, POTASSIUM CHLORIDE, CALCIUM CHLORIDE 600; 310; 30; 20 MG/100ML; MG/100ML; MG/100ML; MG/100ML
INJECTION, SOLUTION INTRAVENOUS CONTINUOUS
Status: DISCONTINUED | OUTPATIENT
Start: 2025-04-23 | End: 2025-04-23 | Stop reason: HOSPADM

## 2025-04-23 RX ORDER — PETROLATUM,WHITE
OINTMENT IN PACKET (GRAM) TOPICAL
OUTPATIENT
Start: 2025-06-16

## 2025-04-23 RX ORDER — FENTANYL CITRATE 50 UG/ML
25 INJECTION, SOLUTION INTRAMUSCULAR; INTRAVENOUS EVERY 5 MIN PRN
Status: DISCONTINUED | OUTPATIENT
Start: 2025-04-23 | End: 2025-04-23 | Stop reason: HOSPADM

## 2025-04-23 RX ORDER — ACETAMINOPHEN 325 MG/1
975 TABLET ORAL ONCE
Status: COMPLETED | OUTPATIENT
Start: 2025-04-23 | End: 2025-04-23

## 2025-04-23 RX ADMIN — SODIUM CHLORIDE, SODIUM LACTATE, POTASSIUM CHLORIDE, AND CALCIUM CHLORIDE: .6; .31; .03; .02 INJECTION, SOLUTION INTRAVENOUS at 10:09

## 2025-04-23 RX ADMIN — LIDOCAINE HYDROCHLORIDE 60 MG: 20 INJECTION, SOLUTION INFILTRATION; PERINEURAL at 10:17

## 2025-04-23 RX ADMIN — MIDAZOLAM 2 MG: 1 INJECTION INTRAMUSCULAR; INTRAVENOUS at 10:10

## 2025-04-23 RX ADMIN — ACETAMINOPHEN 975 MG: 325 TABLET, FILM COATED ORAL at 08:41

## 2025-04-23 RX ADMIN — PHENYLEPHRINE HYDROCHLORIDE 200 MCG: 10 INJECTION INTRAVENOUS at 10:45

## 2025-04-23 RX ADMIN — PHENYLEPHRINE HYDROCHLORIDE 150 MCG: 10 INJECTION INTRAVENOUS at 10:27

## 2025-04-23 RX ADMIN — PHENYLEPHRINE HYDROCHLORIDE 200 MCG: 10 INJECTION INTRAVENOUS at 10:39

## 2025-04-23 RX ADMIN — PROPOFOL 150 MG: 10 INJECTION, EMULSION INTRAVENOUS at 10:17

## 2025-04-23 RX ADMIN — PROPOFOL 50 MCG/KG/MIN: 10 INJECTION, EMULSION INTRAVENOUS at 10:18

## 2025-04-23 RX ADMIN — DEXAMETHASONE SODIUM PHOSPHATE 4 MG: 4 INJECTION, SOLUTION INTRA-ARTICULAR; INTRALESIONAL; INTRAMUSCULAR; INTRAVENOUS; SOFT TISSUE at 10:24

## 2025-04-23 RX ADMIN — PHENYLEPHRINE HYDROCHLORIDE 150 MCG: 10 INJECTION INTRAVENOUS at 10:33

## 2025-04-23 RX ADMIN — PHENYLEPHRINE HYDROCHLORIDE 150 MCG: 10 INJECTION INTRAVENOUS at 10:19

## 2025-04-23 RX ADMIN — ONDANSETRON 4 MG: 2 INJECTION INTRAMUSCULAR; INTRAVENOUS at 10:45

## 2025-04-23 RX ADMIN — Medication 2 G: at 10:09

## 2025-04-23 RX ADMIN — FUROSEMIDE 10 MG: 10 INJECTION, SOLUTION INTRAVENOUS at 10:51

## 2025-04-23 RX ADMIN — PHENYLEPHRINE HYDROCHLORIDE 200 MCG: 10 INJECTION INTRAVENOUS at 10:21

## 2025-04-23 RX ADMIN — FENTANYL CITRATE 50 MCG: 50 INJECTION INTRAMUSCULAR; INTRAVENOUS at 10:11

## 2025-04-23 ASSESSMENT — ACTIVITIES OF DAILY LIVING (ADL)
ADLS_ACUITY_SCORE: 58
ADLS_ACUITY_SCORE: 56

## 2025-04-23 NOTE — DISCHARGE INSTRUCTIONS
Same Day Surgery Discharge Instructions for  Sedation and General Anesthesia     It's not unusual to feel dizzy, light-headed or faint for up to 24 hours after surgery or while taking pain medication.  If you have these symptoms: sit for a few minutes before standing and have someone assist you when you get up to walk or use the bathroom.    You should rest and relax for the next 24 hours. We recommend you make arrangements to have an adult stay with you for at least 24 hours after your discharge.  Avoid hazardous and strenuous activity.    DO NOT DRIVE any vehicle or operate mechanical equipment for 24 hours following the end of your surgery.  Even though you may feel normal, your reactions may be affected by the medication you have received.    Do not drink alcoholic beverages for 24 hours following surgery.     Slowly progress to your regular diet as you feel able. It's not unusual to feel nauseated and/or vomit after receiving anesthesia.  If you develop these symptoms, drink clear liquids (apple juice, ginger ale, broth, 7-up, etc. ) until you feel better.  If your nausea and vomiting persists for 24 hours, please notify your surgeon.      All narcotic pain medications, along with inactivity and anesthesia, can cause constipation. Drinking plenty of liquids and increasing fiber intake will help.    For any questions of a medical nature, call your surgeon.    Do not make important decisions for 24 hours.    If you had general anesthesia, you may have a sore throat for a couple of days related to the breathing tube used during surgery.  You may use Cepacol lozenges to help with this discomfort.  If it worsens or if you develop a fever, contact your surgeon.     If you feel your pain is not well managed with the pain medications prescribed by your surgeon, please contact your surgeon's office to let them know so they can address your concerns.                POSTOPERATIVE  INSTRUCTIONS    Diagnosis-------------------------------   Urothelial carcinoma    Procedure-------------------------------  Procedure(s) (LRB):  CYSTOSCOPY, WITH TRANSURETHRAL RESECTION BLADDER TUMOR (N/A)      Findings--------------------------------  Area of concern resected and removed.    Home-going instructions-----------------          FOLLOW THESE INSTRUCTIONS AS INDICATED BELOW:  - Observe operative area for signs of excessive bleeding.  - You may shower.  - Increase fluid intake to promote clear urine.  - Resume usual diet as tolerated    What to expect while recovering-----------  - You may experience some intermittent bleeding that makes your urine pink or cherry colored. This is normal.  - However, if you are unable to urinate, passing large amount of clots, have ruddy blood in your urine, or have a temperature >101 degrees, call the urology nurse on call, or present to your nearest emergency department.  - You are encouraged to walk daily, and have no activity restrictions.     Discharge Medications/instructions:   - Resume all home medications      Questions/concerns------------------------  [facility]: [phone]    Future appointments  Orders placed for repeat induction course of BCG, 6 weeks, to start in about 4 to 6 weeks.    Rogelio Gallagher MD       Today you were given 975 mg of Tylenol at 08:41am. The recommended daily maximum dose is 4000 mg.

## 2025-04-23 NOTE — OP NOTE
OPERATIVE REPORT  DATE OF SURGERY: 04/23/25  LOCATION OF SURGERY: [facility]  PREOPERATIVE DIAGNOSIS:  (C67.8) Malignant neoplasm of overlapping sites of bladder (H)  (primary encounter diagnosis)  POSTOPERATIVE DIAGNOSIS: (C67.8) Malignant neoplasm of overlapping sites of bladder (H)  (primary encounter diagnosis)     START TIME: 10:25 AM  END TIME: 10:51 AM    PROCEDURE PERFORMED:   Transurethral resection of bladder tumor (TURBT) - medium 2-5cm     STAFF SURGEON: Rogelio Gallagher MD  ANESTHESIA: General.   ESTIMATED BLOOD LOSS: 5 mL.   DRAINS AND TUBES: 16fr dominguez catheter  COMPLICATIONS: None.   DISPOSITION: PACU.   SPECIMENS OBTAINED:   ID Type Source Tests Collected by Time Destination   1 : left bladder neck tumor Tissue Urinary Bladder SURGICAL PATHOLOGY EXAM Rogelio Gallagher MD 4/23/2025 10:32 AM       SIGNIFICANT FINDINGS: Cystoscopy with evidence of papillary recurrence at the left bladder neck extending circumferentially from 6:00 to 2:00.  Area resected in entirety.     HISTORY OF PRESENT ILLNESS: Lorin Andrade is a 64 year old female with left-sided hydroureteronephrosis and concern for urothelial malignancy of the left distal ureter.  Now status post a robotic distal ureterectomy, lymph node dissection, ureteroneocystotomy with a Boari flap on 6/24/2024 with evidence of CIS.  Early recurrence within the bladder status post a TURBT and ureteroscopy with biopsy with evidence of recurrence in the ureter.  Now status post a robotic nephro ureterectomy on 3/10/2025 with evidence of CIS in the renal pelvis and mid ureter removed with negative margins.  She completed induction course of BCG.  Surveillance cystoscopy with area of concern at the left bladder neck.  She was counseled on the need for the above surgery and elects to proceed.    OPERATION PERFORMED:   Informed consent was obtained and the patient was brought to the operating room where general anesthesia was induced. The patient was given appropriate  preoperative antibiotics and positioned supine. The patient was then repositioned in dorsal lithotomy with all pressure points padded. We then performed a timeout, verifying the correct patient's site and procedure to be performed.    A 26 [place]er continuous-flow resectoscope was inserted atraumatically into the bladder.  Cystoscopy was performed with the area of concern just within the left bladder neck and extending from 6:00 up to about 2:00.  There are no other areas of concern.  This area was resected in entirety and specimen was irrigated and removed.  Hemostasis was then ensured and noted to be excellent on the low pressure system.  A 16 [place]er Ayala catheter was placed with 10 cc in the balloon.  She was given 10 mg IV Lasix.    She was emerged from anesthesia and taken to the recovery room in stable condition.    Rogelio Gallagher MD   Urology  [facility]  Clinic Phone [phone]

## 2025-04-23 NOTE — ANESTHESIA PROCEDURE NOTES
Airway       Patient location during procedure: OR  Staff -        CRNA: Dario Cisneros APRN CRNA       Performed By: CRNAIndications and Patient Condition       Indications for airway management: ilir-procedural and airway protection       Induction type:intravenous       Mask difficulty assessment: 0 - not attempted    Final Airway Details       Final airway type: supraglottic airway    Supraglottic Airway Details        Type: LMA       Brand: I-Gel       LMA size: 4    Post intubation assessment        Placement verified by: capnometry, equal breath sounds and chest rise        Number of attempts at approach: 1       Number of other approaches attempted: 0       Ease of procedure: easy       Dentition: Intact and Unchanged

## 2025-04-23 NOTE — ANESTHESIA POSTPROCEDURE EVALUATION
Patient: Lorin Andrade    Procedure: Procedure(s):  CYSTOSCOPY, WITH TRANSURETHRAL RESECTION BLADDER TUMOR       Anesthesia Type:  General    Note:  Disposition: Outpatient   Postop Pain Control: Uneventful            Sign Out: Well controlled pain   PONV: No   Neuro/Psych: Uneventful            Sign Out: Acceptable/Baseline neuro status   Airway/Respiratory: Uneventful            Sign Out: Acceptable/Baseline resp. status   CV/Hemodynamics: Uneventful            Sign Out: Acceptable CV status; No obvious hypovolemia; No obvious fluid overload   Other NRE: NONE   DID A NON-ROUTINE EVENT OCCUR? No           Last vitals:  Vitals Value Taken Time   /55 04/23/25 1145   Temp 35.8  C (96.44  F) 04/23/25 1149   Pulse 67 04/23/25 1149   Resp 23 04/23/25 1149   SpO2 98 % 04/23/25 1149   Vitals shown include unfiled device data.    Electronically Signed By: Mago Cisneros MD  April 23, 2025  3:18 PM

## 2025-04-23 NOTE — ANESTHESIA CARE TRANSFER NOTE
Patient: Lorin Andrade    Procedure: Procedure(s):  CYSTOSCOPY, WITH TRANSURETHRAL RESECTION BLADDER TUMOR       Diagnosis: Malignant neoplasm of overlapping sites of bladder (H) [C67.8]  Diagnosis Additional Information: No value filed.    Anesthesia Type:   General     Note:    Oropharynx: oropharynx clear of all foreign objects  Level of Consciousness: drowsy  Oxygen Supplementation: face mask    Independent Airway: airway patency satisfactory and stable  Dentition: dentition unchanged  Vital Signs Stable: post-procedure vital signs reviewed and stable  Report to RN Given: handoff report given  Patient transferred to: PACU    Handoff Report: Identifed the Patient, Identified the Reponsible Provider, Reviewed the pertinent medical history, Discussed the surgical course, Reviewed Intra-OP anesthesia mangement and issues during anesthesia, Set expectations for post-procedure period and Allowed opportunity for questions and acknowledgement of understanding  Vitals:  Vitals Value Taken Time   BP     Temp 35.9  C (96.62  F) 04/23/25 1106   Pulse 71 04/23/25 1106   Resp 9 04/23/25 1106   SpO2 100 % 04/23/25 1106   Vitals shown include unfiled device data.    Electronically Signed By: TAL De Santiago CRNA  April 23, 2025  11:07 AM

## 2025-04-29 PROCEDURE — 88342 IMHCHEM/IMCYTCHM 1ST ANTB: CPT | Mod: 26 | Performed by: PATHOLOGY

## 2025-04-29 PROCEDURE — 88307 TISSUE EXAM BY PATHOLOGIST: CPT | Mod: 26 | Performed by: PATHOLOGY

## 2025-04-29 PROCEDURE — 88341 IMHCHEM/IMCYTCHM EA ADD ANTB: CPT | Mod: 26 | Performed by: PATHOLOGY

## 2025-05-01 ENCOUNTER — LAB (OUTPATIENT)
Dept: LAB | Facility: CLINIC | Age: 65
End: 2025-05-01
Payer: COMMERCIAL

## 2025-05-01 DIAGNOSIS — R39.89 SUSPECTED UTI: ICD-10-CM

## 2025-05-01 LAB
ALBUMIN UR-MCNC: 30 MG/DL
APPEARANCE UR: CLEAR
BILIRUB UR QL STRIP: NEGATIVE
COLOR UR AUTO: YELLOW
GLUCOSE UR STRIP-MCNC: NEGATIVE MG/DL
HGB UR QL STRIP: ABNORMAL
KETONES UR STRIP-MCNC: NEGATIVE MG/DL
LEUKOCYTE ESTERASE UR QL STRIP: ABNORMAL
NITRATE UR QL: NEGATIVE
PH UR STRIP: 6 [PH] (ref 5–7)
SP GR UR STRIP: 1.02 (ref 1–1.03)
UROBILINOGEN UR STRIP-ACNC: 0.2 E.U./DL

## 2025-05-01 PROCEDURE — 81003 URINALYSIS AUTO W/O SCOPE: CPT | Mod: QW

## 2025-05-01 PROCEDURE — 87086 URINE CULTURE/COLONY COUNT: CPT

## 2025-05-02 LAB — BACTERIA UR CULT: NO GROWTH

## 2025-05-07 ENCOUNTER — OFFICE VISIT (OUTPATIENT)
Dept: UROLOGY | Facility: CLINIC | Age: 65
End: 2025-05-07
Payer: COMMERCIAL

## 2025-05-07 VITALS
BODY MASS INDEX: 20.73 KG/M2 | HEIGHT: 66 IN | DIASTOLIC BLOOD PRESSURE: 52 MMHG | HEART RATE: 68 BPM | SYSTOLIC BLOOD PRESSURE: 101 MMHG | OXYGEN SATURATION: 95 % | WEIGHT: 129 LBS

## 2025-05-07 DIAGNOSIS — C67.8 MALIGNANT NEOPLASM OF OVERLAPPING SITES OF BLADDER (H): Primary | ICD-10-CM

## 2025-05-07 DIAGNOSIS — Z90.5 S/P NEPHRECTOMY: ICD-10-CM

## 2025-05-07 DIAGNOSIS — C66.2 UROTHELIAL CARCINOMA OF LEFT DISTAL URETER (H): ICD-10-CM

## 2025-05-07 PROCEDURE — 3078F DIAST BP <80 MM HG: CPT | Performed by: UROLOGY

## 2025-05-07 PROCEDURE — 99214 OFFICE O/P EST MOD 30 MIN: CPT | Performed by: UROLOGY

## 2025-05-07 PROCEDURE — 1125F AMNT PAIN NOTED PAIN PRSNT: CPT | Performed by: UROLOGY

## 2025-05-07 PROCEDURE — 3074F SYST BP LT 130 MM HG: CPT | Performed by: UROLOGY

## 2025-05-07 ASSESSMENT — PAIN SCALES - GENERAL: PAINLEVEL_OUTOF10: MILD PAIN (2)

## 2025-05-07 NOTE — PROGRESS NOTES
[facility]  CHIEF COMPLAINT   It was my pleasure to see Lorin Andrade who is a 64 year old female for follow-up of LEFT ureteral urothelial carcinoma and bladder cancer.      HPI  5/8/2024   Lorin Andrade is a very pleasant 64 year old female with a history of left renal colic with workup showing moderate hydroureteronephrosis and enhancement of the last 5 cm of the left distal ureter.  Urine cytology with suspicion for high-grade urothelial carcinoma.  She underwent ureteroscopy with ureteral biopsy and stent placement on 5/1/2024 and returns today to discuss the results.    She has had resolution of her flank pain following stent placement  Her  joins her for this visit    5/15/2024:  Ureteroscopy     5/22/2024:  Follow-up today to review the ureteroscopy findings and pathology results    6/24/2024:  Robotic distal ureterectomy, ureteroneocystotomy and boari flap    7/9/2024:  Cystogram and dominguez removal    7/10/2024:  Follow-up today for postop check  She is doing very well after Dominguez removal yesterday    7/24/2024:  Ureteral stent removal    10/30/2024:  Surveillance cystoscopy    11/13/2024:  Follow up after Transurethral resection of bladder tumor (TURBT) and Ureteroscopy last week  Developed symptoms of possible UTI on Monday  Symptoms of pelvic pain, dysuria   No notable gross hematuria   Is taking Azo    11/21/2024:  Follow-up today to review her cystogram and discuss treatment options  She continues to have increased urinary frequency and pain with holding and some dysuria which is relieved with urination  Continues on PCN for recent UTI    12/12/2024:  She did started to notice return of some bilateral flank pain in the last 4-5 days  She notes that this was her similar pain that she had prior to her initial stent placement in May and she is concerned that this could represent some interval change  Her  joins her for this visit    12/16/2024:  Transurethral resection of bladder tumor (TURBT)  "and Ureteroscopy with biopsy and laser ablation    12/31/2024:  She returns today for stent removal and discussion of her pathology results  She did have a UTI diagnosed on 12/23/2024 and completed a course of penicillin yesterday    3/10/2025:  Robotic nephroureterectomy    TODAY 4/9/2025:  Return for surveillance cystoscopy  She notes she still feels like she is recovering from her nephrectomy    PHYSICAL EXAM  Patient is a 64 year old  female   Vitals: Blood pressure 101/52, pulse 68, height 1.664 m (5' 5.5\"), weight 58.5 kg (129 lb), SpO2 95%.  Body mass index is 21.14 kg/m .  General Appearance Adult:   Alert, no acute distress, oriented  HENT: throat/mouth:normal, good dentition  Lungs: no respiratory distress, or pursed lip breathing  Heart: No obvious jugular venous distension present  Abd: Well-healing surgical incisions, no evidence of infection or hernia, small less than 1 cm scab at the extraction site  Neuro: Alert, oriented, speech and mentation normal  Psych: affect and mood normal  Gait: Normal  : deferred to cystoscopy     Creatinine   Date Value Ref Range Status   03/11/2025 1.12 (H) 0.51 - 0.95 mg/dL Final   06/26/2024 0.89 0.51 - 0.95 mg/dL Final   06/25/2024 0.73 0.51 - 0.95 mg/dL Final   06/11/2024 0.74 0.51 - 0.95 mg/dL Final   05/01/2024 0.73 0.51 - 0.95 mg/dL Final   04/30/2024 0.89 0.51 - 0.95 mg/dL Final   04/19/2024 0.84 0.51 - 0.95 mg/dL Final   08/14/2023 0.61 0.51 - 0.95 mg/dL Final     GFR Estimate   Date Value Ref Range Status   03/11/2025 55 (L) >60 mL/min/1.73m2 Final     Comment:     eGFR calculated using 2021 CKD-EPI equation.   06/26/2024 73 >60 mL/min/1.73m2 Final     Comment:     eGFR calculated using 2021 CKD-EPI equation.   06/25/2024 >90 >60 mL/min/1.73m2 Final     Comment:     eGFR calculated using 2021 CKD-EPI equation.        CYTOLOGY:  Final Diagnosis   Specimen A                 Interpretation:                  -  Suspicious for High-grade Urothelial Carcinoma  "     PATHOLOGY:  5/1/2024:  Final Diagnosis   Left distal ureter, biopsy:  -Normal urothelial lining without evidence of atypia or malignancy, please see description     5/15/2024:  Final Diagnosis   Ureter, left proximal, biopsy:  -- Urothelium overlying edematous stroma.  Negative for dysplasia or carcinoma.     5/15/2024:  Distal ureteral brushing  Final Diagnosis   Specimen A              Interpretation:               Negative for High Grade Urothelial Carcinoma     6/24/2024:  Distal ureterectomy and lymph node dissection  Final Diagnosis   A-C.  Left ureter with separately submitted proximal margin and left pelvic lymph nodes; left ureterectomy with left pelvic lymphadenectomy  -Proximal left ureter margin without evidence of in situ or invasive carcinoma  -2 benign lymph nodes, benign perinodal fibrovascular and fatty tissue, entire received tissue sample examined microscopically  -Urothelial carcinoma in situ present in association with background urothelial atypia/dysplasia; no evidence of carcinoma in situ at margins; please see microscopic description and synoptic template within report     11/4/2024:  Transurethral resection of bladder tumor (TURBT)  Final Diagnosis   A.  Left distal ureter biopsy:  - High-grade urothelial cell carcinoma, detached fragment without underlying tissue for evaluation of invasion     B.  Posterior bladder wall tumor:   - Noninvasive high-grade urothelial cell carcinoma     C.  Left bladder wall tumor:  - Noninvasive high-grade urothelial cell carcinoma     D.  Left anterior bladder wall tumor:  - Noninvasive high-grade urothelial cell carcinoma     12/16/2024:  Final Diagnosis   A.  Left proximal ureter, biopsy:  -Tiny fragment of partially crushed tissue with UROTHELIAL CARCINOMA IN SITU (see comment).     B.  Anterior bladder wall tumor, transurethral resection:  -NON-INVASIVE HIGH-GRADE PAPILLARY UROTHELIAL CARCINOMA with associated marked acute and chronic inflammatory  changes.  -Fragment of muscularis propria (detrusor muscle), uninvolved by tumor.     3/10/2025:  Final Diagnosis   Kidney, ureter, left, resection-  Urothelial carcinoma in situ involving renal pelvis and mid ureter  No evidence of invasive malignancy  Resection margins free of malignancy.     4/23/2025:  Final Diagnosis   Bladder, left bladder neck tumor, transurethral resection-   -HIGH-GRADE UROTHELIAL CARCINOMA, INVASIVE INTO LAMINA PROPRIA (please see comment)  -Focal micropapillary histology present  -Carcinoma in situ present in the background  -Both lamina propria and muscularis propria is present for assessment.       IMAGING:  All pertinent imaging reviewed:    All imaging studies reviewed by me.  I personally reviewed these imaging films.  A formal report from radiology will follow.    CT UROGRAM 4/30/2024:  FINDINGS:   LOWER CHEST: No worrisome consolidation in the visualized lung bases. No pleural effusion. Partially imaged breast implants.     HEPATOBILIARY: Unchanged liver contours with benign cyst in the left hepatic lobe. No worrisome liver lesions. Normal gallbladder. No biliary ductal dilation.     PANCREAS: Normal.     SPLEEN: Normal.     ADRENAL GLANDS: Normal.     RIGHT KIDNEY/URETER: Normal morphology and enhancement of the right kidney. A few cortically based subcentimeter low-attenuation lesions are incompletely characterized though likely cysts. No urolithiasis or hydronephrosis. Normal excretion of contrast   on delayed images without urothelial thickening or filling defects.     LEFT KIDNEY/URETER: Unchanged delayed left nephrogram. 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 No urolithiasis. There is unchanged moderate to severe hydroureteronephrosis with dependent excreted contrast   layering within the calyces. As before, there is a transition in the distal ureter where there is an approximately 5 cm segment of urothelial thickening and  enhancement (series 12 image #[id number], #[id number]).      BLADDER: Unremarkable.     BOWEL: Normal caliber small and large bowel. Normal appendix. No free fluid or free air.     LYMPH NODES: No lymphadenopathy.     VASCULATURE: The abdominal aorta is nonaneurysmal with mild atheromatous disease.     PELVIC ORGANS: Uterus is present. No adnexal mass.     MUSCULOSKELETAL: Unchanged mild L4 superior endplate deformity. No destructive osseous lesions.                                                                      IMPRESSION:  1.  Unchanged moderate left hydroureteronephrosis to the level of the distal ureter where there is an approximately 5 cm segment of urothelial thickening and enhancement. As before, etiology may be inflammatory or neoplastic.  2.  Additional noncritical details in the findings.    NM RENAL SCAN 4/17/2024:  FINDINGS:      Renal angiogram demonstrates: Flow to right kidney within normal  limits. There is asymmetrically slower and less flow to the left  kidney.   Renogram images demonstrate:     Right kidney: There is normal cortical uptake. Cortical transit is  noted at 3 minutes. Ureter is visualized at 4 minutes. There is normal  wash out. Following Lasix administration, there is prompt wash out.      Left kidney: There is delayed cortical uptake. Cortical transit is  noted at 6 minutes which is slightly delayed. Ureter is visualized at  20 minutes. There is no wash ou prior to Lasix, minimal wash out after  Lasix.   Renogram curves demonstrate:     Right kidney: The radiotracer reaches the peak activity at 5 minutes  (time to peak). There is normal wash out and complete wash out  following Lasix. T1/2 is: 14 minutes.     Left kidney: The radiotracer reaches the peak activity at 22 minutes  (time to peak). There is delayed wash out and  incomplete wash out  following Lasix. T1/2 is: Not applicable.     Split function: Left: 38.7%, Right: 61.3%.                                                                       IMPRESSION:  1. Right kidney: Normal function. Normal excretion. No obstruction.     2. Left kidney: Decreased function. Decreased excretion, concerning  for partial obstruction.    CYSTOGRAM 7/9/2024:  FINDINGS: Left ureteral stent present on the  image. Cystografin  contrast was instilled into the bladder via existing Ayala catheter.  No visible leak.                                                          IMPRESSION:  1.  No leak, status post ureteral reimplantation.    Retrograde Pyelogram 11/4/2024:    Ureteroscope tip at the level of the ureteral tumor    Cystogram 11/18/2024:    Cystogram with demonstrated reflux    Retrograde Pyelogram 12/16/2024:    Ureteroscope tip at site of more proximal CIS    ASSESSMENT and PLAN  64-year-old female with left-sided hydroureteronephrosis and concern for urothelial malignancy of the left distal ureter.  Now status post a robotic distal ureterectomy, lymph node dissection, ureteroneocystotomy with a Boari flap on 6/24/2024 with evidence of CIS.  Early recurrence within the bladder status post a TURBT and ureteroscopy with biopsy.  Now status post a robotic nephro ureterectomy on 3/10/2025.    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  This was in addition to cystoscopy time    Note copy attestation: the elements have been reviewed, edited as needed, and remain pertinent to today's visit.      Rogelio Gallagher MD    Urology  [facility]  [facility] Phone: [phone]  [facility] Phone: [phone]

## 2025-05-07 NOTE — LETTER
5/7/2025       RE: Lorin Andrade  [address]  [facility] [address]     Dear Colleague,    Thank you for referring your patient, Lorin Andrade, to the [facility] at [facility]. Please see a copy of my visit note below.    [place]  CHIEF COMPLAINT   It was my pleasure to see Lorin Andrade who is a 64 year old female for follow-up of LEFT ureteral urothelial carcinoma and bladder cancer.      HPI  5/8/2024   Lorni Andrade is a very pleasant 64 year old female with a history of left renal colic with workup showing moderate hydroureteronephrosis and enhancement of the last 5 cm of the left distal ureter.  Urine cytology with suspicion for high-grade urothelial carcinoma.  She underwent ureteroscopy with ureteral biopsy and stent placement on 5/1/2024 and returns today to discuss the results.    She has had resolution of her flank pain following stent placement  Her  joins her for this visit    5/15/2024:  Ureteroscopy     5/22/2024:  Follow-up today to review the ureteroscopy findings and pathology results    6/24/2024:  Robotic distal ureterectomy, ureteroneocystotomy and boari flap    7/9/2024:  Cystogram and dominguez removal    7/10/2024:  Follow-up today for postop check  She is doing very well after Dominguez removal yesterday    7/24/2024:  Ureteral stent removal    10/30/2024:  Surveillance cystoscopy    11/13/2024:  Follow up after Transurethral resection of bladder tumor (TURBT) and Ureteroscopy last week  Developed symptoms of possible UTI on Monday  Symptoms of pelvic pain, dysuria   No notable gross hematuria   Is taking Azo    11/21/2024:  Follow-up today to review her cystogram and discuss treatment options  She continues to have increased urinary frequency and pain with holding and some dysuria which is relieved with urination  Continues on PCN for recent UTI    12/12/2024:  She did started to notice return of some bilateral  "flank pain in the last 4-5 days  She notes that this was her similar pain that she had prior to her initial stent placement in May and she is concerned that this could represent some interval change  Her  joins her for this visit    12/16/2024:  Transurethral resection of bladder tumor (TURBT) and Ureteroscopy with biopsy and laser ablation    12/31/2024:  She returns today for stent removal and discussion of her pathology results  She did have a UTI diagnosed on 12/23/2024 and completed a course of penicillin yesterday    3/10/2025:  Robotic nephroureterectomy    4/9/2025:  Return for surveillance cystoscopy  She notes she still feels like she is recovering from her nephrectomy    4/23/2025:  Transurethral resection of bladder tumor (TURBT)     TODAY 5/7/2025:  Follow-up today to review her pathology from her TURBT  She has continued to have some dysuria and frequency and urgency    PHYSICAL EXAM  Patient is a 64 year old  female   Vitals: Blood pressure 101/52, pulse 68, height 1.664 m (5' 5.5\"), weight 58.5 kg (129 lb), SpO2 95%.  Body mass index is 21.14 kg/m .  General Appearance Adult:   Alert, no acute distress, oriented  HENT: throat/mouth:normal, good dentition  Lungs: no respiratory distress, or pursed lip breathing  Heart: No obvious jugular venous distension present  Neuro: Alert, oriented, speech and mentation normal  Psych: affect and mood normal  Gait: Normal    Creatinine   Date Value Ref Range Status   03/11/2025 1.12 (H) 0.51 - 0.95 mg/dL Final   06/26/2024 0.89 0.51 - 0.95 mg/dL Final   06/25/2024 0.73 0.51 - 0.95 mg/dL Final   06/11/2024 0.74 0.51 - 0.95 mg/dL Final   05/01/2024 0.73 0.51 - 0.95 mg/dL Final   04/30/2024 0.89 0.51 - 0.95 mg/dL Final   04/19/2024 0.84 0.51 - 0.95 mg/dL Final   08/14/2023 0.61 0.51 - 0.95 mg/dL Final     GFR Estimate   Date Value Ref Range Status   03/11/2025 55 (L) >60 mL/min/1.73m2 Final     Comment:     eGFR calculated using 2021 CKD-EPI equation. "   06/26/2024 73 >60 mL/min/1.73m2 Final     Comment:     eGFR calculated using 2021 CKD-EPI equation.   06/25/2024 >90 >60 mL/min/1.73m2 Final     Comment:     eGFR calculated using 2021 CKD-EPI equation.        CYTOLOGY:  Final Diagnosis   Specimen A                 Interpretation:                  -  Suspicious for High-grade Urothelial Carcinoma      PATHOLOGY:  5/1/2024:  Final Diagnosis   Left distal ureter, biopsy:  -Normal urothelial lining without evidence of atypia or malignancy, please see description     5/15/2024:  Final Diagnosis   Ureter, left proximal, biopsy:  -- Urothelium overlying edematous stroma.  Negative for dysplasia or carcinoma.     5/15/2024:  Distal ureteral brushing  Final Diagnosis   Specimen A              Interpretation:               Negative for High Grade Urothelial Carcinoma     6/24/2024:  Distal ureterectomy and lymph node dissection  Final Diagnosis   A-C.  Left ureter with separately submitted proximal margin and left pelvic lymph nodes; left ureterectomy with left pelvic lymphadenectomy  -Proximal left ureter margin without evidence of in situ or invasive carcinoma  -2 benign lymph nodes, benign perinodal fibrovascular and fatty tissue, entire received tissue sample examined microscopically  -Urothelial carcinoma in situ present in association with background urothelial atypia/dysplasia; no evidence of carcinoma in situ at margins; please see microscopic description and synoptic template within report     11/4/2024:  Transurethral resection of bladder tumor (TURBT)  Final Diagnosis   A.  Left distal ureter biopsy:  - High-grade urothelial cell carcinoma, detached fragment without underlying tissue for evaluation of invasion     B.  Posterior bladder wall tumor:   - Noninvasive high-grade urothelial cell carcinoma     C.  Left bladder wall tumor:  - Noninvasive high-grade urothelial cell carcinoma     D.  Left anterior bladder wall tumor:  - Noninvasive high-grade urothelial  cell carcinoma     12/16/2024:  Final Diagnosis   A.  Left proximal ureter, biopsy:  -Tiny fragment of partially crushed tissue with UROTHELIAL CARCINOMA IN SITU (see comment).     B.  Anterior bladder wall tumor, transurethral resection:  -NON-INVASIVE HIGH-GRADE PAPILLARY UROTHELIAL CARCINOMA with associated marked acute and chronic inflammatory changes.  -Fragment of muscularis propria (detrusor muscle), uninvolved by tumor.     3/10/2025:  Final Diagnosis   Kidney, ureter, left, resection-  Urothelial carcinoma in situ involving renal pelvis and mid ureter  No evidence of invasive malignancy  Resection margins free of malignancy.     4/23/2025:  Final Diagnosis   Bladder, left bladder neck tumor, transurethral resection-   -HIGH-GRADE UROTHELIAL CARCINOMA, INVASIVE INTO LAMINA PROPRIA (please see comment)  -Focal micropapillary histology present  -Carcinoma in situ present in the background  -Both lamina propria and muscularis propria is present for assessment.       IMAGING:  All pertinent imaging reviewed:    All imaging studies reviewed by me.  I personally reviewed these imaging films.  A formal report from radiology will follow.    CT UROGRAM 4/30/2024:  FINDINGS:   LOWER CHEST: No worrisome consolidation in the visualized lung bases. No pleural effusion. Partially imaged breast implants.     HEPATOBILIARY: Unchanged liver contours with benign cyst in the left hepatic lobe. No worrisome liver lesions. Normal gallbladder. No biliary ductal dilation.     PANCREAS: Normal.     SPLEEN: Normal.     ADRENAL GLANDS: Normal.     RIGHT KIDNEY/URETER: Normal morphology and enhancement of the right kidney. A few cortically based subcentimeter low-attenuation lesions are incompletely characterized though likely cysts. No urolithiasis or hydronephrosis. Normal excretion of contrast   on delayed images without urothelial thickening or filling defects.     LEFT KIDNEY/URETER: Unchanged delayed left nephrogram. 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 No urolithiasis. There is unchanged moderate to severe hydroureteronephrosis with dependent excreted contrast   layering within the calyces. As before, there is a transition in the distal ureter where there is an approximately 5 cm segment of urothelial thickening and enhancement (series 12 image #42, #48).      BLADDER: Unremarkable.     BOWEL: Normal caliber small and large bowel. Normal appendix. No free fluid or free air.     LYMPH NODES: No lymphadenopathy.     VASCULATURE: The abdominal aorta is nonaneurysmal with mild atheromatous disease.     PELVIC ORGANS: Uterus is present. No adnexal mass.     MUSCULOSKELETAL: Unchanged mild L4 superior endplate deformity. No destructive osseous lesions.                                                                      IMPRESSION:  1.  Unchanged moderate left hydroureteronephrosis to the level of the distal ureter where there is an approximately 5 cm segment of urothelial thickening and enhancement. As before, etiology may be inflammatory or neoplastic.  2.  Additional noncritical details in the findings.    NM RENAL SCAN 4/17/2024:  FINDINGS:      Renal angiogram demonstrates: Flow to right kidney within normal  limits. There is asymmetrically slower and less flow to the left  kidney.   Renogram images demonstrate:     Right kidney: There is normal cortical uptake. Cortical transit is  noted at 3 minutes. Ureter is visualized at 4 minutes. There is normal  wash out. Following Lasix administration, there is prompt wash out.      Left kidney: There is delayed cortical uptake. Cortical transit is  noted at 6 minutes which is slightly delayed. Ureter is visualized at  20 minutes. There is no wash ou prior to Lasix, minimal wash out after  Lasix.   Renogram curves demonstrate:     Right kidney: The radiotracer reaches the peak activity at 5 minutes  (time to peak). There is normal wash out and  complete wash out  following Lasix. T1/2 is: 14 minutes.     Left kidney: The radiotracer reaches the peak activity at 22 minutes  (time to peak). There is delayed wash out and  incomplete wash out  following Lasix. T1/2 is: Not applicable.     Split function: Left: 38.7%, Right: 61.3%.                                                                      IMPRESSION:  1. Right kidney: Normal function. Normal excretion. No obstruction.     2. Left kidney: Decreased function. Decreased excretion, concerning  for partial obstruction.    CYSTOGRAM 7/9/2024:  FINDINGS: Left ureteral stent present on the  image. Cystografin  contrast was instilled into the bladder via existing Ayala catheter.  No visible leak.                                                          IMPRESSION:  1.  No leak, status post ureteral reimplantation.    Retrograde Pyelogram 11/4/2024:    Ureteroscope tip at the level of the ureteral tumor    Cystogram 11/18/2024:    Cystogram with demonstrated reflux    Retrograde Pyelogram 12/16/2024:    Ureteroscope tip at site of more proximal CIS    ASSESSMENT and PLAN  64-year-old female with left-sided hydroureteronephrosis and concern for urothelial malignancy of the left distal ureter.  Now status post a robotic distal ureterectomy, lymph node dissection, ureteroneocystotomy with a Boari flap on 6/24/2024 with evidence of CIS.  Early recurrence within the bladder status post a TURBT and ureteroscopy with biopsy.  Now status post a robotic nephro ureterectomy on 3/10/2025.  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  - Now status post a nephro ureterectomy  - She did complete 6 weeks of induction BCG for the bladder  -We discussed her pathology from her recent TURBT which shows high-grade T1 bladder cancer with CIS as well as some focal micropapillary features  - We  discussed recommendation and plan for a repeat induction course of BCG and this is scheduled to start on 5/28/2025  - We discussed that there are alternative intravesical chemotherapy options that we could consider if she continues to recur despite 2 rounds of induction BCG, though it would also be reasonable to consider discussion and possible referral to Dr. Nixon  -Follow-up with me as scheduled on 8/6/2025 for surveillance cystoscopy after her BCG      Time spent: 20 minutes spent on the date of the encounter doing chart review, history and exam, documentation and further activities as noted above.  This was in addition to cystoscopy time    Note copy attestation: the elements have been reviewed, edited as needed, and remain pertinent to today's visit.      Rogelio Gallagher MD   Urology  [facility]  [facility] Phone: [phone]  [facility] Phone: [phone]      Again, thank you for allowing me to participate in the care of your patient.      Sincerely,    Rogelio Gallagher MD

## 2025-05-14 ENCOUNTER — OFFICE VISIT (OUTPATIENT)
Dept: FAMILY MEDICINE | Facility: CLINIC | Age: 65
End: 2025-05-14
Payer: COMMERCIAL

## 2025-05-14 VITALS
WEIGHT: 133.4 LBS | HEIGHT: 66 IN | RESPIRATION RATE: 20 BRPM | BODY MASS INDEX: 21.44 KG/M2 | HEART RATE: 86 BPM | SYSTOLIC BLOOD PRESSURE: 104 MMHG | TEMPERATURE: 97.3 F | OXYGEN SATURATION: 95 % | DIASTOLIC BLOOD PRESSURE: 62 MMHG

## 2025-05-14 DIAGNOSIS — Z00.00 ANNUAL PHYSICAL EXAM: Primary | ICD-10-CM

## 2025-05-14 DIAGNOSIS — G62.9 PERIPHERAL POLYNEUROPATHY: ICD-10-CM

## 2025-05-14 DIAGNOSIS — Z12.4 CERVICAL CANCER SCREENING: ICD-10-CM

## 2025-05-14 DIAGNOSIS — D64.9 ANEMIA, UNSPECIFIED TYPE: ICD-10-CM

## 2025-05-14 DIAGNOSIS — F41.9 ANXIETY: ICD-10-CM

## 2025-05-14 PROBLEM — Z11.4 SCREENING FOR HIV (HUMAN IMMUNODEFICIENCY VIRUS): Status: ACTIVE | Noted: 2025-05-14

## 2025-05-14 LAB
HGB BLD-MCNC: 11.5 G/DL (ref 11.7–15.7)
MCV RBC AUTO: 94 FL (ref 78–100)

## 2025-05-14 PROCEDURE — G0145 SCR C/V CYTO,THINLAYER,RESCR: HCPCS | Performed by: INTERNAL MEDICINE

## 2025-05-14 PROCEDURE — 80053 COMPREHEN METABOLIC PANEL: CPT | Performed by: INTERNAL MEDICINE

## 2025-05-14 PROCEDURE — 85018 HEMOGLOBIN: CPT | Performed by: INTERNAL MEDICINE

## 2025-05-14 PROCEDURE — 36415 COLL VENOUS BLD VENIPUNCTURE: CPT | Performed by: INTERNAL MEDICINE

## 2025-05-14 PROCEDURE — 1125F AMNT PAIN NOTED PAIN PRSNT: CPT | Performed by: INTERNAL MEDICINE

## 2025-05-14 PROCEDURE — 87624 HPV HI-RISK TYP POOLED RSLT: CPT | Performed by: INTERNAL MEDICINE

## 2025-05-14 PROCEDURE — 3074F SYST BP LT 130 MM HG: CPT | Performed by: INTERNAL MEDICINE

## 2025-05-14 PROCEDURE — 99213 OFFICE O/P EST LOW 20 MIN: CPT | Mod: 25 | Performed by: INTERNAL MEDICINE

## 2025-05-14 PROCEDURE — 80061 LIPID PANEL: CPT | Performed by: INTERNAL MEDICINE

## 2025-05-14 PROCEDURE — 99396 PREV VISIT EST AGE 40-64: CPT | Performed by: INTERNAL MEDICINE

## 2025-05-14 PROCEDURE — 3078F DIAST BP <80 MM HG: CPT | Performed by: INTERNAL MEDICINE

## 2025-05-14 RX ORDER — CHLORAL HYDRATE 500 MG
2 CAPSULE ORAL DAILY
COMMUNITY

## 2025-05-14 RX ORDER — PREGABALIN 75 MG/1
75 CAPSULE ORAL 2 TIMES DAILY
Qty: 180 CAPSULE | Refills: 3 | Status: SHIPPED | OUTPATIENT
Start: 2025-05-14

## 2025-05-14 RX ORDER — CITALOPRAM HYDROBROMIDE 40 MG/1
40 TABLET ORAL DAILY
Qty: 90 TABLET | Refills: 3 | Status: SHIPPED | OUTPATIENT
Start: 2025-05-14

## 2025-05-14 SDOH — HEALTH STABILITY: PHYSICAL HEALTH: ON AVERAGE, HOW MANY MINUTES DO YOU ENGAGE IN EXERCISE AT THIS LEVEL?: 60 MIN

## 2025-05-14 SDOH — HEALTH STABILITY: PHYSICAL HEALTH: ON AVERAGE, HOW MANY DAYS PER WEEK DO YOU ENGAGE IN MODERATE TO STRENUOUS EXERCISE (LIKE A BRISK WALK)?: 6 DAYS

## 2025-05-14 ASSESSMENT — PAIN SCALES - GENERAL: PAINLEVEL_OUTOF10: MILD PAIN (2)

## 2025-05-14 ASSESSMENT — SOCIAL DETERMINANTS OF HEALTH (SDOH): HOW OFTEN DO YOU GET TOGETHER WITH FRIENDS OR RELATIVES?: MORE THAN THREE TIMES A WEEK

## 2025-05-14 NOTE — PROGRESS NOTES
Preventive Care Visit  [facility] MARIO Bella MD, Internal Medicine  May 14, 2025  {Provider  Link to [facility] :[id number]}    {PROVIDER CHARTING PREFERENCE:[id number]}    Felix Padgett is a 64 year old, presenting for the following:  Physical       HPI    Anemia on and off  Mastectomy: lymoph nodes present  Pap  Anxiety: currently on 30 mg, increase to 40        Advance Care Planning  Discussed advance care planning with patient; informed AVS has link to [employer] [employer].        5/14/2025   General Health   How would you rate your overall physical health? (!) POOR   Feel stress (tense, anxious, or unable to sleep) Rather much   (!) STRESS CONCERN      5/14/2025   Nutrition   Three or more servings of calcium each day? Yes   Diet: Regular (no restrictions)   How many servings of fruit and vegetables per day? 4 or more   How many sweetened beverages each day? 0-1         5/14/2025   Exercise   Days per week of moderate/strenous exercise 6 days   Average minutes spent exercising at this level 60 min         5/14/2025   Social Factors   Frequency of gathering with friends or relatives More than three times a week   Worry food won't last until get money to buy more No   Food not last or not have enough money for food? No   Do you have housing? (Housing is defined as stable permanent housing and does not include staying outside in a car, in a tent, in an abandoned building, in an overnight shelter, or couch-surfing.) Yes   Are you worried about losing your housing? No   Lack of transportation? No   Unable to get utilities (heat,electricity)? No         5/14/2025   Fall Risk   Fallen 2 or more times in the past year? No   Trouble with walking or balance? No          5/14/2025   Dental   Dentist two times every year? Yes         Today's PHQ-2 Score:       5/14/2025     1:14 PM   PHQ-2 ( 1999 [employer])   Q1: Little interest or pleasure in doing things 0   Q2: Feeling down, depressed or hopeless 0   PHQ-2  Score 0    Q1: Little interest or pleasure in doing things Not at all   Q2: Feeling down, depressed or hopeless Not at all   PHQ-2 Score 0       Patient-reported           5/14/2025   Substance Use   Alcohol more than 3/day or more than 7/wk No   Do you use any other substances recreationally? No     Social History     Tobacco Use    Smoking status: Never     Passive exposure: Never    Smokeless tobacco: Never   Vaping Use    Vaping status: Never Used   Substance Use Topics    Alcohol use: Not Currently    Drug use: Not Currently     Types: Hydrocodone, Oxycodone     Comment: Medical marijuana and other medical opioids.     {Provider  If there are gaps in the social history shown above, please follow the link to update and then refresh the note Link to Social and Substance History :[id number]}     Mammogram Screening - Mammography discussed, not appropriate due to mastectomy.  Surgical history and/or SOGI form updated.          5/14/2025   One time HIV Screening   Previous HIV test? No         5/14/2025   STI Screening   New sexual partner(s) since last STI/HIV test? No     History of abnormal Pap smear: No - age 30- 64 PAP with HPV every 5 years recommended       ASCVD Risk   The 10-year ASCVD risk score (Nain LUCIO, et al., 2019) is: 2.5%    Values used to calculate the score:      Age: 64 years      Sex: Female      Is Non- : No      Diabetic: No      Tobacco smoker: No      Systolic Blood Pressure: 104 mmHg      Is BP treated: No      HDL Cholesterol: 60 mg/dL      Total Cholesterol: 188 mg/dL    Fracture Risk Assessment Tool  Low bone density (OSTEOPENIA). T score meets the WHO criteria for low bone density (osteopenia) at one or more measured sites. The risk of osteoporotic fracture increases approximately two-fold for each standard deviation decrease in T-score.     Reviewed and updated as needed this visit by Provider                         Objective    Exam  /62 (BP  "Location: Right arm, Patient Position: Sitting, Cuff Size: Adult Regular)   Pulse 86   Temp 97.3  F (36.3  C) (Temporal)   Resp 20   Ht 1.664 m (5' 5.5\")   Wt 60.5 kg (133 lb 6.4 oz)   SpO2 95%   BMI 21.86 kg/m     Estimated body mass index is 21.86 kg/m  as calculated from the following:    Height as of this encounter: 1.664 m (5' 5.5\").    Weight as of this encounter: 60.5 kg (133 lb 6.4 oz).    Physical Exam  Chest:      Comments: Bilateral mastectomy and implants   Lymphadenopathy:      Upper Body:      Right upper body: No axillary adenopathy.      Left upper body: No axillary adenopathy.             Signed Electronically by: Sandie Bella MD  {Email feedback regarding this note to [email or web address]   :[id number]}  " Rhythm: Normal rate and regular rhythm.      Heart sounds: Normal heart sounds. No murmur heard.     No gallop.   Pulmonary:      Effort: Pulmonary effort is normal. No respiratory distress.      Breath sounds: Normal breath sounds. No stridor. No wheezing, rhonchi or rales.   Chest:      Comments: Bilateral mastectomy and implants   Abdominal:      General: Abdomen is flat. There is no distension.      Palpations: Abdomen is soft. There is no mass.      Tenderness: There is no abdominal tenderness. There is no guarding.      Hernia: No hernia is present.   Musculoskeletal:         General: Normal range of motion.      Cervical back: Normal range of motion and neck supple. No rigidity or tenderness.      Right lower leg: No edema.      Left lower leg: No edema.   Lymphadenopathy:      Cervical: No cervical adenopathy.      Upper Body:      Right upper body: No axillary adenopathy.      Left upper body: No axillary adenopathy.   Skin:     General: Skin is warm and dry.   Neurological:      General: No focal deficit present.      Mental Status: She is alert.             Signed Electronically by: Sandie Bella MD

## 2025-05-15 LAB
ALBUMIN SERPL BCG-MCNC: 4.3 G/DL (ref 3.5–5.2)
ALP SERPL-CCNC: 75 U/L (ref 40–150)
ALT SERPL W P-5'-P-CCNC: 16 U/L (ref 0–50)
ANION GAP SERPL CALCULATED.3IONS-SCNC: 4 MMOL/L (ref 7–15)
AST SERPL W P-5'-P-CCNC: 25 U/L (ref 0–45)
BILIRUB SERPL-MCNC: 0.2 MG/DL
BUN SERPL-MCNC: 19.5 MG/DL (ref 8–23)
CALCIUM SERPL-MCNC: 9.6 MG/DL (ref 8.8–10.4)
CHLORIDE SERPL-SCNC: 102 MMOL/L (ref 98–107)
CHOLEST SERPL-MCNC: 205 MG/DL
CREAT SERPL-MCNC: 0.98 MG/DL (ref 0.51–0.95)
EGFRCR SERPLBLD CKD-EPI 2021: 65 ML/MIN/1.73M2
FASTING STATUS PATIENT QL REPORTED: NO
FASTING STATUS PATIENT QL REPORTED: NO
GLUCOSE SERPL-MCNC: 84 MG/DL (ref 70–99)
HCO3 SERPL-SCNC: 31 MMOL/L (ref 22–29)
HDLC SERPL-MCNC: 68 MG/DL
HPV HR 12 DNA CVX QL NAA+PROBE: NEGATIVE
HPV16 DNA CVX QL NAA+PROBE: NEGATIVE
HPV18 DNA CVX QL NAA+PROBE: NEGATIVE
HUMAN PAPILLOMA VIRUS FINAL DIAGNOSIS: NORMAL
LDLC SERPL CALC-MCNC: 97 MG/DL
NONHDLC SERPL-MCNC: 137 MG/DL
POTASSIUM SERPL-SCNC: 4.8 MMOL/L (ref 3.4–5.3)
PROT SERPL-MCNC: 6.7 G/DL (ref 6.4–8.3)
SODIUM SERPL-SCNC: 137 MMOL/L (ref 135–145)
TRIGL SERPL-MCNC: 202 MG/DL

## 2025-05-16 ENCOUNTER — RESULTS FOLLOW-UP (OUTPATIENT)
Dept: OBGYN | Facility: CLINIC | Age: 65
End: 2025-05-16
Payer: COMMERCIAL

## 2025-05-19 LAB
BKR AP ASSOCIATED HPV REPORT: NORMAL
BKR LAB AP GYN ADEQUACY: NORMAL
BKR LAB AP GYN INTERPRETATION: NORMAL
BKR LAB AP PREVIOUS ABNORMAL: NORMAL
PATH REPORT.COMMENTS IMP SPEC: NORMAL
PATH REPORT.COMMENTS IMP SPEC: NORMAL
PATH REPORT.RELEVANT HX SPEC: NORMAL

## 2025-05-27 ENCOUNTER — DOCUMENTATION ONLY (OUTPATIENT)
Dept: UROLOGY | Facility: CLINIC | Age: 65
End: 2025-05-27
Payer: COMMERCIAL

## 2025-05-28 ENCOUNTER — INFUSION THERAPY VISIT (OUTPATIENT)
Dept: INFUSION THERAPY | Facility: CLINIC | Age: 65
End: 2025-05-28
Attending: UROLOGY
Payer: COMMERCIAL

## 2025-05-28 ENCOUNTER — LAB (OUTPATIENT)
Dept: LAB | Facility: CLINIC | Age: 65
End: 2025-05-28
Payer: COMMERCIAL

## 2025-05-28 VITALS
HEART RATE: 84 BPM | TEMPERATURE: 97.8 F | SYSTOLIC BLOOD PRESSURE: 104 MMHG | RESPIRATION RATE: 16 BRPM | OXYGEN SATURATION: 97 % | DIASTOLIC BLOOD PRESSURE: 48 MMHG

## 2025-05-28 DIAGNOSIS — C67.8 MALIGNANT NEOPLASM OF OVERLAPPING SITES OF BLADDER (H): ICD-10-CM

## 2025-05-28 DIAGNOSIS — C67.9 MALIGNANT NEOPLASM OF URINARY BLADDER, UNSPECIFIED SITE (H): Primary | ICD-10-CM

## 2025-05-28 DIAGNOSIS — C66.2 UROTHELIAL CARCINOMA OF LEFT DISTAL URETER (H): Primary | ICD-10-CM

## 2025-05-28 LAB
ALBUMIN UR-MCNC: NEGATIVE MG/DL
APPEARANCE UR: CLEAR
BILIRUB UR QL STRIP: NEGATIVE
COLOR UR AUTO: YELLOW
GLUCOSE UR STRIP-MCNC: NEGATIVE MG/DL
HGB UR QL STRIP: NEGATIVE
KETONES UR STRIP-MCNC: ABNORMAL MG/DL
LEUKOCYTE ESTERASE UR QL STRIP: ABNORMAL
NITRATE UR QL: NEGATIVE
PH UR STRIP: 7 [PH] (ref 5–7)
SP GR UR STRIP: 1.02 (ref 1–1.03)
UROBILINOGEN UR STRIP-ACNC: 0.2 E.U./DL

## 2025-05-28 PROCEDURE — 250N000011 HC RX IP 250 OP 636: Mod: JZ | Performed by: UROLOGY

## 2025-05-28 PROCEDURE — 81003 URINALYSIS AUTO W/O SCOPE: CPT

## 2025-05-28 PROCEDURE — 51720 TREATMENT OF BLADDER LESION: CPT

## 2025-05-28 RX ORDER — LEVOFLOXACIN 500 MG/1
TABLET, FILM COATED ORAL
Qty: 12 TABLET | Refills: 0 | Status: SHIPPED | OUTPATIENT
Start: 2025-05-28

## 2025-05-28 RX ADMIN — BACILLUS CALMETTE-GUERIN 50 MG: 50 POWDER, FOR SUSPENSION INTRAVESICAL at 14:51

## 2025-05-28 ASSESSMENT — PAIN SCALES - GENERAL: PAINLEVEL_OUTOF10: NO PAIN (0)

## 2025-05-28 NOTE — PROGRESS NOTES
Infusion Nursing Note:  Lorin Andrade presents today for BCG 1/6.  Instillation number 1 of 6.   Patient seen by provider today: No   present during visit today: Not Applicable.    Note: pt denies any urinary symptoms and understands that she needs to start the levaquin tonight.    Treatment Conditions:   Patient states no concerns or issues at this time.  Ok to proceed with treatment.     Labs Reviewed:  Urine analysis.  Results meet treatment conditions.     Procedure:  14F Coude catheter placed.  Catheter placed without trauma.  Clear/yellow urine drained from bladder.    Patient turned every 15 minutes per protocol: Yes, turning to be completed at home per protocol.   Patient tolerated instillation without incident.      Discharge Plan:   Patient and/or family verbalized understanding of discharge instructions and all questions answered.  AVS to patient via [employer].  Patient will return 1 week for next appointment.   Patient discharged in stable condition accompanied by: self.  Departure Mode: Ambulatory.

## 2025-05-29 PROBLEM — D64.9 ANEMIA, UNSPECIFIED TYPE: Status: ACTIVE | Noted: 2025-05-29

## 2025-05-30 ENCOUNTER — MYC MEDICAL ADVICE (OUTPATIENT)
Dept: FAMILY MEDICINE | Facility: CLINIC | Age: 65
End: 2025-05-30
Payer: COMMERCIAL

## 2025-05-30 DIAGNOSIS — D64.9 ANEMIA, UNSPECIFIED TYPE: Primary | ICD-10-CM

## 2025-06-02 ENCOUNTER — MYC REFILL (OUTPATIENT)
Dept: FAMILY MEDICINE | Facility: CLINIC | Age: 65
End: 2025-06-02
Payer: COMMERCIAL

## 2025-06-02 DIAGNOSIS — G62.9 PERIPHERAL POLYNEUROPATHY: ICD-10-CM

## 2025-06-02 RX ORDER — PREGABALIN 75 MG/1
75 CAPSULE ORAL 2 TIMES DAILY
Qty: 180 CAPSULE | Refills: 3 | OUTPATIENT
Start: 2025-06-02

## 2025-06-05 ENCOUNTER — INFUSION THERAPY VISIT (OUTPATIENT)
Dept: INFUSION THERAPY | Facility: CLINIC | Age: 65
End: 2025-06-05
Attending: UROLOGY
Payer: COMMERCIAL

## 2025-06-05 ENCOUNTER — LAB (OUTPATIENT)
Dept: LAB | Facility: CLINIC | Age: 65
End: 2025-06-05
Payer: COMMERCIAL

## 2025-06-05 VITALS
SYSTOLIC BLOOD PRESSURE: 107 MMHG | RESPIRATION RATE: 16 BRPM | TEMPERATURE: 97.5 F | HEART RATE: 81 BPM | DIASTOLIC BLOOD PRESSURE: 58 MMHG | OXYGEN SATURATION: 96 %

## 2025-06-05 DIAGNOSIS — C67.8 MALIGNANT NEOPLASM OF OVERLAPPING SITES OF BLADDER (H): ICD-10-CM

## 2025-06-05 DIAGNOSIS — C66.2 UROTHELIAL CARCINOMA OF LEFT DISTAL URETER (H): Primary | ICD-10-CM

## 2025-06-05 DIAGNOSIS — C67.9 MALIGNANT NEOPLASM OF URINARY BLADDER, UNSPECIFIED SITE (H): Primary | ICD-10-CM

## 2025-06-05 LAB
ALBUMIN UR-MCNC: NEGATIVE MG/DL
APPEARANCE UR: CLEAR
BILIRUB UR QL STRIP: NEGATIVE
COLOR UR AUTO: YELLOW
GLUCOSE UR STRIP-MCNC: NEGATIVE MG/DL
HGB UR QL STRIP: NEGATIVE
KETONES UR STRIP-MCNC: NEGATIVE MG/DL
LEUKOCYTE ESTERASE UR QL STRIP: ABNORMAL
NITRATE UR QL: NEGATIVE
PH UR STRIP: 6 [PH] (ref 5–7)
SP GR UR STRIP: 1.02 (ref 1–1.03)
UROBILINOGEN UR STRIP-ACNC: 0.2 E.U./DL

## 2025-06-05 PROCEDURE — 250N000011 HC RX IP 250 OP 636: Mod: JZ | Performed by: UROLOGY

## 2025-06-05 PROCEDURE — 81003 URINALYSIS AUTO W/O SCOPE: CPT

## 2025-06-05 RX ADMIN — BACILLUS CALMETTE-GUERIN 50 MG: 50 POWDER, FOR SUSPENSION INTRAVESICAL at 14:13

## 2025-06-05 ASSESSMENT — PAIN SCALES - GENERAL: PAINLEVEL_OUTOF10: NO PAIN (0)

## 2025-06-05 NOTE — PROGRESS NOTES
Infusion Nursing Note:  Lorin Andrade presents today for BCG.  Instillation number 2 of 6.   Patient seen by provider today: No   present during visit today: Not Applicable.    Note: pt states tolerated first instillation well with no side effects, able to complete 2 hour dwell time at home. Pt confirms not taking any anti-TNF inhibitors. Pt confirms taking Levofloxacin as prescribed.     Treatment Conditions:   Patient states no concerns or issues at this time.  Ok to proceed with treatment.     Labs Reviewed:  Urine analysis at urology clinic prior to visit  Results meet treatment conditions.     Procedure:  16F Ayala kit used.  16F Coude catheter placed.  Catheter placed without trauma.  Clear/yellow urine drained from bladder.    Patient turned every 15 minutes per protocol: Yes, turning to be completed at home per protocol.   Patient tolerated instillation without incident.      Discharge Plan:   Discharge instructions reviewed with: Patient.  Patient and/or family verbalized understanding of discharge instructions and all questions answered.  Copy of AVS reviewed with patient and/or family.  Patient will return as scheduled for next appointment.  Patient discharged in stable condition accompanied by: self.  Departure Mode: Ambulatory.

## 2025-06-05 NOTE — PATIENT INSTRUCTIONS
BCG was instilled into your bladder                                   For the next 2 hours, lie on your back for 15 min., then lie on your left side for 15 min., then lie on your right side for 15 min. Then you may sit up then, but keep the medication in your bladder for 60 more minutes for a total of 2 hours. After 2 hours, empty your bladder. After urinating (sit for this, don't stand), add 2 cups undiluted chlorine bleach to the toilet, close the lid, and flush after 15 min. Repeat this each time you urinate for the next 6 hours. Drink plenty of water today to flush any remaining BCG out of your bladder. If you were given a prescription for an antibiotic, take it as prescribed. Report any fevers to your doctor.

## 2025-06-10 ENCOUNTER — VIRTUAL VISIT (OUTPATIENT)
Dept: ONCOLOGY | Facility: CLINIC | Age: 65
End: 2025-06-10
Attending: UROLOGY
Payer: COMMERCIAL

## 2025-06-10 DIAGNOSIS — Z80.3 FAMILY HISTORY OF MALIGNANT NEOPLASM OF BREAST: ICD-10-CM

## 2025-06-10 DIAGNOSIS — C68.9 UROTHELIAL CARCINOMA (H): Primary | ICD-10-CM

## 2025-06-10 DIAGNOSIS — Z80.52 FAMILY HISTORY OF BLADDER CANCER: ICD-10-CM

## 2025-06-10 DIAGNOSIS — Z80.42 FAMILY HISTORY OF PROSTATE CANCER: ICD-10-CM

## 2025-06-10 DIAGNOSIS — Z80.51 FAMILY HISTORY OF KIDNEY CANCER: ICD-10-CM

## 2025-06-10 DIAGNOSIS — Z85.3 PERSONAL HISTORY OF MALIGNANT NEOPLASM OF BREAST: ICD-10-CM

## 2025-06-10 DIAGNOSIS — C67.8 MALIGNANT NEOPLASM OF OVERLAPPING SITES OF BLADDER (H): ICD-10-CM

## 2025-06-10 NOTE — PROGRESS NOTES
Virtual Visit Details    Type of service:  Video Visit   Video Start Time: 7:56  Video End Time: 8:45  Originating Location (pt. Location): Home  Distant Location (provider location):  Off-site  Platform used for Video Visit: Atul    6/10/2025    Referring Provider: Rogelio Gallagher MD    Present for Today's Visit: Lorin    Presenting Information:   I met with Lorin Andrade today for genetic counseling (video visit) to discuss her personal and family history of cancer.  She is here today to review this history, cancer screening recommendations, and available genetic testing options.    Personal History:  Lorin is a 64 year old female. She was diagnosed with a high grade urothelial cell carcinoma in May 2024; treatment has included transurethral resection of bladder tumor in April 2025 and she is currently on her second round of BCG therapy. She also has a history of breast cancer diagnosed in 2006 at age 44; treatment included bilateral mastectomy, radiation, and tamoxifen. She reports that she had negative BRCA1/2 testing in 2006 or 2007.      She had her first menstrual period at age 14, her first child at age 32, and reports that she ai through surgical menopause at age 45.  Lorin had her ovaries removed at age 45 after her breast cancer diagnosis. She reports past history of oral contraceptive use for about 3 years and that she has never been on hormone replacement therapy.      Most recent colonoscopy in 2023 and follow-up was recommended in 10 years. She does not regularly do any other cancer screening at this time.  Lorni reported no tobacco use, and no alcohol use.    Family History: Cancer family history and pertinent information reviewed below (Please see scanned pedigree for detailed family history information)  Sister passed at age 80 with a history of breast cancer diagnosed at age 70.   Sister passed at age 50 from breast cancer diagnosed at age 48.   Sister passed at age 51 from heart issues with a history  of skin cancer (unknown type).  Maternal uncle with a history of prostate cancer and passed at age 75.   Maternal aunt passed at age 45 with a history of breast cancer diagnosed in her 40's.   Maternal aunt with a history of breast cancer diagnosed after age 50.   Father had a history of a non-melanoma skin cancer diagnosed after age 60.   Three paternal uncles passed in their 70's/80's from prostate cancers.   Paternal uncle with a history of bladder cancer diagnosed in his 90's.   Paternal uncle with a history of skin cancer (unknown type).   Paternal first cousin with a history of kidney cancer (unknown age at diagnosis)  Two paternal first cousins with histories of bladder cancers (unknown ages at diagnosis)  There is no known Ashkenazi Religion ancestry on either side of her family. There is no reported consanguinity.    Discussion:  Lorin's personal and family history of cancer is suggestive of a hereditary cancer syndrome.  We reviewed the features of sporadic, familial, and hereditary cancers. In looking at Lorin's family history, it is possible that a cancer susceptibility gene is present due to her personal history of early onset breast cancer and her recent diagnosis of urothelial cancer as well as her family history of breast cancer (including early-onset diagnoses for some of her relatives), prostate cancer, and bladder and kidney cancer.  We discussed the natural history and genetics of hereditary cancers. A detailed handout regarding the information we discussed will be sent to Lorin via [employer]. Topics included: inheritance pattern, cancer risks, cancer screening recommendations, and also risks, benefits and limitations of testing.  Yang syndrome can be caused by a mutation in one of five genes:  MLH1, MSH2, MSH6, PMS2, and EPCAM.  Yang syndrome may present with polyps, but typically a small number.  The highest cancer risks associated with Yang syndrome include colon cancer, endometrial/uterine  cancer, gastric cancer, and ovarian cancer.  We reviewed the BRCA1/2 genes, for which Lorin had previous negative genetic testing. We did discuss that newer testing technology may be able to identify mutations in BRCA1/2 that were missed by older testing technology. Testing is also available for additional cancer risk genes via panel testing.   Based on her personal and family history, Lorin meets current National Comprehensive Cancer Network (NCCN) criteria for genetic testing of high-penetrance breast cancer susceptibility genes (including BRCA1, BRCA2, CDH1, PALB2, PTEN, STK11, and TP53).    We discussed that there are additional genes that could cause increased risk for the cancers we've seen in Lorin's personal and family history. As many of these genes present with overlapping features in a family and accurate cancer risk cannot always be established based upon the pedigree analysis alone, it would be reasonable for Lorin to consider panel genetic testing to analyze multiple genes at once.  We reviewed genetic testing options given Lorin's personal and family history including targeted and expanded options. After our discussion, Lorin opted to proceed with genetic testing via BRCA1/2 analysis with reflex to the Comprehensive Cancer panel  + Kidney panel through the Molecular Diagnostics Lab at [facility].   Medical Management: For Lorin, we reviewed that the information from genetic testing may determine:  additional cancer screening for which Lorin may qualify (i.e. more frequent colonoscopies, upper endoscopy screening, more frequent dermatologic exams, etc.),  options for risk reducing surgeries Lorin could consider (i.e. surgery to remove her ovaries and/or uterus, etc.),    and targeted chemotherapies for Lorin's active cancer, or if she were to develop certain cancers in the future (i.e. immunotherapy for individuals with Yang syndrome, PARP inhibitors, etc.).   The possible outcomes of genetic testing including  positive, negative, and uncertain were discussed with Lorin. A handout that explains this in detail was provided to the patient.  Verbal consent obtained over video today with no witness required.   These recommendations will be discussed in detail once genetic testing is completed.     I spent 60 minutes on the date of the encounter doing chart review, history and exam, documentation and further activities as noted above.    Plan:  1) Today Lorin elected to proceed with BRCA1/2 analysis with reflex to the Comprehensive Cancer panel + Kidney Cancers panel through the Molecular Diagnostics Lab at [facility].   2) Lorin will be contacted by our scheduling department to set up a virtual result disclosure appointment as soon as results are available.     Letitia Blood MS, [facility]  Licensed, Certified Genetic Counselor  [facility]  [email or web address]

## 2025-06-10 NOTE — NURSING NOTE
[place] [address] [place] [place] [facility] [address]      Is the patient currently in the state of [place]? YES    Visit mode:VIDEO    If the visit is dropped, the patient can be reconnected by: VIDEO VISIT: Send to e-mail at: [email or web address]    Will anyone else be joining the visit? , Gurdeep, is present  (If patient encounters technical issues they should call [phone]    How would you like to obtain your AVS? MyChart    Are changes needed to the allergy or medication list? N/A    Reason for visit: Consult      Jillian BRAVO

## 2025-06-10 NOTE — LETTER
6/10/2025      Lorin Andrade  [address]  [facility] [address]      Dear Colleague,    Thank you for referring your patient, Lorin Andrade, to the [facility]. Please see a copy of my visit note below.    Virtual Visit Details    Type of service:  Video Visit   Video Start Time: 7:56  Video End Time: 8:45  Originating Location (pt. Location): Home  Distant Location (provider location):  Off-site  Platform used for Video Visit: Bindo    6/10/2025    Referring Provider: Rogelio Gallagher MD    Present for Today's Visit: Lorin    Presenting Information:   I met with Lorin Andrade today for genetic counseling (video visit) to discuss her personal and family history of cancer.  She is here today to review this history, cancer screening recommendations, and available genetic testing options.    Personal History:  Lorin is a 64 year old female. She was diagnosed with a high grade urothelial cell carcinoma in May 2024; treatment has included transurethral resection of bladder tumor in April 2025 and she is currently on her second round of BCG therapy. She also has a history of breast cancer diagnosed in 2006 at age 44; treatment included bilateral mastectomy, radiation, and tamoxifen. She reports that she had negative BRCA1/2 testing in 2006 or 2007.      She had her first menstrual period at age 14, her first child at age 32, and reports that she ai through surgical menopause at age 45.  Lorin had her ovaries removed at age 45 after her breast cancer diagnosis. She reports past history of oral contraceptive use for about 3 years and that she has never been on hormone replacement therapy.      Most recent colonoscopy in 2023 and follow-up was recommended in 10 years. She does not regularly do any other cancer screening at this time.  Lorin reported no tobacco use, and no alcohol use.    Family History: Cancer family history and pertinent information reviewed below (Please see scanned pedigree  for detailed family history information)  Sister passed at age 80 with a history of breast cancer diagnosed at age 70.   Sister passed at age 50 from breast cancer diagnosed at age 48.   Sister passed at age 51 from heart issues with a history of skin cancer (unknown type).  Maternal uncle with a history of prostate cancer and passed at age 75.   Maternal aunt passed at age 45 with a history of breast cancer diagnosed in her 40's.   Maternal aunt with a history of breast cancer diagnosed after age 50.   Father had a history of a non-melanoma skin cancer diagnosed after age 60.   Three paternal uncles passed in their 70's/80's from prostate cancers.   Paternal uncle with a history of bladder cancer diagnosed in his 90's.   Paternal uncle with a history of skin cancer (unknown type).   Paternal first cousin with a history of kidney cancer (unknown age at diagnosis)  Two paternal first cousins with histories of bladder cancers (unknown ages at diagnosis)  There is no known Ashkenazi Pentecostal ancestry on either side of her family. There is no reported consanguinity.    Discussion:  Lorin's personal and family history of cancer is suggestive of a hereditary cancer syndrome.  We reviewed the features of sporadic, familial, and hereditary cancers. In looking at Lorin's family history, it is possible that a cancer susceptibility gene is present due to her personal history of early onset breast cancer and her recent diagnosis of urothelial cancer as well as her family history of breast cancer (including early-onset diagnoses for some of her relatives), prostate cancer, and bladder and kidney cancer.  We discussed the natural history and genetics of hereditary cancers. A detailed handout regarding the information we discussed will be sent to Lorin via [employer]. Topics included: inheritance pattern, cancer risks, cancer screening recommendations, and also risks, benefits and limitations of testing.  Yang syndrome can be caused by a  mutation in one of five genes:  MLH1, MSH2, MSH6, PMS2, and EPCAM.  Yang syndrome may present with polyps, but typically a small number.  The highest cancer risks associated with Yang syndrome include colon cancer, endometrial/uterine cancer, gastric cancer, and ovarian cancer.  We reviewed the BRCA1/2 genes, for which Lorin had previous negative genetic testing. We did discuss that newer testing technology may be able to identify mutations in BRCA1/2 that were missed by older testing technology. Testing is also available for additional cancer risk genes via panel testing.   Based on her personal and family history, Lorin meets current National Comprehensive Cancer Network (NCCN) criteria for genetic testing of high-penetrance breast cancer susceptibility genes (including BRCA1, BRCA2, CDH1, PALB2, PTEN, STK11, and TP53).    We discussed that there are additional genes that could cause increased risk for the cancers we've seen in Lorin's personal and family history. As many of these genes present with overlapping features in a family and accurate cancer risk cannot always be established based upon the pedigree analysis alone, it would be reasonable for Lorin to consider panel genetic testing to analyze multiple genes at once.  We reviewed genetic testing options given Lorin's personal and family history including targeted and expanded options. After our discussion, Lorin opted to proceed with genetic testing via BRCA1/2 analysis with reflex to the Comprehensive Cancer panel  + Kidney panel through the Molecular Diagnostics Lab at [facility].   Medical Management: For Lorin, we reviewed that the information from genetic testing may determine:  additional cancer screening for which Lorin may qualify (i.e. more frequent colonoscopies, upper endoscopy screening, more frequent dermatologic exams, etc.),  options for risk reducing surgeries Lorin could consider (i.e. surgery to remove her ovaries and/or uterus, etc.),    and  targeted chemotherapies for Lorin's active cancer, or if she were to develop certain cancers in the future (i.e. immunotherapy for individuals with Yang syndrome, PARP inhibitors, etc.).   The possible outcomes of genetic testing including positive, negative, and uncertain were discussed with Lorin. A handout that explains this in detail was provided to the patient.  Verbal consent obtained over video today with no witness required.   These recommendations will be discussed in detail once genetic testing is completed.     I spent 60 minutes on the date of the encounter doing chart review, history and exam, documentation and further activities as noted above.    Plan:  1) Today Lorin elected to proceed with BRCA1/2 analysis with reflex to the Comprehensive Cancer panel + Kidney Cancers panel through the Molecular Diagnostics Lab at [facility].   2) Lorin will be contacted by our scheduling department to set up a virtual result disclosure appointment as soon as results are available.     Letitia Blood MS, CGC  Licensed, Certified Genetic Counselor  [facility]  [email or web address]        Again, thank you for allowing me to participate in the care of your patient.        Sincerely,        Letitia Cortes GC    Electronically signed

## 2025-06-12 ENCOUNTER — LAB (OUTPATIENT)
Dept: LAB | Facility: CLINIC | Age: 65
End: 2025-06-12
Payer: COMMERCIAL

## 2025-06-12 ENCOUNTER — INFUSION THERAPY VISIT (OUTPATIENT)
Dept: INFUSION THERAPY | Facility: CLINIC | Age: 65
End: 2025-06-12
Attending: UROLOGY
Payer: COMMERCIAL

## 2025-06-12 VITALS
OXYGEN SATURATION: 98 % | HEART RATE: 76 BPM | DIASTOLIC BLOOD PRESSURE: 53 MMHG | SYSTOLIC BLOOD PRESSURE: 106 MMHG | TEMPERATURE: 97.8 F | RESPIRATION RATE: 16 BRPM

## 2025-06-12 DIAGNOSIS — C67.9 MALIGNANT NEOPLASM OF URINARY BLADDER, UNSPECIFIED SITE (H): Primary | ICD-10-CM

## 2025-06-12 DIAGNOSIS — C66.2 UROTHELIAL CARCINOMA OF LEFT DISTAL URETER (H): Primary | ICD-10-CM

## 2025-06-12 DIAGNOSIS — C67.8 MALIGNANT NEOPLASM OF OVERLAPPING SITES OF BLADDER (H): ICD-10-CM

## 2025-06-12 LAB
ALBUMIN UR-MCNC: NEGATIVE MG/DL
APPEARANCE UR: CLEAR
BILIRUB UR QL STRIP: NEGATIVE
COLOR UR AUTO: YELLOW
GLUCOSE UR STRIP-MCNC: NEGATIVE MG/DL
HGB UR QL STRIP: NEGATIVE
KETONES UR STRIP-MCNC: NEGATIVE MG/DL
LEUKOCYTE ESTERASE UR QL STRIP: NEGATIVE
NITRATE UR QL: NEGATIVE
PH UR STRIP: 6 [PH] (ref 5–7)
SP GR UR STRIP: 1.02 (ref 1–1.03)
UROBILINOGEN UR STRIP-ACNC: 0.2 E.U./DL

## 2025-06-12 PROCEDURE — 81003 URINALYSIS AUTO W/O SCOPE: CPT

## 2025-06-12 PROCEDURE — 250N000011 HC RX IP 250 OP 636: Mod: JZ | Performed by: UROLOGY

## 2025-06-12 RX ADMIN — BACILLUS CALMETTE-GUERIN 50 MG: 50 POWDER, FOR SUSPENSION INTRAVESICAL at 14:15

## 2025-06-12 NOTE — PROGRESS NOTES
Infusion Nursing Note:  Lorin Andrade presents today for BCG.  Instillation number 3 of 6.   Patient seen by provider today: No   present during visit today: Not Applicable.    Note: No new concerns this past week since last instillation.    Treatment Conditions:   Patient states no concerns or issues at this time.  Ok to proceed with treatment.     Labs Reviewed:  Urine analysis.  Results meet treatment conditions.     Procedure:  16F Ayala kit used.  16F Coude catheter placed.  Lidocaine urojet 2% 10ml used.  Catheter placed without trauma.  Clear/yellow urine drained from bladder.    Patient turned every 15 minutes per protocol: Yes, turning to be completed at home per protocol.   Patient tolerated instillation without incident.  Patient dwelled for 120 minutes.      Discharge Plan:   AVS to patient via [employer].  Patient will return as prev deidra for next appointment.   Patient discharged in stable condition accompanied by: self.  Departure Mode: Ambulatory.

## 2025-06-19 ENCOUNTER — LAB (OUTPATIENT)
Dept: LAB | Facility: CLINIC | Age: 65
End: 2025-06-19
Payer: COMMERCIAL

## 2025-06-19 ENCOUNTER — INFUSION THERAPY VISIT (OUTPATIENT)
Dept: INFUSION THERAPY | Facility: CLINIC | Age: 65
End: 2025-06-19
Attending: UROLOGY
Payer: COMMERCIAL

## 2025-06-19 VITALS
SYSTOLIC BLOOD PRESSURE: 104 MMHG | TEMPERATURE: 98 F | HEART RATE: 77 BPM | DIASTOLIC BLOOD PRESSURE: 58 MMHG | OXYGEN SATURATION: 96 % | RESPIRATION RATE: 16 BRPM

## 2025-06-19 DIAGNOSIS — C66.2 UROTHELIAL CARCINOMA OF LEFT DISTAL URETER (H): Primary | ICD-10-CM

## 2025-06-19 DIAGNOSIS — C67.9 MALIGNANT NEOPLASM OF URINARY BLADDER, UNSPECIFIED SITE (H): Primary | ICD-10-CM

## 2025-06-19 DIAGNOSIS — C67.8 MALIGNANT NEOPLASM OF OVERLAPPING SITES OF BLADDER (H): ICD-10-CM

## 2025-06-19 PROCEDURE — 81003 URINALYSIS AUTO W/O SCOPE: CPT

## 2025-06-19 PROCEDURE — 250N000011 HC RX IP 250 OP 636: Performed by: UROLOGY

## 2025-06-19 RX ADMIN — BACILLUS CALMETTE-GUERIN 50 MG: 50 POWDER, FOR SUSPENSION INTRAVESICAL at 14:53

## 2025-06-19 NOTE — PROGRESS NOTES
Infusion Nursing Note:  Lorin Andrade presents today for BCG.  Instillation number 4 of 6.   Patient seen by provider today: No   present during visit today: Not Applicable.    Note: No new concerns or complaints this past week. Reviewed levaquin use and post instillation instructions.    Treatment Conditions:   Patient states no concerns or issues at this time.  Ok to proceed with treatment.     Labs Reviewed:  Urine analysis.  Results meet treatment conditions.     Procedure:  16F Ayala kit used.  16F Coude catheter placed.  Catheter placed without trauma.  Clear/yellow urine drained from bladder.    Patient turned every 15 minutes per protocol: Yes, turning to be completed at home per protocol.   Patient tolerated instillation without incident.  Patient dwelled for 120 minutes.      Discharge Plan:   AVS to patient via MYCHART.  Patient will return as prev deidra next week for next appointment.   Patient discharged in stable condition accompanied by: self.  Departure Mode: Ambulatory.

## 2025-06-25 ENCOUNTER — LAB (OUTPATIENT)
Dept: LAB | Facility: CLINIC | Age: 65
End: 2025-06-25
Payer: COMMERCIAL

## 2025-06-25 ENCOUNTER — INFUSION THERAPY VISIT (OUTPATIENT)
Dept: INFUSION THERAPY | Facility: CLINIC | Age: 65
End: 2025-06-25
Attending: UROLOGY
Payer: COMMERCIAL

## 2025-06-25 VITALS
SYSTOLIC BLOOD PRESSURE: 103 MMHG | DIASTOLIC BLOOD PRESSURE: 65 MMHG | TEMPERATURE: 98.1 F | OXYGEN SATURATION: 95 % | HEART RATE: 81 BPM | RESPIRATION RATE: 16 BRPM

## 2025-06-25 DIAGNOSIS — C67.8 MALIGNANT NEOPLASM OF OVERLAPPING SITES OF BLADDER (H): ICD-10-CM

## 2025-06-25 DIAGNOSIS — R39.89 SUSPECTED UTI: Primary | ICD-10-CM

## 2025-06-25 DIAGNOSIS — C66.2 UROTHELIAL CARCINOMA OF LEFT DISTAL URETER (H): Primary | ICD-10-CM

## 2025-06-25 DIAGNOSIS — C67.9 MALIGNANT NEOPLASM OF URINARY BLADDER, UNSPECIFIED SITE (H): ICD-10-CM

## 2025-06-25 LAB
ALBUMIN UR-MCNC: NEGATIVE MG/DL
APPEARANCE UR: CLEAR
BILIRUB UR QL STRIP: NEGATIVE
COLOR UR AUTO: YELLOW
GLUCOSE UR STRIP-MCNC: NEGATIVE MG/DL
HGB UR QL STRIP: ABNORMAL
KETONES UR STRIP-MCNC: NEGATIVE MG/DL
LEUKOCYTE ESTERASE UR QL STRIP: NEGATIVE
NITRATE UR QL: NEGATIVE
PH UR STRIP: 6.5 [PH] (ref 5–7)
SP GR UR STRIP: 1.02 (ref 1–1.03)
UROBILINOGEN UR STRIP-ACNC: 1 E.U./DL

## 2025-06-25 PROCEDURE — 81003 URINALYSIS AUTO W/O SCOPE: CPT

## 2025-06-25 PROCEDURE — 250N000011 HC RX IP 250 OP 636: Performed by: UROLOGY

## 2025-06-25 PROCEDURE — 51720 TREATMENT OF BLADDER LESION: CPT

## 2025-06-25 RX ADMIN — BACILLUS CALMETTE-GUERIN 50 MG: 50 POWDER, FOR SUSPENSION INTRAVESICAL at 14:03

## 2025-06-25 ASSESSMENT — PAIN SCALES - GENERAL: PAINLEVEL_OUTOF10: MILD PAIN (2)

## 2025-06-25 NOTE — PROGRESS NOTES
Infusion Nursing Note:  Lorin Andrade presents today for BCG.  Instillation number 5 of 6.   Patient seen by provider today: No   present during visit today: Not Applicable.    Note: pt denies any urinary frequency, urgency or ruddy blood. Pt knows to take the levaquin as prescribed.    Treatment Conditions:   Patient states no concerns or issues at this time.  Ok to proceed with treatment.     Labs Reviewed:  Urine analysis.  Results meet treatment conditions.     Procedure:  16F Coude catheter placed.  Catheter placed without trauma.  Clear/yellow urine drained from bladder.    Patient turned every 15 minutes per protocol: Yes, turning to be completed at home per protocol.   Patient tolerated instillation without incident.        Discharge Plan:   Discharge instructions reviewed with: Patient.  AVS to patient via [employer].  Patient will return in 1 week for next appointment.   Patient discharged in stable condition accompanied by: self.  Departure Mode: Ambulatory.

## 2025-07-02 ENCOUNTER — LAB (OUTPATIENT)
Dept: LAB | Facility: CLINIC | Age: 65
End: 2025-07-02
Payer: COMMERCIAL

## 2025-07-02 DIAGNOSIS — C67.9 MALIGNANT NEOPLASM OF URINARY BLADDER, UNSPECIFIED SITE (H): Primary | ICD-10-CM

## 2025-07-02 LAB
ALBUMIN UR-MCNC: NEGATIVE MG/DL
APPEARANCE UR: CLEAR
BILIRUB UR QL STRIP: NEGATIVE
COLOR UR AUTO: YELLOW
GLUCOSE UR STRIP-MCNC: NEGATIVE MG/DL
HGB UR QL STRIP: NEGATIVE
KETONES UR STRIP-MCNC: ABNORMAL MG/DL
LEUKOCYTE ESTERASE UR QL STRIP: ABNORMAL
NITRATE UR QL: NEGATIVE
PH UR STRIP: 6 [PH] (ref 5–7)
SP GR UR STRIP: 1.02 (ref 1–1.03)
UROBILINOGEN UR STRIP-ACNC: 0.2 E.U./DL

## 2025-07-02 PROCEDURE — 81003 URINALYSIS AUTO W/O SCOPE: CPT

## 2025-07-03 ENCOUNTER — INFUSION THERAPY VISIT (OUTPATIENT)
Dept: INFUSION THERAPY | Facility: CLINIC | Age: 65
End: 2025-07-03
Attending: UROLOGY
Payer: COMMERCIAL

## 2025-07-03 VITALS
HEART RATE: 86 BPM | RESPIRATION RATE: 16 BRPM | TEMPERATURE: 97.9 F | SYSTOLIC BLOOD PRESSURE: 111 MMHG | DIASTOLIC BLOOD PRESSURE: 68 MMHG

## 2025-07-03 DIAGNOSIS — C67.8 MALIGNANT NEOPLASM OF OVERLAPPING SITES OF BLADDER (H): ICD-10-CM

## 2025-07-03 DIAGNOSIS — C66.2 UROTHELIAL CARCINOMA OF LEFT DISTAL URETER (H): Primary | ICD-10-CM

## 2025-07-03 PROCEDURE — 250N000011 HC RX IP 250 OP 636: Performed by: UROLOGY

## 2025-07-03 RX ADMIN — BACILLUS CALMETTE-GUERIN 50 MG: 50 POWDER, FOR SUSPENSION INTRAVESICAL at 14:15

## 2025-07-03 NOTE — PROGRESS NOTES
Infusion Nursing Note:  Lorin Andrade presents today for BCG.  Instillation number 6of 6.   Patient seen by provider today: No   present during visit today: Not Applicable.     Note: Pt reports no new  health changes or concerns today.  Pt confirms she is not taking any anti-TNF inhibitors. She also confirms tolerating the BCG well, with the exception of some bladder irritation post instillation. Denies burning sensation  with urination and frequency, and denies  blood in the urine.   Reviewed levaquin use and post instillation instructions completed.     Treatment Conditions:   Patient states no concerns or issues at this time.  Ok to proceed with treatment.      Labs Reviewed:  Urine analysis.  Results meet treatment conditions.      Procedure:  16F Ayala kit used.  16F Coude catheter placed.  Catheter placed without trauma.  Clear/yellow urine drained from bladder.    Patient turned every 15 minutes per protocol: Yes, turning to be completed at home per protocol.   Patient tolerated instillation without incident.  Patient dwelled for 120 minutes.        Discharge Plan:   AVS to patient via MYCHART.  Patient will return as advised by her provider for next appointment.  Patient discharged in stable condition accompanied by: self.  Departure Mode: Ambulatory    Arlin Olson

## 2025-07-15 ENCOUNTER — LAB (OUTPATIENT)
Dept: LAB | Facility: CLINIC | Age: 65
End: 2025-07-15
Payer: COMMERCIAL

## 2025-07-15 DIAGNOSIS — C68.9 UROTHELIAL CARCINOMA (H): Primary | ICD-10-CM

## 2025-07-15 DIAGNOSIS — Z80.42 FAMILY HISTORY OF PROSTATE CANCER: ICD-10-CM

## 2025-07-15 DIAGNOSIS — Z80.51 FAMILY HISTORY OF KIDNEY CANCER: ICD-10-CM

## 2025-07-15 DIAGNOSIS — Z80.52 FAMILY HISTORY OF BLADDER CANCER: ICD-10-CM

## 2025-07-15 DIAGNOSIS — Z80.3 FAMILY HISTORY OF MALIGNANT NEOPLASM OF BREAST: ICD-10-CM

## 2025-07-15 DIAGNOSIS — Z85.3 PERSONAL HISTORY OF MALIGNANT NEOPLASM OF BREAST: ICD-10-CM

## 2025-07-15 PROCEDURE — 81162 BRCA1&2 GEN FULL SEQ DUP/DEL: CPT | Performed by: GENETIC COUNSELOR, MS

## 2025-07-15 PROCEDURE — G0452 MOLECULAR PATHOLOGY INTERPR: HCPCS | Mod: 26 | Performed by: PATHOLOGY

## 2025-07-16 ENCOUNTER — NURSE TRIAGE (OUTPATIENT)
Dept: FAMILY MEDICINE | Facility: CLINIC | Age: 65
End: 2025-07-16
Payer: COMMERCIAL

## 2025-07-16 NOTE — TELEPHONE ENCOUNTER
Triage connected with pt again and offered OV spot as approved per PCP - pt agreed, and was able to schedule the following:    Appointments in Next Year      Jul 17, 2025 1:00 PM  (Arrive by 12:40 PM)  Provider Visit with Mindy Mills MD  [facility] ([facility]) [phone]     Lindsey Fajardo RN

## 2025-07-16 NOTE — TELEPHONE ENCOUNTER
Triage again huddled virtually with PCP for alternative options -         Lindsey Fajardo RN

## 2025-07-16 NOTE — TELEPHONE ENCOUNTER
"Nurse Triage SBAR    Is this a 2nd Level Triage? YES, LICENSED PRACTITIONER REVIEW IS REQUIRED    Situation: Abd pain, chest \"tightness\" patient states about 15 minutes ago she had a stomach ache and then a shocking pain went up her rib cage to her sternum, lasting only a few seconds. She states she felt doubled over during this time. All sx have since resolved. Pt denies HA, SOB, difficulty breathing, numbness, weakness. Pt went out of her condo as she was alone and a friend had her chew 2 baby aspirin and took her vitals which were \"fine\" according to the friend, didn't have the number.    Background: Pt has never had this in the past, has no hx of blood clots. Pt is currently on a pause from immunotherapy for cancer.     Assessment: abd pain and chest tightness    Protocol Recommended Disposition:   See in Office Today    Recommendation: Please advise on next steps for pt. Pt didn't feel she needed to come to the clinic or be seen right now as symptoms are gone. Pt is anxious that this is the start of a heart attack. Pt was advised to be seen but wanted message routed to provider. Pt educated on resources and sx to watch for.    Routed to provider    Does the patient meet one of the following criteria for ADS visit consideration? No   Reason for Disposition   Patient wants to be seen     Wants to know what next steps should be   Cancer treatment in the past two months (or has cancer now)     Not chest pain more abd pain with chest tightness    Additional Information   Negative: SEVERE difficulty breathing (e.g., struggling for each breath, speaks in single words)   Negative: Difficult to awaken or acting confused (e.g., disoriented, slurred speech)   Negative: Shock suspected (e.g., cold/pale/clammy skin, too weak to stand, low BP, rapid pulse)   Negative: Passed out (i.e., lost consciousness, collapsed and was not responding)   Negative: Chest pain lasting longer than 5 minutes and ANY of the following:         " Pain is crushing, pressure-like, or heavy         Over 44 years old          Over 30 years old and one cardiac risk factor (e.g diabetes, high blood pressure, high cholesterol, smoker, or family history of heart disease)         History of heart disease (e.g. angina, heart attack, heart failure, bypass surgery, takes nitroglycerin)   Negative: Heart beating < 50 beats per minute OR > 140 beats per minute   Negative: Visible sweat on face or sweat dripping down face   Negative: Sounds like a life-threatening emergency to the triager   Negative: Fever > 100.4 F (38.0 C)   Negative: Chest pain(s) lasting a few seconds persists > 3 days   Negative: Rash in same area as pain (may be described as 'small blisters')   Negative: All other patients with chest pain (Exception: Fleeting chest pain lasting a few seconds.)   Negative: Patient says chest pain feels exactly the same as previously diagnosed 'heartburn' and describes burning in chest and accompanying sour taste in mouth   Negative: Chest pain lasting longer than 5 minutes and occurred in last 3 days (72 hours) (Exception: Feels exactly the same as previously diagnosed heartburn and has accompanying sour taste in mouth.)   Negative: Chest pain or 'angina' comes and goes and is happening more often (increasing in frequency) or getting worse (increasing in severity) (Exception: Chest pains that last only a few seconds.)   Negative: Dizziness or lightheadedness   Negative: Coughing up blood   Negative: Patient sounds very sick or weak to the triager   Negative: SEVERE chest pain   Negative: Pain also in shoulder(s) or arm(s) or jaw   Negative: Difficulty breathing   Negative: Cocaine use within last 3 days   Negative: Major surgery in the past month   Negative: Hip or leg fracture (broken bone) in past month (or had cast on leg or ankle in past month)   Negative: Illness requiring prolonged bedrest in past month (e.g., immobilization, long hospital stay)   Negative:  "Long-distance travel in past month (e.g., car, bus, train, plane; with trip lasting 6 or more hours)   Negative: History of prior 'blood clot' in leg or lungs (i.e., deep vein thrombosis, pulmonary embolism)   Negative: History of inherited increased risk of blood clots (e.g., Factor 5 Leiden, Anti-thrombin 3, Protein C or Protein S deficiency, Prothrombin mutation)   Negative: Heart beating irregularly or very rapidly   Negative: Followed an injury to chest    Answer Assessment - Initial Assessment Questions  1. LOCATION: \"Where does it hurt?\"        Abd up to sternum     2. RADIATION: \"Does the pain go anywhere else?\" (e.g., into neck, jaw, arms, back)      From stomach up to rib cage     3. ONSET: \"When did the chest pain begin?\" (Minutes, hours or days)       15 minutes ago     4. PATTERN: \"Does the pain come and go, or has it been constant since it started?\"  \"Does it get worse with exertion?\"       Pain is gone     5. DURATION: \"How long does it last\" (e.g., seconds, minutes, hours)      Seconds     6. SEVERITY: \"How bad is the pain?\"  (e.g., Scale 1-10; mild, moderate, or severe)     - MILD (1-3): doesn't interfere with normal activities      - MODERATE (4-7): interferes with normal activities or awakens from sleep     - SEVERE (8-10): excruciating pain, unable to do any normal activities        Moderate to severe when it occurred (shocking)     7. CARDIAC RISK FACTORS: \"Do you have any history of heart problems or risk factors for heart disease?\" (e.g., angina, prior heart attack; diabetes, high blood pressure, high cholesterol, smoker, or strong family history of heart disease)      No, but is going through cancer right now     8. PULMONARY RISK FACTORS: \"Do you have any history of lung disease?\"  (e.g., blood clots in lung, asthma, emphysema, birth control pills)      No    9. CAUSE: \"What do you think is causing the chest pain?\"      Unsure, heart attack or ate a rich lunch     10. OTHER SYMPTOMS: \"Do you " "have any other symptoms?\" (e.g., dizziness, nausea, vomiting, sweating, fever, difficulty breathing, cough)        Lightheaded, denies other sx     11. PREGNANCY: \"Is there any chance you are pregnant?\" \"When was your last menstrual period?\"        No    Protocols used: Chest Pain-A-[facility]    Anu Li RN    "

## 2025-07-16 NOTE — TELEPHONE ENCOUNTER
Triage spoke to pt and relayed PCP direction below - pt verbalized understanding, but refusing to go to ED at this time. Triage encouraged pt to consider going into ED or UC for safety, and pt stated she would speak to spouse and decide what to do next.    Lindsey Fajardo RN

## 2025-07-17 ENCOUNTER — TELEPHONE (OUTPATIENT)
Dept: FAMILY MEDICINE | Facility: CLINIC | Age: 65
End: 2025-07-17

## 2025-07-17 DIAGNOSIS — D64.9 ANEMIA, UNSPECIFIED TYPE: Primary | ICD-10-CM

## 2025-07-17 NOTE — TELEPHONE ENCOUNTER
Dr. Bella,     Pt was seen in ED as advised. Pt called to schedule F/U appts. ED recommends getting kidney function testing done within 1 week of discharge. Scheduled for 7/22 for labs, scheduled appt with you for 7/24. GFR and Creat lab pended. Please advise.     Magnolia Daniel RN on 7/17/2025 at 4:35 PM

## 2025-07-22 ENCOUNTER — LAB (OUTPATIENT)
Dept: LAB | Facility: CLINIC | Age: 65
End: 2025-07-22
Payer: COMMERCIAL

## 2025-07-22 DIAGNOSIS — D64.9 ANEMIA, UNSPECIFIED TYPE: ICD-10-CM

## 2025-07-22 LAB
BASOPHILS # BLD AUTO: 0 10E3/UL (ref 0–0.2)
BASOPHILS NFR BLD AUTO: 1 %
CREAT SERPL-MCNC: 1.01 MG/DL (ref 0.51–0.95)
EGFRCR SERPLBLD CKD-EPI 2021: 63 ML/MIN/1.73M2
EOSINOPHIL # BLD AUTO: 0.3 10E3/UL (ref 0–0.7)
EOSINOPHIL NFR BLD AUTO: 5 %
ERYTHROCYTE [DISTWIDTH] IN BLOOD BY AUTOMATED COUNT: 12.8 % (ref 10–15)
FERRITIN SERPL-MCNC: 52 NG/ML (ref 11–328)
FOLATE SERPL-MCNC: 32.1 NG/ML (ref 4.6–34.8)
HCT VFR BLD AUTO: 36.7 % (ref 35–47)
HGB BLD-MCNC: 11.9 G/DL (ref 11.7–15.7)
IMM GRANULOCYTES # BLD: 0 10E3/UL
IMM GRANULOCYTES NFR BLD: 0 %
LYMPHOCYTES # BLD AUTO: 1.3 10E3/UL (ref 0.8–5.3)
LYMPHOCYTES NFR BLD AUTO: 25 %
MCH RBC QN AUTO: 30.4 PG (ref 26.5–33)
MCHC RBC AUTO-ENTMCNC: 32.4 G/DL (ref 31.5–36.5)
MCV RBC AUTO: 94 FL (ref 78–100)
MONOCYTES # BLD AUTO: 0.4 10E3/UL (ref 0–1.3)
MONOCYTES NFR BLD AUTO: 9 %
NEUTROPHILS # BLD AUTO: 3.2 10E3/UL (ref 1.6–8.3)
NEUTROPHILS NFR BLD AUTO: 61 %
PLATELET # BLD AUTO: 175 10E3/UL (ref 150–450)
RBC # BLD AUTO: 3.92 10E6/UL (ref 3.8–5.2)
RETICS # AUTO: 0.05 10E6/UL (ref 0.03–0.1)
RETICS/RBC NFR AUTO: 1.4 % (ref 0.5–2)
TSH SERPL DL<=0.005 MIU/L-ACNC: 1.81 UIU/ML (ref 0.3–4.2)
VIT B12 SERPL-MCNC: 588 PG/ML (ref 232–1245)
WBC # BLD AUTO: 5.2 10E3/UL (ref 4–11)

## 2025-07-22 PROCEDURE — 85045 AUTOMATED RETICULOCYTE COUNT: CPT

## 2025-07-22 PROCEDURE — 82607 VITAMIN B-12: CPT

## 2025-07-22 PROCEDURE — 36415 COLL VENOUS BLD VENIPUNCTURE: CPT

## 2025-07-22 PROCEDURE — 82565 ASSAY OF CREATININE: CPT

## 2025-07-22 PROCEDURE — 82728 ASSAY OF FERRITIN: CPT

## 2025-07-22 PROCEDURE — 85025 COMPLETE CBC W/AUTO DIFF WBC: CPT

## 2025-07-22 PROCEDURE — 82746 ASSAY OF FOLIC ACID SERUM: CPT

## 2025-07-22 PROCEDURE — 84443 ASSAY THYROID STIM HORMONE: CPT

## 2025-08-06 ENCOUNTER — OFFICE VISIT (OUTPATIENT)
Dept: UROLOGY | Facility: CLINIC | Age: 65
End: 2025-08-06
Payer: COMMERCIAL

## 2025-08-06 VITALS
BODY MASS INDEX: 20.89 KG/M2 | HEART RATE: 79 BPM | HEIGHT: 66 IN | DIASTOLIC BLOOD PRESSURE: 63 MMHG | OXYGEN SATURATION: 94 % | WEIGHT: 130 LBS | SYSTOLIC BLOOD PRESSURE: 96 MMHG

## 2025-08-06 DIAGNOSIS — Z90.5 S/P NEPHRECTOMY: ICD-10-CM

## 2025-08-06 DIAGNOSIS — C67.8 MALIGNANT NEOPLASM OF OVERLAPPING SITES OF BLADDER (H): Primary | ICD-10-CM

## 2025-08-06 LAB
ALBUMIN UR-MCNC: NEGATIVE MG/DL
APPEARANCE UR: CLEAR
BILIRUB UR QL STRIP: NEGATIVE
COLOR UR AUTO: YELLOW
GLUCOSE UR STRIP-MCNC: NEGATIVE MG/DL
HGB UR QL STRIP: NEGATIVE
KETONES UR STRIP-MCNC: NEGATIVE MG/DL
LEUKOCYTE ESTERASE UR QL STRIP: NEGATIVE
NITRATE UR QL: NEGATIVE
PH UR STRIP: 6 [PH] (ref 5–7)
SP GR UR STRIP: 1.01 (ref 1–1.03)
UROBILINOGEN UR STRIP-ACNC: 0.2 E.U./DL

## 2025-08-06 PROCEDURE — 81003 URINALYSIS AUTO W/O SCOPE: CPT | Mod: QW | Performed by: UROLOGY

## 2025-08-06 RX ORDER — LIDOCAINE HYDROCHLORIDE 20 MG/ML
JELLY TOPICAL ONCE
Status: COMPLETED | OUTPATIENT
Start: 2025-08-06 | End: 2025-08-06

## 2025-08-06 RX ADMIN — LIDOCAINE HYDROCHLORIDE 5 ML: 20 JELLY TOPICAL at 14:41

## 2025-08-06 ASSESSMENT — PAIN SCALES - GENERAL: PAINLEVEL_OUTOF10: MILD PAIN (2)

## 2025-08-11 ENCOUNTER — TELEPHONE (OUTPATIENT)
Dept: UROLOGY | Facility: CLINIC | Age: 65
End: 2025-08-11
Payer: COMMERCIAL

## 2025-08-21 ENCOUNTER — OFFICE VISIT (OUTPATIENT)
Dept: FAMILY MEDICINE | Facility: CLINIC | Age: 65
End: 2025-08-21
Payer: COMMERCIAL

## 2025-08-21 VITALS
HEIGHT: 66 IN | WEIGHT: 131.9 LBS | DIASTOLIC BLOOD PRESSURE: 71 MMHG | OXYGEN SATURATION: 96 % | RESPIRATION RATE: 18 BRPM | TEMPERATURE: 96.8 F | BODY MASS INDEX: 21.2 KG/M2 | SYSTOLIC BLOOD PRESSURE: 115 MMHG | HEART RATE: 77 BPM

## 2025-08-21 DIAGNOSIS — C67.8 MALIGNANT NEOPLASM OF OVERLAPPING SITES OF BLADDER (H): ICD-10-CM

## 2025-08-21 DIAGNOSIS — G62.9 PERIPHERAL POLYNEUROPATHY: ICD-10-CM

## 2025-08-21 DIAGNOSIS — Z01.818 PRE-OP EXAM: Primary | ICD-10-CM

## 2025-08-21 ASSESSMENT — PAIN SCALES - GENERAL: PAINLEVEL_OUTOF10: MILD PAIN (1)

## 2025-08-22 ENCOUNTER — HOSPITAL ENCOUNTER (OUTPATIENT)
Facility: CLINIC | Age: 65
Discharge: HOME | End: 2025-08-22
Attending: UROLOGY | Admitting: UROLOGY
Payer: COMMERCIAL

## 2025-08-22 ASSESSMENT — ACTIVITIES OF DAILY LIVING (ADL)
ADLS_ACUITY_SCORE: 32
ADLS_ACUITY_SCORE: 30
ADLS_ACUITY_SCORE: 30
ADLS_ACUITY_SCORE: 32
ADLS_ACUITY_SCORE: 30
ADLS_ACUITY_SCORE: 30

## 2025-08-25 ENCOUNTER — TELEPHONE (OUTPATIENT)
Dept: UROLOGY | Facility: CLINIC | Age: 65
End: 2025-08-25
Payer: COMMERCIAL

## 2025-08-28 ENCOUNTER — OFFICE VISIT (OUTPATIENT)
Dept: UROLOGY | Facility: CLINIC | Age: 65
End: 2025-08-28
Payer: COMMERCIAL

## 2025-08-28 VITALS
HEIGHT: 66 IN | DIASTOLIC BLOOD PRESSURE: 64 MMHG | OXYGEN SATURATION: 99 % | HEART RATE: 72 BPM | SYSTOLIC BLOOD PRESSURE: 98 MMHG | WEIGHT: 130 LBS | BODY MASS INDEX: 20.89 KG/M2

## 2025-08-28 DIAGNOSIS — C67.8 MALIGNANT NEOPLASM OF OVERLAPPING SITES OF BLADDER (H): Primary | ICD-10-CM

## 2025-08-28 DIAGNOSIS — C66.2 UROTHELIAL CARCINOMA OF LEFT DISTAL URETER (H): ICD-10-CM

## 2025-08-28 RX ORDER — LIDOCAINE HYDROCHLORIDE 20 MG/ML
10 JELLY TOPICAL
OUTPATIENT
Start: 2025-09-04

## 2025-08-28 RX ORDER — PETROLATUM,WHITE
OINTMENT IN PACKET (GRAM) TOPICAL
OUTPATIENT
Start: 2025-09-11

## 2025-08-28 RX ORDER — PETROLATUM,WHITE
OINTMENT IN PACKET (GRAM) TOPICAL
OUTPATIENT
Start: 2025-10-02

## 2025-08-28 RX ORDER — LIDOCAINE HYDROCHLORIDE 20 MG/ML
10 JELLY TOPICAL
OUTPATIENT
Start: 2025-09-18

## 2025-08-28 RX ORDER — WATER 10 ML/10ML
60 INJECTION INTRAMUSCULAR; INTRAVENOUS; SUBCUTANEOUS ONCE
OUTPATIENT
Start: 2025-09-04

## 2025-08-28 RX ORDER — WATER 10 ML/10ML
60 INJECTION INTRAMUSCULAR; INTRAVENOUS; SUBCUTANEOUS ONCE
OUTPATIENT
Start: 2025-10-02

## 2025-08-28 RX ORDER — LIDOCAINE HYDROCHLORIDE 20 MG/ML
10 JELLY TOPICAL
OUTPATIENT
Start: 2025-09-11

## 2025-08-28 RX ORDER — PETROLATUM,WHITE
OINTMENT IN PACKET (GRAM) TOPICAL
OUTPATIENT
Start: 2025-09-04

## 2025-08-28 RX ORDER — LIDOCAINE HYDROCHLORIDE 20 MG/ML
10 JELLY TOPICAL
OUTPATIENT
Start: 2025-10-02

## 2025-08-28 RX ORDER — WATER 10 ML/10ML
60 INJECTION INTRAMUSCULAR; INTRAVENOUS; SUBCUTANEOUS ONCE
OUTPATIENT
Start: 2025-09-18

## 2025-08-28 RX ORDER — PETROLATUM,WHITE
OINTMENT IN PACKET (GRAM) TOPICAL
OUTPATIENT
Start: 2025-09-18

## 2025-08-28 RX ORDER — PETROLATUM,WHITE
OINTMENT IN PACKET (GRAM) TOPICAL
OUTPATIENT
Start: 2025-09-25

## 2025-08-28 RX ORDER — PETROLATUM,WHITE
OINTMENT IN PACKET (GRAM) TOPICAL
OUTPATIENT
Start: 2025-08-28

## 2025-08-28 RX ORDER — WATER 10 ML/10ML
60 INJECTION INTRAMUSCULAR; INTRAVENOUS; SUBCUTANEOUS ONCE
OUTPATIENT
Start: 2025-09-25

## 2025-08-28 RX ORDER — WATER 10 ML/10ML
60 INJECTION INTRAMUSCULAR; INTRAVENOUS; SUBCUTANEOUS ONCE
OUTPATIENT
Start: 2025-08-28

## 2025-08-28 RX ORDER — WATER 10 ML/10ML
60 INJECTION INTRAMUSCULAR; INTRAVENOUS; SUBCUTANEOUS ONCE
OUTPATIENT
Start: 2025-09-11

## 2025-08-28 RX ORDER — LIDOCAINE HYDROCHLORIDE 20 MG/ML
10 JELLY TOPICAL
OUTPATIENT
Start: 2025-09-25

## 2025-08-28 RX ORDER — LIDOCAINE HYDROCHLORIDE 20 MG/ML
10 JELLY TOPICAL
OUTPATIENT
Start: 2025-08-28

## 2025-08-28 ASSESSMENT — PAIN SCALES - GENERAL: PAINLEVEL_OUTOF10: NO PAIN (0)

## 2025-09-03 ENCOUNTER — VIRTUAL VISIT (OUTPATIENT)
Dept: ONCOLOGY | Facility: CLINIC | Age: 65
End: 2025-09-03
Attending: GENETIC COUNSELOR, MS
Payer: COMMERCIAL

## 2025-09-03 DIAGNOSIS — Z80.52 FAMILY HISTORY OF BLADDER CANCER: ICD-10-CM

## 2025-09-03 DIAGNOSIS — C67.8 MALIGNANT NEOPLASM OF OVERLAPPING SITES OF BLADDER (H): Primary | ICD-10-CM

## 2025-09-03 DIAGNOSIS — C68.9 UROTHELIAL CARCINOMA (H): ICD-10-CM

## 2025-09-03 DIAGNOSIS — Z80.3 FAMILY HISTORY OF MALIGNANT NEOPLASM OF BREAST: ICD-10-CM

## 2025-09-03 DIAGNOSIS — Z85.3 PERSONAL HISTORY OF MALIGNANT NEOPLASM OF BREAST: ICD-10-CM

## 2025-09-03 DIAGNOSIS — Z80.51 FAMILY HISTORY OF KIDNEY CANCER: ICD-10-CM

## 2025-09-03 DIAGNOSIS — Z80.42 FAMILY HISTORY OF PROSTATE CANCER: ICD-10-CM

## 2025-09-03 PROCEDURE — 999N000069 HC STATISTIC GENETIC COUNSELING, < 16 MIN: Mod: GT,95 | Performed by: GENETIC COUNSELOR, MS

## (undated) DEVICE — ESU GROUND PAD UNIVERSAL W/O CORD

## (undated) DEVICE — CLEANER INST PRE-KLENZ SOAK SHIELD TUBE 6 ML MEDIUM 2D66J4

## (undated) DEVICE — GUIDEWIRE SENSOR DUAL FLEX STR 0.035"X150CM M0066703080

## (undated) DEVICE — SUCTION IRR STRYKERFLOW II W/TIP 250-070-520

## (undated) DEVICE — PACK DAVINCI UROLOGY SBA15UDFSG

## (undated) DEVICE — ESU ELEC BIPOLAR CUTTING LOOP EXT LENGTH 24/26FR 27040GP130-

## (undated) DEVICE — DAVINCI XI DRAPE COLUMN 470341

## (undated) DEVICE — FCP BIOPSY URETEROSCOPIC PIRAHNA  3FR M0065051600

## (undated) DEVICE — CATH TRAY FOLEY SURESTEP 16FR WDRAIN BAG STLK LATEX A300316A

## (undated) DEVICE — PACK TUR CUSTOM SBA15RUFSE

## (undated) DEVICE — CONTRAST ISOVUE 300 61% IOPAMIDOL 10X30ML VIAL 131525

## (undated) DEVICE — SU PDS II 0 CT-2 27" Z334H

## (undated) DEVICE — GUIDEWIRE ZIPWIRE STIFF .035IN STRAIGHT TIP M0066802221

## (undated) DEVICE — RULER SURGICAL PLASTIC STRL LF CS628

## (undated) DEVICE — PAD CHUX UNDERPAD 23X24" 7136

## (undated) DEVICE — DECANTER BAG 2002S

## (undated) DEVICE — SOL WATER IRRIG 1000ML BOTTLE 2F7114

## (undated) DEVICE — SU VICRYL 3-0 RB-1 27" UND J215H

## (undated) DEVICE — STPL DAVINCI SUREFORM 30X8MM 488530

## (undated) DEVICE — CATH URETERAL TIGERTAIL 05FR 70CM 139005

## (undated) DEVICE — GLOVE BIOGEL PI SZ 7.5 40875

## (undated) DEVICE — BAG DRAIN URO FOR SIEMENS 8MM ADAPTER NS CC164NS-A

## (undated) DEVICE — SOL NACL 0.9% IRRIG 3000ML BAG 2B7477

## (undated) DEVICE — SU MONOCRYL 4-0 PS-2 18" UND Y496G

## (undated) DEVICE — GLOVE BIOGEL PI MICRO SZ 7.5 48575

## (undated) DEVICE — EVACUATOR BLADDER UROVAC LATEX M0067301250

## (undated) DEVICE — CATH TRAY FOLEY SURESTEP 16FR W/URINE MTR STATLK LF A303416A

## (undated) DEVICE — LASER FIBER HOLMIUM MOSES 200 D/F/L AC-10030100

## (undated) DEVICE — WIPES FOLEY CARE SURESTEP PROVON DFC100

## (undated) DEVICE — Device

## (undated) DEVICE — DRAIN JACKSON PRATT CHANNEL 19FR ROUND HUBLESS SIL JP-2230

## (undated) DEVICE — SOLUTION IV IRRIGATION 0.9% NACL 3L R8206

## (undated) DEVICE — SUCTION MANIFOLD NEPTUNE 2 SYS 4 PORT 0702-020-000

## (undated) DEVICE — ENDO TROCAR CONMED AIRSEAL BLADELESS 12X120MM IAS12-120LP

## (undated) DEVICE — SU SILK 2-0 FSL 18" 677G

## (undated) DEVICE — DAVINCI XI SEAL UNIVERSAL 5-12MM 470500

## (undated) DEVICE — SOL NACL 0.9% IRRIG 1000ML BOTTLE 2F7124

## (undated) DEVICE — GUIDEWIRE AMPLATZ SUPER STIFF .035 X 145CM M0066401080

## (undated) DEVICE — VIAL DECANTER STERILE WHITE DYNJDEC06

## (undated) DEVICE — SU VICRYL 0 TIE 12X18" J906G

## (undated) DEVICE — ANTIFOG SOLUTION SEE SHARP 150M TROCAR SWABS 30978 (COI)

## (undated) DEVICE — ENDO POUCH UNIVERSAL RETRIEVAL SYSTEM INZII 12/15MM CD004

## (undated) DEVICE — TUBING CONMED AIRSEAL SMOKE EVAC INSUFFLATION ASM-EVAC

## (undated) DEVICE — NDL INSUFFLATION 13GA 120MM C2201

## (undated) DEVICE — DAVINCI XI MONOPOLAR SCISSORS HOT SHEARS 8MM 470179

## (undated) DEVICE — KIT PATIENT POSITIONING PIGAZZI LATEX FREE 40580

## (undated) DEVICE — FCP BX BACKLOADING BIGOPSY 2.4FRX115CM G48240

## (undated) DEVICE — ENDO SEAL BX PORT BPS-A

## (undated) DEVICE — CATH URETERAL FLEX TIP TIGERTAIL 06FRX70CM 139006

## (undated) DEVICE — ANTIFOG SOLUTION SEE SHARP 150M TROCAR SWABS 30978

## (undated) DEVICE — DAVINCI XI NDL DRIVER LARGE 470006

## (undated) DEVICE — PUMP SYSTEM SINGLE ACTION M0067201000

## (undated) DEVICE — DRAIN JACKSON PRATT RESERVOIR 100ML SU130-1305

## (undated) DEVICE — DAVINCI XI GRASPER ENDOWRIST PROGRASP 470093

## (undated) DEVICE — DAVINCI HOT SHEARS TIP COVER  400180

## (undated) DEVICE — ENDO SEAL BX PORT ABP

## (undated) DEVICE — SU WND CLOSURE VLOC 90 ABS 3-0 VIOLET 6" CV-23 VLOCM0804

## (undated) DEVICE — CATH TRAY FOLEY 16FR BARDEX W/DRAIN BAG STATLOCK 300316A

## (undated) DEVICE — SU DERMABOND ADVANCED .7ML DNX12

## (undated) DEVICE — ENDO POUCH UNIVERSAL RETRIEVAL SYSTEM INZII 5MM CD003

## (undated) DEVICE — LINEN TOWEL PACK X5 5464

## (undated) DEVICE — SU VICRYL 0 CT-1 27" J340H

## (undated) DEVICE — DAVINCI XI DRAPE ARM 470015

## (undated) DEVICE — ADD ON FEE

## (undated) DEVICE — GOWN XXL 9575

## (undated) DEVICE — BLADE CLIPPER 4406

## (undated) DEVICE — HOLMIUM LASER

## (undated) DEVICE — CLIP ENDO HEMO-LOC PURPLE LG 544240

## (undated) DEVICE — SYR 10ML FINGER CONTROL W/O NDL 309695

## (undated) DEVICE — TAPE DURAPORE 3" SILK 1538-3

## (undated) DEVICE — VESSEL LOOPS YELLOW MINI 31145660

## (undated) DEVICE — SU VICRYL 4-0 RB-1 27" J304

## (undated) DEVICE — GUIDEWIRE SENSOR DUAL FLEX ANG 0.035"X150CM M0066703010

## (undated) DEVICE — STPL DAVINCI SUREFORM 30MM RELOAD WHITE 48230W

## (undated) DEVICE — SOL NACL 0.9% INJ 1000ML BAG 2B1324X

## (undated) DEVICE — GLOVE BIOGEL PI MICRO INDICATOR UNDERGLOVE SZ 8.0 48980

## (undated) DEVICE — SU VICRYL+ 0 27IN CT-2 VLT VCP334H

## (undated) RX ORDER — FUROSEMIDE 10 MG/ML
INJECTION INTRAMUSCULAR; INTRAVENOUS
Status: DISPENSED
Start: 2024-12-16

## (undated) RX ORDER — BUPIVACAINE HYDROCHLORIDE 2.5 MG/ML
INJECTION, SOLUTION EPIDURAL; INFILTRATION; INTRACAUDAL
Status: DISPENSED
Start: 2024-06-24

## (undated) RX ORDER — ACETAMINOPHEN 325 MG/1
TABLET ORAL
Status: DISPENSED
Start: 2025-04-23

## (undated) RX ORDER — FENTANYL CITRATE 50 UG/ML
INJECTION, SOLUTION INTRAMUSCULAR; INTRAVENOUS
Status: DISPENSED
Start: 2024-06-24

## (undated) RX ORDER — FENTANYL CITRATE 50 UG/ML
INJECTION, SOLUTION INTRAMUSCULAR; INTRAVENOUS
Status: DISPENSED
Start: 2024-12-16

## (undated) RX ORDER — FUROSEMIDE 10 MG/ML
INJECTION INTRAMUSCULAR; INTRAVENOUS
Status: DISPENSED
Start: 2025-04-23

## (undated) RX ORDER — FENTANYL CITRATE 50 UG/ML
INJECTION, SOLUTION INTRAMUSCULAR; INTRAVENOUS
Status: DISPENSED
Start: 2023-08-16

## (undated) RX ORDER — FUROSEMIDE 10 MG/ML
INJECTION INTRAMUSCULAR; INTRAVENOUS
Status: DISPENSED
Start: 2024-05-01

## (undated) RX ORDER — ONDANSETRON 2 MG/ML
INJECTION INTRAMUSCULAR; INTRAVENOUS
Status: DISPENSED
Start: 2024-05-01

## (undated) RX ORDER — HYDROMORPHONE HCL IN WATER/PF 6 MG/30 ML
PATIENT CONTROLLED ANALGESIA SYRINGE INTRAVENOUS
Status: DISPENSED
Start: 2024-05-01

## (undated) RX ORDER — PROPOFOL 10 MG/ML
INJECTION, EMULSION INTRAVENOUS
Status: DISPENSED
Start: 2024-05-01

## (undated) RX ORDER — ONDANSETRON 2 MG/ML
INJECTION INTRAMUSCULAR; INTRAVENOUS
Status: DISPENSED
Start: 2025-04-23

## (undated) RX ORDER — FENTANYL CITRATE 50 UG/ML
INJECTION, SOLUTION INTRAMUSCULAR; INTRAVENOUS
Status: DISPENSED
Start: 2025-03-10

## (undated) RX ORDER — FUROSEMIDE 10 MG/ML
INJECTION INTRAMUSCULAR; INTRAVENOUS
Status: DISPENSED
Start: 2024-04-17

## (undated) RX ORDER — HEPARIN SODIUM 5000 [USP'U]/.5ML
INJECTION, SOLUTION INTRAVENOUS; SUBCUTANEOUS
Status: DISPENSED
Start: 2024-06-24

## (undated) RX ORDER — FUROSEMIDE 10 MG/ML
INJECTION INTRAMUSCULAR; INTRAVENOUS
Status: DISPENSED
Start: 2024-05-15

## (undated) RX ORDER — FENTANYL CITRATE 0.05 MG/ML
INJECTION, SOLUTION INTRAMUSCULAR; INTRAVENOUS
Status: DISPENSED
Start: 2024-05-01

## (undated) RX ORDER — CEFAZOLIN SODIUM/WATER 2 G/20 ML
SYRINGE (ML) INTRAVENOUS
Status: DISPENSED
Start: 2024-06-24

## (undated) RX ORDER — FENTANYL CITRATE 0.05 MG/ML
INJECTION, SOLUTION INTRAMUSCULAR; INTRAVENOUS
Status: DISPENSED
Start: 2024-11-04

## (undated) RX ORDER — FENTANYL CITRATE 50 UG/ML
INJECTION, SOLUTION INTRAMUSCULAR; INTRAVENOUS
Status: DISPENSED
Start: 2025-04-23

## (undated) RX ORDER — HYDROMORPHONE HYDROCHLORIDE 1 MG/ML
INJECTION, SOLUTION INTRAMUSCULAR; INTRAVENOUS; SUBCUTANEOUS
Status: DISPENSED
Start: 2024-06-24

## (undated) RX ORDER — FUROSEMIDE 10 MG/ML
INJECTION INTRAMUSCULAR; INTRAVENOUS
Status: DISPENSED
Start: 2024-11-04

## (undated) RX ORDER — CEFAZOLIN SODIUM/WATER 2 G/20 ML
SYRINGE (ML) INTRAVENOUS
Status: DISPENSED
Start: 2024-05-15

## (undated) RX ORDER — HEPARIN SODIUM 5000 [USP'U]/.5ML
INJECTION, SOLUTION INTRAVENOUS; SUBCUTANEOUS
Status: DISPENSED
Start: 2025-03-10

## (undated) RX ORDER — FENTANYL CITRATE 50 UG/ML
INJECTION, SOLUTION INTRAMUSCULAR; INTRAVENOUS
Status: DISPENSED
Start: 2024-05-15

## (undated) RX ORDER — ACETAMINOPHEN 325 MG/1
TABLET ORAL
Status: DISPENSED
Start: 2024-12-16

## (undated) RX ORDER — PROPOFOL 10 MG/ML
INJECTION, EMULSION INTRAVENOUS
Status: DISPENSED
Start: 2025-03-10

## (undated) RX ORDER — FENTANYL CITRATE 50 UG/ML
INJECTION, SOLUTION INTRAMUSCULAR; INTRAVENOUS
Status: DISPENSED
Start: 2024-05-01

## (undated) RX ORDER — HYDROMORPHONE HYDROCHLORIDE 1 MG/ML
INJECTION, SOLUTION INTRAMUSCULAR; INTRAVENOUS; SUBCUTANEOUS
Status: DISPENSED
Start: 2025-03-10

## (undated) RX ORDER — ONDANSETRON 2 MG/ML
INJECTION INTRAMUSCULAR; INTRAVENOUS
Status: DISPENSED
Start: 2024-12-16

## (undated) RX ORDER — GLYCOPYRROLATE 0.2 MG/ML
INJECTION, SOLUTION INTRAMUSCULAR; INTRAVENOUS
Status: DISPENSED
Start: 2025-03-10

## (undated) RX ORDER — BUPIVACAINE HYDROCHLORIDE 2.5 MG/ML
INJECTION, SOLUTION EPIDURAL; INFILTRATION; INTRACAUDAL; PERINEURAL
Status: DISPENSED
Start: 2025-03-10

## (undated) RX ORDER — ONDANSETRON 2 MG/ML
INJECTION INTRAMUSCULAR; INTRAVENOUS
Status: DISPENSED
Start: 2024-11-04

## (undated) RX ORDER — CEFAZOLIN SODIUM/WATER 2 G/20 ML
SYRINGE (ML) INTRAVENOUS
Status: DISPENSED
Start: 2025-03-10

## (undated) RX ORDER — DEXAMETHASONE SODIUM PHOSPHATE 4 MG/ML
INJECTION, SOLUTION INTRA-ARTICULAR; INTRALESIONAL; INTRAMUSCULAR; INTRAVENOUS; SOFT TISSUE
Status: DISPENSED
Start: 2024-06-24

## (undated) RX ORDER — PROPOFOL 10 MG/ML
INJECTION, EMULSION INTRAVENOUS
Status: DISPENSED
Start: 2024-06-24

## (undated) RX ORDER — KETOROLAC TROMETHAMINE 30 MG/ML
INJECTION, SOLUTION INTRAMUSCULAR; INTRAVENOUS
Status: DISPENSED
Start: 2024-12-16

## (undated) RX ORDER — HYDROMORPHONE HCL IN WATER/PF 6 MG/30 ML
PATIENT CONTROLLED ANALGESIA SYRINGE INTRAVENOUS
Status: DISPENSED
Start: 2024-06-24

## (undated) RX ORDER — FENTANYL CITRATE 50 UG/ML
INJECTION, SOLUTION INTRAMUSCULAR; INTRAVENOUS
Status: DISPENSED
Start: 2024-11-04

## (undated) RX ORDER — APREPITANT 40 MG/1
CAPSULE ORAL
Status: DISPENSED
Start: 2024-05-15

## (undated) RX ORDER — DEXAMETHASONE SODIUM PHOSPHATE 4 MG/ML
INJECTION, SOLUTION INTRA-ARTICULAR; INTRALESIONAL; INTRAMUSCULAR; INTRAVENOUS; SOFT TISSUE
Status: DISPENSED
Start: 2025-04-23

## (undated) RX ORDER — KETOROLAC TROMETHAMINE 30 MG/ML
INJECTION, SOLUTION INTRAMUSCULAR; INTRAVENOUS
Status: DISPENSED
Start: 2024-05-15

## (undated) RX ORDER — DEXAMETHASONE SODIUM PHOSPHATE 4 MG/ML
INJECTION, SOLUTION INTRA-ARTICULAR; INTRALESIONAL; INTRAMUSCULAR; INTRAVENOUS; SOFT TISSUE
Status: DISPENSED
Start: 2024-05-15

## (undated) RX ORDER — ONDANSETRON 2 MG/ML
INJECTION INTRAMUSCULAR; INTRAVENOUS
Status: DISPENSED
Start: 2024-06-24

## (undated) RX ORDER — CEFAZOLIN SODIUM/WATER 2 G/20 ML
SYRINGE (ML) INTRAVENOUS
Status: DISPENSED
Start: 2024-05-01

## (undated) RX ORDER — ACETAMINOPHEN 325 MG/1
TABLET ORAL
Status: DISPENSED
Start: 2025-03-10

## (undated) RX ORDER — ONDANSETRON 2 MG/ML
INJECTION INTRAMUSCULAR; INTRAVENOUS
Status: DISPENSED
Start: 2024-05-15

## (undated) RX ORDER — DEXAMETHASONE SODIUM PHOSPHATE 4 MG/ML
INJECTION, SOLUTION INTRA-ARTICULAR; INTRALESIONAL; INTRAMUSCULAR; INTRAVENOUS; SOFT TISSUE
Status: DISPENSED
Start: 2024-11-04

## (undated) RX ORDER — ACETAMINOPHEN 325 MG/1
TABLET ORAL
Status: DISPENSED
Start: 2024-11-04

## (undated) RX ORDER — ONDANSETRON 2 MG/ML
INJECTION INTRAMUSCULAR; INTRAVENOUS
Status: DISPENSED
Start: 2025-03-10

## (undated) RX ORDER — FENTANYL CITRATE 0.05 MG/ML
INJECTION, SOLUTION INTRAMUSCULAR; INTRAVENOUS
Status: DISPENSED
Start: 2025-03-10

## (undated) RX ORDER — DEXAMETHASONE SODIUM PHOSPHATE 4 MG/ML
INJECTION, SOLUTION INTRA-ARTICULAR; INTRALESIONAL; INTRAMUSCULAR; INTRAVENOUS; SOFT TISSUE
Status: DISPENSED
Start: 2024-12-16

## (undated) RX ORDER — PROPOFOL 10 MG/ML
INJECTION, EMULSION INTRAVENOUS
Status: DISPENSED
Start: 2024-05-15